# Patient Record
Sex: FEMALE | Race: WHITE | Employment: OTHER | URBAN - METROPOLITAN AREA
[De-identification: names, ages, dates, MRNs, and addresses within clinical notes are randomized per-mention and may not be internally consistent; named-entity substitution may affect disease eponyms.]

---

## 2017-01-11 ENCOUNTER — OFFICE VISIT (OUTPATIENT)
Dept: BEHAVIORAL/MENTAL HEALTH CLINIC | Age: 42
End: 2017-01-11

## 2017-01-11 VITALS
OXYGEN SATURATION: 95 % | HEIGHT: 67 IN | WEIGHT: 229 LBS | SYSTOLIC BLOOD PRESSURE: 123 MMHG | DIASTOLIC BLOOD PRESSURE: 86 MMHG | HEART RATE: 101 BPM | BODY MASS INDEX: 35.94 KG/M2

## 2017-01-11 DIAGNOSIS — F13.20 BENZODIAZEPINE DEPENDENCE (HCC): ICD-10-CM

## 2017-01-11 DIAGNOSIS — F41.9 ANXIETY: ICD-10-CM

## 2017-01-11 DIAGNOSIS — Z76.5 MALINGERING: Primary | ICD-10-CM

## 2017-01-11 NOTE — PATIENT INSTRUCTIONS
Sleep Tips    What to avoid    · Do not have drinks with caffeine, such as coffee or black tea, for 8 hours before bed. · Do not smoke or use other types of tobacco near bedtime. Nicotine is a stimulant and can keep you awake. · Avoid drinking alcohol late in the evening, because it can cause you to wake in the middle of the night. · Do not eat a big meal close to bedtime. If you are hungry, eat a light snack. · Do not drink a lot of water close to bedtime, because the need to urinate may wake you up during the night. · Do not read or watch TV in bed. Use the bed only for sleeping and sexual activity. What to try    · Go to bed at the same time every night, and wake up at the same time every morning. Do not take naps during the day. · Keep your bedroom quiet, dark, and cool. · Get regular exercise, but not within 3 to 4 hours of your bedtime. · Sleep on a comfortable pillow and mattress. · If watching the clock makes you anxious, turn it facing away from you so you cannot see the time. · If you worry when you lie down, start a worry book. Well before bedtime, write down your worries, and then set the book and your concerns aside. · Try meditation or other relaxation techniques before you go to bed. · If you cannot fall asleep, get up and go to another room until you feel sleepy. Do something relaxing. Repeat your bedtime routine before you go to bed again. Make your house quiet and calm about an hour before bedtime. Turn down the lights, turn off the TV, log off the computer, and turn down the volume on music. This can help you relax after a busy day. Substance Abuse Resources    Alcoholics Anonymous Hotline (XX)                        #623-1048    Narcotics Anonymous Hotline (NA)   #7-382-783-583-616-4410    [facility]   [address]   #[phone]   Http://[email or web address] LinguaLeo/index.htm    [employer] [facility])   [phone]      [address] [place], [place] [address] [facility]   [phone]       [facility] [address]    [facility] at [address] [place] [address]   Admissions: [phone]    [facility] for [facility]   [phone]    · [email or web address]

## 2017-01-11 NOTE — PROGRESS NOTES
Psychiatric Outpatient Progress Note    Account Number:  [id number]  Name: Steven Thakur    SUBJECTIVE:   CHIEF COMPLAINT:  Steven Thakur is a 39 y.o. female and was seen today for follow-up of psychiatric condition and psychotropic medication management. Client came early for the appointment. HPI:    Alon Beard reports the following psychiatric symptoms:  depression and anxiety. The symptoms have been present for few years and are of moderate to high severity.  Client reported sleep for 8-10 hrs and feels rested. Reported increased irritability at home over family members. Client was not sure about her medication regimen and ? Med compliance. She reported taking quetiapine. Does not remember tapering off of Duloxetine. Did not take paroxetine. Contributing factors include: social anxiety and financial stressors. Patient denies SI/HI/SIB. Side Effects:  none      Fam/Soc Hx (from [place] with updates):    Family History   Problem Relation Age of Onset    Hypertension Mother     Elevated Lipids Mother     Cancer Father      pancreatic    Thyroid Disease Paternal Aunt     Heart Disease Maternal Grandmother     Heart Disease Maternal Grandfather     Heart Disease Paternal Grandfather     Diabetes Paternal Aunt     Alcohol abuse Neg Hx     Arthritis-rheumatoid Neg Hx     Asthma Neg Hx     Bleeding Prob Neg Hx     Headache Neg Hx     Lung Disease Neg Hx     Migraines Neg Hx     Psychiatric Disorder Neg Hx     Stroke Neg Hx     Mental Retardation Neg Hx     Anesth Problems Neg Hx       Social History   Substance Use Topics    Smoking status: Current Every Day Smoker     Packs/day: 1.00    Smokeless tobacco: Never Used    Alcohol use No       REVIEW OF SYSTEMS:  Psychiatric:  depression, anxiety and social anxiety.   Appetite:no change from normal   Sleep: poor with difficulty initiating                  Mental Status exam: WNL except for      Sensorium  oriented to time, place and person   Relations cooperative    Eye Contact    appropriate   Appearance:  age appropriate, casually dressed, overweight and within normal Limits   Motor Behavior/Gait:  gait stable and within normal limits   Speech:  normal pitch and normal volume   Thought Process: goal directed, logical and within normal limits   Thought Content free of delusions and free of hallucinations   Suicidal ideations no plan , no intention and none   Homicidal ideations no plan , no intention and none   Mood:  anxious and irritability   Affect:  Anxious and mood-congruent   Memory recent  adequate   Memory remote:  adequate   Concentration:  adequate   Abstraction:  abstract   Insight:  fair   Reliability fair   Judgment:  fair       MEDICAL DECISION MAKING  Data: pertinent labs, imaging, medical records and diagnostic tests reviewed and incorporated in diagnosis and treatment plan    Allergies   Allergen Reactions    Ciprocinonide Hives     cipro    Demerol [Meperidine] Other (comments)     Goofy feeling, hallucinates        Current Outpatient Prescriptions   Medication Sig Dispense Refill    clonazePAM (KLONOPIN) 1 mg tablet Take 1 Tab by mouth daily. Max Daily Amount: 1 mg. 60 Tab 0    busPIRone (BUSPAR) 5 mg tablet Take 1 Tab by mouth three (3) times daily. 90 Tab 0    QUEtiapine (SEROQUEL) 100 mg tablet Take 1 Tab by mouth daily. 30 Tab 0    estradiol (ESTRACE) 0.5 mg tablet Take 0.5 mg by mouth daily.  pantoprazole (PROTONIX) 40 mg tablet Take 1 Tab by mouth daily. 30 Tab 3    GLUMETZA 1,000 mg TG24 24 hour tablet Take 1,000 mg by mouth nightly. 30 Tab 4    pravastatin (PRAVACHOL) 40 mg tablet TAKE ONE TABLET BY MOUTH NIGHTLY 30 Tab 4    fenofibrate micronized (LOFIBRA) 134 mg capsule Take 1 Cap by mouth daily. 30 Cap 4    sitaGLIPtin (JANUVIA) 100 mg tablet Take 1 Tab by mouth daily. 30 Tab 5    ranitidine (ZANTAC) 150 mg tablet Take 1 Tab by mouth nightly.  60 Tab 4    oxybutynin chloride XL (DITROPAN XL) 10 mg CR tablet Take 1 Tab by mouth nightly. TAKE ONE TABLET BY MOUTH EVERY DAY 30 Tab 4    terconazole (TERAZOL 7) 0.4 % vaginal cream Insert 1 Applicator into vagina nightly. 45 g 0    cholecalciferol, vitamin D3, (VITAMIN D3) 2,000 unit tab Take  by mouth.  Lancets (ONE TOUCH ULTRASOFT LANCETS) Misc Use as directed   1 Package 11    Blood-Glucose Meter (ONE TOUCH ULTRA SYSTEM KIT) monitoring kit Use as directed 1 Kit 0    glucose blood VI test strips (ONE TOUCH ULTRA TEST) strip Monitor BS BID  Dx: 250.02   1 Package 11    psyllium (METAMUCIL) 1.7 g Wafr Take  by mouth.  PARoxetine (PAXIL) 20 mg tablet Take 1 Tab by mouth daily. 30 Tab 0    dapagliflozin (FARXIGA) 5 mg tab tablet Take 1 Tab by mouth daily. 30 Tab 3    aspirin delayed-release 81 mg tablet Take 1 Tab by mouth daily. 30 Tab 3        Visit Vitals    /86 (BP 1 Location: Left arm, BP Patient Position: Sitting)    Pulse (!) 101    Ht 5' 7\" (1.702 m)    Wt 103.9 kg (229 lb)    LMP 09/10/2014 (Exact Date)    SpO2 95%    BMI 35.87 kg/m2         Problems addressed today:   anxiety, depression, social anxiety, R/o Mood disorder, malingering    Assessment:  reviewed- last filled clonazepam and zolpidem on 12/16/ 16 by Dr. Eris Ennis and taking since June 2016. Has been on oxycodone till July 2016 by another provider , ELA Du Jessica Freeman  is a 39 y.o.  female  is partially responding to treatment. Reported irritability and angry at home, difficulty focus and concentrationClient is unable to recall her medication. She stated ' I am taking duloxetine\". She was informed that she ha been tapered off of it. Client began receiving clonazepam from her PCP. Currently taking 1 mg daily. Client was on ambien and clonazepam and was untruthful about it and stated that she is not taking clonazepam and zolpidem. Reviewed  with her. Reported sleeping for 6-10 hrs and taking quetiapine.  Never began taking paxil due to insurance issues. Client seems to be malingering and plan to taper off quetiapine and was informed client about tapering off and begin SSRI for anxiety. Client appeared drowsy, disoriented and ? under the influence. When informed that she has to stick to one provider for her psychiatric issues. She got up and dismissed the interview and left the office. Patient denies SI/HI/SIB. No evidence of AH/VH or delusions. Risk Scoring- chronic illnesses and prescription drug management    Treatment Plan:  1. Medications:          Medication Changes/Adjustments: No refills given     Current Outpatient Prescriptions   Medication Sig Dispense Refill    clonazePAM (KLONOPIN) 1 mg tablet Take 1 Tab by mouth daily. Max Daily Amount: 1 mg. 60 Tab 0    busPIRone (BUSPAR) 5 mg tablet Take 1 Tab by mouth three (3) times daily. 90 Tab 0    QUEtiapine (SEROQUEL) 100 mg tablet Take 1 Tab by mouth daily. 30 Tab 0    estradiol (ESTRACE) 0.5 mg tablet Take 0.5 mg by mouth daily.  pantoprazole (PROTONIX) 40 mg tablet Take 1 Tab by mouth daily. 30 Tab 3    GLUMETZA 1,000 mg TG24 24 hour tablet Take 1,000 mg by mouth nightly. 30 Tab 4    pravastatin (PRAVACHOL) 40 mg tablet TAKE ONE TABLET BY MOUTH NIGHTLY 30 Tab 4    fenofibrate micronized (LOFIBRA) 134 mg capsule Take 1 Cap by mouth daily. 30 Cap 4    sitaGLIPtin (JANUVIA) 100 mg tablet Take 1 Tab by mouth daily. 30 Tab 5    ranitidine (ZANTAC) 150 mg tablet Take 1 Tab by mouth nightly. 60 Tab 4    oxybutynin chloride XL (DITROPAN XL) 10 mg CR tablet Take 1 Tab by mouth nightly. TAKE ONE TABLET BY MOUTH EVERY DAY 30 Tab 4    terconazole (TERAZOL 7) 0.4 % vaginal cream Insert 1 Applicator into vagina nightly. 45 g 0    cholecalciferol, vitamin D3, (VITAMIN D3) 2,000 unit tab Take  by mouth.       Lancets (ONE TOUCH ULTRASOFT LANCETS) Misc Use as directed   1 Package 11    Blood-Glucose Meter (ONE TOUCH ULTRA SYSTEM KIT) monitoring kit Use as directed 1 Kit 0    glucose blood VI test strips (ONE TOUCH ULTRA TEST) strip Monitor BS BID  Dx: 250.02   1 Package 11    psyllium (METAMUCIL) 1.7 g Wafr Take  by mouth.  PARoxetine (PAXIL) 20 mg tablet Take 1 Tab by mouth daily. 30 Tab 0    dapagliflozin (FARXIGA) 5 mg tab tablet Take 1 Tab by mouth daily. 30 Tab 3    aspirin delayed-release 81 mg tablet Take 1 Tab by mouth daily. 30 Tab 3                  The following regarding medications was addressed:    (The risks and benefits of the proposed medication; the potential medication side effects ie    dry mouth, weight gain, GI upset, headache; patient given opportunity to ask questions)       2. Counseling and coordination of care including instructions for treatment, risks/benefits, risk factor reduction and patient/family education. She agrees with the plan. Patient instructed to call with any side effects, questions or issues. Instructed patient to call the clinic, and if after hours call the provider on call ifclient experiences any suicidal thought or ideas to hurt self or other. Also instructed to call 911 or go to the ED. Patient verbalized understanding and agreed to call    3. Follow-up Disposition: Not on File      PSYCHOTHERAPY:  approx 20 minutes  Type:  Supportive/Cognitive Behavioral psychotherapy provided  Focus:     Current problems- social anxiety- discussed on medication use and benzodiazepione use and seeing one provider for psychiatric diagnoses. Psychoeducation provided  Treatment plan reviewed with patient-including diagnosis and medications    Leonardo Gaviria is progressing.     Dara Wan NP  1/11/2017

## 2017-01-16 ENCOUNTER — TELEPHONE (OUTPATIENT)
Dept: BEHAVIORAL/MENTAL HEALTH CLINIC | Age: 42
End: 2017-01-16

## 2017-01-16 DIAGNOSIS — G47.00 INSOMNIA, UNSPECIFIED TYPE: ICD-10-CM

## 2017-01-17 RX ORDER — ZOLPIDEM TARTRATE 10 MG/1
10 TABLET ORAL
Qty: 30 TAB | Refills: 0 | Status: SHIPPED | OUTPATIENT
Start: 2017-01-17 | End: 2017-01-18

## 2017-01-18 ENCOUNTER — ANESTHESIA EVENT (OUTPATIENT)
Dept: SURGERY | Age: 42
End: 2017-01-18
Payer: MEDICAID

## 2017-01-18 RX ORDER — QUETIAPINE FUMARATE 100 MG/1
100 TABLET, FILM COATED ORAL
Status: ON HOLD | COMMUNITY
End: 2017-01-20 | Stop reason: SDUPTHER

## 2017-01-19 ENCOUNTER — ANESTHESIA (OUTPATIENT)
Dept: SURGERY | Age: 42
End: 2017-01-19
Payer: MEDICAID

## 2017-01-19 ENCOUNTER — HOSPITAL ENCOUNTER (OUTPATIENT)
Age: 42
Setting detail: OBSERVATION
Discharge: HOME HEALTH | End: 2017-01-20
Attending: ORTHOPAEDIC SURGERY | Admitting: ORTHOPAEDIC SURGERY
Payer: MEDICAID

## 2017-01-19 LAB
GLUCOSE BLD STRIP.AUTO-MCNC: 169 MG/DL (ref 65–100)
GLUCOSE BLD STRIP.AUTO-MCNC: 198 MG/DL (ref 65–100)
GLUCOSE BLD STRIP.AUTO-MCNC: 235 MG/DL (ref 65–100)
GLUCOSE BLD STRIP.AUTO-MCNC: 275 MG/DL (ref 65–100)
GLUCOSE BLD STRIP.AUTO-MCNC: 482 MG/DL (ref 65–100)
SERVICE CMNT-IMP: ABNORMAL

## 2017-01-19 PROCEDURE — 74011000250 HC RX REV CODE- 250: Performed by: ORTHOPAEDIC SURGERY

## 2017-01-19 PROCEDURE — 74011250636 HC RX REV CODE- 250/636: Performed by: ORTHOPAEDIC SURGERY

## 2017-01-19 PROCEDURE — 74011636637 HC RX REV CODE- 636/637: Performed by: INTERNAL MEDICINE

## 2017-01-19 PROCEDURE — 77030006835 HC BLD SAW SAG STRY -B: Performed by: ORTHOPAEDIC SURGERY

## 2017-01-19 PROCEDURE — 77030003601 HC NDL NRV BLK BBMI -A

## 2017-01-19 PROCEDURE — 76210000016 HC OR PH I REC 1 TO 1.5 HR: Performed by: ORTHOPAEDIC SURGERY

## 2017-01-19 PROCEDURE — 74011000250 HC RX REV CODE- 250

## 2017-01-19 PROCEDURE — 77030011640 HC PAD GRND REM COVD -A: Performed by: ORTHOPAEDIC SURGERY

## 2017-01-19 PROCEDURE — 77030019908 HC STETH ESOPH SIMS -A: Performed by: ANESTHESIOLOGY

## 2017-01-19 PROCEDURE — 77030013079 HC BLNKT BAIR HGGR 3M -A: Performed by: ANESTHESIOLOGY

## 2017-01-19 PROCEDURE — 74011000258 HC RX REV CODE- 258

## 2017-01-19 PROCEDURE — 77030020788: Performed by: ORTHOPAEDIC SURGERY

## 2017-01-19 PROCEDURE — 77030006784 HC BLD SAW OSC MCRA -B: Performed by: ORTHOPAEDIC SURGERY

## 2017-01-19 PROCEDURE — 74011250637 HC RX REV CODE- 250/637: Performed by: PHYSICIAN ASSISTANT

## 2017-01-19 PROCEDURE — 77030020753 HC CUF TRNQT 1BLA STRY -B: Performed by: ORTHOPAEDIC SURGERY

## 2017-01-19 PROCEDURE — 77030003666 HC NDL SPINAL BD -A: Performed by: ANESTHESIOLOGY

## 2017-01-19 PROCEDURE — 99218 HC RM OBSERVATION: CPT

## 2017-01-19 PROCEDURE — 82962 GLUCOSE BLOOD TEST: CPT

## 2017-01-19 PROCEDURE — 77030018822 HC SLV COMPR FT COVD -B

## 2017-01-19 PROCEDURE — 76010000161 HC OR TIME 1 TO 1.5 HR INTENSV-TIER 1: Performed by: ORTHOPAEDIC SURGERY

## 2017-01-19 PROCEDURE — 77030007866 HC KT SPN ANES BBMI -B: Performed by: ANESTHESIOLOGY

## 2017-01-19 PROCEDURE — 77030034850: Performed by: ORTHOPAEDIC SURGERY

## 2017-01-19 PROCEDURE — 77030018836 HC SOL IRR NACL ICUM -A: Performed by: ORTHOPAEDIC SURGERY

## 2017-01-19 PROCEDURE — 76060000033 HC ANESTHESIA 1 TO 1.5 HR: Performed by: ORTHOPAEDIC SURGERY

## 2017-01-19 PROCEDURE — 74011636637 HC RX REV CODE- 636/637: Performed by: ANESTHESIOLOGY

## 2017-01-19 PROCEDURE — 77030026763 HC BN CEM J&J -E: Performed by: ORTHOPAEDIC SURGERY

## 2017-01-19 PROCEDURE — 77030028224 HC PDNG CST BSNM -A: Performed by: ORTHOPAEDIC SURGERY

## 2017-01-19 PROCEDURE — 74011250636 HC RX REV CODE- 250/636

## 2017-01-19 PROCEDURE — 77030006813 HC BLD SAW RECIP STRY -C: Performed by: ORTHOPAEDIC SURGERY

## 2017-01-19 PROCEDURE — 74011000258 HC RX REV CODE- 258: Performed by: ORTHOPAEDIC SURGERY

## 2017-01-19 PROCEDURE — 77030008467 HC STPLR SKN COVD -B: Performed by: ORTHOPAEDIC SURGERY

## 2017-01-19 PROCEDURE — 74011250636 HC RX REV CODE- 250/636: Performed by: PHYSICIAN ASSISTANT

## 2017-01-19 PROCEDURE — 74011000250 HC RX REV CODE- 250: Performed by: ANESTHESIOLOGY

## 2017-01-19 PROCEDURE — 74011250636 HC RX REV CODE- 250/636: Performed by: ANESTHESIOLOGY

## 2017-01-19 PROCEDURE — C9290 INJ, BUPIVACAINE LIPOSOME: HCPCS | Performed by: ORTHOPAEDIC SURGERY

## 2017-01-19 PROCEDURE — C1776 JOINT DEVICE (IMPLANTABLE): HCPCS | Performed by: ORTHOPAEDIC SURGERY

## 2017-01-19 PROCEDURE — 77030031139 HC SUT VCRL2 J&J -A: Performed by: ORTHOPAEDIC SURGERY

## 2017-01-19 DEVICE — CEMENT BNE 40GM FULL DOSE PMMA W/ GENT HI VISC RADPQ LNG: Type: IMPLANTABLE DEVICE | Site: KNEE | Status: FUNCTIONAL

## 2017-01-19 DEVICE — PAT INST NXGN 32MM POLYETH --: Type: IMPLANTABLE DEVICE | Site: KNEE | Status: FUNCTIONAL

## 2017-01-19 DEVICE — IMPLANTABLE DEVICE: Type: IMPLANTABLE DEVICE | Site: KNEE | Status: FUNCTIONAL

## 2017-01-19 RX ORDER — MIDAZOLAM HYDROCHLORIDE 1 MG/ML
INJECTION, SOLUTION INTRAMUSCULAR; INTRAVENOUS AS NEEDED
Status: DISCONTINUED | OUTPATIENT
Start: 2017-01-19 | End: 2017-01-19 | Stop reason: HOSPADM

## 2017-01-19 RX ORDER — ONDANSETRON 2 MG/ML
4 INJECTION INTRAMUSCULAR; INTRAVENOUS
Status: DISCONTINUED | OUTPATIENT
Start: 2017-01-19 | End: 2017-01-20 | Stop reason: HOSPADM

## 2017-01-19 RX ORDER — MIDAZOLAM HYDROCHLORIDE 1 MG/ML
1 INJECTION, SOLUTION INTRAMUSCULAR; INTRAVENOUS AS NEEDED
Status: DISCONTINUED | OUTPATIENT
Start: 2017-01-19 | End: 2017-01-19 | Stop reason: HOSPADM

## 2017-01-19 RX ORDER — BUPIVACAINE HYDROCHLORIDE 7.5 MG/ML
INJECTION, SOLUTION EPIDURAL; RETROBULBAR AS NEEDED
Status: DISCONTINUED | OUTPATIENT
Start: 2017-01-19 | End: 2017-01-19 | Stop reason: HOSPADM

## 2017-01-19 RX ORDER — FENOFIBRATE 145 MG/1
145 TABLET, COATED ORAL DAILY
Status: DISCONTINUED | OUTPATIENT
Start: 2017-01-20 | End: 2017-01-20 | Stop reason: HOSPADM

## 2017-01-19 RX ORDER — CEFAZOLIN SODIUM IN 0.9 % NACL 2 G/100 ML
PLASTIC BAG, INJECTION (ML) INTRAVENOUS AS NEEDED
Status: DISCONTINUED | OUTPATIENT
Start: 2017-01-19 | End: 2017-01-19 | Stop reason: HOSPADM

## 2017-01-19 RX ORDER — MORPHINE SULFATE 10 MG/ML
2 INJECTION, SOLUTION INTRAMUSCULAR; INTRAVENOUS
Status: DISCONTINUED | OUTPATIENT
Start: 2017-01-19 | End: 2017-01-19 | Stop reason: HOSPADM

## 2017-01-19 RX ORDER — SODIUM CHLORIDE 0.9 % (FLUSH) 0.9 %
5-10 SYRINGE (ML) INJECTION EVERY 8 HOURS
Status: DISCONTINUED | OUTPATIENT
Start: 2017-01-19 | End: 2017-01-19 | Stop reason: HOSPADM

## 2017-01-19 RX ORDER — SODIUM CHLORIDE 0.9 % (FLUSH) 0.9 %
5-10 SYRINGE (ML) INJECTION AS NEEDED
Status: DISCONTINUED | OUTPATIENT
Start: 2017-01-19 | End: 2017-01-20 | Stop reason: HOSPADM

## 2017-01-19 RX ORDER — BUSPIRONE HYDROCHLORIDE 5 MG/1
5 TABLET ORAL 3 TIMES DAILY
Status: DISCONTINUED | OUTPATIENT
Start: 2017-01-19 | End: 2017-01-20 | Stop reason: HOSPADM

## 2017-01-19 RX ORDER — DEXTROSE 50 % IN WATER (D50W) INTRAVENOUS SYRINGE
12.5-25 AS NEEDED
Status: DISCONTINUED | OUTPATIENT
Start: 2017-01-19 | End: 2017-01-20 | Stop reason: HOSPADM

## 2017-01-19 RX ORDER — NALOXONE HYDROCHLORIDE 0.4 MG/ML
0.4 INJECTION, SOLUTION INTRAMUSCULAR; INTRAVENOUS; SUBCUTANEOUS AS NEEDED
Status: DISCONTINUED | OUTPATIENT
Start: 2017-01-19 | End: 2017-01-20 | Stop reason: HOSPADM

## 2017-01-19 RX ORDER — METFORMIN HYDROCHLORIDE 500 MG/1
1000 TABLET, EXTENDED RELEASE ORAL
Status: DISCONTINUED | OUTPATIENT
Start: 2017-01-19 | End: 2017-01-20 | Stop reason: HOSPADM

## 2017-01-19 RX ORDER — DEXAMETHASONE SODIUM PHOSPHATE 4 MG/ML
10 INJECTION, SOLUTION INTRA-ARTICULAR; INTRALESIONAL; INTRAMUSCULAR; INTRAVENOUS; SOFT TISSUE ONCE
Status: COMPLETED | OUTPATIENT
Start: 2017-01-20 | End: 2017-01-20

## 2017-01-19 RX ORDER — ONDANSETRON 2 MG/ML
INJECTION INTRAMUSCULAR; INTRAVENOUS AS NEEDED
Status: DISCONTINUED | OUTPATIENT
Start: 2017-01-19 | End: 2017-01-19 | Stop reason: HOSPADM

## 2017-01-19 RX ORDER — SODIUM CHLORIDE 0.9 % (FLUSH) 0.9 %
5-10 SYRINGE (ML) INJECTION EVERY 8 HOURS
Status: DISCONTINUED | OUTPATIENT
Start: 2017-01-20 | End: 2017-01-20 | Stop reason: HOSPADM

## 2017-01-19 RX ORDER — CEFAZOLIN SODIUM IN 0.9 % NACL 2 G/50 ML
2 INTRAVENOUS SOLUTION, PIGGYBACK (ML) INTRAVENOUS EVERY 8 HOURS
Status: DISCONTINUED | OUTPATIENT
Start: 2017-01-19 | End: 2017-01-19 | Stop reason: CLARIF

## 2017-01-19 RX ORDER — INSULIN LISPRO 100 [IU]/ML
6 INJECTION, SOLUTION INTRAVENOUS; SUBCUTANEOUS ONCE
Status: COMPLETED | OUTPATIENT
Start: 2017-01-19 | End: 2017-01-19

## 2017-01-19 RX ORDER — FACIAL-BODY WIPES
10 EACH TOPICAL DAILY PRN
Status: DISCONTINUED | OUTPATIENT
Start: 2017-01-21 | End: 2017-01-20 | Stop reason: HOSPADM

## 2017-01-19 RX ORDER — VANCOMYCIN 2 GRAM/500 ML IN 0.9 % SODIUM CHLORIDE INTRAVENOUS
2000 EVERY 12 HOURS
Status: COMPLETED | OUTPATIENT
Start: 2017-01-19 | End: 2017-01-20

## 2017-01-19 RX ORDER — ASPIRIN 325 MG
325 TABLET, DELAYED RELEASE (ENTERIC COATED) ORAL 2 TIMES DAILY
Status: DISCONTINUED | OUTPATIENT
Start: 2017-01-19 | End: 2017-01-20 | Stop reason: HOSPADM

## 2017-01-19 RX ORDER — INSULIN LISPRO 100 [IU]/ML
INJECTION, SOLUTION INTRAVENOUS; SUBCUTANEOUS
Status: DISCONTINUED | OUTPATIENT
Start: 2017-01-20 | End: 2017-01-20 | Stop reason: HOSPADM

## 2017-01-19 RX ORDER — SODIUM CHLORIDE, SODIUM LACTATE, POTASSIUM CHLORIDE, CALCIUM CHLORIDE 600; 310; 30; 20 MG/100ML; MG/100ML; MG/100ML; MG/100ML
INJECTION, SOLUTION INTRAVENOUS
Status: DISCONTINUED | OUTPATIENT
Start: 2017-01-19 | End: 2017-01-19 | Stop reason: HOSPADM

## 2017-01-19 RX ORDER — OXYCODONE HYDROCHLORIDE 5 MG/1
10 TABLET ORAL
Status: DISCONTINUED | OUTPATIENT
Start: 2017-01-19 | End: 2017-01-20 | Stop reason: HOSPADM

## 2017-01-19 RX ORDER — FENTANYL CITRATE 50 UG/ML
INJECTION, SOLUTION INTRAMUSCULAR; INTRAVENOUS AS NEEDED
Status: DISCONTINUED | OUTPATIENT
Start: 2017-01-19 | End: 2017-01-19 | Stop reason: HOSPADM

## 2017-01-19 RX ORDER — ONDANSETRON 2 MG/ML
4 INJECTION INTRAMUSCULAR; INTRAVENOUS AS NEEDED
Status: DISCONTINUED | OUTPATIENT
Start: 2017-01-19 | End: 2017-01-19 | Stop reason: HOSPADM

## 2017-01-19 RX ORDER — PHENYLEPHRINE HCL IN 0.9% NACL 0.4MG/10ML
SYRINGE (ML) INTRAVENOUS AS NEEDED
Status: DISCONTINUED | OUTPATIENT
Start: 2017-01-19 | End: 2017-01-19 | Stop reason: HOSPADM

## 2017-01-19 RX ORDER — MAGNESIUM SULFATE 100 %
4 CRYSTALS MISCELLANEOUS AS NEEDED
Status: DISCONTINUED | OUTPATIENT
Start: 2017-01-19 | End: 2017-01-20 | Stop reason: HOSPADM

## 2017-01-19 RX ORDER — KETOROLAC TROMETHAMINE 30 MG/ML
30 INJECTION, SOLUTION INTRAMUSCULAR; INTRAVENOUS EVERY 6 HOURS
Status: COMPLETED | OUTPATIENT
Start: 2017-01-19 | End: 2017-01-20

## 2017-01-19 RX ORDER — OXYBUTYNIN CHLORIDE 5 MG/1
10 TABLET, EXTENDED RELEASE ORAL
Status: DISCONTINUED | OUTPATIENT
Start: 2017-01-19 | End: 2017-01-20 | Stop reason: HOSPADM

## 2017-01-19 RX ORDER — OXYCODONE HYDROCHLORIDE 5 MG/1
5 TABLET ORAL
Status: DISCONTINUED | OUTPATIENT
Start: 2017-01-19 | End: 2017-01-20 | Stop reason: HOSPADM

## 2017-01-19 RX ORDER — OXYCODONE AND ACETAMINOPHEN 5; 325 MG/1; MG/1
1 TABLET ORAL AS NEEDED
Status: DISCONTINUED | OUTPATIENT
Start: 2017-01-19 | End: 2017-01-19 | Stop reason: HOSPADM

## 2017-01-19 RX ORDER — FENTANYL CITRATE 50 UG/ML
50 INJECTION, SOLUTION INTRAMUSCULAR; INTRAVENOUS AS NEEDED
Status: DISCONTINUED | OUTPATIENT
Start: 2017-01-19 | End: 2017-01-19 | Stop reason: HOSPADM

## 2017-01-19 RX ORDER — LIDOCAINE HYDROCHLORIDE 10 MG/ML
0.1 INJECTION, SOLUTION EPIDURAL; INFILTRATION; INTRACAUDAL; PERINEURAL AS NEEDED
Status: DISCONTINUED | OUTPATIENT
Start: 2017-01-19 | End: 2017-01-19 | Stop reason: HOSPADM

## 2017-01-19 RX ORDER — PANTOPRAZOLE SODIUM 40 MG/1
40 TABLET, DELAYED RELEASE ORAL
Status: DISCONTINUED | OUTPATIENT
Start: 2017-01-19 | End: 2017-01-20 | Stop reason: HOSPADM

## 2017-01-19 RX ORDER — SODIUM CHLORIDE 9 MG/ML
125 INJECTION, SOLUTION INTRAVENOUS CONTINUOUS
Status: DISPENSED | OUTPATIENT
Start: 2017-01-19 | End: 2017-01-20

## 2017-01-19 RX ORDER — MIDAZOLAM HYDROCHLORIDE 1 MG/ML
0.5 INJECTION, SOLUTION INTRAMUSCULAR; INTRAVENOUS
Status: DISCONTINUED | OUTPATIENT
Start: 2017-01-19 | End: 2017-01-19 | Stop reason: HOSPADM

## 2017-01-19 RX ORDER — SODIUM CHLORIDE, SODIUM LACTATE, POTASSIUM CHLORIDE, CALCIUM CHLORIDE 600; 310; 30; 20 MG/100ML; MG/100ML; MG/100ML; MG/100ML
125 INJECTION, SOLUTION INTRAVENOUS CONTINUOUS
Status: DISCONTINUED | OUTPATIENT
Start: 2017-01-19 | End: 2017-01-19 | Stop reason: HOSPADM

## 2017-01-19 RX ORDER — POLYETHYLENE GLYCOL 3350 17 G/17G
17 POWDER, FOR SOLUTION ORAL DAILY
Status: DISCONTINUED | OUTPATIENT
Start: 2017-01-20 | End: 2017-01-20 | Stop reason: HOSPADM

## 2017-01-19 RX ORDER — ESTRADIOL 1 MG/1
0.5 TABLET ORAL DAILY
Status: DISCONTINUED | OUTPATIENT
Start: 2017-01-20 | End: 2017-01-20 | Stop reason: HOSPADM

## 2017-01-19 RX ORDER — SODIUM CHLORIDE 0.9 % (FLUSH) 0.9 %
5-10 SYRINGE (ML) INJECTION AS NEEDED
Status: DISCONTINUED | OUTPATIENT
Start: 2017-01-19 | End: 2017-01-19 | Stop reason: HOSPADM

## 2017-01-19 RX ORDER — DIPHENHYDRAMINE HYDROCHLORIDE 50 MG/ML
12.5 INJECTION, SOLUTION INTRAMUSCULAR; INTRAVENOUS AS NEEDED
Status: DISCONTINUED | OUTPATIENT
Start: 2017-01-19 | End: 2017-01-19 | Stop reason: HOSPADM

## 2017-01-19 RX ORDER — HYDROMORPHONE HYDROCHLORIDE 1 MG/ML
0.2 INJECTION, SOLUTION INTRAMUSCULAR; INTRAVENOUS; SUBCUTANEOUS
Status: DISCONTINUED | OUTPATIENT
Start: 2017-01-19 | End: 2017-01-19 | Stop reason: HOSPADM

## 2017-01-19 RX ORDER — SODIUM CHLORIDE 9 MG/ML
25 INJECTION, SOLUTION INTRAVENOUS CONTINUOUS
Status: DISCONTINUED | OUTPATIENT
Start: 2017-01-19 | End: 2017-01-19 | Stop reason: HOSPADM

## 2017-01-19 RX ORDER — HYDROXYZINE HYDROCHLORIDE 10 MG/1
10 TABLET, FILM COATED ORAL
Status: DISCONTINUED | OUTPATIENT
Start: 2017-01-19 | End: 2017-01-20 | Stop reason: HOSPADM

## 2017-01-19 RX ORDER — QUETIAPINE FUMARATE 100 MG/1
100 TABLET, FILM COATED ORAL
Status: DISCONTINUED | OUTPATIENT
Start: 2017-01-19 | End: 2017-01-20 | Stop reason: HOSPADM

## 2017-01-19 RX ORDER — FENTANYL CITRATE 50 UG/ML
25 INJECTION, SOLUTION INTRAMUSCULAR; INTRAVENOUS
Status: DISCONTINUED | OUTPATIENT
Start: 2017-01-19 | End: 2017-01-19 | Stop reason: HOSPADM

## 2017-01-19 RX ORDER — PROPOFOL 10 MG/ML
INJECTION, EMULSION INTRAVENOUS
Status: DISCONTINUED | OUTPATIENT
Start: 2017-01-19 | End: 2017-01-19 | Stop reason: HOSPADM

## 2017-01-19 RX ORDER — RANITIDINE 150 MG/1
150 TABLET, FILM COATED ORAL
Status: DISCONTINUED | OUTPATIENT
Start: 2017-01-19 | End: 2017-01-19 | Stop reason: CLARIF

## 2017-01-19 RX ORDER — FAMOTIDINE 20 MG/1
20 TABLET, FILM COATED ORAL
Status: DISCONTINUED | OUTPATIENT
Start: 2017-01-19 | End: 2017-01-20 | Stop reason: HOSPADM

## 2017-01-19 RX ORDER — PRAVASTATIN SODIUM 20 MG/1
40 TABLET ORAL
Status: DISCONTINUED | OUTPATIENT
Start: 2017-01-19 | End: 2017-01-20 | Stop reason: HOSPADM

## 2017-01-19 RX ORDER — AMOXICILLIN 250 MG
1 CAPSULE ORAL 2 TIMES DAILY
Status: DISCONTINUED | OUTPATIENT
Start: 2017-01-19 | End: 2017-01-20 | Stop reason: HOSPADM

## 2017-01-19 RX ORDER — ACETAMINOPHEN 325 MG/1
650 TABLET ORAL EVERY 6 HOURS
Status: DISCONTINUED | OUTPATIENT
Start: 2017-01-19 | End: 2017-01-20 | Stop reason: HOSPADM

## 2017-01-19 RX ORDER — ACETAMINOPHEN 325 MG/1
650 TABLET ORAL
Status: DISCONTINUED | OUTPATIENT
Start: 2017-01-19 | End: 2017-01-20 | Stop reason: HOSPADM

## 2017-01-19 RX ORDER — HYDROMORPHONE HYDROCHLORIDE 1 MG/ML
0.5 INJECTION, SOLUTION INTRAMUSCULAR; INTRAVENOUS; SUBCUTANEOUS
Status: DISCONTINUED | OUTPATIENT
Start: 2017-01-19 | End: 2017-01-20 | Stop reason: HOSPADM

## 2017-01-19 RX ORDER — DEXAMETHASONE SODIUM PHOSPHATE 4 MG/ML
INJECTION, SOLUTION INTRA-ARTICULAR; INTRALESIONAL; INTRAMUSCULAR; INTRAVENOUS; SOFT TISSUE AS NEEDED
Status: DISCONTINUED | OUTPATIENT
Start: 2017-01-19 | End: 2017-01-19 | Stop reason: HOSPADM

## 2017-01-19 RX ADMIN — QUETIAPINE FUMARATE 100 MG: 100 TABLET, FILM COATED ORAL at 21:50

## 2017-01-19 RX ADMIN — ASPIRIN 325 MG: 325 TABLET, DELAYED RELEASE ORAL at 18:34

## 2017-01-19 RX ADMIN — PRAVASTATIN SODIUM 40 MG: 20 TABLET ORAL at 21:48

## 2017-01-19 RX ADMIN — DOCUSATE SODIUM AND SENNOSIDES 1 TABLET: 8.6; 5 TABLET, FILM COATED ORAL at 18:34

## 2017-01-19 RX ADMIN — BUSPIRONE HYDROCHLORIDE 5 MG: 5 TABLET ORAL at 18:34

## 2017-01-19 RX ADMIN — SODIUM CHLORIDE, SODIUM LACTATE, POTASSIUM CHLORIDE, CALCIUM CHLORIDE: 600; 310; 30; 20 INJECTION, SOLUTION INTRAVENOUS at 13:26

## 2017-01-19 RX ADMIN — FENTANYL CITRATE 50 MCG: 50 INJECTION, SOLUTION INTRAMUSCULAR; INTRAVENOUS at 12:12

## 2017-01-19 RX ADMIN — Medication 2 G: at 12:23

## 2017-01-19 RX ADMIN — Medication 80 MCG: at 12:43

## 2017-01-19 RX ADMIN — Medication 80 MCG: at 12:38

## 2017-01-19 RX ADMIN — SODIUM CHLORIDE 125 ML/HR: 900 INJECTION, SOLUTION INTRAVENOUS at 23:46

## 2017-01-19 RX ADMIN — PANTOPRAZOLE SODIUM 40 MG: 40 TABLET, DELAYED RELEASE ORAL at 21:48

## 2017-01-19 RX ADMIN — Medication 40 MCG: at 12:20

## 2017-01-19 RX ADMIN — FENTANYL CITRATE 50 MCG: 50 INJECTION, SOLUTION INTRAMUSCULAR; INTRAVENOUS at 12:18

## 2017-01-19 RX ADMIN — DEXAMETHASONE SODIUM PHOSPHATE 10 MG: 4 INJECTION, SOLUTION INTRA-ARTICULAR; INTRALESIONAL; INTRAMUSCULAR; INTRAVENOUS; SOFT TISSUE at 12:56

## 2017-01-19 RX ADMIN — FAMOTIDINE 20 MG: 20 TABLET ORAL at 21:48

## 2017-01-19 RX ADMIN — OXYCODONE HYDROCHLORIDE 5 MG: 5 TABLET ORAL at 22:33

## 2017-01-19 RX ADMIN — SODIUM CHLORIDE, SODIUM LACTATE, POTASSIUM CHLORIDE, AND CALCIUM CHLORIDE 125 ML/HR: 600; 310; 30; 20 INJECTION, SOLUTION INTRAVENOUS at 11:52

## 2017-01-19 RX ADMIN — MIDAZOLAM HYDROCHLORIDE 2 MG: 1 INJECTION, SOLUTION INTRAMUSCULAR; INTRAVENOUS at 12:05

## 2017-01-19 RX ADMIN — ACETAMINOPHEN 650 MG: 325 TABLET, FILM COATED ORAL at 18:33

## 2017-01-19 RX ADMIN — KETOROLAC TROMETHAMINE 30 MG: 30 INJECTION, SOLUTION INTRAMUSCULAR at 23:44

## 2017-01-19 RX ADMIN — MIDAZOLAM HYDROCHLORIDE 1 MG: 1 INJECTION, SOLUTION INTRAMUSCULAR; INTRAVENOUS at 12:21

## 2017-01-19 RX ADMIN — BUPIVACAINE HYDROCHLORIDE 12.5 MG: 7.5 INJECTION, SOLUTION EPIDURAL; RETROBULBAR at 12:24

## 2017-01-19 RX ADMIN — LIDOCAINE HYDROCHLORIDE 0.1 ML: 10 INJECTION, SOLUTION EPIDURAL; INFILTRATION; INTRACAUDAL; PERINEURAL at 11:52

## 2017-01-19 RX ADMIN — ACETAMINOPHEN 650 MG: 325 TABLET, FILM COATED ORAL at 23:44

## 2017-01-19 RX ADMIN — SODIUM CHLORIDE, SODIUM LACTATE, POTASSIUM CHLORIDE, CALCIUM CHLORIDE: 600; 310; 30; 20 INJECTION, SOLUTION INTRAVENOUS at 12:00

## 2017-01-19 RX ADMIN — Medication 40 MCG: at 12:22

## 2017-01-19 RX ADMIN — Medication 80 MCG: at 12:24

## 2017-01-19 RX ADMIN — MIDAZOLAM HYDROCHLORIDE 1 MG: 1 INJECTION, SOLUTION INTRAMUSCULAR; INTRAVENOUS at 13:17

## 2017-01-19 RX ADMIN — FENTANYL CITRATE 50 MCG: 50 INJECTION, SOLUTION INTRAMUSCULAR; INTRAVENOUS at 12:05

## 2017-01-19 RX ADMIN — BUSPIRONE HYDROCHLORIDE 5 MG: 5 TABLET ORAL at 21:49

## 2017-01-19 RX ADMIN — SITAGLIPTIN 100 MG: 25 TABLET, FILM COATED ORAL at 21:46

## 2017-01-19 RX ADMIN — INSULIN LISPRO 6 UNITS: 100 INJECTION, SOLUTION INTRAVENOUS; SUBCUTANEOUS at 22:33

## 2017-01-19 RX ADMIN — Medication 40 MCG: at 12:41

## 2017-01-19 RX ADMIN — PROPOFOL 50 MCG/KG/MIN: 10 INJECTION, EMULSION INTRAVENOUS at 12:20

## 2017-01-19 RX ADMIN — KETOROLAC TROMETHAMINE 30 MG: 30 INJECTION, SOLUTION INTRAMUSCULAR at 18:33

## 2017-01-19 RX ADMIN — Medication 40 MCG: at 12:51

## 2017-01-19 RX ADMIN — HUMAN INSULIN 10 UNITS: 100 INJECTION, SOLUTION SUBCUTANEOUS at 12:05

## 2017-01-19 RX ADMIN — ONDANSETRON 4 MG: 2 INJECTION INTRAMUSCULAR; INTRAVENOUS at 13:38

## 2017-01-19 RX ADMIN — OXYBUTYNIN CHLORIDE 10 MG: 5 TABLET, EXTENDED RELEASE ORAL at 21:49

## 2017-01-19 RX ADMIN — SODIUM CHLORIDE 125 ML/HR: 900 INJECTION, SOLUTION INTRAVENOUS at 16:29

## 2017-01-19 RX ADMIN — MIDAZOLAM HYDROCHLORIDE 3 MG: 1 INJECTION, SOLUTION INTRAMUSCULAR; INTRAVENOUS at 12:18

## 2017-01-19 RX ADMIN — VANCOMYCIN HYDROCHLORIDE 2000 MG: 10 INJECTION, POWDER, LYOPHILIZED, FOR SOLUTION INTRAVENOUS at 18:50

## 2017-01-19 NOTE — PROGRESS NOTES
TRANSFER - IN REPORT:    Verbal report received from [facility] (name) on Ignacio Hammond  being received from PACU (unit) for routine post - op      Report consisted of patients Situation, Background, Assessment and   Recommendations(SBAR). Information from the following report(s) SBAR and Kardex was reviewed with the receiving nurse. Opportunity for questions and clarification was provided. Assessment completed upon patients arrival to unit and care assumed.

## 2017-01-19 NOTE — H&P
PRE-OP HISTORY AND PHYSICAL    Subjective:     Patient is a 39 y.o.  female presented with a history of right knee patellofemoral djd. Onset of symptoms was gradual with gradually worsening course since that time. She is being admitted for surgical management of this condition. The indications for the procedure include pain, swelling, functional decline, and failure of conservative management. 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    Diabetes mellitus without complication ([facility] 39/25/0181    Other specified disease of white blood cells 09/23/2011    Insomnia, unspecified 07/14/2010    Unspecified vitamin D deficiency 05/27/2010    Esophageal reflux 05/27/2010    Mixed hyperlipidemia 03/31/2010    Anxiety 06/03/2009     Past Medical History   Diagnosis Date    Abnormal Pap smear of cervix     Acid indigestion     Cancer ([facility]      choriocarcinoma  - UTERNE    Depression     Diabetes ([facility]     Dysthymic disorder     GERD (gastroesophageal reflux disease)     Hypertriglyceridemia     Mixed hyperlipidemia 3/31/2010    MRSA (methicillin resistant Staphylococcus aureus)     Psychiatric disorder      depression; ANXIETY    Urinary incontinence      URGENCY AND INCONTINENECE      Past Surgical History   Procedure Laterality Date    Hx appendectomy      Hx gyn  12/08     D & C     Hx partial hysterectomy Bilateral May 4 2015     Dr. Miguelina Garcia Hx orthopaedic       R ACL knee    Hx orthopaedic       BILATERAL A/S KNEES    Hx orthopaedic  4/2016     REMOVAL OF HARDWARE IN KNEE      Prior to Admission medications    Medication Sig Start Date End Date Taking? Authorizing Provider   QUEtiapine (SEROQUEL) 100 mg tablet Take 100 mg by mouth nightly.    Yes Historical Provider   busPIRone (BUSPAR) 5 mg tablet Take 1 Tab by mouth three (3) times daily. 12/15/16   Amarilis Dickey NP   estradiol (ESTRACE) 0.5 mg tablet Take 0.5 mg by mouth daily. 10/18/16   Historical Provider   pantoprazole (PROTONIX) 40 mg tablet Take 1 Tab by mouth daily. Patient taking differently: Take 40 mg by mouth nightly. 10/12/16   Ivette Amaral NP   GLUMETZA 1,000 mg TG24 24 hour tablet Take 1,000 mg by mouth nightly. 10/12/16   Ivette Amaral NP   pravastatin (PRAVACHOL) 40 mg tablet TAKE ONE TABLET BY MOUTH NIGHTLY 10/12/16   Ivette Amaral NP   fenofibrate micronized (LOFIBRA) 134 mg capsule Take 1 Cap by mouth daily. 10/12/16   Yudith Rivera NP   sitaGLIPtin (JANUVIA) 100 mg tablet Take 1 Tab by mouth daily. Patient taking differently: Take 100 mg by mouth nightly. 10/12/16   Ivette Amaral NP   ranitidine (ZANTAC) 150 mg tablet Take 1 Tab by mouth nightly. 10/12/16   Ivette Amaral NP   oxybutynin chloride XL (DITROPAN XL) 10 mg CR tablet Take 1 Tab by mouth nightly. TAKE ONE TABLET BY MOUTH EVERY DAY 10/12/16   Ivette Amaral NP   terconazole (TERAZOL 7) 0.4 % vaginal cream Insert 1 Applicator into vagina nightly.  6/15/16   YANELIS Fonseca   Lancets (ONE TOUCH ULTRASOFT LANCETS) Misc Use as directed   6/11/12   Jonnathan Ventura MD   Blood-Glucose Meter (ONE TOUCH ULTRA SYSTEM KIT) monitoring kit Use as directed 6/11/12   Jonnathan Ventura MD   glucose blood VI test strips (ONE TOUCH ULTRA TEST) strip Monitor BS BID  Dx: 250.02   6/11/12   Jonnathan Ventura MD     Allergies   Allergen Reactions    Ciprocinonide Hives     cipro    Demerol [Meperidine] Other (comments)     Goofy feeling, hallucinates      Social History   Substance Use Topics    Smoking status: Current Every Day Smoker     Packs/day: 1.00     Years: 25.00    Smokeless tobacco: Never Used    Alcohol use No      Family History   Problem Relation Age of Onset    Hypertension Mother     Elevated Lipids Mother     Cancer Father pancreatic    Thyroid Disease Paternal Aunt     Heart Disease Maternal Grandmother     Heart Disease Maternal Grandfather     Heart Disease Paternal Grandfather     Diabetes Paternal Aunt     Alcohol abuse Neg Hx     Arthritis-rheumatoid Neg Hx     Asthma Neg Hx     Bleeding Prob Neg Hx     Headache Neg Hx     Lung Disease Neg Hx     Migraines Neg Hx     Psychiatric Disorder Neg Hx     Stroke Neg Hx     Mental Retardation Neg Hx     Anesth Problems Neg Hx       Review of Systems  A comprehensive review of systems was negative except for that written in the HPI. Objective:     No data found. Lungs: clear to auscultation bilaterally  Heart: regular rate and rhythm, S1, S2 normal, no murmur, click, rub or gallop  Abdomen: soft, non-tender. Bowel sounds normal. No masses,  no organomegaly    Imaging Review  Radiographs show severe patellofemoral djd with good preservation of the medial and lateral joint compartments. Assessment:     Severe patellofemoral djd right knee    Plan:     The various methods of treatment have been discussed with the patient and family. After consideration of risks, benefits and other options for treatment, the patient has consented to surgical interventions (RIGHT PATELLOFEMORAL JOINT REPLACEMENT). Questions were answered and Pre-op teaching was done by Dr. Pearl Sierra.

## 2017-01-19 NOTE — OP NOTES
[address]   [address] [facility], [address] [place] [address]   OP NOTE       Name:  Summer Higuera   MR#:  [id number]   :  1975   Account #:  [de-identified]    Surgery Date:  2017   Date of Adm:  2017       PREOPERATIVE DIAGNOSIS: Chondral defect, left patella, grade 4. POSTOPERATIVE DIAGNOSIS: Chondral defect, left patella, grade 4. PROCEDURES PERFORMED: Patellofemoral joint replacement. SURGEON: German Mckee MD    ASSISTANT: Mo Ochoa MD    ANESTHESIA: Spinal.    ESTIMATED BLOOD LOSS: Minimal.    COMPLICATIONS: None. SPECIMENS REMOVED: None. IMPLANT: Alysia patellofemoral joint replacement. INDICATIONS: A 60-year-old woman multiple previous surgeries. She   has failed nonoperative modalities. Her last arthroscopy showed grade   4 changes in her patella. She presents today for elective patellofemoral   joint replacement after no relief of symptoms. The patient can no   longer ascend and descend stairs and get up from a chair without   difficulty. DESCRIPTION OF PROCEDURE: Anesthetic was initiated. IV   antibiotic dose was given. The left side was confirmed as the operative   side, prepped and draped in the usual sterile fashion. Skin was   covered completely with Ioban occlusive dressing. The left side was exsanguinated, tourniquet was inflated. Short medial   parapatellar arthrotomy was made. I released the fat pad of the   femoral patellar tendon to zacarias the  grade 4 changes of patella. The   distal femur was exposed with grade 4 changes in the trochlea. Medullary canal was entered. The anterior cutting guide was placed,   rotated based on the bony landmarks. The anterior cut was made,   sized for a 3 prepared for a 3 trochlea. Patella was cut freehand and a   32 patellar button was placed. A lateral chamfer cut was made. With   the trials in place, the patellofemoral joint tracked well. All osteophytes   were removed with a rongeur. Trials were removed. Bony surfaces   were lavaged and dried. I cemented the components. Cement was   allowed to fully cure with no motion and with the patella clamp in place. After the cement had fully cured, the knee was ranged, irrigated   copiously with pulsatile lavage. Soft tissues were infiltrated with local   anesthetic. The arthrotomy was closed with heavy Vicryl suture   watertight. Skin and subcutaneous were irrigated and closed in   standard fashion. Sterile dressing was applied. There were no complications. No specimen was sent. Procedure was   partial knee replacement, left knee. Dr. Adelia Pastrana performed the   procedure and Dr. Pritesh Stephens assisted. The patient was taken to the   recovery room stable.         Lionel Hu MD MD / S   D:  01/19/2017   13:29   T:  01/19/2017   13:48   Job #:  [id number]

## 2017-01-19 NOTE — DIABETES MGMT
DTC Progress Note    Recommendations/ Comments: Chart review for elevated BG's and elevated A1c in Oct 2016. If appropriate, please consider   Add lispro normal correction scale post op  Lantus 10 units daily to control BG's during post op period  Add A1c to next lab draw to evaluate home medications     Chart reviewed on [facility]. Patient is a 39 y.o. female with Type 2 DM on Glumetza 1000 mg daily at bedtime and Januvia 100 mg daily at home. Pt in OR today. DTC will see pt to assess home management tomorrow    A1c:   Lab Results   Component Value Date/Time    Hemoglobin A1c 9.8 10/12/2016 09:18 AM    Hemoglobin A1c 10.9 11/18/2015 08:39 AM    Hemoglobin A1c 7.1 05/18/2012 09:00 AM       Recent Glucose Results:   Lab Results   Component Value Date/Time    GLUCPOC 198 (H) 01/19/2017 12:49 PM    GLUCPOC 275 (H) 01/19/2017 11:50 AM        Lab Results   Component Value Date/Time    Creatinine 0.67 10/12/2016 09:18 AM       Active Orders   There are no active orders of the following type(s): Diet. PO intake: No data found. Current hospital DM medication:     Will continue to follow as needed.     Thank you  Bridger Patel RN, CDE

## 2017-01-19 NOTE — IP AVS SNAPSHOT
[address] [facility] [address] [place] [address] 
[phone] Patient: Ignacio aHmmond MRN: [id number] MMA:19/4/[birth year]26 You are allergic to the following Allergen Reactions Ciprocinonide Hives  
 cipro Demerol (Meperidine) Other (comments) Goofy feeling, hallucinates Recent Documentation Height Weight BMI OB Status Smoking Status 1.702 m 95.3 kg 32.89 kg/m2 Hysterectomy Current Every Day Smoker Unresulted Labs Order Current Status CULTURE, MRSA In process Emergency Contacts Name Discharge Info Relation Home Work Mobile Jonathan Vazquez DISCHARGE CAREGIVER [3] Life Partner [7] [phone] Jonathan Vazquez  Spouse [3] [phone] About your hospitalization You were admitted on:  January 19, 2017 [facility] on:  January 20, 2017 Unit phone number:  [phone] Why you were hospitalized Your primary diagnosis was:  Not on File Providers Seen During Your Hospitalizations Provider Role Specialty Primary office phone Ibrahima Dolan MD Attending Provider Orthopedic Surgery [phone] Rebel Alvarez MD Attending Provider Orthopedic Surgery [phone] Your Primary Care Physician (PCP) Primary Care Physician Office Phone Office Fax Hazel PENALOZA [phone] ** None ** Follow-up Information Follow up With Details Comments Contact Info Aydee Diaz NP   [address] [place] [address] 
[phone] [address] On 1/21/2017 for home health. Please contact agency if you have not heard from them by 12 PM the first full day after discharge. [address] [address] 
[phone] Current Discharge Medication List  
  
START taking these medications Dose & Instructions Dispensing Information Comments Morning Noon Evening Bedtime  
 aspirin delayed-release 325 mg tablet Your next dose is: Today, Tomorrow Other:  _________ Dose:  325 mg Take 1 Tab by mouth two (2) times a day. Quantity:  60 Tab Refills:  0  
     
   
   
   
  
 oxyCODONE IR 5 mg immediate release tablet Commonly known as:  Kateryna Roney Your next dose is: Today, Tomorrow Other:  _________ Dose:  5-10 mg Take 1-2 Tabs by mouth every four (4) hours as needed for Pain. Max Daily Amount: 60 mg.  
 Quantity:  90 Tab Refills:  0 CONTINUE these medications which have CHANGED Dose & Instructions Dispensing Information Comments Morning Noon Evening Bedtime  
 pantoprazole 40 mg tablet Commonly known as:  PROTONIX What changed:  when to take this Your next dose is: Today, Tomorrow Other:  _________ Dose:  40 mg Take 1 Tab by mouth daily. Quantity:  30 Tab Refills:  3 QUEtiapine 100 mg tablet Commonly known as:  SEROquel What changed:  See the new instructions. Your next dose is: Today, Tomorrow Other:  _________ TAKE ONE TABLET BY MOUTH EVERY DAY Quantity:  30 Tab Refills:  0 SITagliptin 100 mg tablet Commonly known as:  [employer] What changed:  when to take this Your next dose is: Today, Tomorrow Other:  _________ Dose:  100 mg Take 1 Tab by mouth daily. Quantity:  30 Tab Refills:  5 CONTINUE these medications which have NOT CHANGED Dose & Instructions Dispensing Information Comments Morning Noon Evening Bedtime Blood-Glucose Meter monitoring kit Commonly known as:  [address]   [address] KIT Your next dose is: Today, Tomorrow Other:  _________ Use as directed Quantity:  1 Kit Refills:  0 busPIRone 5 mg tablet Commonly known as:  BUSPAR Your next dose is: Today, Tomorrow Other:  _________ Dose:  5 mg Take 1 Tab by mouth three (3) times daily. Quantity:  90 Tab Refills:  0  
     
   
   
   
  
 estradiol 0.5 mg tablet Commonly known as:  ESTRACE Your next dose is: Today, Tomorrow Other:  _________ Dose:  0.5 mg Take 0.5 mg by mouth daily. Refills:  0  
     
   
   
   
  
 fenofibrate micronized 134 mg capsule Commonly known as:  LOFIBRA Your next dose is: Today, Tomorrow Other:  _________ Dose:  134 mg Take 1 Cap by mouth daily. Quantity:  30 Cap Refills:  4  
     
   
   
   
  
 glucose blood VI test strips strip Commonly known as:  ONETOUCH ULTRA TEST Your next dose is: Today, Tomorrow Other:  _________ Monitor BS BID Dx: 250.02 Quantity:  1 Package Refills:  11  
     
   
   
   
  
 GLUMETZA 1,000 mg Tg24 24 hour tablet Generic drug:  metFORMIN Your next dose is: Today, Tomorrow Other:  _________ Dose:  1000 mg Take 1,000 mg by mouth nightly. Quantity:  30 Tab Refills:  4 Lancets Misc Commonly known as:  ONETOUCH ULTRASOFT LANCETS Your next dose is: Today, Tomorrow Other:  _________ Use as directed Quantity:  1 Package Refills:  11  
     
   
   
   
  
 oxybutynin chloride XL 10 mg CR tablet Commonly known as:  DITROPAN XL Your next dose is: Today, Tomorrow Other:  _________ Dose:  10 mg Take 1 Tab by mouth nightly. TAKE ONE TABLET BY MOUTH EVERY DAY Quantity:  30 Tab Refills:  4  
     
   
   
   
  
 pravastatin 40 mg tablet Commonly known as:  PRAVACHOL Your next dose is: Today, Tomorrow Other:  _________ TAKE ONE TABLET BY MOUTH NIGHTLY Quantity:  30 Tab Refills:  4 raNITIdine 150 mg tablet Commonly known as:  ZANTAC Your next dose is: Today, Tomorrow Other:  _________ Dose:  150 mg Take 1 Tab by mouth nightly. Quantity:  60 Tab Refills:  4  
     
   
   
   
  
 terconazole 0.4 % vaginal cream  
Commonly known as:  TERAZOL 7 Your next dose is: Today, Tomorrow Other:  _________ Dose:  1 Applicator Insert 1 Applicator into vagina nightly. Quantity:  45 g Refills:  0 Where to Get Your Medications These medications were sent to [address] [place] [address] [place] [address] [place] [address] Phone:  [phone] QUEtiapine 100 mg tablet Information on where to get these meds will be given to you by the nurse or doctor. ! Ask your nurse or doctor about these medications  
  aspirin delayed-release 325 mg tablet  
 oxyCODONE IR 5 mg immediate release tablet Discharge Instructions After Hospital Care Plan:  Discharge Instructions Knee Replacement Dr. Pearl Sierra Patient Name: Jonnathan Summers Date of procedure: 1/19/2017 Procedure: Procedure(s): LEFT KNEE PATELLA FEMORAL ARTHROPLASTY Surgeon: Surgeon(s) and Role: Mj Nino MD - Primary Wolfgang Wesley MD - Assisting PCP: Ivette Amaral NP Date of discharge: No discharge date for patient encounter. Follow up appointments ? Follow up with Dr. Pearl Sierra in 3 weeks. Call [phone] to make an appointment. ? If home health has been arranged for you the agency will contact you to arrange dates/times for visits. Please call them if you do not hear from them within 24 hours after you are discharged When to call your Orthopaedic Surgeon: Call [phone]. If you call after 5pm or on a weekend, the on call physician will be contacted ? Unrelieved pain ? Signs of infection-if your incision is red; continues to have drainage; drainage has a foul odor or if you have a persistent fever over 101 degrees ? Signs of a blood clot in your leg-calf pain, tenderness, redness, swelling of lower leg When to call your Primary Care Physician: 
? Concerns about medical conditions such as diabetes, high blood pressure, asthma, congestive heart failure ? Call if blood sugars are elevated, persistent headache or dizziness, coughing or congestion, constipation or diarrhea, burning with urination, abnormal heart rate When to call 911and go to the nearest emergency room ? Acute onset of chest pain, shortness of breath, difficulty breathing Activity ? Weight bearing as tolerated with walker or crutches. Refer to pages 23-31 of your handbook for instructions and pictures ? Complete your Home Exercise Program daily as instructed by your therapist.  Refer to pages 33-41 of your handbook for instructions and pictures ? Get up every one hour and walk (except at night when sleeping) ? Do not drive or operate heavy machinery Incision Care ? The Aquacel (brown, waterproof) surgical dressing is to remain on your knee for 7 days. On the 7th day have someone gently peel the dressing off by carefully lifting the edge and stretching it slightly to break the adhesive seal and leave incision open to air. You may take a shower with the Aquacel dressing in place. ? You have staples in your knee incision; they will be removed by the Home Health staff in 10-14 days and steri-strips will be applied. Leave the steri-strips on until they fall off Preventing blood clots ? Take one coated Aspirin twice a day for one month following surgery ? Wear elastic stockings (TEDS) for 4 weeks. You may remove them for approximately 1 hour daily for showering/sponge bathing Pain management ? Take pain medication as prescribed; decrease the amount you use as your pain lessens ? Avoid alcoholic beverages while taking pain medication ? Do not take any over-the-counter medication for pain except Tylenol (acetaminophen) ? Please be aware that many medications contain Tylenol. We do not want you to over medicate so please read the information below as a guide. Do not take more than 4 Grams of Tylenol in a 24 hour period. (There are 1000 milligrams in one Gram) o Percocet contains 325 mg of Tylenol per tablet (do not take more than 12 tablets in 24 hours) 
o Lortab contains 500 mg of Tylenol per tablet (do not take more than 8 tablets in 24 hours) o Norco contains 325 mg of Tylenol per tablet (do not take more than 12 tablets in 24 hours). ? You may place an ice bag on your knee for 15-20 minutes after exercising and as needed throughout the day and night Diet ? Resume usual diet; drink plenty of fluids; eat foods high in fiber ? You may want to take a stool softener (such as Senokot-S or Colace) to prevent constipation while you are taking pain medication. If constipation occurs, take a laxative (such as Dulcolax tablets, Milk of Magnesia, or a suppository) Home Health Care Protocol (to be followed by [facility]) Nursing-per physicians order ? Remove staples 10-14 days post op and apply steri-strips ? Remove Aquacel dressing at 7 days post-op ? Complete head to toe assessment, vital signs ? Medication reconciliation ? Review pain management ? Manage chronic medical conditions Physical Therapy-per physicians order Weight bearing status: 
  
  
  
 
Mobility Status: 
  
  
  
  
 
Gait: 
  
  
  
  
 
ADL status overall composite: 
  
  
  
  
  
 
Physical Therapy ? Assessment and evaluation-bed mobility; functional transfers (bed, chair, bathroom, stairs); ambulation with equipment, car transfers, shower transfers, safety and ability to get out of house in the event of an emergency ? Review and maintain weight bearing as tolerated ? Discuss pain management ? Review how to do ADLs. Refer to page 42 of patient handbook Home Exercise Program-refer to pages 33-41 of the patient handbook for exercises Discharge Orders None Introducing [facility]! Dear Belkis Bales: Thank you for requesting a Ajaline account. Our records indicate that you already have an active Ajaline account. You can access your account anytime at [email or web address] Did you know that you can access your hospital and ER discharge instructions at any time in Ajaline? You can also review all of your test results from your hospital stay or ER visit. Additional Information If you have questions, please visit the Frequently Asked Questions section of the Ajaline website at [email or web address] Remember, Ajaline is NOT to be used for urgent needs. For medical emergencies, dial 911. Now available from your iPhone and Android! General Information Please provide this summary of care documentation to your next provider. Patient Signature:  ____________________________________________________________ Date:  ____________________________________________________________  
  
Parminder Tyler Provider Signature:  ____________________________________________________________ Date:  ____________________________________________________________

## 2017-01-19 NOTE — PROGRESS NOTES
Received order from Dr. Elliott Gr to give insulin regular 10 units iv, insulin given. Reported off to crna.

## 2017-01-19 NOTE — ANESTHESIA PROCEDURE NOTES
Spinal Block    Start time: 1/19/2017 12:11 PM  End time: 1/19/2017 12:24 PM  Performed by: José Miguel Morillo  Authorized by: José Miguel Morillo     Pre-procedure:   Indications: primary anesthetic  Preanesthetic Checklist: patient identified, risks and benefits discussed, anesthesia consent, site marked, patient being monitored and timeout performed    Timeout Time: 12:12          Spinal Block:   Patient Position:  Seated  Prep Region:  Lumbar  Prep: Betadine      Location:  L3-4  Technique:  Single shot  Local:  Lidocaine 1%  Local Dose (mL):  3    Needle:   Needle Type:  Pencil-tip  Needle Gauge:  25 G  Attempts:  1      Events: CSF confirmed, no blood with aspiration and no paresthesia        Assessment:  Insertion:  Uncomplicated  Patient tolerance:  Patient tolerated the procedure well with no immediate complications

## 2017-01-19 NOTE — IP AVS SNAPSHOT
Current Discharge Medication List  
  
Take these medications at their scheduled times Dose & Instructions Dispensing Information Comments Morning Noon Evening Bedtime  
 aspirin delayed-release 325 mg tablet Your next dose is: Today, Tomorrow Other:  ____________ Dose:  325 mg Take 1 Tab by mouth two (2) times a day. Quantity:  60 Tab Refills:  0  
     
   
   
   
  
 busPIRone 5 mg tablet Commonly known as:  BUSPAR Your next dose is: Today, Tomorrow Other:  ____________ Dose:  5 mg Take 1 Tab by mouth three (3) times daily. Quantity:  90 Tab Refills:  0  
     
   
   
   
  
 estradiol 0.5 mg tablet Commonly known as:  ESTRACE Your next dose is: Today, Tomorrow Other:  ____________ Dose:  0.5 mg Take 0.5 mg by mouth daily. Refills:  0  
     
   
   
   
  
 fenofibrate micronized 134 mg capsule Commonly known as:  LOFIBRA Your next dose is: Today, Tomorrow Other:  ____________ Dose:  134 mg Take 1 Cap by mouth daily. Quantity:  30 Cap Refills:  4 GLUMETZA 1,000 mg Tg24 24 hour tablet Generic drug:  metFORMIN Your next dose is: Today, Tomorrow Other:  ____________ Dose:  1000 mg Take 1,000 mg by mouth nightly. Quantity:  30 Tab Refills:  4  
     
   
   
   
  
 oxybutynin chloride XL 10 mg CR tablet Commonly known as:  DITROPAN XL Your next dose is: Today, Tomorrow Other:  ____________ Dose:  10 mg Take 1 Tab by mouth nightly. TAKE ONE TABLET BY MOUTH EVERY DAY Quantity:  30 Tab Refills:  4  
     
   
   
   
  
 pantoprazole 40 mg tablet Commonly known as:  PROTONIX Your next dose is: Today, Tomorrow Other:  ____________ Dose:  40 mg Take 1 Tab by mouth daily. Quantity:  30 Tab Refills:  3  
     
   
   
   
  
 raNITIdine 150 mg tablet Commonly known as:  ZANTAC Your next dose is: Today, Tomorrow Other:  ____________ Dose:  150 mg Take 1 Tab by mouth nightly. Quantity:  60 Tab Refills:  4 SITagliptin 100 mg tablet Commonly known as:  Emile Kim Your next dose is: Today, Tomorrow Other:  ____________ Dose:  100 mg Take 1 Tab by mouth daily. Quantity:  30 Tab Refills:  5  
     
   
   
   
  
 terconazole 0.4 % vaginal cream  
Commonly known as:  TERAZOL 7 Your next dose is: Today, Tomorrow Other:  ____________ Dose:  1 Applicator Insert 1 Applicator into vagina nightly. Quantity:  45 g Refills:  0 Take these medications as needed Dose & Instructions Dispensing Information Comments Morning Noon Evening Bedtime  
 oxyCODONE IR 5 mg immediate release tablet Commonly known as:  Alexander Fisher Your next dose is: Today, Tomorrow Other:  ____________ Dose:  5-10 mg Take 1-2 Tabs by mouth every four (4) hours as needed for Pain. Max Daily Amount: 60 mg.  
 Quantity:  90 Tab Refills:  0 Take these medications as directed Dose & Instructions Dispensing Information Comments Morning Noon Evening Bedtime Blood-Glucose Meter monitoring kit Commonly known as:  [address]   [address] KIT Your next dose is: Today, Tomorrow Other:  ____________ Use as directed Quantity:  1 Kit Refills:  0  
     
   
   
   
  
 glucose blood VI test strips strip Commonly known as:  ONETOUCH ULTRA TEST Your next dose is: Today, Tomorrow Other:  ____________ Monitor BS BID Dx: 250.02 Quantity:  1 Package Refills:  11 Lancets Misc Commonly known as:  ONETOUCH ULTRASOFT LANCETS Your next dose is: Today, Tomorrow Other:  ____________ Use as directed Quantity:  1 Package Refills:  11  
     
   
   
   
  
 pravastatin 40 mg tablet Commonly known as:  PRAVACHOL Your next dose is: Today, Tomorrow Other:  ____________ TAKE ONE TABLET BY MOUTH NIGHTLY Quantity:  30 Tab Refills:  4 QUEtiapine 100 mg tablet Commonly known as:  SEROquel Your next dose is: Today, Tomorrow Other:  ____________ TAKE ONE TABLET BY MOUTH EVERY DAY Quantity:  30 Tab Refills:  0 Where to Get Your Medications These medications were sent to [facility] [address] [place] [address] [facility] Phone:  [phone] QUEtiapine 100 mg tablet Information about where to get these medications is not yet available ! Ask your nurse or doctor about these medications  
  aspirin delayed-release 325 mg tablet  
 oxyCODONE IR 5 mg immediate release tablet

## 2017-01-19 NOTE — ANESTHESIA PREPROCEDURE EVALUATION
Anesthetic History   No history of anesthetic complications            Review of Systems / Medical History  Patient summary reviewed, nursing notes reviewed and pertinent labs reviewed    Pulmonary          Smoker         Neuro/Psych         Psychiatric history     Cardiovascular  Within defined limits                Exercise tolerance: >4 METS     GI/Hepatic/Renal     GERD: well controlled           Endo/Other    Diabetes: poorly controlled, type 2    Arthritis     Other Findings              Physical Exam    Airway  Mallampati: II  TM Distance: > 6 cm  Neck ROM: normal range of motion   Mouth opening: Normal     Cardiovascular  Regular rate and rhythm,  S1 and S2 normal,  no murmur, click, rub, or gallop             Dental  No notable dental hx       Pulmonary  Breath sounds clear to auscultation               Abdominal  GI exam deferred       Other Findings            Anesthetic Plan    ASA: 3  Anesthesia type: spinal      Post-op pain plan if not by surgeon: peripheral nerve block single    Induction: Intravenous  Anesthetic plan and risks discussed with: Patient

## 2017-01-19 NOTE — ANESTHESIA PROCEDURE NOTES
Peripheral Block    Start time: 1/19/2017 12:00 PM  End time: 1/19/2017 12:10 PM  Performed by: Nydia Bowser  Authorized by: Nydia Bowser       Pre-procedure: Indications: at surgeon's request and post-op pain management    Preanesthetic Checklist: patient identified, risks and benefits discussed, site marked, timeout performed, anesthesia consent given and patient being monitored    Timeout Time: 12:00          Block Type:   Block Type:   Adductor canal  Laterality:  Left  Monitoring:  Standard ASA monitoring, continuous pulse ox, frequent vital sign checks, heart rate, responsive to questions and oxygen  Injection Technique:  Single shot  Procedures: ultrasound guided    Patient Position: supine  Prep: DuraPrep    Location:  Mid thigh  Needle Type:  Stimuplex  Needle Gauge:  22 G  Needle Localization:  Ultrasound guidance  Medication Injected:  0.5%  ropivacaine  Volume (mL):  20    Assessment:  Number of attempts:  1  Injection Assessment:  Incremental injection every 5 mL, local visualized surrounding nerve on ultrasound, negative aspiration for blood, no paresthesia, no intravascular symptoms and ultrasound image on chart  Patient tolerance:  Patient tolerated the procedure well with no immediate complications

## 2017-01-19 NOTE — BRIEF OP NOTE
BRIEF OPERATIVE NOTE    Date of Procedure: 1/19/2017   Preoperative Diagnosis: LEFT KNEE PAIN, CHONDRAL DEFECT LEFT PATELLA  Postoperative Diagnosis: * No post-op diagnosis entered *    Procedure(s):  LEFT KNEE PATELLA FEMORAL ARTHROPLASTY  Surgeon(s) and Role:     * Mimi Krueger MD - Assisting     * Khalida Rivero MD - Primary            Surgical Staff:  Circ-1: Dino Ingram RN  Scrub RN-1: Edenilson Manzo RN  Retractor Estrada: Rocio Mehta  Event Time In   Incision Start 1243   Incision Close      Anesthesia: Spinal   Estimated Blood Loss: minimal  Specimens: * No specimens in log *   Findings: oa severe patella   Complications: none  Implants: * No implants in log *

## 2017-01-20 ENCOUNTER — HOME HEALTH ADMISSION (OUTPATIENT)
Dept: HOME HEALTH SERVICES | Facility: HOME HEALTH | Age: 42
End: 2017-01-20
Payer: MEDICAID

## 2017-01-20 VITALS
RESPIRATION RATE: 16 BRPM | WEIGHT: 210 LBS | HEART RATE: 60 BPM | SYSTOLIC BLOOD PRESSURE: 129 MMHG | HEIGHT: 67 IN | OXYGEN SATURATION: 94 % | DIASTOLIC BLOOD PRESSURE: 74 MMHG | TEMPERATURE: 98.4 F | BODY MASS INDEX: 32.96 KG/M2

## 2017-01-20 LAB
ANION GAP BLD CALC-SCNC: 14 MMOL/L (ref 5–15)
BUN SERPL-MCNC: 12 MG/DL (ref 6–20)
BUN/CREAT SERPL: 13 (ref 12–20)
CALCIUM SERPL-MCNC: 8.9 MG/DL (ref 8.5–10.1)
CHLORIDE SERPL-SCNC: 103 MMOL/L (ref 97–108)
CO2 SERPL-SCNC: 19 MMOL/L (ref 21–32)
CREAT SERPL-MCNC: 0.94 MG/DL (ref 0.55–1.02)
GLUCOSE BLD STRIP.AUTO-MCNC: 363 MG/DL (ref 65–100)
GLUCOSE BLD STRIP.AUTO-MCNC: 454 MG/DL (ref 65–100)
GLUCOSE SERPL-MCNC: 420 MG/DL (ref 65–100)
HGB BLD-MCNC: 12 G/DL (ref 11.5–16)
POTASSIUM SERPL-SCNC: 4.2 MMOL/L (ref 3.5–5.1)
SERVICE CMNT-IMP: ABNORMAL
SERVICE CMNT-IMP: ABNORMAL
SODIUM SERPL-SCNC: 136 MMOL/L (ref 136–145)

## 2017-01-20 PROCEDURE — 97161 PT EVAL LOW COMPLEX 20 MIN: CPT | Performed by: PHYSICAL THERAPIST

## 2017-01-20 PROCEDURE — 99218 HC RM OBSERVATION: CPT

## 2017-01-20 PROCEDURE — 85018 HEMOGLOBIN: CPT | Performed by: ORTHOPAEDIC SURGERY

## 2017-01-20 PROCEDURE — 77030027138 HC INCENT SPIROMETER -A

## 2017-01-20 PROCEDURE — 97116 GAIT TRAINING THERAPY: CPT | Performed by: PHYSICAL THERAPIST

## 2017-01-20 PROCEDURE — 80048 BASIC METABOLIC PNL TOTAL CA: CPT | Performed by: ORTHOPAEDIC SURGERY

## 2017-01-20 PROCEDURE — 74011250637 HC RX REV CODE- 250/637: Performed by: PHYSICIAN ASSISTANT

## 2017-01-20 PROCEDURE — 77030012893

## 2017-01-20 PROCEDURE — 74011636637 HC RX REV CODE- 636/637: Performed by: INTERNAL MEDICINE

## 2017-01-20 PROCEDURE — 36415 COLL VENOUS BLD VENIPUNCTURE: CPT | Performed by: ORTHOPAEDIC SURGERY

## 2017-01-20 PROCEDURE — 82962 GLUCOSE BLOOD TEST: CPT

## 2017-01-20 PROCEDURE — 74011636637 HC RX REV CODE- 636/637: Performed by: HOSPITALIST

## 2017-01-20 PROCEDURE — 74011250636 HC RX REV CODE- 250/636: Performed by: PHYSICIAN ASSISTANT

## 2017-01-20 RX ORDER — OXYCODONE HYDROCHLORIDE 5 MG/1
5-10 TABLET ORAL
Qty: 90 TAB | Refills: 0 | Status: SHIPPED | OUTPATIENT
Start: 2017-01-20 | End: 2017-05-22

## 2017-01-20 RX ORDER — ASPIRIN 325 MG
325 TABLET, DELAYED RELEASE (ENTERIC COATED) ORAL 2 TIMES DAILY
Qty: 60 TAB | Refills: 0 | Status: SHIPPED | OUTPATIENT
Start: 2017-01-20 | End: 2017-09-11 | Stop reason: ALTCHOICE

## 2017-01-20 RX ORDER — QUETIAPINE FUMARATE 100 MG/1
TABLET, FILM COATED ORAL
Qty: 30 TAB | Refills: 0 | Status: SHIPPED | OUTPATIENT
Start: 2017-01-20 | End: 2017-03-01 | Stop reason: SDUPTHER

## 2017-01-20 RX ORDER — INSULIN LISPRO 100 [IU]/ML
10 INJECTION, SOLUTION INTRAVENOUS; SUBCUTANEOUS ONCE
Status: COMPLETED | OUTPATIENT
Start: 2017-01-20 | End: 2017-01-20

## 2017-01-20 RX ADMIN — VANCOMYCIN HYDROCHLORIDE 2000 MG: 10 INJECTION, POWDER, LYOPHILIZED, FOR SOLUTION INTRAVENOUS at 07:44

## 2017-01-20 RX ADMIN — DEXAMETHASONE SODIUM PHOSPHATE 10 MG: 4 INJECTION, SOLUTION INTRAMUSCULAR; INTRAVENOUS at 13:06

## 2017-01-20 RX ADMIN — ASPIRIN 325 MG: 325 TABLET, DELAYED RELEASE ORAL at 09:35

## 2017-01-20 RX ADMIN — BUSPIRONE HYDROCHLORIDE 5 MG: 5 TABLET ORAL at 16:14

## 2017-01-20 RX ADMIN — OXYCODONE HYDROCHLORIDE 5 MG: 5 TABLET ORAL at 16:15

## 2017-01-20 RX ADMIN — INSULIN LISPRO 10 UNITS: 100 INJECTION, SOLUTION INTRAVENOUS; SUBCUTANEOUS at 08:02

## 2017-01-20 RX ADMIN — ACETAMINOPHEN 650 MG: 325 TABLET, FILM COATED ORAL at 13:06

## 2017-01-20 RX ADMIN — ACETAMINOPHEN 650 MG: 325 TABLET, FILM COATED ORAL at 07:42

## 2017-01-20 RX ADMIN — ESTRADIOL 0.5 MG: 1 TABLET ORAL at 09:33

## 2017-01-20 RX ADMIN — OXYCODONE HYDROCHLORIDE 5 MG: 5 TABLET ORAL at 03:00

## 2017-01-20 RX ADMIN — Medication 10 ML: at 07:43

## 2017-01-20 RX ADMIN — OXYCODONE HYDROCHLORIDE 5 MG: 5 TABLET ORAL at 13:39

## 2017-01-20 RX ADMIN — OXYCODONE HYDROCHLORIDE 5 MG: 5 TABLET ORAL at 10:52

## 2017-01-20 RX ADMIN — OXYCODONE HYDROCHLORIDE 5 MG: 5 TABLET ORAL at 07:42

## 2017-01-20 RX ADMIN — BUSPIRONE HYDROCHLORIDE 5 MG: 5 TABLET ORAL at 09:35

## 2017-01-20 RX ADMIN — KETOROLAC TROMETHAMINE 30 MG: 30 INJECTION, SOLUTION INTRAMUSCULAR at 11:50

## 2017-01-20 RX ADMIN — KETOROLAC TROMETHAMINE 30 MG: 30 INJECTION, SOLUTION INTRAMUSCULAR at 07:43

## 2017-01-20 RX ADMIN — DOCUSATE SODIUM AND SENNOSIDES 1 TABLET: 8.6; 5 TABLET, FILM COATED ORAL at 09:32

## 2017-01-20 RX ADMIN — FENOFIBRATE 145 MG: 145 TABLET, FILM COATED ORAL at 09:35

## 2017-01-20 RX ADMIN — INSULIN LISPRO 9 UNITS: 100 INJECTION, SOLUTION INTRAVENOUS; SUBCUTANEOUS at 11:49

## 2017-01-20 NOTE — DISCHARGE INSTRUCTIONS
After Hospital Care Plan:  Discharge Instructions Knee Replacement  Dr. aVlerie Chavez    Patient Name: Stefania Moraes  Date of procedure: 1/19/2017   Procedure: Procedure(s):  LEFT KNEE PATELLA FEMORAL ARTHROPLASTY  Surgeon: Otto Anderson) and Role:     * Leopold Real, MD - Primary     * Cristobal Cain MD - Assisting  PCP: Maxi Garcia NP  Date of discharge: No discharge date for patient encounter. Follow up appointments   Follow up with Dr. Valerie Chavez in 3 weeks. Call [phone] to make an appointment.  If home health has been arranged for you the agency will contact you to arrange dates/times for visits. Please call them if you do not hear from them within 24 hours after you are discharged    When to call your Orthopaedic Surgeon: Call [phone]. If you call after 5pm or on a weekend, the on call physician will be contacted   Unrelieved pain   Signs of infection-if your incision is red; continues to have drainage; drainage has a foul odor or if you have a persistent fever over 101 degrees   Signs of a blood clot in your leg-calf pain, tenderness, redness, swelling of lower leg    When to call your Primary Care Physician:   Concerns about medical conditions such as diabetes, high blood pressure, asthma, congestive heart failure   Call if blood sugars are elevated, persistent headache or dizziness, coughing or congestion, constipation or diarrhea, burning with urination, abnormal heart rate    When to call [id number] go to the nearest emergency room   Acute onset of chest pain, shortness of breath, difficulty breathing    Activity   Weight bearing as tolerated with walker or crutches.  Refer to pages 23-31 of your handbook for instructions and pictures   Complete your Home Exercise Program daily as instructed by your therapist.  Refer to pages 33-41 of your handbook for instructions and pictures   Get up every one hour and walk (except at night when sleeping)   Do not drive or operate heavy machinery    Incision Care   The Aquacel (brown, waterproof) surgical dressing is to remain on your knee for 7 days. On the 7th day have someone gently peel the dressing off by carefully lifting the edge and stretching it slightly to break the adhesive seal and leave incision open to air. You may take a shower with the Aquacel dressing in place.  You have staples in your knee incision; they will be removed by the [address] [place] [address] [facility] staff in 10-14 days and steri-strips will be applied. Leave the steri-strips on until they fall off      Preventing blood clots    Take one coated Aspirin twice a day for one month following surgery   Wear elastic stockings (TEDS) for 4 weeks. You may remove them for approximately 1 hour daily for showering/sponge bathing    Pain management   Take pain medication as prescribed; decrease the amount you use as your pain lessens   Avoid alcoholic beverages while taking pain medication   Do not take any over-the-counter medication for pain except Tylenol (acetaminophen)   Please be aware that many medications contain Tylenol. We do not want you to over medicate so please read the information below as a guide. Do not take more than 4 Grams of Tylenol in a 24 hour period. (There are 1000 milligrams in one Gram)  o Percocet contains 325 mg of Tylenol per tablet (do not take more than 12 tablets in 24 hours)  o Lortab contains 500 mg of Tylenol per tablet (do not take more than 8 tablets in 24 hours)  o Norco contains 325 mg of Tylenol per tablet (do not take more than 12 tablets in 24 hours).  You may place an ice bag on your knee for 15-20 minutes after exercising and as needed throughout the day and night    Diet   Resume usual diet; drink plenty of fluids; eat foods high in fiber   You may want to take a stool softener (such as Senokot-S or Colace) to prevent constipation while you are taking pain medication.   If constipation occurs, take a laxative (such as Dulcolax tablets, Milk of Magnesia, or a suppository)    [facility] Protocol (to be followed by [address])    Nursing-per physicians order   Remove staples 10-14 days post op and apply steri-strips   Remove Aquacel dressing at 7 days post-op    Complete head to toe assessment, vital signs   Medication reconciliation   Review pain management   Manage chronic medical conditions    Physical Therapy-per physicians order    Weight bearing status:             Mobility Status:                Gait:                ADL status overall composite:                   Physical Therapy   Assessment and evaluation-bed mobility; functional transfers (bed, chair, bathroom, stairs); ambulation with equipment, car transfers, shower transfers, safety and ability to get out of house in the event of an emergency   Review and maintain weight bearing as tolerated   Discuss pain management   Review how to do ADLs.  Refer to page 42 of patient handbook    Home Exercise Program-refer to pages 33-41 of the patient handbook for exercises

## 2017-01-20 NOTE — PROGRESS NOTES
Bedside and Verbal shift change report given to [address]  (oncoming nurse) by Stefanie Gr  (offgoing nurse). Report included the following information SBAR and Kardex.

## 2017-01-20 NOTE — ANESTHESIA POSTPROCEDURE EVALUATION
Post-Anesthesia Evaluation and Assessment    Patient: Soni Gill MRN: [id number]  SSN: [id number]    YOB: 1975  Age: 39 y.o. Sex: female       Cardiovascular Function/Vital Signs  Visit Vitals    /69    Pulse 92    Temp 36.7 °C (98 °F)    Resp 14    Ht 5' 7\" (1.702 m)    Wt 95.3 kg (210 lb)    SpO2 94%    BMI 32.89 kg/m2       Patient is status post spinal anesthesia for Procedure(s):  LEFT KNEE PATELLA FEMORAL ARTHROPLASTY. Nausea/Vomiting: None    Postoperative hydration reviewed and adequate. Pain:  Pain Scale 1: Numeric (0 - 10) (01/20/17 0300)  Pain Intensity 1: 4 (01/20/17 0300)   Managed    Neurological Status:   Neuro (WDL): Exceptions to WDL (01/19/17 1455)  Neuro  LLE Motor Response: Purposeful (01/19/17 1753)   At baseline    Mental Status and Level of Consciousness: Arousable    Pulmonary Status:   O2 Device: Room air (01/20/17 0259)   Adequate oxygenation and airway patent    Complications related to anesthesia: None    Post-anesthesia assessment completed.  No concerns    Signed By: Hadley Bingham MD     January 20, 2017

## 2017-01-20 NOTE — DIABETES MGMT
[facility]    Elevated A1C Visit Note    Recommendations/ Comments: If appropriate, please consider:    - Changing correction insulin to insulin resistant scale. - Add Lantus 20 units daily      Patient is a 39 y.o. female with hx Type 2 Diabetes on Januvia 100 mg daily and Metformin XR 1000 at night at home. Visited pt to talk about elevated A1c. When told pt that I was from the D[facility] she said: \"oh let me get my Pepsi ready for you\". Pt was drinking regular Pepsi and states to drink regular soda because she cant smoke here. Pt does not check BS at home, states to always take her medications and follows up with her PCP every 3 months. Has an upcoming appointment on 1/24. Encouraged pt to check her BS 3 times a day and share that information with her PCP. A1c:   Lab Results   Component Value Date/Time    Hemoglobin A1c 9.8 10/12/2016 09:18 AM    Hemoglobin A1c 10.9 11/18/2015 08:39 AM    Hemoglobin A1c 7.1 05/18/2012 09:00 AM       Recent Glucose Results:   Lab Results   Component Value Date/Time     (H) 01/20/2017 03:02 AM    GLUCPOC 454 (H) 01/20/2017 11:28 AM    GLUCPOC 363 (H) 01/20/2017 06:48 AM    GLUCPOC 482 (H) 01/19/2017 09:46 PM        Lab Results   Component Value Date/Time    Creatinine 0.94 01/20/2017 03:02 AM       Active Orders   Diet    DIET DIABETIC CONSISTENT CARB Regular          Assessed and instructed patient on the following:   ·  interpretation of lab results, blood sugar goals, complications of diabetes mellitus, SMBG skills and referred to Diabetes Educator    Provided patient with the following: [x]          Diabetes Self-Care Guide               [x]          Outpatient DTC contact number          Patient to follow up with PCP after discharge. Will continue to follow as needed. Thank you.     Vale Fritz RD

## 2017-01-20 NOTE — PROGRESS NOTES
The patient stated that Dr. John Hwang told her that she could go outside of the Hospital anytime she wanted. I talked with the patient and her Mother and explained [facility] no smoking policy. The patient stated that she understood and would try to get through it. I offered to call the doctor to get an order for a nicotine patch but she declined.

## 2017-01-20 NOTE — PROGRESS NOTES
Bedside and Verbal shift change report given to Letitia Danielson (oncoming nurse) by Velma Bacon RN (offgoing nurse). Report given with Savanna FAIRCHILD and MAR.

## 2017-01-20 NOTE — PROGRESS NOTES
Problem: Mobility Impaired (Adult and Pediatric)  Goal: *Acute Goals and Plan of Care (Insert Text)  Physical Therapy Goals  Initiated 1/20/2017    1. Patient will move from supine to sit and sit to supine in bed with modified independence within 4 days. 2. Patient will perform sit to stand with modified independence within 4 days. 3. Patient will ambulate with modified independence for 150 feet with the least restrictive device within 4 days. 4. Patient will ascend/descend 4 stairs with 4 handrail(s) with supervision/set-up within 4 days. 5. Patient will perform home exercise program per protocol with minimal assistance/contact guard assist within 4 days. 6. Patient will demonstrate AROM 0-90 degrees in operative joint within 4 days. PHYSICAL THERAPY TREATMENT  Patient: Tabitha Veliz (89 y.o. female)  Date: 1/20/2017  Diagnosis: LEFT KNEE PAIN, CHONDRAL DEFECT LEFT PATELLA  LEFT KNEE PAIN, CHONDRAL DEFECT LEFT PATELLA <principal problem not specified>  Procedure(s) (LRB):  LEFT KNEE PATELLA FEMORAL ARTHROPLASTY (Left) 1 Day Post-Op  Precautions:   WBAT L      ASSESSMENT:  Patient doing well. She ambulated 120 feet with rolling walker and supervision. She negotiated 3 steps with single handrail and verbal cues. She is safe ambulating on level surfaces and steps and ready for discharge from PT standpoint. She has rolling walker delivered to room. She will benefit from [address] [place] [address] [facility] PT post discharge from   [facility]. Progression toward goals:  [X]      Improving appropriately and progressing toward goals  [ ]      Improving slowly and progressing toward goals  [ ]      Not making progress toward goals and plan of care will be adjusted       Discharge Recommendations:  Home Health  Further Equipment Recommendations for Discharge:  rolling walker in room       SUBJECTIVE:   Patient stated I'm going home this afternoon.       OBJECTIVE DATA:   Critical Behavior:  Neurologic State: Alert  Orientation Level: Oriented X4  Cognition: Appropriate decision making, Appropriate for age attention/concentration, Appropriate safety awareness, Follows commands     Range of Motion:     PROM:  (L knee 10-80)  Functional Mobility Training:  Bed Mobility:  Rolling: Moderate assistance  Supine to Sit: Moderate assistance  Sit to Supine: Moderate assistance  Scooting: Minimum assistance  Transfers:  Sit to Stand: Minimum assistance  Stand to Sit: Minimum assistance  Stand Pivot Transfers: Minimum assistance (with rolling walker)     Balance:  Sitting: Intact  Standing: With support  Ambulation/Gait Training:  Distance:120 Feet   Assistive Device: Walker, rolling        Gait Description (WDL): Exceptions to WDL  Gait Abnormalities: Step to gait; Decreased step clearance     Left Side Weight Bearing: As tolerated  Base of Support: Widened     Speed/Melany: Pace decreased (<100 feet/min)  Step Length: Right shortened     Stairs:  Patient negotiated 3 steps with single handrail and verbal cues  She utilized step to sequencing with RLE ascending     Pain:  Pain Scale 1: Numeric (0 - 10)  Pain Intensity 1: 4  Pain Location 1: Knee  Pain Orientation 1: Left  Pain Description 1: Aching  Pain Intervention(s) 1: Medication (see MAR)  Activity Tolerance:   No c/o increased pain and minimal fatigue with Rx this PM     After treatment:   [ ] Patient left in no apparent distress sitting up in chair  [X] Patient left in no apparent distress in bed  [X] Call bell left within reach  [X] Nursing notified  [ ] Caregiver present  [ ] Bed alarm activated      COMMUNICATION/COLLABORATION:   The patients plan of care was discussed with: Registered Nurse     Cindy Ortiz, PT   Time Calculation: 16 mins

## 2017-01-20 NOTE — PROGRESS NOTES
Spoke with Luis Felipe Delacruz to get a hospitalist consult, as patient had a BG of 454. Telephone with readback, placed order.

## 2017-01-20 NOTE — PROGRESS NOTES
Noncompliance with diet. Four empty cans of regular pepsi found on the bedside table and she continue to ask for regular sodas. Patient advised about the risk of infection due to elevated blood sugar levels. Blood sugar 482. Dr Kenan Alcaraz on call for Dr Pearl Sierra notified and he gave an order for hospitalist consult. Dr Adilia Robles notified and she ordered 6 units of Humalog and stated she will come and see patient. 0240  Continue to drink regular sodas.  goes to the kitchen and gets 6 cans of regular sodas at a time.

## 2017-01-20 NOTE — CONSULTS
Consult Note    Primary Care Provider: Justin Alfonso NP  Source of Information: Patient     History of Presenting Illness:   Yumiko Latham is a 39 y.o. female admitted for left patellofemoral joint replacement--today post op day #1. Consult obtained for hyperglycemia. Upon examination, patient states she is diabetic and on metformin and januvia at home. She denies being on insulin. Believes her last A1c was done around 6 months ago and was in the 8% range. She does not check her blood sugars on a regular basis. She does take her meds as prescribed. Positive for polyuria and polydypsia. No n/v. No abdominal pain. No further complaints. Review of Systems:  Comprehensive review obtained including ENT, cardiovascular, respiratory, GI, , musculoskeletal, neuro, psych, endocrine, and skin and all negative other than as per above. Past Medical History   Diagnosis Date    Abnormal Pap smear of cervix     Acid indigestion     Cancer (HCC)      choriocarcinoma  - UTERNE    Depression     Diabetes ([facility] [address]     Dysthymic disorder     GERD (gastroesophageal reflux disease)     Hypertriglyceridemia     Mixed hyperlipidemia 3/31/2010    MRSA (methicillin resistant Staphylococcus aureus)     Psychiatric disorder      depression; ANXIETY    Urinary incontinence      URGENCY AND INCONTINENECE      Past Surgical History   Procedure Laterality Date    Hx appendectomy      Hx gyn  12/08     D & C     Hx partial hysterectomy Bilateral May 4 2015     Dr. Faina Rodriguez Hx orthopaedic       R ACL knee    Hx orthopaedic       BILATERAL A/S KNEES    Hx orthopaedic  4/2016     REMOVAL OF HARDWARE IN KNEE     Prior to Admission medications    Medication Sig Start Date End Date Taking? Authorizing Provider   QUEtiapine (SEROQUEL) 100 mg tablet Take 100 mg by mouth nightly.    Yes Historical Provider   busPIRone (BUSPAR) 5 mg tablet Take 1 Tab by mouth three (3) times daily. 12/15/16  Yes Amarilis Dickey NP   estradiol (ESTRACE) 0.5 mg tablet Take 0.5 mg by mouth daily. 10/18/16  Yes Historical Provider   pantoprazole (PROTONIX) 40 mg tablet Take 1 Tab by mouth daily. Patient taking differently: Take 40 mg by mouth nightly. 10/12/16  Yes Robert Rivera NP   pravastatin (PRAVACHOL) 40 mg tablet TAKE ONE TABLET BY MOUTH NIGHTLY 10/12/16  Yes Kerry Patterson NP   fenofibrate micronized (LOFIBRA) 134 mg capsule Take 1 Cap by mouth daily. 10/12/16  Yes Robert Rivera NP   sitaGLIPtin (JANUVIA) 100 mg tablet Take 1 Tab by mouth daily. Patient taking differently: Take 100 mg by mouth nightly. 10/12/16  Yes Kerry Patterson NP   ranitidine (ZANTAC) 150 mg tablet Take 1 Tab by mouth nightly. 10/12/16  Yes Kerry Patterson NP   oxybutynin chloride XL (DITROPAN XL) 10 mg CR tablet Take 1 Tab by mouth nightly. TAKE ONE TABLET BY MOUTH EVERY DAY 10/12/16  Yes Kerry Patterson NP   terconazole (TERAZOL 7) 0.4 % vaginal cream Insert 1 Applicator into vagina nightly. 6/15/16  Yes YANELIS Park   GLUMETZA 1,000 mg TG24 24 hour tablet Take 1,000 mg by mouth nightly.  10/12/16   Kerry Patterson NP   Lancets (ONE TOUCH ULTRASOFT LANCETS) Misc Use as directed   6/11/12   Alla Pritchett MD   Blood-Glucose Meter (ONE TOUCH ULTRA SYSTEM KIT) monitoring kit Use as directed 6/11/12   Alla Pritchett MD   glucose blood VI test strips (ONE TOUCH ULTRA TEST) strip Monitor BS BID  Dx: 250.02   6/11/12   Alla Pritchett MD     Allergies   Allergen Reactions    Ciprocinonide Hives     cipro    Demerol [Meperidine] Other (comments)     Goofy feeling, hallucinates      Family History   Problem Relation Age of Onset    Hypertension Mother     Elevated Lipids Mother     Cancer Father      pancreatic    Thyroid Disease Paternal Aunt     Heart Disease Maternal Grandmother     Heart Disease Maternal Grandfather     Heart Disease Paternal Grandfather     Diabetes Paternal Aunt     Alcohol abuse Neg Hx     Arthritis-rheumatoid Neg Hx     Asthma Neg Hx     Bleeding Prob Neg Hx     Headache Neg Hx     Lung Disease Neg Hx     Migraines Neg Hx     Psychiatric Disorder Neg Hx     Stroke Neg Hx     Mental Retardation Neg Hx     Anesth Problems Neg Hx         SOCIAL HISTORY:  Patient resides:  Independently x   Assisted Living    SNF    With family care       Smoking history:   None    Former    Chronic x     Alcohol history:   None x   Social    Chronic      Ambulates:   Independently x   w/cane    w/walker    w/wc      Objective:     Physical Exam:     Visit Vitals    /77    Pulse 67    Temp 98.4 °F (36.9 °C)    Resp 14    Ht 5' 7\" (1.702 m)    Wt 95.3 kg (210 lb)    LMP 09/10/2014 (Exact Date)    SpO2 93%    BMI 32.89 kg/m2    O2 Flow Rate (L/min): 3 l/min O2 Device: Room air    General:  Alert, cooperative, no distress, appears stated age. Head:  Normocephalic, without obvious abnormality, atraumatic. Eyes:  Conjunctivae/corneas clear. EOMs intact. Lungs:   Clear to auscultation bilaterally. No w/r/r. Heart:  Regular rate and rhythm, S1, S2 normal, no murmur appreciated, no edema. Abdomen:   Soft, non-tender. Bowel sounds normal. No masses,  No organomegaly. Obese. Extremities: Post op left knee    Neurologic: CNII-XII intact. Data Review:     Recent Days:  No results for input(s): WBC, HGB, HCT, PLT, HGBEXT, HCTEXT, PLTEXT in the last 72 hours. No results for input(s): NA, K, CL, CO2, GLU, BUN, CREA, CA, MG, PHOS, ALB, TBIL, SGOT, ALT, INR in the last 72 hours. No lab exists for component: INREXT  No results for input(s): PH, PCO2, PO2, HCO3, FIO2 in the last 72 hours.     24 Hour Results:  Recent Results (from the past 24 hour(s))   GLUCOSE, POC    Collection Time: 01/19/17 11:50 AM   Result Value Ref Range    Glucose (POC) 275 (H) 65 - 100 mg/dL    Performed by Socorro Gan, POC    Collection Time: 01/19/17 12:49 PM Result Value Ref Range    Glucose (POC) 198 (H) 65 - 100 mg/dL    Performed by [address], POC    Collection Time: 01/19/17  1:31 PM   Result Value Ref Range    Glucose (POC) 169 (H) 65 - 100 mg/dL    Performed by [address], POC    Collection Time: 01/19/17  2:36 PM   Result Value Ref Range    Glucose (POC) 235 (H) 65 - 100 mg/dL    Performed by Sterling Kelly, POC    Collection Time: 01/19/17  9:46 PM   Result Value Ref Range    Glucose (POC) 482 (H) 65 - 100 mg/dL    Performed by Sincere Arora              Assessment/Plan:     38 y/o female with:  1. DM type II, poorly controlled  --Patient with most recent documented HbA1c of 9.8% from 10/2016  --Outpatient regimen consists of januvia and metformin, held yesterday for surgery but resumed in the evening by orthopedic service. --Should be ok to continue while inpatient-->recommend checking daily BMP to assess renal function   --In addition, add insulin sliding scale. --Diabetic diet once ok to advance diet. --Upon review of HbA1c trend, should be on insulin; however, will let patient follow up with her PCP regarding chronic medical management. --Discussed diet/exercise/weight loss. Will sign off for now. Please call/re-consult if questions or additional issues arise.          Signed By: Tiffany Gresham DO     January 20, 2017

## 2017-01-20 NOTE — PROGRESS NOTES
Complaints: none  Events: none      GEN:  NAD. AOx3   ABD:  S/NT/ND   LLE:  Dressing C/D/I    5/5 motor    Calf nttp (Bilat)    Sensate all distribution to light touch    1+ dp/pt pulses, foot perfused      Lab Results   Component Value Date/Time    HGB 12.0 01/20/2017 03:02 AM       Lab Results   Component Value Date/Time    Sodium 136 01/20/2017 03:02 AM    Potassium 4.2 01/20/2017 03:02 AM    Chloride 103 01/20/2017 03:02 AM    CO2 19 01/20/2017 03:02 AM    BUN 12 01/20/2017 03:02 AM    Creatinine 0.94 01/20/2017 03:02 AM    Calcium 8.9 01/20/2017 03:02 AM            POD #1 LEFT PATELLOFEMORAL JOINT REPLACEMENT. Satisfactory progress.     ABX: Complete today  PATHWAY: D/C [facility] per protocol  DVT Prophylaxis: Aspirin, SCD, YANCI   Weight Bearing: WBAT LLE   Pain Control: PRN oral narcotics  Anticipated Discharge Date: 1/20/17 vs 1/21/17   Disposition: Home, Home Health

## 2017-01-20 NOTE — PROGRESS NOTES
Problem: Mobility Impaired (Adult and Pediatric)  Goal: *Acute Goals and Plan of Care (Insert Text)  Physical Therapy Goals  Initiated 1/20/2017    1. Patient will move from supine to sit and sit to supine in bed with modified independence within 4 days. 2. Patient will perform sit to stand with modified independence within 4 days. 3. Patient will ambulate with modified independence for 150 feet with the least restrictive device within 4 days. 4. Patient will ascend/descend 4 stairs with 4 handrail(s) with supervision/set-up within 4 days. 5. Patient will perform home exercise program per protocol with minimal assistance/contact guard assist within 4 days. 6. Patient will demonstrate AROM 0-90 degrees in operative joint within 4 days. PHYSICAL THERAPY EVALUATION  Patient: Stanton Martinez (85 y.o. female)  Date: 1/20/2017  Primary Diagnosis: LEFT KNEE PAIN, CHONDRAL DEFECT LEFT PATELLA  LEFT KNEE PAIN, CHONDRAL DEFECT LEFT PATELLA  Procedure(s) (LRB):  LEFT KNEE PATELLA FEMORAL ARTHROPLASTY (Left) 1 Day Post-Op   Precautions: WBAT L         ASSESSMENT :  Based on the objective data described below, the patient presents with good initial mobility. She is ambulating in room with CGA and rolling walker. She will need rolling walker and Home Health PT for discharge. Patient will benefit from skilled intervention to address the above impairments.   Patients rehabilitation potential is considered to be Excellent  Factors which may influence rehabilitation potential include:   [X]         None noted  [ ]         Mental ability/status  [ ]         Medical condition  [ ]         Home/family situation and support systems  [ ]         Safety awareness  [ ]         Pain tolerance/management  [ ]         Other:        PLAN :  Recommendations and Planned Interventions:  [X]           Bed Mobility Training             [ ]    Neuromuscular Re-Education  [X]           Transfer Training                   [ ] Orthotic/Prosthetic Training  [X]           Gait Training                         [ ]    Modalities  [X]           Therapeutic Exercises           [ ]    Edema Management/Control  [X]           Therapeutic Activities            [X]    Patient and Family Training/Education  [ ]           Other (comment):     Frequency/Duration: Patient will be followed by physical therapy  twice daily to address goals. Discharge Recommendations: Home Health  Further Equipment Recommendations for Discharge: rolling walker       SUBJECTIVE:   Patient stated I'm going home this afternoon.       OBJECTIVE DATA SUMMARY:   HISTORY:    Past Medical History   Diagnosis Date    Abnormal Pap smear of cervix      Acid indigestion      Cancer (HCC)         choriocarcinoma  - UTERNE    Depression      Diabetes ([facility]      Dysthymic disorder      GERD (gastroesophageal reflux disease)      Hypertriglyceridemia      Mixed hyperlipidemia 3/31/2010    MRSA (methicillin resistant Staphylococcus aureus)      Psychiatric disorder         depression; ANXIETY    Urinary incontinence         URGENCY AND INCONTINENECE     Past Surgical History   Procedure Laterality Date    Hx appendectomy        Hx gyn   12/08       D & C     Hx partial hysterectomy Bilateral May 4 2015       Dr. Faina Rodriguez Hx orthopaedic           R ACL knee    Hx orthopaedic           BILATERAL A/S KNEES    Hx orthopaedic   4/2016       REMOVAL OF HARDWARE IN KNEE     Prior Level of Function/Home Situation: Ambulatory in home without assistive device but with pain  Personal factors and/or comorbidities impacting plan of care:      Home Situation  Home Environment: Private residence  # Steps to Enter: 3  One/Two Story Residence: One story  Living Alone: No  Support Systems: Spouse/Significant Other/Partner  Patient Expects to be Discharged to[de-identified] Private residence  Current DME Used/Available at Home: None     EXAMINATION/PRESENTATION/DECISION MAKING:   Critical Behavior:  Neurologic State: Alert  Orientation Level: Oriented X4  Cognition: Appropriate decision making, Appropriate for age attention/concentration, Appropriate safety awareness, Follows commands     Skin:  Incision bandaged and not inspected  Strength:    Strength:  (L knee 2/5)  Tone & Sensation:   Sensation: Intact     Range Of Motion:  PROM:  (L knee 10-80)     Coordination:  Coordination: Within functional limits     Functional Mobility:  Bed Mobility:  Rolling: Moderate assistance  Supine to Sit: Moderate assistance  Sit to Supine: Moderate assistance  Scooting: Minimum assistance  Transfers:  Sit to Stand: Minimum assistance  Stand to Sit: Minimum assistance  Stand Pivot Transfers: Minimum assistance (with roling walker)        Balance:   Sitting: Intact  Standing: With support  Ambulation/Gait Training:  Distance (ft): 20 Feet (ft)  Assistive Device: Walker, rolling   Assistance: CGA  Gait Description (WDL): Exceptions to WDL  Gait Abnormalities: Step to gait; Decreased step clearance     Left Side Weight Bearing: As tolerated  Base of Support: Widened     Speed/Melany: Pace decreased (<100 feet/min)  Step Length: Right shortened                       Therapeutic Exercises:   LAQs, Heel slides, Hip extension and hip abduction  Functional Measure:  Tinetti test:      Sitting Balance: 1  Arises: 1  Attempts to Rise: 1  Immediate Standing Balance: 1  Standing Balance: 1  Nudged: 1  Eyes Closed: 1  Turn 360 Degrees - Continuous/Discontinuous: 1  Turn 360 Degrees - Steady/Unsteady: 1  Sitting Down: 1  Balance Score: 10  Indication of Gait: 1  R Step Length/Height: 1  L Step Length/Height: 0  R Foot Clearance: 1  L Foot Clearance: 0  Step Symmetry: 0  Step Continuity: 1  Path: 2  Trunk: 1  Walking Time: 0  Gait Score: 7  Total Score: 17         Tinetti Test and G-code impairment scale:            In compliance with CMSs Claims Based Outcome Reporting, the following G-code set was chosen for this patient based on their primary functional limitation being treated: The outcome measure chosen to determine the severity of the functional limitation was the Tinetti with a score of 17/28 which was correlated with the impairment scale. · Mobility - Walking and Moving Around:               - CURRENT STATUS:    CJ - 20%-39% impaired, limited or restricted               - GOAL STATUS:           CJ - 20%-39% impaired, limited or restricted               - D/C STATUS:                       ---------------To be determined---------------      Physical Therapy Evaluation Charge Determination   History Examination Presentation Decision-Making   LOW Complexity : Zero comorbidities / personal factors that will impact the outcome / POC LOW Complexity : 1-2 Standardized tests and measures addressing body structure, function, activity limitation and / or participation in recreation  LOW Complexity : Stable, uncomplicated  LOW Complexity : FOTO score of       Based on the above components, the patient evaluation is determined to be of the following complexity level: LOW      Pain:  Pain Scale 1: Numeric (0 - 10)  Pain Intensity 1: 2  Pain Location 1: Knee  Pain Orientation 1: Left  Pain Description 1: Aching  Pain Intervention(s) 1: Ice  Activity Tolerance:   Patient tolerated up in chair x 1.5 hours this PM     After treatment:   [X]         Patient left in no apparent distress sitting up in chair  [ ]         Patient left in no apparent distress in bed  [X]         Call bell left within reach  [X]         Nursing notified  [ ]         Caregiver present  [ ]         Bed alarm activated      COMMUNICATION/EDUCATION:   The patients plan of care was discussed with: Registered Nurse.  [X]         Fall prevention education was provided and the patient/caregiver indicated understanding. [X]         Patient/family have participated as able in goal setting and plan of care.   [X]         Patient/family agree to work toward stated goals and plan of care. [ ]         Patient understands intent and goals of therapy, but is neutral about his/her participation. [ ]         Patient is unable to participate in goal setting and plan of care.      Thank you for this referral.  Dex Albrecht, PT   Time Calculation: 24 mins

## 2017-01-20 NOTE — PROGRESS NOTES
Reviewed chart. CM met with pt to introduce role of CM and discuss discharge needs. Pt lives with spouse Stephanie Mccullough ([phone]) and 25year old son in a private residence in [place]. CM noted HH orders. Offered freedom of choice for HH, pt had no preference. Pt agreeable to [facility]. CM sent referral via [employer]. Family will transport pt home via car at discharge. [facility] is willing to accept pt. Care Management Interventions  PCP Verified by CM: Yes  Mode of Transport at Discharge:  Other (see comment) (family)  Transition of Care Consult (CM Consult): [address]: Yes  MyChart Signup: No  Discharge Durable Medical Equipment: No  Physical Therapy Consult: Yes  Occupational Therapy Consult: No  Speech Therapy Consult: No  Current Support Network: Lives with Spouse, Own Home  Confirm Follow Up Transport: Family  Plan discussed with Pt/Family/Caregiver: Yes  Freedom of Choice Offered: Yes  Discharge Location  Discharge Placement: Home with home health     YELITZA Kendall/TERESITA

## 2017-01-20 NOTE — PROGRESS NOTES
Problem: Discharge Planning  Goal: *Discharge to safe environment  Outcome: Progressing Towards Goal  Discharge disposition: home with HH.      YELITZA Wilson/CRM

## 2017-01-21 ENCOUNTER — HOME CARE VISIT (OUTPATIENT)
Dept: SCHEDULING | Facility: HOME HEALTH | Age: 42
End: 2017-01-21
Payer: MEDICAID

## 2017-01-21 LAB
BACTERIA SPEC CULT: NORMAL
BACTERIA SPEC CULT: NORMAL
SERVICE CMNT-IMP: NORMAL

## 2017-01-21 PROCEDURE — 400013 HH SOC

## 2017-01-21 PROCEDURE — G0151 HHCP-SERV OF PT,EA 15 MIN: HCPCS

## 2017-01-21 PROCEDURE — G0299 HHS/HOSPICE OF RN EA 15 MIN: HCPCS

## 2017-01-22 VITALS
TEMPERATURE: 98.5 F | HEART RATE: 86 BPM | HEIGHT: 67 IN | SYSTOLIC BLOOD PRESSURE: 132 MMHG | DIASTOLIC BLOOD PRESSURE: 78 MMHG | OXYGEN SATURATION: 97 %

## 2017-01-23 ENCOUNTER — HOME CARE VISIT (OUTPATIENT)
Dept: HOME HEALTH SERVICES | Facility: HOME HEALTH | Age: 42
End: 2017-01-23
Payer: MEDICAID

## 2017-01-23 ENCOUNTER — HOME CARE VISIT (OUTPATIENT)
Dept: SCHEDULING | Facility: HOME HEALTH | Age: 42
End: 2017-01-23
Payer: MEDICAID

## 2017-01-23 VITALS
WEIGHT: 210 LBS | OXYGEN SATURATION: 96 % | HEART RATE: 72 BPM | TEMPERATURE: 98.9 F | HEIGHT: 67 IN | BODY MASS INDEX: 32.96 KG/M2 | SYSTOLIC BLOOD PRESSURE: 120 MMHG | DIASTOLIC BLOOD PRESSURE: 60 MMHG | RESPIRATION RATE: 16 BRPM

## 2017-01-23 PROCEDURE — G0151 HHCP-SERV OF PT,EA 15 MIN: HCPCS

## 2017-01-24 VITALS — HEART RATE: 100 BPM | SYSTOLIC BLOOD PRESSURE: 132 MMHG | OXYGEN SATURATION: 98 % | DIASTOLIC BLOOD PRESSURE: 77 MMHG

## 2017-01-25 ENCOUNTER — HOME CARE VISIT (OUTPATIENT)
Dept: HOME HEALTH SERVICES | Facility: HOME HEALTH | Age: 42
End: 2017-01-25
Payer: MEDICAID

## 2017-01-25 ENCOUNTER — HOME CARE VISIT (OUTPATIENT)
Dept: SCHEDULING | Facility: HOME HEALTH | Age: 42
End: 2017-01-25
Payer: MEDICAID

## 2017-01-26 ENCOUNTER — HOME CARE VISIT (OUTPATIENT)
Dept: SCHEDULING | Facility: HOME HEALTH | Age: 42
End: 2017-01-26
Payer: MEDICAID

## 2017-01-26 VITALS — DIASTOLIC BLOOD PRESSURE: 78 MMHG | HEART RATE: 88 BPM | SYSTOLIC BLOOD PRESSURE: 132 MMHG | OXYGEN SATURATION: 98 %

## 2017-01-26 PROCEDURE — G0151 HHCP-SERV OF PT,EA 15 MIN: HCPCS

## 2017-01-27 ENCOUNTER — HOME CARE VISIT (OUTPATIENT)
Dept: HOME HEALTH SERVICES | Facility: HOME HEALTH | Age: 42
End: 2017-01-27
Payer: MEDICAID

## 2017-01-30 ENCOUNTER — HOME CARE VISIT (OUTPATIENT)
Dept: SCHEDULING | Facility: HOME HEALTH | Age: 42
End: 2017-01-30
Payer: MEDICAID

## 2017-01-30 VITALS
HEART RATE: 93 BPM | RESPIRATION RATE: 16 BRPM | SYSTOLIC BLOOD PRESSURE: 140 MMHG | DIASTOLIC BLOOD PRESSURE: 90 MMHG | OXYGEN SATURATION: 97 %

## 2017-01-30 PROCEDURE — G0151 HHCP-SERV OF PT,EA 15 MIN: HCPCS

## 2017-01-30 PROCEDURE — G0152 HHCP-SERV OF OT,EA 15 MIN: HCPCS

## 2017-01-31 VITALS — HEART RATE: 78 BPM | DIASTOLIC BLOOD PRESSURE: 76 MMHG | OXYGEN SATURATION: 97 % | SYSTOLIC BLOOD PRESSURE: 128 MMHG

## 2017-02-02 ENCOUNTER — HOME CARE VISIT (OUTPATIENT)
Dept: SCHEDULING | Facility: HOME HEALTH | Age: 42
End: 2017-02-02
Payer: MEDICAID

## 2017-02-02 PROCEDURE — G0151 HHCP-SERV OF PT,EA 15 MIN: HCPCS

## 2017-02-03 ENCOUNTER — HOME CARE VISIT (OUTPATIENT)
Dept: SCHEDULING | Facility: HOME HEALTH | Age: 42
End: 2017-02-03
Payer: MEDICAID

## 2017-02-03 VITALS
DIASTOLIC BLOOD PRESSURE: 90 MMHG | RESPIRATION RATE: 16 BRPM | SYSTOLIC BLOOD PRESSURE: 130 MMHG | HEART RATE: 90 BPM | OXYGEN SATURATION: 98 %

## 2017-02-03 PROCEDURE — G0152 HHCP-SERV OF OT,EA 15 MIN: HCPCS

## 2017-02-06 ENCOUNTER — HOME CARE VISIT (OUTPATIENT)
Dept: SCHEDULING | Facility: HOME HEALTH | Age: 42
End: 2017-02-06
Payer: MEDICAID

## 2017-02-06 PROCEDURE — G0160 HHC OCCUP THERAPY EA 15: HCPCS

## 2017-02-07 VITALS
HEART RATE: 87 BPM | DIASTOLIC BLOOD PRESSURE: 71 MMHG | OXYGEN SATURATION: 99 % | RESPIRATION RATE: 16 BRPM | SYSTOLIC BLOOD PRESSURE: 125 MMHG

## 2017-02-08 ENCOUNTER — HOME CARE VISIT (OUTPATIENT)
Dept: SCHEDULING | Facility: HOME HEALTH | Age: 42
End: 2017-02-08
Payer: MEDICAID

## 2017-02-08 ENCOUNTER — TELEPHONE (OUTPATIENT)
Dept: FAMILY MEDICINE CLINIC | Age: 42
End: 2017-02-08

## 2017-02-08 PROCEDURE — G0151 HHCP-SERV OF PT,EA 15 MIN: HCPCS

## 2017-02-09 VITALS — OXYGEN SATURATION: 97 % | DIASTOLIC BLOOD PRESSURE: 76 MMHG | SYSTOLIC BLOOD PRESSURE: 138 MMHG | HEART RATE: 82 BPM

## 2017-02-15 DIAGNOSIS — G47.00 INSOMNIA, UNSPECIFIED TYPE: ICD-10-CM

## 2017-02-15 RX ORDER — ZOLPIDEM TARTRATE 10 MG/1
10 TABLET ORAL
Qty: 30 TAB | Refills: 0 | Status: SHIPPED | OUTPATIENT
Start: 2017-02-15 | End: 2017-03-01

## 2017-02-15 RX ORDER — CLONAZEPAM 1 MG/1
1 TABLET ORAL DAILY
Qty: 60 TAB | Refills: 0 | Status: SHIPPED | OUTPATIENT
Start: 2017-02-15 | End: 2017-03-01

## 2017-02-15 NOTE — TELEPHONE ENCOUNTER
Received faxed refill request for this medication from the pharmacy that is on file. Pharmacy states    Clonazepam 1 MG Tab    Qty. 30 Tab    Take 1 tablet by mouth every day (max daily amount 1 Tab)    Zolpidem 10 MG Tab    Qty.  30 Tab    Take 1 tablet by mouth as needed at night for sleep

## 2017-03-01 ENCOUNTER — OFFICE VISIT (OUTPATIENT)
Dept: FAMILY MEDICINE CLINIC | Age: 42
End: 2017-03-01

## 2017-03-01 VITALS
HEIGHT: 67 IN | RESPIRATION RATE: 18 BRPM | BODY MASS INDEX: 34.56 KG/M2 | HEART RATE: 100 BPM | OXYGEN SATURATION: 95 % | WEIGHT: 220.2 LBS | SYSTOLIC BLOOD PRESSURE: 123 MMHG | DIASTOLIC BLOOD PRESSURE: 76 MMHG | TEMPERATURE: 99.2 F

## 2017-03-01 DIAGNOSIS — G47.00 INSOMNIA, UNSPECIFIED: ICD-10-CM

## 2017-03-01 DIAGNOSIS — E11.9 DIABETES MELLITUS WITHOUT COMPLICATION (HCC): ICD-10-CM

## 2017-03-01 DIAGNOSIS — F31.9 BIPOLAR 1 DISORDER, DEPRESSED (HCC): Primary | ICD-10-CM

## 2017-03-01 DIAGNOSIS — E78.2 MIXED HYPERLIPIDEMIA: ICD-10-CM

## 2017-03-01 DIAGNOSIS — F41.9 ANXIETY: ICD-10-CM

## 2017-03-01 RX ORDER — QUETIAPINE FUMARATE 100 MG/1
100 TABLET, FILM COATED ORAL
Qty: 30 TAB | Refills: 1 | Status: SHIPPED | OUTPATIENT
Start: 2017-03-01 | End: 2017-04-28 | Stop reason: SDUPTHER

## 2017-03-01 RX ORDER — PAROXETINE HYDROCHLORIDE 20 MG/1
TABLET, FILM COATED ORAL
COMMUNITY
Start: 2016-12-15 | End: 2017-03-01

## 2017-03-01 RX ORDER — FLUCONAZOLE 150 MG/1
TABLET ORAL
COMMUNITY
Start: 2017-02-15 | End: 2017-03-01

## 2017-03-01 NOTE — PROGRESS NOTES
HISTORY OF PRESENT ILLNESS  Pau Do is a 39 y.o. female. HPI: Patient is crying and reports she feels  depressed due to not taking her medication--Seroquel--. She stopped going to her psychiatrist at [address] She reports that she didn't get along didn't like her psychiatrist. Last time she saw he psychiatrist on December, 2016, as a result she ran out of her Seroquel a month ago. She has history of bipolar, depression and anxiety. She is also taking Buspar 5 mg tid. She denies suicidal ideation or hurting herself. She is requesting refill on Seroquel requesting referral to another psychiatrist.  Cardiovascular Review  The patient has diabetes and hypertension. She reports taking medications as instructed, no medication side effects noted. Diet and Lifestyle: generally follows a low fat low cholesterol diet, generally follows a low sodium diet, no formal exercise but active during the day. Lab review: labs reviewed and discussed with patient.       Past Medical History:   Diagnosis Date    Abnormal Pap smear of cervix     Acid indigestion     Cancer (HCC)     choriocarcinoma  - UTERNE    Depression     Diabetes ([address]     Dysthymic disorder     GERD (gastroesophageal reflux disease)     Hypertriglyceridemia     Mixed hyperlipidemia 3/31/2010    MRSA (methicillin resistant Staphylococcus aureus)     Psychiatric disorder     depression; ANXIETY    Urinary incontinence     URGENCY AND INCONTINENECE     Past Surgical History:   Procedure Laterality Date    HX APPENDECTOMY      HX GYN  12/08    D & C     HX ORTHOPAEDIC      R ACL knee    HX ORTHOPAEDIC      BILATERAL A/S KNEES    HX ORTHOPAEDIC  4/2016    REMOVAL OF HARDWARE IN KNEE    HX PARTIAL HYSTERECTOMY Bilateral May 4 2015    Dr. Jackie Malik     Allergies   Allergen Reactions    Ciprocinonide Hives     cipro    Demerol [Meperidine] Other (comments)     Goofy feeling, hallucinates     Current Outpatient Prescriptions:    QUEtiapine (SEROQUEL) 100 mg tablet, Take 1 Tab by mouth nightly., Disp: 30 Tab, Rfl: 1    aspirin delayed-release 325 mg tablet, Take 1 Tab by mouth two (2) times a day., Disp: 60 Tab, Rfl: 0    oxyCODONE IR (ROXICODONE) 5 mg immediate release tablet, Take 1-2 Tabs by mouth every four (4) hours as needed for Pain. Max Daily Amount: 60 mg., Disp: 90 Tab, Rfl: 0    busPIRone (BUSPAR) 5 mg tablet, Take 1 Tab by mouth three (3) times daily. , Disp: 90 Tab, Rfl: 0    estradiol (ESTRACE) 0.5 mg tablet, Take 0.5 mg by mouth daily. , Disp: , Rfl:     pantoprazole (PROTONIX) 40 mg tablet, Take 1 Tab by mouth daily. (Patient taking differently: Take 40 mg by mouth nightly.), Disp: 30 Tab, Rfl: 3    pravastatin (PRAVACHOL) 40 mg tablet, TAKE ONE TABLET BY MOUTH NIGHTLY, Disp: 30 Tab, Rfl: 4    fenofibrate micronized (LOFIBRA) 134 mg capsule, Take 1 Cap by mouth daily. , Disp: 30 Cap, Rfl: 4    sitaGLIPtin (JANUVIA) 100 mg tablet, Take 1 Tab by mouth daily. (Patient taking differently: Take 100 mg by mouth nightly.), Disp: 30 Tab, Rfl: 5    ranitidine (ZANTAC) 150 mg tablet, Take 1 Tab by mouth nightly., Disp: 60 Tab, Rfl: 4    terconazole (TERAZOL 7) 0.4 % vaginal cream, Insert 1 Applicator into vagina nightly., Disp: 45 g, Rfl: 0    Lancets (ONE TOUCH ULTRASOFT LANCETS) Misc, Use as directed , Disp: 1 Package, Rfl: 11    Blood-Glucose Meter (ONE TOUCH ULTRA SYSTEM KIT) monitoring kit, Use as directed, Disp: 1 Kit, Rfl: 0    glucose blood VI test strips (ONE TOUCH ULTRA TEST) strip, Monitor BS BID Dx: 250.02 , Disp: 1 Package, Rfl: 11    GLUMETZA 1,000 mg TG24 24 hour tablet, Take 1,000 mg by mouth nightly., Disp: 30 Tab, Rfl: 4    oxybutynin chloride XL (DITROPAN XL) 10 mg CR tablet, Take 1 Tab by mouth nightly. TAKE ONE TABLET BY MOUTH EVERY DAY, Disp: 30 Tab, Rfl: 4  Review of Systems   Constitutional: Negative. Respiratory: Negative. Cardiovascular: Negative. Gastrointestinal: Negative. Psychiatric/Behavioral: Positive for depression. Negative for hallucinations, memory loss, substance abuse and suicidal ideas. The patient is nervous/anxious and has insomnia. Blood pressure 123/76, pulse 100, temperature 99.2 °F (37.3 °C), temperature source Oral, resp. rate 18, height 5' 7\" (1.702 m), weight 220 lb 3.2 oz (99.9 kg), last menstrual period 09/10/2014, SpO2 95 %. Physical Exam   Constitutional: She is oriented to person, place, and time. Crying, in mild distress    HENT:   Mouth/Throat: Oropharynx is clear and moist.   Neck: Neck supple. Cardiovascular: Normal rate and regular rhythm. No murmur heard. Pulmonary/Chest: Effort normal and breath sounds normal.   Abdominal: Soft. Bowel sounds are normal.   Neurological: She is alert and oriented to person, place, and time. Psychiatric: She has a normal mood and affect. Her behavior is normal.   Nursing note and vitals reviewed. ASSESSMENT and PLAN    ICD-10-CM ICD-9-CM    1. Anxiety F41.9 300.00    2. Insomnia, unspecified G47.00 780.52    3. Bipolar 1 disorder, depressed ([facility] F31.9 296.50 REFERRAL TO PSYCHIATRY   4. Mixed hyperlipidemia E78.2 272.2 LIPID PANEL      METABOLIC PANEL, COMPREHENSIVE   5. Diabetes mellitus without complication (HCC) H14.7 250.00 MICROALBUMIN, UR, RAND W/ MICROALBUMIN/CREA RATIO      HEMOGLOBIN A1C WITH EAG     -     QUEtiapine (SEROQUEL) 100 mg tablet; Take 1 Tab by mouth nightly. Refill Seroquel, name and number of several psychiatrist given to PT to call and make appointment   Await labs.  Will follow up in lab to recheck fasting labs  Pt was given an after visit summary which includes diagnosis, current medicines and vital and voiced understanding of treatment plan

## 2017-03-01 NOTE — PROGRESS NOTES
\"Reviewed record in preparation for visit and have obtained the necessary documentation\"  Chief Complaint   Patient presents with    Altered mental status     discuss     Anxiety    Medication Evaluation       Patient presents in the office today to discuss mental health issues/concerns and anxiety     Patient also requested medication evaluation in regard to mental health medications,sleep aid,and anxiety medications     1. Have you been to the ER, urgent care clinic since your last visit? Hospitalized since your last visit? No    2. Have you seen or consulted any other health care providers outside of the [address] [place] [address] since your last visit? Include any pap smears or colon screening.  No

## 2017-03-01 NOTE — MR AVS SNAPSHOT
Visit Information Date & Time Provider Department Dept. Phone Encounter #  
 3/1/2017 10:30 AM Maxi Garcia  [facility] [address] [facility] [address] [phone] [id number] Your Appointments 4/20/2017 11:30 AM  
ESTABLISHED PATIENT with Mariana Schaumann, NP [facility] ([facility]) Appt Note: 3m fu  
 [address] [facility] Florina Garcia [address]  
  
   
 [address] [facility] [phone] Upcoming Health Maintenance Date Due Pneumococcal 19-64 Highest Risk (1 of 3 - PCV13) 10/5/1994 DTaP/Tdap/Td series (1 - Tdap) 10/5/1996 EYE EXAM RETINAL OR DILATED Q1 2/12/2014 PAP AKA CERVICAL CYTOLOGY 2/12/2016 FOOT EXAM Q1 2/13/2016 HEMOGLOBIN A1C Q6M 4/12/2017 MICROALBUMIN Q1 10/12/2017 LIPID PANEL Q1 10/12/2017 Allergies as of 3/1/2017  Review Complete On: 3/1/2017 By: Jeanie Salinas LPN Severity Noted Reaction Type Reactions Ciprocinonide  06/03/2009    Hives  
 cipro Demerol [Meperidine]  06/03/2009    Other (comments) Goofy feeling, hallucinates Current Immunizations  Never Reviewed Name Date Influenza Vaccine (Quad) PF 10/12/2016 Influenza Vaccine Split 11/19/2010, 10/28/2009 Not reviewed this visit You Were Diagnosed With   
  
 Codes Comments Anxiety    -  Primary ICD-10-CM: F41.9 ICD-9-CM: 300.00 Insomnia, unspecified     ICD-10-CM: G47.00 ICD-9-CM: 780.52 Bipolar 1 disorder, depressed ([facility]     ICD-10-CM: F31.9 ICD-9-CM: 296.50 Mixed hyperlipidemia     ICD-10-CM: E78.2 ICD-9-CM: 272.2 Diabetes mellitus without complication ([facility]     RBF-29-II: E11.9 ICD-9-CM: 250.00 Vitals BP  
  
  
  
  
  
 123/76 (BP 1 Location: Left arm, BP Patient Position: Sitting) Vitals History BMI and BSA Data Body Mass Index Body Surface Area 34.49 kg/m 2 2.17 m 2 Preferred Pharmacy Pharmacy Name Phone [address] [place] [address] [phone] Your Updated Medication List  
  
   
This list is accurate as of: 3/1/17 10:53 AM.  Always use your most recent med list.  
  
  
  
  
 aspirin delayed-release 325 mg tablet Take 1 Tab by mouth two (2) times a day. Blood-Glucose Meter monitoring kit Commonly known as:  [address]   [address] KIT Use as directed  
  
 busPIRone 5 mg tablet Commonly known as:  BUSPAR Take 1 Tab by mouth three (3) times daily. estradiol 0.5 mg tablet Commonly known as:  ESTRACE Take 0.5 mg by mouth daily. fenofibrate micronized 134 mg capsule Commonly known as:  LOFIBRA Take 1 Cap by mouth daily. glucose blood VI test strips strip Commonly known as:  ONETOUCH ULTRA TEST Monitor BS BID Dx: 250.02  
  
 GLUMETZA 1,000 mg Tg24 24 hour tablet Generic drug:  metFORMIN Take 1,000 mg by mouth nightly. Lancets Misc Commonly known as:  ONETOUCH ULTRASOFT LANCETS Use as directed  
  
 oxybutynin chloride XL 10 mg CR tablet Commonly known as:  DITROPAN XL Take 1 Tab by mouth nightly. TAKE ONE TABLET BY MOUTH EVERY DAY  
  
 oxyCODONE IR 5 mg immediate release tablet Commonly known as:  [place] [employer] Take 1-2 Tabs by mouth every four (4) hours as needed for Pain. Max Daily Amount: 60 mg.  
  
 pantoprazole 40 mg tablet Commonly known as:  PROTONIX Take 1 Tab by mouth daily. pravastatin 40 mg tablet Commonly known as:  PRAVACHOL  
TAKE ONE TABLET BY MOUTH NIGHTLY QUEtiapine 100 mg tablet Commonly known as:  SEROquel Take 1 Tab by mouth nightly. raNITIdine 150 mg tablet Commonly known as:  ZANTAC Take 1 Tab by mouth nightly. SITagliptin 100 mg tablet Commonly known as:  Merline Pesa Take 1 Tab by mouth daily. terconazole 0.4 % vaginal cream  
Commonly known as:  TERAZOL 7 Insert 1 Applicator into vagina nightly. Prescriptions Sent to Pharmacy Refills QUEtiapine (SEROQUEL) 100 mg tablet 1 Sig: Take 1 Tab by mouth nightly. Class: Normal  
 Pharmacy: [address] [place] [address] #: [phone] Route: Oral  
  
We Performed the Following HEMOGLOBIN A1C WITH EAG [17423 CPT(R)] LIPID PANEL [19219 CPT(R)] METABOLIC PANEL, COMPREHENSIVE [97781 CPT(R)] MICROALBUMIN, UR, RAND W/ MICROALBUMIN/CREA RATIO [id number] CPT(R)] REFERRAL TO PSYCHIATRY [REF91 Custom] Comments:  
 Please evaluate patient for anxiety/depression Patient will make her appointment Referral Information Referral ID Referred By Referred To  
  
 [id number] Yuan PENALOZA Not Available Visits Status Start Date End Date 1 New Request 3/1/17 3/1/18 If your referral has a status of pending review or denied, additional information will be sent to support the outcome of this decision. Introducing [address]! Dear Shalini Chu: Thank you for requesting a [employer] account. Our records indicate that you already have an active Touch[employer] account. You can access your account anytime at [email or web address] Did you know that you can access your hospital and ER discharge instructions at any time in Touch[employer]? You can also review all of your test results from your hospital stay or ER visit. Additional Information If you have questions, please visit the Frequently Asked Questions section of the Touch[employer] website at [email or web address] Remember, TouchFrame is NOT to be used for urgent needs. For medical emergencies, dial 911. Now available from your iPhone and Android! Please provide this summary of care documentation to your next provider. Your primary care clinician is listed as Bella ROBLERO. If you have any questions after today's visit, please call [phone].

## 2017-03-25 DIAGNOSIS — E78.5 HYPERLIPIDEMIA, UNSPECIFIED HYPERLIPIDEMIA TYPE: ICD-10-CM

## 2017-03-27 RX ORDER — PRAVASTATIN SODIUM 40 MG/1
TABLET ORAL
Qty: 30 TAB | Refills: 0 | Status: SHIPPED | OUTPATIENT
Start: 2017-03-27 | End: 2017-05-15 | Stop reason: SDUPTHER

## 2017-04-12 DIAGNOSIS — F31.9 BIPOLAR 1 DISORDER, DEPRESSED (HCC): Primary | ICD-10-CM

## 2017-04-12 DIAGNOSIS — F34.1 DYSTHYMIC DISORDER: ICD-10-CM

## 2017-04-12 DIAGNOSIS — G47.00 INSOMNIA, UNSPECIFIED: ICD-10-CM

## 2017-04-12 RX ORDER — ZOLPIDEM TARTRATE 5 MG/1
5 TABLET ORAL
Qty: 30 TAB | Refills: 0 | Status: SHIPPED | OUTPATIENT
Start: 2017-04-12 | End: 2017-05-16 | Stop reason: SDUPTHER

## 2017-04-28 DIAGNOSIS — F40.10 SOCIAL ANXIETY DISORDER: ICD-10-CM

## 2017-04-28 DIAGNOSIS — F40.01 AGORAPHOBIA WITH PANIC DISORDER: ICD-10-CM

## 2017-04-28 DIAGNOSIS — F41.9 ANXIETY: ICD-10-CM

## 2017-04-28 DIAGNOSIS — F32.2 SEVERE SINGLE CURRENT EPISODE OF MAJOR DEPRESSIVE DISORDER, WITHOUT PSYCHOTIC FEATURES (HCC): ICD-10-CM

## 2017-04-28 RX ORDER — QUETIAPINE FUMARATE 100 MG/1
100 TABLET, FILM COATED ORAL
Qty: 30 TAB | Refills: 1 | Status: SHIPPED | OUTPATIENT
Start: 2017-04-28 | End: 2017-06-29 | Stop reason: SDUPTHER

## 2017-04-28 RX ORDER — BUSPIRONE HYDROCHLORIDE 5 MG/1
5 TABLET ORAL 3 TIMES DAILY
Qty: 90 TAB | Refills: 0 | Status: SHIPPED | OUTPATIENT
Start: 2017-04-28 | End: 2018-10-02

## 2017-05-15 DIAGNOSIS — K21.00 GASTROESOPHAGEAL REFLUX DISEASE WITH ESOPHAGITIS: ICD-10-CM

## 2017-05-15 DIAGNOSIS — E78.5 HYPERLIPIDEMIA, UNSPECIFIED HYPERLIPIDEMIA TYPE: ICD-10-CM

## 2017-05-16 RX ORDER — CLONAZEPAM 1 MG/1
1 TABLET ORAL DAILY
Qty: 60 TAB | Refills: 0 | Status: SHIPPED | OUTPATIENT
Start: 2017-05-16 | End: 2017-05-22

## 2017-05-16 RX ORDER — PANTOPRAZOLE SODIUM 40 MG/1
TABLET, DELAYED RELEASE ORAL
Qty: 30 TAB | Refills: 0 | Status: SHIPPED | OUTPATIENT
Start: 2017-05-16 | End: 2017-07-20 | Stop reason: SDUPTHER

## 2017-05-16 RX ORDER — PRAVASTATIN SODIUM 40 MG/1
TABLET ORAL
Qty: 30 TAB | Refills: 0 | Status: SHIPPED | OUTPATIENT
Start: 2017-05-16 | End: 2017-07-20 | Stop reason: SDUPTHER

## 2017-05-16 RX ORDER — ZOLPIDEM TARTRATE 5 MG/1
5 TABLET ORAL
Qty: 30 TAB | Refills: 3 | Status: SHIPPED | OUTPATIENT
Start: 2017-05-16 | End: 2017-10-25 | Stop reason: SDUPTHER

## 2017-05-22 ENCOUNTER — OFFICE VISIT (OUTPATIENT)
Dept: FAMILY MEDICINE CLINIC | Age: 42
End: 2017-05-22

## 2017-05-22 VITALS
TEMPERATURE: 98.2 F | RESPIRATION RATE: 18 BRPM | BODY MASS INDEX: 35.47 KG/M2 | DIASTOLIC BLOOD PRESSURE: 86 MMHG | WEIGHT: 226 LBS | SYSTOLIC BLOOD PRESSURE: 127 MMHG | HEIGHT: 67 IN | HEART RATE: 84 BPM | OXYGEN SATURATION: 98 %

## 2017-05-22 DIAGNOSIS — E11.9 DIABETES MELLITUS WITHOUT COMPLICATION (HCC): Primary | ICD-10-CM

## 2017-05-22 DIAGNOSIS — F41.9 ANXIETY: ICD-10-CM

## 2017-05-22 DIAGNOSIS — F31.9 BIPOLAR 1 DISORDER, DEPRESSED (HCC): ICD-10-CM

## 2017-05-22 DIAGNOSIS — E66.9 OBESITY (BMI 35.0-39.9 WITHOUT COMORBIDITY): ICD-10-CM

## 2017-05-22 DIAGNOSIS — E78.2 MIXED HYPERLIPIDEMIA: ICD-10-CM

## 2017-05-22 RX ORDER — METFORMIN HYDROCHLORIDE 1000 MG/1
1000 TABLET, FILM COATED, EXTENDED RELEASE ORAL
Qty: 30 TAB | Refills: 4 | Status: SHIPPED | OUTPATIENT
Start: 2017-05-22 | End: 2017-09-11 | Stop reason: CLARIF

## 2017-05-22 NOTE — PROGRESS NOTES
1. Have you been to the ER, urgent care clinic since your last visit? Hospitalized since your last visit? No    2. Have you seen or consulted any other health care providers outside of the [address] [place] [address] since your last visit? Include any pap smears or colon screening.  No   Chief Complaint   Patient presents with    Medication Evaluation     patient is here for medication evaluation     Learning Assessment 7/22/2015   PRIMARY LEARNER Patient   HIGHEST LEVEL OF EDUCATION - PRIMARY LEARNER  GRADUATED HIGH SCHOOL OR GED   BARRIERS PRIMARY LEARNER NONE   CO-LEARNER CAREGIVER No   PRIMARY LANGUAGE ENGLISH   LEARNER PREFERENCE PRIMARY DEMONSTRATION   ANSWERED BY Bambi oD   RELATIONSHIP SELF

## 2017-05-22 NOTE — PROGRESS NOTES
HISTORY OF PRESENT ILLNESS  Eleanor Do is a 39 y.o. female. HPI:Cardiovascular Review  The patient has diabetes and hyperlipidemia. She reports taking medications as instructed, no medication side effects noted. Diet and Lifestyle: generally follows a low fat low cholesterol diet, generally follows a low sodium diet, no formal exercise but active during the day. Lab review: labs reviewed and discussed with patient. Patient requesting another referral to psychiatrist.  Past Medical History:   Diagnosis Date    Abnormal Pap smear of cervix     Acid indigestion     Cancer ([facility]     choriocarcinoma  - UTERNE    Depression     Diabetes ([facility]     Dysthymic disorder     GERD (gastroesophageal reflux disease)     Hypertriglyceridemia     Mixed hyperlipidemia 3/31/2010    MRSA (methicillin resistant Staphylococcus aureus)     Psychiatric disorder     depression; ANXIETY    Urinary incontinence     URGENCY AND INCONTINENECE     Past Surgical History:   Procedure Laterality Date    HX APPENDECTOMY      HX GYN  12/08    D & C     HX ORTHOPAEDIC      R ACL knee    HX ORTHOPAEDIC      BILATERAL A/S KNEES    HX ORTHOPAEDIC  4/2016    REMOVAL OF HARDWARE IN KNEE    HX PARTIAL HYSTERECTOMY Bilateral May 4 2015    Dr. Alfonso Ball     Allergies   Allergen Reactions    Ciprocinonide Hives     cipro    Demerol [Meperidine] Other (comments)     Goofy feeling, hallucinates     Current Outpatient Prescriptions:     GLUMETZA 1,000 mg TG24 24 hour tablet, Take 1,000 mg by mouth nightly., Disp: 30 Tab, Rfl: 4    pravastatin (PRAVACHOL) 40 mg tablet, TAKE ONE TABLET BY MOUTH AT NIGHT, Disp: 30 Tab, Rfl: 0    pantoprazole (PROTONIX) 40 mg tablet, TAKE ONE TABLET BY MOUTH EVERY DAY, Disp: 30 Tab, Rfl: 0    zolpidem (AMBIEN) 5 mg tablet, Take 1 Tab by mouth nightly as needed for Sleep.  Max Daily Amount: 5 mg., Disp: 30 Tab, Rfl: 3    busPIRone (BUSPAR) 5 mg tablet, Take 1 Tab by mouth three (3) times daily., Disp: 90 Tab, Rfl: 0    QUEtiapine (SEROQUEL) 100 mg tablet, Take 1 Tab by mouth nightly., Disp: 30 Tab, Rfl: 1    aspirin delayed-release 325 mg tablet, Take 1 Tab by mouth two (2) times a day., Disp: 60 Tab, Rfl: 0    estradiol (ESTRACE) 0.5 mg tablet, Take 0.5 mg by mouth daily. , Disp: , Rfl:     fenofibrate micronized (LOFIBRA) 134 mg capsule, Take 1 Cap by mouth daily. , Disp: 30 Cap, Rfl: 4    sitaGLIPtin (JANUVIA) 100 mg tablet, Take 1 Tab by mouth daily. (Patient taking differently: Take 100 mg by mouth nightly.), Disp: 30 Tab, Rfl: 5    ranitidine (ZANTAC) 150 mg tablet, Take 1 Tab by mouth nightly., Disp: 60 Tab, Rfl: 4    oxybutynin chloride XL (DITROPAN XL) 10 mg CR tablet, Take 1 Tab by mouth nightly. TAKE ONE TABLET BY MOUTH EVERY DAY, Disp: 30 Tab, Rfl: 4    terconazole (TERAZOL 7) 0.4 % vaginal cream, Insert 1 Applicator into vagina nightly., Disp: 45 g, Rfl: 0    Lancets (ONE TOUCH ULTRASOFT LANCETS) Misc, Use as directed , Disp: 1 Package, Rfl: 11    Blood-Glucose Meter (ONE TOUCH ULTRA SYSTEM KIT) monitoring kit, Use as directed, Disp: 1 Kit, Rfl: 0    glucose blood VI test strips (ONE TOUCH ULTRA TEST) strip, Monitor BS BID Dx: 250.02 , Disp: 1 Package, Rfl: 11  Review of Systems   Constitutional: Negative. Respiratory: Negative. Cardiovascular: Negative. Gastrointestinal: Negative. Blood pressure 127/86, pulse 84, temperature 98.2 °F (36.8 °C), temperature source Oral, resp. rate 18, height 5' 7\" (1.702 m), weight 226 lb (102.5 kg), last menstrual period 09/10/2014, SpO2 98 %. Body mass index is 35.4 kg/(m^2). Physical Exam   Constitutional: No distress. HENT:   Mouth/Throat: Oropharynx is clear and moist.   Neck: Neck supple. Cardiovascular: Normal rate and regular rhythm. No murmur heard. Pulmonary/Chest: Effort normal and breath sounds normal.   Abdominal: Soft.  Bowel sounds are normal.   Skin:   Diabetic foot exam:     Left: Reflexes 2+     Vibratory sensation normal    Proprioception normal   Sharp/dull discrimination normal    Filament test normal sensation with micro filament   Pulse DP: 2+ (normal)   Pulse PT: 2+ (normal)   Deformities: None  Right: Reflexes 2+   Vibratory sensation normal   Proprioception normal   Sharp/dull discrimination normal   Filament test normal sensation with micro filament   Pulse DP: 2+ (normal)   Pulse PT: 2+ (normal)   Deformities: None     Nursing note and vitals reviewed. ASSESSMENT and PLAN    ICD-10-CM ICD-9-CM    1. Diabetes mellitus without complication ([facility]) M78.9 250.00 HEMOGLOBIN A1C WITH EAG      MICROALBUMIN, UR, RAND W/ MICROALBUMIN/CREA RATIO       DIABETES FOOT EXAM      GLUMETZA 1,000 mg TG24 24 hour tablet   2. Mixed hyperlipidemia E78.2 272.2 LIPID PANEL      METABOLIC PANEL, COMPREHENSIVE   3. Obesity (BMI 35.0-39.9 without comorbidity) ([facility]) E66.9 278.00    4. Bipolar 1 disorder, depressed ([facility] F31.9 296.50 REFERRAL TO PSYCHIATRY   5.  Anxiety F41.9 300.00    await labs  Refill medication,   Refer to psychiatrist   Pt was given an after visit summary which includes diagnosis, current medicines and vital and voiced understanding of treatment plan

## 2017-05-23 NOTE — TELEPHONE ENCOUNTER
Rx has been called into [facility] last week.  Checked with Pharmacist (Mr. Cristobal Merrill)

## 2017-05-25 ENCOUNTER — TELEPHONE (OUTPATIENT)
Dept: FAMILY MEDICINE CLINIC | Age: 42
End: 2017-05-25

## 2017-06-12 NOTE — PROGRESS NOTES
I have reviewed discharge instructions with the patient and spouse. The patient and spouse verbalized understanding. Patient's belongings were packed by the spouse. Patient was taken to patient discharge in a wheelchair by volunteers. - - -

## 2017-06-29 DIAGNOSIS — F41.9 ANXIETY: ICD-10-CM

## 2017-06-30 RX ORDER — QUETIAPINE FUMARATE 100 MG/1
TABLET, FILM COATED ORAL
Qty: 30 TAB | Refills: 0 | Status: SHIPPED | OUTPATIENT
Start: 2017-06-30 | End: 2017-07-20 | Stop reason: SDUPTHER

## 2017-07-07 RX ORDER — ZOLPIDEM TARTRATE 5 MG/1
5 TABLET ORAL
Qty: 30 TAB | Refills: 3 | Status: CANCELLED | OUTPATIENT
Start: 2017-07-07

## 2017-07-07 NOTE — TELEPHONE ENCOUNTER
Writer contacted patient's pharmacy, pharmacist verified patient has 3 refills of requested medication requested.

## 2017-07-07 NOTE — TELEPHONE ENCOUNTER
----- Message from Fausto Black sent at 7/7/2017  2:36 PM EDT -----  Regarding: Margarita Cain / Telephone  Pt is requesting a refill of her Ambien pt has been out for a week. Please send to the [facility]  And pt best contact number is  [phone].

## 2017-07-10 ENCOUNTER — TELEPHONE (OUTPATIENT)
Dept: FAMILY MEDICINE CLINIC | Age: 42
End: 2017-07-10

## 2017-07-10 NOTE — TELEPHONE ENCOUNTER
----- Message from Eduardo Peacock sent at 7/10/2017  2:58 PM EDT -----  Regarding: Nicole/kash   Pt is requesting an appointment today or tomorrow for left shoulder pain. Pts number is [phone].

## 2017-07-20 DIAGNOSIS — K21.00 GASTROESOPHAGEAL REFLUX DISEASE WITH ESOPHAGITIS: ICD-10-CM

## 2017-07-20 DIAGNOSIS — F41.9 ANXIETY: ICD-10-CM

## 2017-07-20 DIAGNOSIS — E78.5 HYPERLIPIDEMIA, UNSPECIFIED HYPERLIPIDEMIA TYPE: ICD-10-CM

## 2017-07-21 RX ORDER — QUETIAPINE FUMARATE 100 MG/1
TABLET, FILM COATED ORAL
Qty: 30 TAB | Refills: 0 | Status: SHIPPED | OUTPATIENT
Start: 2017-07-21 | End: 2017-08-29 | Stop reason: SDUPTHER

## 2017-07-21 RX ORDER — FENOFIBRATE 134 MG/1
CAPSULE ORAL
Qty: 30 CAP | Refills: 0 | Status: SHIPPED | OUTPATIENT
Start: 2017-07-21 | End: 2017-08-29 | Stop reason: SDUPTHER

## 2017-07-21 RX ORDER — PANTOPRAZOLE SODIUM 40 MG/1
TABLET, DELAYED RELEASE ORAL
Qty: 30 TAB | Refills: 0 | Status: SHIPPED | OUTPATIENT
Start: 2017-07-21 | End: 2019-04-15 | Stop reason: SDUPTHER

## 2017-07-21 RX ORDER — PRAVASTATIN SODIUM 40 MG/1
TABLET ORAL
Qty: 30 TAB | Refills: 0 | Status: SHIPPED | OUTPATIENT
Start: 2017-07-21 | End: 2017-08-29 | Stop reason: SDUPTHER

## 2017-07-21 RX ORDER — OXYBUTYNIN CHLORIDE 10 MG/1
TABLET, EXTENDED RELEASE ORAL
Qty: 30 TAB | Refills: 0 | Status: SHIPPED | OUTPATIENT
Start: 2017-07-21 | End: 2017-08-29 | Stop reason: SDUPTHER

## 2017-08-01 ENCOUNTER — TELEPHONE (OUTPATIENT)
Dept: FAMILY MEDICINE CLINIC | Age: 42
End: 2017-08-01

## 2017-08-09 RX ORDER — PHENOL/SODIUM PHENOLATE
20 AEROSOL, SPRAY (ML) MUCOUS MEMBRANE DAILY
Qty: 30 TAB | Refills: 5 | Status: SHIPPED | OUTPATIENT
Start: 2017-08-09 | End: 2019-07-03 | Stop reason: SDUPTHER

## 2017-08-15 ENCOUNTER — TELEPHONE (OUTPATIENT)
Dept: FAMILY MEDICINE CLINIC | Age: 42
End: 2017-08-15

## 2017-08-29 DIAGNOSIS — F41.9 ANXIETY: ICD-10-CM

## 2017-08-29 DIAGNOSIS — E78.5 HYPERLIPIDEMIA, UNSPECIFIED HYPERLIPIDEMIA TYPE: ICD-10-CM

## 2017-08-29 RX ORDER — QUETIAPINE FUMARATE 100 MG/1
TABLET, FILM COATED ORAL
Qty: 30 TAB | Refills: 0 | Status: SHIPPED | OUTPATIENT
Start: 2017-08-29 | End: 2017-09-28 | Stop reason: SDUPTHER

## 2017-08-29 RX ORDER — FENOFIBRATE 134 MG/1
CAPSULE ORAL
Qty: 30 CAP | Refills: 0 | Status: SHIPPED | OUTPATIENT
Start: 2017-08-29 | End: 2017-09-28 | Stop reason: SDUPTHER

## 2017-08-29 RX ORDER — PRAVASTATIN SODIUM 40 MG/1
TABLET ORAL
Qty: 30 TAB | Refills: 0 | Status: SHIPPED | OUTPATIENT
Start: 2017-08-29 | End: 2017-09-28 | Stop reason: SDUPTHER

## 2017-08-29 RX ORDER — OXYBUTYNIN CHLORIDE 10 MG/1
TABLET, EXTENDED RELEASE ORAL
Qty: 30 TAB | Refills: 0 | Status: SHIPPED | OUTPATIENT
Start: 2017-08-29 | End: 2017-09-28 | Stop reason: SDUPTHER

## 2017-09-11 ENCOUNTER — OFFICE VISIT (OUTPATIENT)
Dept: FAMILY MEDICINE CLINIC | Age: 42
End: 2017-09-11

## 2017-09-11 VITALS
BODY MASS INDEX: 34.88 KG/M2 | RESPIRATION RATE: 18 BRPM | OXYGEN SATURATION: 99 % | WEIGHT: 222.2 LBS | DIASTOLIC BLOOD PRESSURE: 72 MMHG | SYSTOLIC BLOOD PRESSURE: 118 MMHG | HEIGHT: 67 IN | HEART RATE: 96 BPM | TEMPERATURE: 99.1 F

## 2017-09-11 DIAGNOSIS — E11.9 DIABETES MELLITUS WITHOUT COMPLICATION (HCC): Primary | ICD-10-CM

## 2017-09-11 DIAGNOSIS — L98.8 SKIN LESION OF BREAST: ICD-10-CM

## 2017-09-11 LAB
GLUCOSE POC: 347 MG/DL
HBA1C MFR BLD HPLC: 9.8 %

## 2017-09-11 RX ORDER — PIOGLITAZONEHYDROCHLORIDE 15 MG/1
15 TABLET ORAL DAILY
Qty: 90 TAB | Refills: 0 | Status: SHIPPED | OUTPATIENT
Start: 2017-09-11 | End: 2019-07-03 | Stop reason: SDUPTHER

## 2017-09-11 NOTE — PROGRESS NOTES
Chief Complaint   Patient presents with    Breast Cyst     Patient has a small red knot area underneath right breast. Sometimes the area gets hard and irritated. Sometimes the area is tender to touch. Patient has had knot on breast x 4 weeks. 1. Have you been to the ER, urgent care clinic since your last visit? Hospitalized since your last visit? No    2. Have you seen or consulted any other health care providers outside of the [address] [place] [address] since your last visit? Include any pap smears or colon screening.  No

## 2017-09-11 NOTE — PROGRESS NOTES
[facility] Note      Subjective:     Chief Complaint   Patient presents with    Breast Cyst     Patient has a small red knot area underneath right breast. Sometimes the area gets hard and irritated. Sometimes the area is tender to touch. Oly Brito is a 39y.o. year old female who presents for evaluation of the following:    Breast Issue: Red bump on breast noticed 4 weeks ago. Right breast. No pain usually, gets  hard and warm occasionally. Uses tylenol with some relief. - Has never had anything like this on her skin  - Last mammogram 2010, history of early mammograms due to unknown reason  - Patient s/p hysterectomy for choriocarcinoma. Ovaries in place per patient. Patient is on estradiol.  - MGM breast cancer  - First baby at age 21. - [id number]  Endorses: sore/tender spot under right underarm but has resolved. Denies breast pain, nipple discharge, asymmetry,       DMII:   - Does not check sugars at home   - Last A1C 9.8% in 10/2016   - Taking januvia. Cannot tolerate metformin dur to GI upset  Diet: Feels her problem is she drinks peps, bread, potatoes  Activity: None  Denies CP, edema. SOB, polyuria, fatigue, hand or feet pain. Nauise,a vomting, headache    Nicotine Use: Everyday smoker. Patient is a smoker for stress releif. Not interested in quitting at [facility] time. Review of Systems   Pertinent positives and negative per HPI. All other systems  reviewed are negative for a Comprehensive ROS (10+).        Past Medical History:   Diagnosis Date    Abnormal Pap smear of cervix     Acid indigestion     Cancer (HCC)     choriocarcinoma  - UTERNE    Depression     Diabetes (HCC)     Dysthymic disorder     GERD (gastroesophageal reflux disease)     Hypertriglyceridemia     Mixed hyperlipidemia 3/31/2010    MRSA (methicillin resistant Staphylococcus aureus)     Psychiatric disorder     depression; ANXIETY    Urinary incontinence     URGENCY AND INCONTINENECE        Social History     Social History    Marital status: SINGLE     Spouse name: N/A    Number of children: N/A    Years of education: N/A     Occupational History    Not on file. Social History Main Topics    Smoking status: Current Every Day Smoker     Packs/day: 1.00     Years: 25.00    Smokeless tobacco: Never Used    Alcohol use No    Drug use: No    Sexual activity: Yes     Partners: Male     Birth control/ protection: None     Other Topics Concern    Not on file     Social History Narrative         Objective:     Vitals:    09/11/17 1405   BP: 118/72   Pulse: 96   Resp: 18   Temp: 99.1 °F (37.3 °C)   TempSrc: Oral   SpO2: 99%   Weight: 222 lb 3.2 oz (100.8 kg)   Height: 5' 7\" (1.702 m)       Physical Examination:  General: Alert, cooperative, no distress, appears stated age. Obese, odor of smoke. Eyes: Conjunctivae/corneas clear. PERRL, EOMs intact. Ears: Normal external ear canals both ears. Nose: Nares normal. Septum midline. Mucosa normal. No drainage or sinus tenderness. Mouth/Throat: Lips, mucosa, and tongue normal. Teeth and gums normal.  Neck: Supple, symmetrical, trachea midline, no adenopathy. No thyroid enlargement/tenderness/nodules  Lungs: Clear to auscultation bilaterally. Normal inspiratory and expiratory ratio. Heart: Regular rate and rhythm, S1, S2 normal, no murmur, click, rub or gallop. Abdomen: Soft, non-tender. Bowel sounds normal. No masses or organomegaly. Breast: Symmetric appearing. Superificial ~5mm papule of skin with faint surrounding erythma without induration or fluctuance at 7 o'clock position. No discharge, non tender lesion. No mass of breast tissure appreciated. Large breast with unsupportive bra. Extremities: Extremities normal, atraumatic, no cyanosis or edema. Pulses: 2+ and symmetric all extremities.   Skin: as above  Lymph nodes: Cervical, supraclavicular, and axillary nodes normal.    Office Visit on 09/11/2017   Component Date Value Ref Range Status    Glucose POC 09/11/2017 347  mg/dL Final    Hemoglobin A1c (POC) 09/11/2017 9.8  % Final       Assessment/ Plan:   Diagnoses and all orders for this visit:    1. Diabetes mellitus without complication [facility]): Uncontrolled  accucheck and A1C persistently elevated at 9.8%. Asymptomatic. Will add additional po med since previous not covered by insurance. Patient declines insulin initiation at this time. -     AMB POC GLUCOSE BLOOD, BY GLUCOSE MONITORING DEVICE  -     AMB POC HEMOGLOBIN N0V  -     METABOLIC PANEL, COMPREHENSIVE  -     CBC W/O DIFF  -     pioglitazone (ACTOS) 15 mg tablet; Take 1 Tab by mouth daily.  -     LIPID PANEL    2. Skin lesion of breast: Epidermoid cyst of skin. No palpable breast involvement but we should get routine mammogram to evaluate. Provided reassurance for cysts red flag symptoms for signs of superinfection. Pateint is to hold po estradiol until mammogram results confirmed normal.    -     [facility] MAMMO BI SCREENING INCL CAD; Future    3. Nicotine Use: Patient pre-contemplative. Need PHQ at next visit. I have discussed the diagnosis with the patient and the intended plan as seen in the above orders. The patient has received an after-visit summary and questions were answered concerning future plans. I have discussed medication side effects and warnings with the patient as well. Follow-up Disposition:  Return in about 3 months (around 12/11/2017) for  follow up DMII.       Signed,    Cedrick Isbell MD  9/11/2017

## 2017-09-11 NOTE — MR AVS SNAPSHOT
Visit Information Date & Time Provider Department Dept. Phone Encounter #  
 9/11/2017  2:00 PM Delma Councilman,  [facility] [phone] [id number] Follow-up Instructions Return in about 3 months (around 12/11/2017) for  follow up DMII. Upcoming Health Maintenance Date Due  
 EYE EXAM RETINAL OR DILATED Q1 2/12/2014 INFLUENZA AGE 9 TO ADULT 8/1/2017 MICROALBUMIN Q1 10/12/2017 LIPID PANEL Q1 10/12/2017 HEMOGLOBIN A1C Q6M 11/22/2017 Pneumococcal 19-64 Highest Risk (2 of 3 - PCV13) 5/22/2018 FOOT EXAM Q1 5/22/2018 PAP AKA CERVICAL CYTOLOGY 5/22/2020 DTaP/Tdap/Td series (2 - Td) 5/22/2027 Allergies as of 9/11/2017  Review Complete On: 9/11/2017 By: Delma Councilman, MD  
  
 Severity Noted Reaction Type Reactions Ciprocinonide  06/03/2009    Hives  
 cipro Demerol [Meperidine]  06/03/2009    Other (comments) Goofy feeling, hallucinates Current Immunizations  Never Reviewed Name Date Influenza Vaccine (Quad) PF 10/12/2016 Influenza Vaccine Split 11/19/2010, 10/28/2009 Not reviewed this visit You Were Diagnosed With   
  
 Codes Comments Diabetes mellitus without complication ([facility].)    -  Primary ICD-10-CM: E11.9 ICD-9-CM: 250.00 Skin lesion of breast     ICD-10-CM: N64.9 ICD-9-CM: 611.9 Vitals BP Pulse Temp Resp Height(growth percentile) Weight(growth percentile) 118/72 (BP 1 Location: Left arm, BP Patient Position: Sitting) 96 99.1 °F (37.3 °C) (Oral) 18 5' 7\" (1.702 m) 222 lb 3.2 oz (100.8 kg) LMP SpO2 BMI OB Status Smoking Status 09/10/2014 (Exact Date) 99% 34.8 kg/m2 Hysterectomy Current Every Day Smoker BMI and BSA Data Body Mass Index Body Surface Area 34.8 kg/m 2 2.18 m 2 Preferred Pharmacy Pharmacy Name Phone 5 [address] [phone] Your Updated Medication List  
  
   
 This list is accurate as of: 9/11/17  2:57 PM.  Always use your most recent med list.  
  
  
  
  
 Blood-Glucose Meter monitoring kit Commonly known as:  [address]   [address] KIT Use as directed  
  
 busPIRone 5 mg tablet Commonly known as:  BUSPAR Take 1 Tab by mouth three (3) times daily. estradiol 0.5 mg tablet Commonly known as:  ESTRACE Take 0.5 mg by mouth daily. fenofibrate micronized 134 mg capsule Commonly known as:  LOFIBRA TAKE ONE CAPSULE BY MOUTH EVERY DAY  
  
 glucose blood VI test strips strip Commonly known as:  ONETOUCH ULTRA TEST Monitor BS BID Dx: 250.02  
  
 GLUMETZA 1,000 mg Tg24 24 hour tablet Generic drug:  metFORMIN Take 1,000 mg by mouth nightly. Lancets Misc Commonly known as:  ONETOUCH ULTRASOFT LANCETS Use as directed Omeprazole delayed release 20 mg tablet Commonly known as:  PRILOSEC D/R Take 1 Tab by mouth daily. oxybutynin chloride XL 10 mg CR tablet Commonly known as:  DITROPAN XL  
TAKE ONE TABLET BY MOUTH AT NIGHT  
  
 pantoprazole 40 mg tablet Commonly known as:  PROTONIX  
TAKE ONE TABLET BY MOUTH EVERY DAY pioglitazone 15 mg tablet Commonly known as:  ACTOS Take 1 Tab by mouth daily. pravastatin 40 mg tablet Commonly known as:  PRAVACHOL  
TAKE ONE TABLET BY MOUTH AT NIGHT QUEtiapine 100 mg tablet Commonly known as:  SEROquel TAKE ONE TABLET BY MOUTH AT NIGHT  
  
 raNITIdine 150 mg tablet Commonly known as:  ZANTAC Take 1 Tab by mouth nightly. SITagliptin 100 mg tablet Commonly known as:  Cathaleen Lint Take 1 Tab by mouth daily. terconazole 0.4 % vaginal cream  
Commonly known as:  TERAZOL 7 Insert 1 Applicator into vagina nightly. zolpidem 5 mg tablet Commonly known as:  AMBIEN Take 1 Tab by mouth nightly as needed for Sleep. Max Daily Amount: 5 mg. Prescriptions Sent to Pharmacy  Refills  
 pioglitazone (ACTOS) 15 mg tablet 0  
 Sig: Take 1 Tab by mouth daily. Class: Normal  
 Pharmacy: [address] [place] [address] [address] #: [phone] Route: Oral  
  
We Performed the Following AMB POC GLUCOSE BLOOD, BY GLUCOSE MONITORING DEVICE [68475 CPT(R)] AMB POC HEMOGLOBIN A1C [90526 CPT(R)] CBC W/O DIFF [89258 CPT(R)] METABOLIC PANEL, COMPREHENSIVE [41673 CPT(R)] Follow-up Instructions Return in about 3 months (around 12/11/2017) for  follow up DMII. To-Do List   
 09/11/2017 Imaging:  SHELIA MAMMO BI SCREENING INCL CAD Patient Instructions Noninsulin Medicines for Type 2 Diabetes: Care Instructions Your Care Instructions There are different types of noninsulin medicines for diabetes. Each works in a different way. But they all help you control your blood sugar. Some types help your body make insulin to lower your blood sugar. Others lower how much insulin your body needs. Some can slow how fast your body digests sugars. And some can remove extra glucose through your urine. · Alpha-glucosidase inhibitors. These keep starches from breaking down. This means that they lower the amount of glucose absorbed when you eat. They don't help your body make more insulin. So they will not cause low blood sugar unless you use them with other medicines for diabetes. They include acarbose and miglitol. · DPP-4 inhibitors. These help your body raise the level of insulin after you eat. They also help your body make less of a hormone that raises blood sugar. They include linagliptin, saxagliptin, and sitagliptin. · Incretin hormones (GLP-1 receptor agonists). Your body makes a protein that can raise your insulin level. It also can lower your blood sugar and make you less hungry. You can get shots of hormones that work the same way. They include exenatide and liraglutide. · Meglitinides. These help your body release insulin.  They also help slow how your body digests sugars. So they can keep your blood sugar from rising too fast after you eat. They include nateglinide and repaglinide. · Metformin. This lowers how much glucose your liver makes. And it helps you respond better to insulin. It also lowers the amount of stored sugar that your liver releases when you are not eating. · SGLT2 inhibitors. These help to remove extra glucose through your urine. They may also help some people lose weight. They include canagliflozin, dapagliflozin, and empagliflozin. · Sulfonylureas. These help your body release more insulin. Some work for many hours. They can cause low blood sugar if you don't eat as you planned. They include glipizide and glyburide. · Thiazolidinediones. These reduce the amount of blood glucose. They also help you respond better to insulin. They include pioglitazone and rosiglitazone. You may need to take more than one medicine for diabetes. Two or more medicines may work better to lower your blood sugar level than just one does. Follow-up care is a key part of your treatment and safety. Be sure to make and go to all appointments, and call your doctor if you are having problems. It's also a good idea to know your test results and keep a list of the medicines you take. How can you care for yourself at home? · Eat a healthy diet. Get some exercise each day. This may help you to reduce how much medicine you need. · Do not take other prescription or over-the-counter medicines, vitamins, herbal products, or supplements without talking to your doctor first. Some medicines for type 2 diabetes can cause problems with other medicines or supplements. · Tell your doctor if you plan to get pregnant. Some of these drugs are not safe for pregnant women. · Be safe with medicines. Take your medicines exactly as prescribed.  Meglitinides and sulfonylureas can cause your blood sugar to drop very low. Call your doctor if you think you are having a problem with your medicine. · Check your blood sugar often. You can use a glucose monitor. Keeping track can help you know how certain foods, activities, and medicines affect your blood sugar. And it can help you keep your blood sugar from getting so low that it's not safe. When should you call for help? Call 911 anytime you think you may need emergency care. For example, call if: 
· You passed out (lost consciousness). · You are confused or cannot think clearly. · Your blood sugar is very high or very low. Watch closely for changes in your health, and be sure to contact your doctor if: 
· Your blood sugar stays outside the level your doctor set for you. · You have any problems. Where can you learn more? Go to http://angelique-sheila.info/. Enter H153 in the search box to learn more about \"Noninsulin Medicines for Type 2 Diabetes: Care Instructions. \" Current as of: March 13, 2017 Content Version: 11.3 © 3737-2253 opendorse. Care instructions adapted under license by [employer] (which disclaims liability or warranty for this information). If you have questions about a medical condition or this instruction, always ask your healthcare professional. [address] any warranty or liability for your use of this information. Learning About Diabetes Food Guidelines Your Care Instructions Meal planning is important to manage diabetes. It helps keep your blood sugar at a target level (which you set with your doctor). You don't have to eat special foods. You can eat what your family eats, including sweets once in a while. But you do have to pay attention to how often you eat and how much you eat of certain foods. You may want to work with a dietitian or a certified diabetes educator (CDE) to help you plan meals and snacks.  A dietitian or CDE can also help you lose weight if that is one of your goals. What should you know about eating carbs? Managing the amount of carbohydrate (carbs) you eat is an important part of healthy meals when you have diabetes. Carbohydrate is found in many foods. · Learn which foods have carbs. And learn the amounts of carbs in different foods. ¨ Bread, cereal, pasta, and rice have about 15 grams of carbs in a serving. A serving is 1 slice of bread (1 ounce), ½ cup of cooked cereal, or 1/3 cup of cooked pasta or rice. ¨ Fruits have 15 grams of carbs in a serving. A serving is 1 small fresh fruit, such as an apple or orange; ½ of a banana; ½ cup of cooked or canned fruit; ½ cup of fruit juice; 1 cup of melon or raspberries; or 2 tablespoons of dried fruit. ¨ Milk and no-sugar-added yogurt have 15 grams of carbs in a serving. A serving is 1 cup of milk or 2/3 cup of no-sugar-added yogurt. ¨ Starchy vegetables have 15 grams of carbs in a serving. A serving is ½ cup of mashed potatoes or sweet potato; 1 cup winter squash; ½ of a small baked potato; ½ cup of cooked beans; or ½ cup cooked corn or green peas. · Learn how much carbs to eat each day and at each meal. A dietitian or CDE can teach you how to keep track of the amount of carbs you eat. This is called carbohydrate counting. · If you are not sure how to count carbohydrate grams, use the Plate Method to plan meals. It is a good, quick way to make sure that you have a balanced meal. It also helps you spread carbs throughout the day. ¨ Divide your plate by types of foods. Put non-starchy vegetables on half the plate, meat or other protein food on one-quarter of the plate, and a grain or starchy vegetable in the final quarter of the plate.  To this you can add a small piece of fruit and 1 cup of milk or yogurt, depending on how many carbs you are supposed to eat at a meal. 
· Try to eat about the same amount of carbs at each meal. Do not \"save up\" your daily allowance of carbs to eat at one meal. 
· Proteins have very little or no carbs per serving. Examples of proteins are beef, chicken, turkey, fish, eggs, tofu, cheese, cottage cheese, and peanut butter. A serving size of meat is 3 ounces, which is about the size of a deck of cards. Examples of meat substitute serving sizes (equal to 1 ounce of meat) are 1/4 cup of cottage cheese, 1 egg, 1 tablespoon of peanut butter, and ½ cup of tofu. How can you eat out and still eat healthy? · Learn to estimate the serving sizes of foods that have carbohydrate. If you measure food at home, it will be easier to estimate the amount in a serving of restaurant food. · If the meal you order has too much carbohydrate (such as potatoes, corn, or baked beans), ask to have a low-carbohydrate food instead. Ask for a salad or green vegetables. · If you use insulin, check your blood sugar before and after eating out to help you plan how much to eat in the future. · If you eat more carbohydrate at a meal than you had planned, take a walk or do other exercise. This will help lower your blood sugar. What else should you know? · Limit saturated fat, such as the fat from meat and dairy products. This is a healthy choice because people who have diabetes are at higher risk of heart disease. So choose lean cuts of meat and nonfat or low-fat dairy products. Use olive or canola oil instead of butter or shortening when cooking. · Don't skip meals. Your blood sugar may drop too low if you skip meals and take insulin or certain medicines for diabetes. · Check with your doctor before you drink alcohol. Alcohol can cause your blood sugar to drop too low. Alcohol can also cause a bad reaction if you take certain diabetes medicines. Follow-up care is a key part of your treatment and safety.  Be sure to make and go to all appointments, and call your doctor if you are having problems. It's also a good idea to know your test results and keep a list of the medicines you take. Where can you learn more? Go to http://angelique-sheila.info/. Enter G402 in the search box to learn more about \"Learning About Diabetes Food Guidelines. \" Current as of: March 13, 2017 Content Version: 11.3 © 9973-2085 [employer]. Care instructions adapted under license by [employer] (which disclaims liability or warranty for this information). If you have questions about a medical condition or this instruction, always ask your healthcare professional. [address] any warranty or liability for your use of this information. Learning About Type 2 Diabetes What is type 2 diabetes? Insulin is a hormone that helps your body use sugar from your food as energy. Type 2 diabetes happens when your body can't use insulin the right way. Over time, the pancreas can't make enough insulin. If you don't have enough insulin, too much sugar stays in your blood. If you are overweight, get little or no exercise, or have type 2 diabetes in your family, you are more likely to have problems with the way insulin works in your body.  Americans, Hispanics, Native Americans,  Americans, and Pacific Islanders have a higher risk for type 2 diabetes. Type 2 diabetes can be prevented or delayed with a healthy lifestyle, which includes staying at a healthy weight, making smart food choices, and getting regular exercise. What can you expect with type 2 diabetes? Ajit Castillo keep hearing about how important it is to keep your blood sugar within a target range. That's because over time, high blood sugar can lead to serious problems. It can: 
· Harm your eyes, nerves, and kidneys. · Damage your blood vessels, leading to heart disease and stroke.  
· Reduce blood flow and cause nerve damage to parts of your body, especially your feet. This can cause slow healing and pain when you walk. · Make your immune system weak and less able to fight infections. When people hear the word \"diabetes,\" they often think of problems like these. But daily care and treatment can help prevent or delay these problems. The goal is to keep your blood sugar in a target range. That's the best way to reduce your chance of having more problems from diabetes. What are the symptoms? Some people who have type 2 diabetes may not have any symptoms early on. Many people with the disease don't even know they have it at first. But with time, diabetes starts to cause symptoms. You experience most symptoms of type 2 diabetes when your blood sugar is either too high or too low. The most common symptoms of high blood sugar include: · Thirst. 
· Frequent urination. · Weight loss. · Blurry vision. The symptoms of low blood sugar include: · Sweating. · Shakiness. · Weakness. · Hunger. · Confusion. How can you prevent type 2 diabetes? The best way to prevent or delay type 2 diabetes is to adopt healthy habits, which include: 
· Staying at a healthy weight. · Exercising regularly. · Eating healthy foods. How is type 2 diabetes treated? If you have type 2 diabetes, here are the most important things you can do. · Take your diabetes medicines. · Check your blood sugar as often as your doctor recommends. Also, get a hemoglobin A1c test at least every 6 months. · Try to eat a variety of foods and to spread carbohydrate throughout the day. Carbohydrate raises blood sugar higher and more quickly than any other nutrient does. Carbohydrate is found in sugar, breads and cereals, fruit, starchy vegetables such as potatoes and corn, and milk and yogurt. · Get at least 30 minutes of exercise on most days of the week. Walking is a good choice.  You also may want to do other activities, such as running, swimming, cycling, or playing tennis or team sports. If your doctor says it's okay, do muscle-strengthening exercises at least 2 times a week. · See your doctor for checkups and tests on a regular schedule. · If you have high blood pressure or high cholesterol, take the medicines as prescribed by your doctor. · Do not smoke. Smoking can make health problems worse. This includes problems you might have with type 2 diabetes. If you need help quitting, talk to your doctor about stop-smoking programs and medicines. These can increase your chances of quitting for good. Follow-up care is a key part of your treatment and safety. Be sure to make and go to all appointments, and call your doctor if you are having problems. It's also a good idea to know your test results and keep a list of the medicines you take. Where can you learn more? Go to http://angeliqueOutboxsheila.info/. Enter C737 in the search box to learn more about \"Learning About Type 2 Diabetes. \" Current as of: March 13, 2017 Content Version: 11.3 © 9320-1362 [employer]. Care instructions adapted under license by [employer] (which disclaims liability or warranty for this information). If you have questions about a medical condition or this instruction, always ask your healthcare professional. [address] any warranty or liability for your use of this information. Epidermoid Cyst: Care Instructions Your Care Instructions An epidermoid (say \"mj-slb-HLE-oumou\") cyst is a lump just under the skin. These cysts can form when a hair follicle becomes blocked. They are common in acne and may occur on the face, neck, back, and genitals. However, they can form anywhere on the body. These cysts are not cancer and do not lead to cancer. They tend not to hurt, but they can sometimes become swollen and painful. They also may break open (rupture) and cause scarring. These cysts sometimes do not cause problems and may not need treatment. If you have a cyst that is swollen and hurts, your doctor may inject it with a medicine to help it heal. But it is more likely that a painful cyst will need to be removed. Your doctor will give you a shot of numbing medicine and cut into the cyst to drain it or remove it. This makes the symptoms go away. Follow-up care is a key part of your treatment and safety. Be sure to make and go to all appointments, and call your doctor if you are having problems. Its also a good idea to know your test results and keep a list of the medicines you take. How can you care for yourself at home? · Do not squeeze the cyst or poke it with a needle to open it. This can cause swelling, redness, and infection. · Always have a doctor look at any new lumps you get to make sure that they are not serious. When should you call for help? Watch closely for changes in your health, and be sure to contact your doctor if: 
· You have a fever, redness, or swelling after you get a shot of medicine in the cyst. 
· You see or feel a new lump on your skin. Where can you learn more? Go to http://angelique-sheila.info/. Enter C730 in the search box to learn more about \"Epidermoid Cyst: Care Instructions. \" Current as of: October 13, 2016 Content Version: 11.3 © 6126-7629 [employer]. Care instructions adapted under license by [employer] (which disclaims liability or warranty for this information). If you have questions about a medical condition or this instruction, always ask your healthcare professional. [address] any warranty or liability for your use of this information. Introducing \[facility] [place] [facility]\"" & HEALTH SERVICES! Dear Baljit Samayoa: Thank you for requesting a [employer] account. Our records indicate that you already have an active [employer] account.   You can access your account anytime at [email or web address] Did you know that you can access your hospital and ER discharge instructions at any time in DrinkS[employer]? You can also review all of your test results from your hospital stay or ER visit. Additional Information If you have questions, please visit the Frequently Asked Questions section of the [employer] website at [email or web address] Remember, DrinkSendo is NOT to be used for urgent needs. For medical emergencies, dial 911. Now available from your iPhone and Android! Please provide this summary of care documentation to your next provider. Your primary care clinician is listed as Fina ROBLERO. If you have any questions after today's visit, please call [phone].

## 2017-09-11 NOTE — PATIENT INSTRUCTIONS
Noninsulin Medicines for Type 2 Diabetes: Care Instructions  Your Care Instructions  There are different types of noninsulin medicines for diabetes. Each works in a different way. But they all help you control your blood sugar. Some types help your body make insulin to lower your blood sugar. Others lower how much insulin your body needs. Some can slow how fast your body digests sugars. And some can remove extra glucose through your urine. · Alpha-glucosidase inhibitors. These keep starches from breaking down. This means that they lower the amount of glucose absorbed when you eat. They don't help your body make more insulin. So they will not cause low blood sugar unless you use them with other medicines for diabetes. They include acarbose and miglitol. · DPP-4 inhibitors. These help your body raise the level of insulin after you eat. They also help your body make less of a hormone that raises blood sugar. They include linagliptin, saxagliptin, and sitagliptin. · Incretin hormones (GLP-1 receptor agonists). Your body makes a protein that can raise your insulin level. It also can lower your blood sugar and make you less hungry. You can get shots of hormones that work the same way. They include exenatide and liraglutide. · Meglitinides. These help your body release insulin. They also help slow how your body digests sugars. So they can keep your blood sugar from rising too fast after you eat. They include nateglinide and repaglinide. · Metformin. This lowers how much glucose your liver makes. And it helps you respond better to insulin. It also lowers the amount of stored sugar that your liver releases when you are not eating. · SGLT2 inhibitors. These help to remove extra glucose through your urine. They may also help some people lose weight. They include canagliflozin, dapagliflozin, and empagliflozin. · Sulfonylureas. These help your body release more insulin. Some work for many hours.  They can cause low blood sugar if you don't eat as you planned. They include glipizide and glyburide. · Thiazolidinediones. These reduce the amount of blood glucose. They also help you respond better to insulin. They include pioglitazone and rosiglitazone. You may need to take more than one medicine for diabetes. Two or more medicines may work better to lower your blood sugar level than just one does. Follow-up care is a key part of your treatment and safety. Be sure to make and go to all appointments, and call your doctor if you are having problems. It's also a good idea to know your test results and keep a list of the medicines you take. How can you care for yourself at home? · Eat a healthy diet. Get some exercise each day. This may help you to reduce how much medicine you need. · Do not take other prescription or over-the-counter medicines, vitamins, herbal products, or supplements without talking to your doctor first. Some medicines for type 2 diabetes can cause problems with other medicines or supplements. · Tell your doctor if you plan to get pregnant. Some of these drugs are not safe for pregnant women. · Be safe with medicines. Take your medicines exactly as prescribed. Meglitinides and sulfonylureas can cause your blood sugar to drop very low. Call your doctor if you think you are having a problem with your medicine. · Check your blood sugar often. You can use a glucose monitor. Keeping track can help you know how certain foods, activities, and medicines affect your blood sugar. And it can help you keep your blood sugar from getting so low that it's not safe. When should you call for help? Call 911 anytime you think you may need emergency care. For example, call if:  · You passed out (lost consciousness). · You are confused or cannot think clearly. · Your blood sugar is very high or very low.   Watch closely for changes in your health, and be sure to contact your doctor if:  · Your blood sugar stays outside the level your doctor set for you. · You have any problems. Where can you learn more? Go to http://angelique-sheila.info/. Enter H153 in the search box to learn more about \"Noninsulin Medicines for Type 2 Diabetes: Care Instructions. \"  Current as of: March 13, 2017  Content Version: 11.3  © 0695-0709 "[employer]. Care instructions adapted under license by [employer] (which disclaims liability or warranty for this information). If you have questions about a medical condition or this instruction, always ask your healthcare professional. [address] any warranty or liability for your use of this information. Learning About Diabetes Food Guidelines  Your Care Instructions  Meal planning is important to manage diabetes. It helps keep your blood sugar at a target level (which you set with your doctor). You don't have to eat special foods. You can eat what your family eats, including sweets once in a while. But you do have to pay attention to how often you eat and how much you eat of certain foods. You may want to work with a dietitian or a certified diabetes educator (CDE) to help you plan meals and snacks. A dietitian or CDE can also help you lose weight if that is one of your goals. What should you know about eating carbs? Managing the amount of carbohydrate (carbs) you eat is an important part of healthy meals when you have diabetes. Carbohydrate is found in many foods. · Learn which foods have carbs. And learn the amounts of carbs in different foods. ¨ Bread, cereal, pasta, and rice have about 15 grams of carbs in a serving. A serving is 1 slice of bread (1 ounce), ½ cup of cooked cereal, or 1/3 cup of cooked pasta or rice. ¨ Fruits have 15 grams of carbs in a serving.  A serving is 1 small fresh fruit, such as an apple or orange; ½ of a banana; ½ cup of cooked or canned fruit; ½ cup of fruit juice; 1 cup of melon or raspberries; or 2 tablespoons of dried fruit. ¨ Milk and no-sugar-added yogurt have 15 grams of carbs in a serving. A serving is 1 cup of milk or 2/3 cup of no-sugar-added yogurt. ¨ Starchy vegetables have 15 grams of carbs in a serving. A serving is ½ cup of mashed potatoes or sweet potato; 1 cup winter squash; ½ of a small baked potato; ½ cup of cooked beans; or ½ cup cooked corn or green peas. · Learn how much carbs to eat each day and at each meal. A dietitian or CDE can teach you how to keep track of the amount of carbs you eat. This is called carbohydrate counting. · If you are not sure how to count carbohydrate grams, use the Plate Method to plan meals. It is a good, quick way to make sure that you have a balanced meal. It also helps you spread carbs throughout the day. ¨ Divide your plate by types of foods. Put non-starchy vegetables on half the plate, meat or other protein food on one-quarter of the plate, and a grain or starchy vegetable in the final quarter of the plate. To this you can add a small piece of fruit and 1 cup of milk or yogurt, depending on how many carbs you are supposed to eat at a meal.  · Try to eat about the same amount of carbs at each meal. Do not \"save up\" your daily allowance of carbs to eat at one meal.  · Proteins have very little or no carbs per serving. Examples of proteins are beef, chicken, turkey, fish, eggs, tofu, cheese, cottage cheese, and peanut butter. A serving size of meat is 3 ounces, which is about the size of a deck of cards. Examples of meat substitute serving sizes (equal to 1 ounce of meat) are 1/4 cup of cottage cheese, 1 egg, 1 tablespoon of peanut butter, and ½ cup of tofu. How can you eat out and still eat healthy? · Learn to estimate the serving sizes of foods that have carbohydrate. If you measure food at home, it will be easier to estimate the amount in a serving of restaurant food.   · If the meal you order has too much carbohydrate (such as potatoes, corn, or baked beans), ask to have a low-carbohydrate food instead. Ask for a salad or green vegetables. · If you use insulin, check your blood sugar before and after eating out to help you plan how much to eat in the future. · If you eat more carbohydrate at a meal than you had planned, take a walk or do other exercise. This will help lower your blood sugar. What else should you know? · Limit saturated fat, such as the fat from meat and dairy products. This is a healthy choice because people who have diabetes are at higher risk of heart disease. So choose lean cuts of meat and nonfat or low-fat dairy products. Use olive or canola oil instead of butter or shortening when cooking. · Don't skip meals. Your blood sugar may drop too low if you skip meals and take insulin or certain medicines for diabetes. · Check with your doctor before you drink alcohol. Alcohol can cause your blood sugar to drop too low. Alcohol can also cause a bad reaction if you take certain diabetes medicines. Follow-up care is a key part of your treatment and safety. Be sure to make and go to all appointments, and call your doctor if you are having problems. It's also a good idea to know your test results and keep a list of the medicines you take. Where can you learn more? Go to http://angelique-sheila.info/. Enter K003 in the search box to learn more about \"Learning About Diabetes Food Guidelines. \"  Current as of: March 13, 2017  Content Version: 11.3  © 3355-1794 [employer]. Care instructions adapted under license by [employer] (which disclaims liability or warranty for this information). If you have questions about a medical condition or this instruction, always ask your healthcare professional. [place] [address] any warranty or liability for your use of this information. Learning About Type 2 Diabetes  What is type 2 diabetes?   Insulin is a hormone that helps your body use sugar from your food as energy. Type 2 diabetes happens when your body can't use insulin the right way. Over time, the pancreas can't make enough insulin. If you don't have enough insulin, too much sugar stays in your blood. If you are overweight, get little or no exercise, or have type 2 diabetes in your family, you are more likely to have problems with the way insulin works in your body.  Americans, Hispanics, Native Americans,  Americans, and Pacific Islanders have a higher risk for type 2 diabetes. Type 2 diabetes can be prevented or delayed with a healthy lifestyle, which includes staying at a healthy weight, making smart food choices, and getting regular exercise. What can you expect with type 2 diabetes? Tyron Wise keep hearing about how important it is to keep your blood sugar within a target range. That's because over time, high blood sugar can lead to serious problems. It can:  · Harm your eyes, nerves, and kidneys. · Damage your blood vessels, leading to heart disease and stroke. · Reduce blood flow and cause nerve damage to parts of your body, especially your feet. This can cause slow healing and pain when you walk. · Make your immune system weak and less able to fight infections. When people hear the word \"diabetes,\" they often think of problems like these. But daily care and treatment can help prevent or delay these problems. The goal is to keep your blood sugar in a target range. That's the best way to reduce your chance of having more problems from diabetes. What are the symptoms? Some people who have type 2 diabetes may not have any symptoms early on. Many people with the disease don't even know they have it at first. But with time, diabetes starts to cause symptoms. You experience most symptoms of type 2 diabetes when your blood sugar is either too high or too low. The most common symptoms of high blood sugar include:  · Thirst.  · Frequent urination. · Weight loss. · Blurry vision.   The symptoms of low blood sugar include:  · Sweating. · Shakiness. · Weakness. · Hunger. · Confusion. How can you prevent type 2 diabetes? The best way to prevent or delay type 2 diabetes is to adopt healthy habits, which include:  · Staying at a healthy weight. · Exercising regularly. · Eating healthy foods. How is type 2 diabetes treated? If you have type 2 diabetes, here are the most important things you can do. · Take your diabetes medicines. · Check your blood sugar as often as your doctor recommends. Also, get a hemoglobin A1c test at least every 6 months. · Try to eat a variety of foods and to spread carbohydrate throughout the day. Carbohydrate raises blood sugar higher and more quickly than any other nutrient does. Carbohydrate is found in sugar, breads and cereals, fruit, starchy vegetables such as potatoes and corn, and milk and yogurt. · Get at least 30 minutes of exercise on most days of the week. Walking is a good choice. You also may want to do other activities, such as running, swimming, cycling, or playing tennis or team sports. If your doctor says it's okay, do muscle-strengthening exercises at least 2 times a week. · See your doctor for checkups and tests on a regular schedule. · If you have high blood pressure or high cholesterol, take the medicines as prescribed by your doctor. · Do not smoke. Smoking can make health problems worse. This includes problems you might have with type 2 diabetes. If you need help quitting, talk to your doctor about stop-smoking programs and medicines. These can increase your chances of quitting for good. Follow-up care is a key part of your treatment and safety. Be sure to make and go to all appointments, and call your doctor if you are having problems. It's also a good idea to know your test results and keep a list of the medicines you take. Where can you learn more? Go to http://angelique-sheila.info/.   Enter M446 in the search box to learn more about \"Learning About Type 2 Diabetes. \"  Current as of: March 13, 2017  Content Version: 11.3  © 6492-5975 [employer]. Care instructions adapted under license by [employer] (which disclaims liability or warranty for this information). If you have questions about a medical condition or this instruction, always ask your healthcare professional. [address] any warranty or liability for your use of this information. Epidermoid Cyst: Care Instructions  Your Care Instructions  An epidermoid (say \"kx-irb-ZTT-moyd\") cyst is a lump just under the skin. These cysts can form when a hair follicle becomes blocked. They are common in acne and may occur on the face, neck, back, and genitals. However, they can form anywhere on the body. These cysts are not cancer and do not lead to cancer. They tend not to hurt, but they can sometimes become swollen and painful. They also may break open (rupture) and cause scarring. These cysts sometimes do not cause problems and may not need treatment. If you have a cyst that is swollen and hurts, your doctor may inject it with a medicine to help it heal. But it is more likely that a painful cyst will need to be removed. Your doctor will give you a shot of numbing medicine and cut into the cyst to drain it or remove it. This makes the symptoms go away. Follow-up care is a key part of your treatment and safety. Be sure to make and go to all appointments, and call your doctor if you are having problems. Its also a good idea to know your test results and keep a list of the medicines you take. How can you care for yourself at home? · Do not squeeze the cyst or poke it with a needle to open it. This can cause swelling, redness, and infection. · Always have a doctor look at any new lumps you get to make sure that they are not serious. When should you call for help?   Watch closely for changes in your health, and be sure to contact your doctor if:  · You have a fever, redness, or swelling after you get a shot of medicine in the cyst.  · You see or feel a new lump on your skin. Where can you learn more? Go to http://angelique-sheila.info/. Enter Q186 in the search box to learn more about \"Epidermoid Cyst: Care Instructions. \"  Current as of: October 13, 2016  Content Version: 11.3  © 6236-0765 [employer]. Care instructions adapted under license by [employer] (which disclaims liability or warranty for this information). If you have questions about a medical condition or this instruction, always ask your healthcare professional. [place] [address] any warranty or liability for your use of this information.

## 2017-09-21 ENCOUNTER — HOSPITAL ENCOUNTER (OUTPATIENT)
Dept: MAMMOGRAPHY | Age: 42
Discharge: HOME | End: 2017-09-21
Payer: MEDICAID

## 2017-09-21 DIAGNOSIS — L98.8 SKIN LESION OF BREAST: ICD-10-CM

## 2017-09-21 DIAGNOSIS — N64.9 BREAST LESION: Primary | ICD-10-CM

## 2017-09-21 DIAGNOSIS — R92.8 ABNORMAL MAMMOGRAM: ICD-10-CM

## 2017-09-21 PROCEDURE — 77066 DX MAMMO INCL CAD BI: CPT

## 2017-09-21 PROCEDURE — 76642 ULTRASOUND BREAST LIMITED: CPT

## 2017-09-22 LAB
ALBUMIN SERPL-MCNC: 4.2 G/DL (ref 3.5–5.5)
ALBUMIN/CREAT UR: 13 MG/G CREAT (ref 0–30)
ALBUMIN/GLOB SERPL: 1.8 {RATIO} (ref 1.2–2.2)
ALP SERPL-CCNC: 81 IU/L (ref 39–117)
ALT SERPL-CCNC: 25 IU/L (ref 0–32)
AST SERPL-CCNC: 23 IU/L (ref 0–40)
BILIRUB SERPL-MCNC: 0.4 MG/DL (ref 0–1.2)
BUN SERPL-MCNC: 10 MG/DL (ref 6–24)
BUN/CREAT SERPL: 15 (ref 9–23)
CALCIUM SERPL-MCNC: 9.1 MG/DL (ref 8.7–10.2)
CHLORIDE SERPL-SCNC: 98 MMOL/L (ref 96–106)
CHOLEST SERPL-MCNC: 160 MG/DL (ref 100–199)
CO2 SERPL-SCNC: 18 MMOL/L (ref 18–29)
CREAT SERPL-MCNC: 0.65 MG/DL (ref 0.57–1)
CREAT UR-MCNC: 113.5 MG/DL
ERYTHROCYTE [DISTWIDTH] IN BLOOD BY AUTOMATED COUNT: 14 % (ref 12.3–15.4)
EST. AVERAGE GLUCOSE BLD GHB EST-MCNC: 258 MG/DL
GLOBULIN SER CALC-MCNC: 2.3 G/DL (ref 1.5–4.5)
GLUCOSE SERPL-MCNC: 333 MG/DL (ref 65–99)
HBA1C MFR BLD: 10.6 % (ref 4.8–5.6)
HCT VFR BLD AUTO: 41.4 % (ref 34–46.6)
HDLC SERPL-MCNC: 25 MG/DL
HGB BLD-MCNC: 14 G/DL (ref 11.1–15.9)
INTERPRETATION, 910389: NORMAL
LDLC SERPL CALC-MCNC: ABNORMAL MG/DL (ref 0–99)
MCH RBC QN AUTO: 29.2 PG (ref 26.6–33)
MCHC RBC AUTO-ENTMCNC: 33.8 G/DL (ref 31.5–35.7)
MCV RBC AUTO: 86 FL (ref 79–97)
MICROALBUMIN UR-MCNC: 14.8 UG/ML
PLATELET # BLD AUTO: 222 X10E3/UL (ref 150–379)
POTASSIUM SERPL-SCNC: 4.2 MMOL/L (ref 3.5–5.2)
PROT SERPL-MCNC: 6.5 G/DL (ref 6–8.5)
RBC # BLD AUTO: 4.79 X10E6/UL (ref 3.77–5.28)
SODIUM SERPL-SCNC: 137 MMOL/L (ref 134–144)
TRIGL SERPL-MCNC: 505 MG/DL (ref 0–149)
VLDLC SERPL CALC-MCNC: ABNORMAL MG/DL (ref 5–40)
WBC # BLD AUTO: 10.3 X10E3/UL (ref 3.4–10.8)

## 2017-09-25 ENCOUNTER — TELEPHONE (OUTPATIENT)
Dept: FAMILY MEDICINE CLINIC | Age: 42
End: 2017-09-25

## 2017-09-28 DIAGNOSIS — F41.9 ANXIETY: ICD-10-CM

## 2017-09-28 DIAGNOSIS — E78.5 HYPERLIPIDEMIA, UNSPECIFIED HYPERLIPIDEMIA TYPE: ICD-10-CM

## 2017-09-29 RX ORDER — PRAVASTATIN SODIUM 40 MG/1
TABLET ORAL
Qty: 30 TAB | Refills: 0 | Status: SHIPPED | OUTPATIENT
Start: 2017-09-29 | End: 2017-10-24 | Stop reason: SDUPTHER

## 2017-09-29 RX ORDER — FENOFIBRATE 134 MG/1
CAPSULE ORAL
Qty: 30 CAP | Refills: 0 | Status: SHIPPED | OUTPATIENT
Start: 2017-09-29 | End: 2017-10-24 | Stop reason: SDUPTHER

## 2017-09-29 RX ORDER — QUETIAPINE FUMARATE 100 MG/1
TABLET, FILM COATED ORAL
Qty: 30 TAB | Refills: 0 | Status: SHIPPED | OUTPATIENT
Start: 2017-09-29 | End: 2018-01-02 | Stop reason: SDUPTHER

## 2017-09-29 RX ORDER — OXYBUTYNIN CHLORIDE 10 MG/1
TABLET, EXTENDED RELEASE ORAL
Qty: 30 TAB | Refills: 0 | Status: SHIPPED | OUTPATIENT
Start: 2017-09-29 | End: 2017-10-24 | Stop reason: SDUPTHER

## 2017-09-29 NOTE — PROGRESS NOTES
Pt was made aware yesterday of her Cholesterol and A1C labs and her risk of Cardiovascular disease due to these numbers, she has been advised to make a 1 month follow up appointment, but Pt states she will have to check with her  schedule first. Pt promised she will call office back for a 1 month follow up appt.

## 2017-10-24 DIAGNOSIS — K21.00 GASTROESOPHAGEAL REFLUX DISEASE WITH ESOPHAGITIS: ICD-10-CM

## 2017-10-24 DIAGNOSIS — E78.5 HYPERLIPIDEMIA, UNSPECIFIED HYPERLIPIDEMIA TYPE: ICD-10-CM

## 2017-10-24 RX ORDER — PRAVASTATIN SODIUM 40 MG/1
TABLET ORAL
Qty: 30 TAB | Refills: 0 | Status: SHIPPED | OUTPATIENT
Start: 2017-10-24 | End: 2019-07-03 | Stop reason: SDUPTHER

## 2017-10-24 RX ORDER — FENOFIBRATE 134 MG/1
CAPSULE ORAL
Qty: 30 CAP | Refills: 0 | Status: SHIPPED | OUTPATIENT
Start: 2017-10-24 | End: 2019-07-03 | Stop reason: SDUPTHER

## 2017-10-24 RX ORDER — OXYBUTYNIN CHLORIDE 10 MG/1
TABLET, EXTENDED RELEASE ORAL
Qty: 30 TAB | Refills: 0 | Status: SHIPPED | OUTPATIENT
Start: 2017-10-24 | End: 2019-07-03 | Stop reason: SDUPTHER

## 2017-10-24 RX ORDER — RANITIDINE 150 MG/1
TABLET, FILM COATED ORAL
Qty: 30 TAB | Refills: 0 | Status: SHIPPED | OUTPATIENT
Start: 2017-10-24 | End: 2019-07-03 | Stop reason: SDUPTHER

## 2017-10-25 NOTE — TELEPHONE ENCOUNTER
Pharmacy on file verified  Requested Prescriptions     Pending Prescriptions Disp Refills    zolpidem (AMBIEN) 5 mg tablet 30 Tab 3     Sig: Take 1 Tab by mouth nightly as needed for Sleep. Max Daily Amount: 5 mg.

## 2017-10-27 RX ORDER — ZOLPIDEM TARTRATE 5 MG/1
5 TABLET ORAL
Qty: 30 TAB | Refills: 3 | Status: SHIPPED | OUTPATIENT
Start: 2017-10-27 | End: 2019-01-21

## 2018-01-02 DIAGNOSIS — F41.9 ANXIETY: ICD-10-CM

## 2018-01-02 RX ORDER — QUETIAPINE FUMARATE 100 MG/1
TABLET, FILM COATED ORAL
Qty: 30 TAB | Refills: 0 | Status: SHIPPED | OUTPATIENT
Start: 2018-01-02 | End: 2018-10-02 | Stop reason: SDUPTHER

## 2018-02-07 ENCOUNTER — HOSPITAL ENCOUNTER (OUTPATIENT)
Dept: LAB | Age: 43
Discharge: HOME | End: 2018-02-07

## 2018-02-07 LAB
25(OH)D3 SERPL-MCNC: 17.1 NG/ML (ref 30–100)
ALBUMIN SERPL-MCNC: 3.7 G/DL (ref 3.5–5)
ALBUMIN/GLOB SERPL: 1.1 {RATIO} (ref 1.1–2.2)
ALP SERPL-CCNC: 86 U/L (ref 45–117)
ALT SERPL-CCNC: 39 U/L (ref 12–78)
ANION GAP SERPL CALC-SCNC: 10 MMOL/L (ref 5–15)
AST SERPL-CCNC: 19 U/L (ref 15–37)
BASOPHILS # BLD: 0 K/UL (ref 0–0.1)
BASOPHILS NFR BLD: 0 % (ref 0–1)
BILIRUB SERPL-MCNC: 0.3 MG/DL (ref 0.2–1)
BUN SERPL-MCNC: 12 MG/DL (ref 6–20)
BUN/CREAT SERPL: 14 (ref 12–20)
CALCIUM SERPL-MCNC: 8.9 MG/DL (ref 8.5–10.1)
CHLORIDE SERPL-SCNC: 103 MMOL/L (ref 97–108)
CHOLEST SERPL-MCNC: 163 MG/DL
CO2 SERPL-SCNC: 27 MMOL/L (ref 21–32)
CREAT SERPL-MCNC: 0.83 MG/DL (ref 0.55–1.02)
CREAT UR-MCNC: 155 MG/DL
DIFFERENTIAL METHOD BLD: ABNORMAL
EOSINOPHIL # BLD: 0.1 K/UL (ref 0–0.4)
EOSINOPHIL NFR BLD: 1 % (ref 0–7)
ERYTHROCYTE [DISTWIDTH] IN BLOOD BY AUTOMATED COUNT: 13.2 % (ref 11.5–14.5)
EST. AVERAGE GLUCOSE BLD GHB EST-MCNC: 220 MG/DL
GLOBULIN SER CALC-MCNC: 3.4 G/DL (ref 2–4)
GLUCOSE SERPL-MCNC: 254 MG/DL (ref 65–100)
HBA1C MFR BLD: 9.3 % (ref 4.2–6.3)
HCT VFR BLD AUTO: 43.3 % (ref 35–47)
HDLC SERPL-MCNC: 33 MG/DL
HDLC SERPL: 4.9 {RATIO} (ref 0–5)
HGB BLD-MCNC: 14.5 G/DL (ref 11.5–16)
IMM GRANULOCYTES # BLD: 0 K/UL (ref 0–0.04)
IMM GRANULOCYTES NFR BLD AUTO: 0 % (ref 0–0.5)
LDLC SERPL CALC-MCNC: 59 MG/DL (ref 0–100)
LIPID PROFILE,FLP: ABNORMAL
LYMPHOCYTES # BLD: 2.8 K/UL (ref 0.8–3.5)
LYMPHOCYTES NFR BLD: 32 % (ref 12–49)
MAGNESIUM SERPL-MCNC: 1.9 MG/DL (ref 1.6–2.4)
MCH RBC QN AUTO: 29.6 PG (ref 26–34)
MCHC RBC AUTO-ENTMCNC: 33.5 G/DL (ref 30–36.5)
MCV RBC AUTO: 88.4 FL (ref 80–99)
MICROALBUMIN UR-MCNC: 1.35 MG/DL
MICROALBUMIN/CREAT UR-RTO: 9 MG/G (ref 0–30)
MONOCYTES # BLD: 0.4 K/UL (ref 0–1)
MONOCYTES NFR BLD: 5 % (ref 5–13)
NEUTS SEG # BLD: 5.4 K/UL (ref 1.8–8)
NEUTS SEG NFR BLD: 62 % (ref 32–75)
NRBC # BLD: 0 K/UL (ref 0–0.01)
NRBC BLD-RTO: 0 PER 100 WBC
PLATELET # BLD AUTO: 204 K/UL (ref 150–400)
PMV BLD AUTO: 13 FL (ref 8.9–12.9)
POTASSIUM SERPL-SCNC: 3.9 MMOL/L (ref 3.5–5.1)
PROT SERPL-MCNC: 7.1 G/DL (ref 6.4–8.2)
RBC # BLD AUTO: 4.9 M/UL (ref 3.8–5.2)
SODIUM SERPL-SCNC: 140 MMOL/L (ref 136–145)
TRIGL SERPL-MCNC: 355 MG/DL (ref ?–150)
TSH SERPL DL<=0.05 MIU/L-ACNC: 0.72 UIU/ML (ref 0.36–3.74)
URATE SERPL-MCNC: 4.4 MG/DL (ref 2.6–6)
VLDLC SERPL CALC-MCNC: 71 MG/DL
WBC # BLD AUTO: 8.7 K/UL (ref 3.6–11)

## 2018-02-07 PROCEDURE — 82570 ASSAY OF URINE CREATININE: CPT | Performed by: FAMILY MEDICINE

## 2018-02-07 PROCEDURE — 83735 ASSAY OF MAGNESIUM: CPT | Performed by: FAMILY MEDICINE

## 2018-02-07 PROCEDURE — 80061 LIPID PANEL: CPT | Performed by: FAMILY MEDICINE

## 2018-02-07 PROCEDURE — 83036 HEMOGLOBIN GLYCOSYLATED A1C: CPT | Performed by: FAMILY MEDICINE

## 2018-02-07 PROCEDURE — 82306 VITAMIN D 25 HYDROXY: CPT | Performed by: FAMILY MEDICINE

## 2018-02-07 PROCEDURE — 85025 COMPLETE CBC W/AUTO DIFF WBC: CPT | Performed by: FAMILY MEDICINE

## 2018-02-07 PROCEDURE — 86592 SYPHILIS TEST NON-TREP QUAL: CPT | Performed by: FAMILY MEDICINE

## 2018-02-07 PROCEDURE — 87389 HIV-1 AG W/HIV-1&-2 AB AG IA: CPT | Performed by: FAMILY MEDICINE

## 2018-02-07 PROCEDURE — 84550 ASSAY OF BLOOD/URIC ACID: CPT | Performed by: FAMILY MEDICINE

## 2018-02-07 PROCEDURE — 84443 ASSAY THYROID STIM HORMONE: CPT | Performed by: FAMILY MEDICINE

## 2018-02-07 PROCEDURE — 80053 COMPREHEN METABOLIC PANEL: CPT | Performed by: FAMILY MEDICINE

## 2018-02-07 PROCEDURE — 80074 ACUTE HEPATITIS PANEL: CPT | Performed by: FAMILY MEDICINE

## 2018-02-08 LAB — RPR SER QL: NONREACTIVE

## 2018-02-09 LAB
HAV IGM SER QL: NONREACTIVE
HBV CORE IGM SER QL: NONREACTIVE
HBV SURFACE AG SER QL: <0.1 INDEX
HBV SURFACE AG SER QL: NEGATIVE
HCV AB SERPL QL IA: NONREACTIVE
HCV COMMENT,HCGAC: NORMAL
HIV 1+2 AB+HIV1 P24 AG SERPL QL IA: NONREACTIVE
HIV12 RESULT COMMENT, HHIVC: NORMAL
SP1: NORMAL
SP2: NORMAL
SP3: NORMAL

## 2018-07-16 ENCOUNTER — HOSPITAL ENCOUNTER (OUTPATIENT)
Dept: LAB | Age: 43
Discharge: HOME | End: 2018-07-16

## 2018-07-16 LAB
ALBUMIN SERPL-MCNC: 3.9 G/DL (ref 3.5–5)
ALBUMIN/GLOB SERPL: 1.1 {RATIO} (ref 1.1–2.2)
ALP SERPL-CCNC: 96 U/L (ref 45–117)
ALT SERPL-CCNC: 32 U/L (ref 12–78)
ANION GAP SERPL CALC-SCNC: 11 MMOL/L (ref 5–15)
AST SERPL-CCNC: 17 U/L (ref 15–37)
BILIRUB SERPL-MCNC: 0.3 MG/DL (ref 0.2–1)
BUN SERPL-MCNC: 10 MG/DL (ref 6–20)
BUN/CREAT SERPL: 14 (ref 12–20)
CALCIUM SERPL-MCNC: 8.6 MG/DL (ref 8.5–10.1)
CHLORIDE SERPL-SCNC: 105 MMOL/L (ref 97–108)
CHOLEST SERPL-MCNC: 232 MG/DL
CO2 SERPL-SCNC: 24 MMOL/L (ref 21–32)
CREAT SERPL-MCNC: 0.69 MG/DL (ref 0.55–1.02)
EST. AVERAGE GLUCOSE BLD GHB EST-MCNC: 214 MG/DL
GLOBULIN SER CALC-MCNC: 3.4 G/DL (ref 2–4)
GLUCOSE SERPL-MCNC: 171 MG/DL (ref 65–100)
HBA1C MFR BLD: 9.1 % (ref 4.2–6.3)
HDLC SERPL-MCNC: 28 MG/DL
HDLC SERPL: 8.3 {RATIO} (ref 0–5)
LDLC SERPL CALC-MCNC: ABNORMAL MG/DL (ref 0–100)
LDLC SERPL DIRECT ASSAY-MCNC: 85 MG/DL (ref 0–100)
LIPID PROFILE,FLP: ABNORMAL
POTASSIUM SERPL-SCNC: 4.2 MMOL/L (ref 3.5–5.1)
PROT SERPL-MCNC: 7.3 G/DL (ref 6.4–8.2)
SODIUM SERPL-SCNC: 140 MMOL/L (ref 136–145)
TRIGL SERPL-MCNC: 859 MG/DL (ref ?–150)
VLDLC SERPL CALC-MCNC: ABNORMAL MG/DL

## 2018-07-16 PROCEDURE — 80061 LIPID PANEL: CPT | Performed by: FAMILY MEDICINE

## 2018-07-16 PROCEDURE — 83036 HEMOGLOBIN GLYCOSYLATED A1C: CPT | Performed by: FAMILY MEDICINE

## 2018-07-16 PROCEDURE — 83721 ASSAY OF BLOOD LIPOPROTEIN: CPT | Performed by: FAMILY MEDICINE

## 2018-07-16 PROCEDURE — 80053 COMPREHEN METABOLIC PANEL: CPT | Performed by: FAMILY MEDICINE

## 2018-10-02 ENCOUNTER — OFFICE VISIT (OUTPATIENT)
Dept: FAMILY MEDICINE CLINIC | Age: 43
End: 2018-10-02

## 2018-10-02 VITALS
RESPIRATION RATE: 16 BRPM | DIASTOLIC BLOOD PRESSURE: 82 MMHG | SYSTOLIC BLOOD PRESSURE: 130 MMHG | BODY MASS INDEX: 34.62 KG/M2 | OXYGEN SATURATION: 97 % | WEIGHT: 220.6 LBS | HEIGHT: 67 IN | HEART RATE: 84 BPM | TEMPERATURE: 98.8 F

## 2018-10-02 DIAGNOSIS — F40.01 AGORAPHOBIA WITH PANIC DISORDER: ICD-10-CM

## 2018-10-02 DIAGNOSIS — F31.9 BIPOLAR 1 DISORDER, DEPRESSED (HCC): Primary | ICD-10-CM

## 2018-10-02 DIAGNOSIS — F32.2 SEVERE SINGLE CURRENT EPISODE OF MAJOR DEPRESSIVE DISORDER, WITHOUT PSYCHOTIC FEATURES (HCC): ICD-10-CM

## 2018-10-02 DIAGNOSIS — G47.00 INSOMNIA, UNSPECIFIED TYPE: ICD-10-CM

## 2018-10-02 RX ORDER — BUSPIRONE HYDROCHLORIDE 10 MG/1
10 TABLET ORAL 2 TIMES DAILY
Qty: 60 TAB | Refills: 3 | Status: SHIPPED | OUTPATIENT
Start: 2018-10-02 | End: 2019-07-03 | Stop reason: SDUPTHER

## 2018-10-02 RX ORDER — QUETIAPINE FUMARATE 100 MG/1
100 TABLET, FILM COATED ORAL DAILY
Qty: 30 TAB | Refills: 3 | Status: SHIPPED | OUTPATIENT
Start: 2018-10-02 | End: 2019-01-24 | Stop reason: SDUPTHER

## 2018-10-02 RX ORDER — FLUCONAZOLE 150 MG/1
150 TABLET ORAL DAILY
COMMUNITY
End: 2019-03-16

## 2018-10-02 NOTE — PROGRESS NOTES
Chief Complaint   Patient presents with    Anxiety     Follow up    Medication Refill     Klonopin, ambien and Buspar. 1. Have you been to the ER, urgent care clinic since your last visit? Hospitalized since your last visit? No    2. Have you seen or consulted any other health care providers outside of the [address] [place] [address] since your last visit? Include any pap smears or colon screening.  No

## 2018-10-02 NOTE — PROGRESS NOTES
HISTORY OF PRESENT ILLNESS  Efe Rivera Do is a 43 y.o. female. HPI: Patient is following up on her anxiety/depression. She has history of bipolar 1 disorder, anxiety, depression, insomnia. High cholesterol, diabetes, GERD. For her psychiatric issues was followed by [facility]. She reports that she had to be seen every two weeks and that since her son turned to 21years old, she lost her [employer] and that she couldn't pay to be seen at that clinic. She is here to refill her medication. She states that her symptom are under control since taking her current medication.  She will get a new insurance at the end of this year and will follow up with a psychiatrist .  For her high cholesterol and diabetes she is followed by [facility] clinic, it is a free clinic of [facility] and she will follow up with them for her lab work  BMI is 34.55, she is not watching her diet, she is not exercising   Past Medical History:   Diagnosis Date    Abnormal Pap smear of cervix     Acid indigestion     Cancer ([facility] [address]     choriocarcinoma  - UTERNE    Depression     Diabetes ([facility] [address]     Dysthymic disorder     GERD (gastroesophageal reflux disease)     Hypertriglyceridemia     Mixed hyperlipidemia 3/31/2010    MRSA (methicillin resistant Staphylococcus aureus)     Psychiatric disorder     depression; ANXIETY    Urinary incontinence     URGENCY AND INCONTINENECE     Past Surgical History:   Procedure Laterality Date    HX APPENDECTOMY      HX GYN  12/08    D & C     HX ORTHOPAEDIC      R ACL knee    HX ORTHOPAEDIC      BILATERAL A/S KNEES    HX ORTHOPAEDIC  4/2016    REMOVAL OF HARDWARE IN KNEE    HX PARTIAL HYSTERECTOMY Bilateral May 4 2015    Dr. Joshua Bosch     Allergies   Allergen Reactions    Ciprocinonide Hives     cipro    Demerol [Meperidine] Other (comments)     Goofy feeling, hallucinates     Current Outpatient Prescriptions:     fluconazole (DIFLUCAN) 150 mg tablet, Take 150 mg by mouth daily. FDA advises cautious prescribing of oral fluconazole in pregnancy. , Disp: , Rfl:     QUEtiapine (SEROQUEL) 100 mg tablet, Take 1 Tab by mouth daily. , Disp: 30 Tab, Rfl: 3    busPIRone (BUSPAR) 10 mg tablet, Take 1 Tab by mouth two (2) times a day., Disp: 60 Tab, Rfl: 3    zolpidem (AMBIEN) 5 mg tablet, Take 1 Tab by mouth nightly as needed for Sleep. Max Daily Amount: 5 mg., Disp: 30 Tab, Rfl: 3    pravastatin (PRAVACHOL) 40 mg tablet, TAKE ONE TABLET BY MOUTH AT NIGHT, Disp: 30 Tab, Rfl: 0    fenofibrate micronized (LOFIBRA) 134 mg capsule, TAKE ONE CAPSULE BY MOUTH EVERY DAY, Disp: 30 Cap, Rfl: 0    raNITIdine (ZANTAC) 150 mg tablet, TAKE ONE TABLET BY MOUTH AT NIGHT, Disp: 30 Tab, Rfl: 0    pioglitazone (ACTOS) 15 mg tablet, Take 1 Tab by mouth daily. , Disp: 90 Tab, Rfl: 0    Omeprazole delayed release (PRILOSEC D/R) 20 mg tablet, Take 1 Tab by mouth daily. (Patient taking differently: Take 40 mg by mouth daily.), Disp: 30 Tab, Rfl: 5    SITagliptin (JANUVIA) 100 mg tablet, Take 1 Tab by mouth daily. , Disp: 30 Tab, Rfl: 5    pantoprazole (PROTONIX) 40 mg tablet, TAKE ONE TABLET BY MOUTH EVERY DAY, Disp: 30 Tab, Rfl: 0    terconazole (TERAZOL 7) 0.4 % vaginal cream, Insert 1 Applicator into vagina nightly., Disp: 45 g, Rfl: 0    Lancets (ONE TOUCH ULTRASOFT LANCETS) Misc, Use as directed , Disp: 1 Package, Rfl: 11    Blood-Glucose Meter (ONE TOUCH ULTRA SYSTEM KIT) monitoring kit, Use as directed, Disp: 1 Kit, Rfl: 0    glucose blood VI test strips (ONE TOUCH ULTRA TEST) strip, Monitor BS BID Dx: 250.02 , Disp: 1 Package, Rfl: 11    oxybutynin chloride XL (DITROPAN XL) 10 mg CR tablet, TAKE ONE TABLET BY MOUTH AT NIGHT, Disp: 30 Tab, Rfl: 0    estradiol (ESTRACE) 0.5 mg tablet, Take 0.5 mg by mouth daily. , Disp: , Rfl:   Review of Systems   Constitutional: Negative. Respiratory: Negative. Cardiovascular: Negative. Gastrointestinal: Negative.     Psychiatric/Behavioral: Positive for depression. The patient is nervous/anxious and has insomnia. Blood pressure 130/82, pulse 84, temperature 98.8 °F (37.1 °C), temperature source Oral, resp. rate 16, height 5' 7\" (1.702 m), weight 220 lb 9.6 oz (100.1 kg), last menstrual period 09/10/2014, SpO2 97 %. Body mass index is 34.55 kg/(m^2). Physical Exam   Constitutional: No distress. HENT:   Mouth/Throat: Oropharynx is clear and moist.   Neck: Normal range of motion. Neck supple. Cardiovascular: Normal rate and regular rhythm. No murmur heard. Pulmonary/Chest: Effort normal.   Abdominal: Soft. Bowel sounds are normal.   Psychiatric: She has a normal mood and affect. Her behavior is normal.   Nursing note and vitals reviewed. ASSESSMENT and PLAN  Diagnoses and all orders for this visit:    1. Bipolar 1 disorder, depressed (HCC)  -     QUEtiapine (SEROQUEL) 100 mg tablet; Take 1 Tab by mouth daily. 2. Insomnia, unspecified type      Will fill on 17 on this month        checked and this was filled on 9/17  3. Severe single current episode of major depressive disorder, without psychotic features ([address]    4. Agoraphobia with panic disorder  -     busPIRone (BUSPAR) 10 mg tablet; Take 1 Tab by mouth two (2) times a day.     5. BMI 34.0-34.9,adult      Normal BMI discussed, advised to watch diet and exericse  Pt was given an after visit summary which includes diagnosis, current medicines and vital and voiced understanding of treatment plan

## 2018-10-16 DIAGNOSIS — G47.09 OTHER INSOMNIA: Primary | ICD-10-CM

## 2018-10-16 NOTE — TELEPHONE ENCOUNTER
----- Message from Pura Coates sent at 10/16/2018  9:06 AM EDT -----  Regarding: Dr. Christophe Penny  Pt requesting refill of \"Ambian\" to [facility].)  Pt stated it is cheaper for her to get 10mg instead of the 5mg and they're able to be split. Pt would like 10mg( 2 refills to equal a 4 month supply). Pt only has 1 left and stated please rush it as her  can  in town today if possible. Pharmacy contact:  [phone]  . Requested Prescriptions     Pending Prescriptions Disp Refills    zolpidem (AMBIEN) 5 mg tablet 30 Tab 3     Sig: Take 1 Tab by mouth nightly as needed for Sleep. Max Daily Amount: 5 mg.

## 2018-10-16 NOTE — TELEPHONE ENCOUNTER
Patient is requesting call back in regards to her medication refill, she is out of medication.   Best call back : [phone]

## 2018-10-17 DIAGNOSIS — G47.09 OTHER INSOMNIA: Primary | ICD-10-CM

## 2018-10-17 RX ORDER — ZOLPIDEM TARTRATE 5 MG/1
10 TABLET ORAL
Qty: 30 TAB | Refills: 3 | Status: CANCELLED | OUTPATIENT
Start: 2018-10-17

## 2018-10-17 RX ORDER — ZOLPIDEM TARTRATE 5 MG/1
5 TABLET ORAL
Qty: 30 TAB | Refills: 3 | Status: CANCELLED | OUTPATIENT
Start: 2018-10-17

## 2018-10-17 RX ORDER — ZOLPIDEM TARTRATE 10 MG/1
TABLET ORAL
Qty: 30 TAB | Refills: 1 | Status: CANCELLED | OUTPATIENT
Start: 2018-10-17

## 2018-10-17 NOTE — TELEPHONE ENCOUNTER
Patient is calling requesting the status for the  refill on the medication from yesterday. She is out of the medication. .  Requested Prescriptions     Pending Prescriptions Disp Refills    zolpidem (AMBIEN) 5 mg tablet 30 Tab 3     Sig: Take 1 Tab by mouth nightly as needed for Sleep. Max Daily Amount: 5 mg.      Best call back : [phone]  Pharmacy verified

## 2018-10-18 NOTE — TELEPHONE ENCOUNTER
----- Message from Lila Christiansen sent at 10/17/2018  7:19 PM EDT -----  Regarding: Dr. Josué Mxi  Pt is upset because she said she spoke with the PCP about her refill and later in the day, the Rx was denied by the PCP through the portal.  Pt would like to know why the Rx was denied. Best contact number is [phone].

## 2018-10-18 NOTE — TELEPHONE ENCOUNTER
Pt calling in regards to medication being submitted over to pharmacy. She states that medication request has been denied and she is wanting to know why, after she was told from provider that medication will be sent to pharmacy .  Pt informed that request has been sent to provider and is waiting for her approval. Pt is requesting a call back in regards to this from provider or supervisor     [address] [place] [address]  Tuesday, October 02, 2018 10:15 AM    Best call back [phone]    Pharmacy on file verified

## 2018-10-19 RX ORDER — ZOLPIDEM TARTRATE 10 MG/1
10 TABLET ORAL
Qty: 10 TAB | Refills: 0 | Status: SHIPPED | OUTPATIENT
Start: 2018-10-19 | End: 2019-01-21 | Stop reason: SDUPTHER

## 2018-11-05 ENCOUNTER — HOSPITAL ENCOUNTER (OUTPATIENT)
Dept: LAB | Age: 43
Discharge: HOME | End: 2018-11-05

## 2018-11-05 LAB
ALBUMIN SERPL-MCNC: 3.7 G/DL (ref 3.5–5)
ALBUMIN/GLOB SERPL: 1.2 {RATIO} (ref 1.1–2.2)
ALP SERPL-CCNC: 78 U/L (ref 45–117)
ALT SERPL-CCNC: 36 U/L (ref 12–78)
ANION GAP SERPL CALC-SCNC: 8 MMOL/L (ref 5–15)
AST SERPL-CCNC: 13 U/L (ref 15–37)
BILIRUB SERPL-MCNC: 0.4 MG/DL (ref 0.2–1)
BUN SERPL-MCNC: 11 MG/DL (ref 6–20)
BUN/CREAT SERPL: 17 (ref 12–20)
CALCIUM SERPL-MCNC: 8.7 MG/DL (ref 8.5–10.1)
CHLORIDE SERPL-SCNC: 106 MMOL/L (ref 97–108)
CHOLEST SERPL-MCNC: 157 MG/DL
CO2 SERPL-SCNC: 25 MMOL/L (ref 21–32)
CREAT SERPL-MCNC: 0.66 MG/DL (ref 0.55–1.02)
EST. AVERAGE GLUCOSE BLD GHB EST-MCNC: 226 MG/DL
GLOBULIN SER CALC-MCNC: 3.1 G/DL (ref 2–4)
GLUCOSE SERPL-MCNC: 196 MG/DL (ref 65–100)
HBA1C MFR BLD: 9.5 % (ref 4.2–6.3)
HDLC SERPL-MCNC: 35 MG/DL
HDLC SERPL: 4.5 {RATIO} (ref 0–5)
LDLC SERPL CALC-MCNC: 73 MG/DL (ref 0–100)
LIPID PROFILE,FLP: ABNORMAL
POTASSIUM SERPL-SCNC: 4.1 MMOL/L (ref 3.5–5.1)
PROT SERPL-MCNC: 6.8 G/DL (ref 6.4–8.2)
SODIUM SERPL-SCNC: 139 MMOL/L (ref 136–145)
TRIGL SERPL-MCNC: 245 MG/DL (ref ?–150)
VLDLC SERPL CALC-MCNC: 49 MG/DL

## 2018-11-05 PROCEDURE — 80061 LIPID PANEL: CPT | Performed by: FAMILY MEDICINE

## 2018-11-05 PROCEDURE — 80053 COMPREHEN METABOLIC PANEL: CPT | Performed by: FAMILY MEDICINE

## 2018-11-05 PROCEDURE — 83036 HEMOGLOBIN GLYCOSYLATED A1C: CPT | Performed by: FAMILY MEDICINE

## 2018-12-22 ENCOUNTER — APPOINTMENT (OUTPATIENT)
Dept: GENERAL RADIOLOGY | Age: 43
End: 2018-12-22
Attending: EMERGENCY MEDICINE
Payer: SELF-PAY

## 2018-12-22 ENCOUNTER — HOSPITAL ENCOUNTER (EMERGENCY)
Age: 43
Discharge: HOME | End: 2018-12-22
Attending: EMERGENCY MEDICINE
Payer: SELF-PAY

## 2018-12-22 VITALS
SYSTOLIC BLOOD PRESSURE: 149 MMHG | DIASTOLIC BLOOD PRESSURE: 96 MMHG | HEIGHT: 67 IN | RESPIRATION RATE: 18 BRPM | OXYGEN SATURATION: 100 % | BODY MASS INDEX: 35.92 KG/M2 | HEART RATE: 91 BPM | WEIGHT: 228.84 LBS | TEMPERATURE: 98.6 F

## 2018-12-22 DIAGNOSIS — S46.912A MUSCLE STRAIN OF LEFT SHOULDER REGION, INITIAL ENCOUNTER: Primary | ICD-10-CM

## 2018-12-22 PROCEDURE — 73030 X-RAY EXAM OF SHOULDER: CPT

## 2018-12-22 PROCEDURE — 74011250636 HC RX REV CODE- 250/636: Performed by: EMERGENCY MEDICINE

## 2018-12-22 PROCEDURE — 96372 THER/PROPH/DIAG INJ SC/IM: CPT

## 2018-12-22 PROCEDURE — 99283 EMERGENCY DEPT VISIT LOW MDM: CPT

## 2018-12-22 PROCEDURE — 74011250637 HC RX REV CODE- 250/637: Performed by: EMERGENCY MEDICINE

## 2018-12-22 RX ORDER — METHOCARBAMOL 500 MG/1
500 TABLET, FILM COATED ORAL
Qty: 30 TAB | Refills: 0 | Status: SHIPPED | OUTPATIENT
Start: 2018-12-22 | End: 2019-10-01

## 2018-12-22 RX ORDER — NAPROXEN 500 MG/1
500 TABLET ORAL 2 TIMES DAILY WITH MEALS
Qty: 30 TAB | Refills: 0 | Status: SHIPPED | OUTPATIENT
Start: 2018-12-22 | End: 2019-01-01

## 2018-12-22 RX ORDER — CELECOXIB 200 MG/1
600 CAPSULE ORAL 2 TIMES DAILY
COMMUNITY
End: 2018-12-22

## 2018-12-22 RX ORDER — KETOROLAC TROMETHAMINE 30 MG/ML
30 INJECTION, SOLUTION INTRAMUSCULAR; INTRAVENOUS
Status: COMPLETED | OUTPATIENT
Start: 2018-12-22 | End: 2018-12-22

## 2018-12-22 RX ORDER — METHOCARBAMOL 500 MG/1
500 TABLET, FILM COATED ORAL
Status: COMPLETED | OUTPATIENT
Start: 2018-12-22 | End: 2018-12-22

## 2018-12-22 RX ADMIN — METHOCARBAMOL 500 MG: 500 TABLET ORAL at 16:33

## 2018-12-22 RX ADMIN — KETOROLAC TROMETHAMINE 30 MG: 30 INJECTION, SOLUTION INTRAMUSCULAR at 16:33

## 2018-12-22 NOTE — ED PROVIDER NOTES
HPI   37 y.o. female presents to the ED noting a 3-4 week history of left shoulder pain. She states that she has been evaluated by two previous providers over the course of her injury and has been rx'd motrin and celebrex to only limited effect on her sx. She states that today she was lifting a heavy bag if feed when she felt sharp aching pain in her left shoulder predominantly in the deltoid and triceps. She tried taking tylenol to no avail of her symptoms. She denies any trauma to the area. No recent illness, fever, chills, redness, swelling. No trauma to the area. She states that she has not had any xrays done, does not know if she has arthritis.      Past Medical History:   Diagnosis Date    Abnormal Pap smear of cervix     Acid indigestion     Cancer (HCC)     choriocarcinoma  - [facility]TERNE    Depression     Diabetes ([facility] [address]     Dysthymic disorder     GERD (gastroesophageal reflux disease)     Hypertriglyceridemia     Mixed hyperlipidemia 3/31/2010    MRSA (methicillin resistant Staphylococcus aureus)     Psychiatric disorder     depression; ANXIETY    Urinary incontinence     URGENCY AND INCONTINENECE       Past Surgical History:   Procedure Laterality Date    HX APPENDECTOMY      HX GYN  12/08    D & C     HX ORTHOPAEDIC      R ACL knee    HX ORTHOPAEDIC      BILATERAL A/S KNEES    HX ORTHOPAEDIC  4/2016    REMOVAL OF HARDWARE IN KNEE    HX PARTIAL HYSTERECTOMY Bilateral May 4 2015    Dr. Mayda Chatterjee         Family History:   Problem Relation Age of Onset    Hypertension Mother     Elevated Lipids Mother     Cancer Father         pancreatic    Thyroid Disease Paternal Aunt     Heart Disease Maternal Grandmother     Heart Disease Maternal Grandfather     Heart Disease Paternal Grandfather     Diabetes Paternal Aunt     Alcohol abuse Neg Hx     Arthritis-rheumatoid Neg Hx     Asthma Neg Hx     Bleeding Prob Neg Hx     Headache Neg Hx     Lung Disease Neg Hx     Migraines Neg Hx  Psychiatric Disorder Neg Hx     Stroke Neg Hx     Mental Retardation Neg Hx     Anesth Problems Neg Hx        Social History     Socioeconomic History    Marital status: SINGLE     Spouse name: Not on file    Number of children: Not on file    Years of education: Not on file    Highest education level: Not on file   Social Needs    Financial resource strain: Not on file    Food insecurity - worry: Not on file    Food insecurity - inability: Not on file    Transportation needs - medical: Not on file   [employer] needs - non-medical: Not on file   Occupational History    Not on file   Tobacco Use    Smoking status: Current Every Day Smoker     Packs/day: 1.00     Years: 25.00     Pack years: 25.00    Smokeless tobacco: Never Used   Substance and Sexual Activity    Alcohol use: No     Alcohol/week: 0.0 oz    Drug use: No    Sexual activity: Yes     Partners: Male     Birth control/protection: None   Other Topics Concern    Not on file   Social History Narrative    Not on file         ALLERGIES: Ciprocinonide and Demerol [meperidine]    Review of Systems   Constitutional: Negative for activity change, appetite change, chills and fever. HENT: Negative for congestion, rhinorrhea, sinus pressure, sneezing and sore throat. Eyes: Negative for photophobia and visual disturbance. Respiratory: Negative for cough and shortness of breath. Cardiovascular: Negative for chest pain. Gastrointestinal: Negative for abdominal pain, blood in stool, constipation, diarrhea, nausea and vomiting. Genitourinary: Negative for difficulty urinating, dysuria, flank pain, frequency, hematuria, menstrual problem, urgency, vaginal bleeding and vaginal discharge. Musculoskeletal: Positive for arthralgias (left shoulder). Negative for back pain, joint swelling, myalgias, neck pain and neck stiffness. Skin: Negative for rash and wound. Neurological: Negative for syncope, weakness, numbness and headaches. Psychiatric/Behavioral: Negative for self-injury and suicidal ideas. All other systems reviewed and are negative. Vitals:    12/22/18 1556   BP: (!) 149/96   Pulse: 91   Resp: 18   Temp: 98.6 °F (37 °C)   SpO2: 100%   Weight: 103.8 kg (228 lb 13.4 oz)   Height: 5' 7\" (1.702 m)            Physical Exam   Constitutional: She is oriented to person, place, and time. She appears well-developed and well-nourished. No distress. HENT:   Head: Normocephalic and atraumatic. Nose: Nose normal.   Mouth/Throat: Oropharynx is clear and moist.   Eyes: Conjunctivae and EOM are normal. Pupils are equal, round, and reactive to light. Neck: Neck supple. Cardiovascular: Normal rate, regular rhythm, normal heart sounds and intact distal pulses. Pulmonary/Chest: Effort normal and breath sounds normal.   Abdominal: Soft. She exhibits no distension. There is no tenderness. Musculoskeletal: She exhibits tenderness. She exhibits no edema or deformity. Left shoulder: She exhibits decreased range of motion, tenderness (over the deltoid and triceps muscles, neg drop test, low suspicion for compete tear, but likey muscle injury.), pain and spasm. She exhibits no bony tenderness, no swelling, no crepitus, no deformity, no laceration, normal pulse and normal strength. Neg spurling's   Neurological: She is alert and oriented to person, place, and time. She has normal strength. No cranial nerve deficit or sensory deficit. Coordination normal. GCS eye subscore is 4. GCS verbal subscore is 5. GCS motor subscore is 6. Skin: Skin is warm and dry. She is not diaphoretic. Nursing note and vitals reviewed. MDM    37 y.o. female presents with sx suggestive of muscle strain in deltoid/triceps after lifting heavy bag. No clear evidence of rotator cuff tear, likely strain with associated spasm    XR was done and showed no acute abnormality.     Given toradol in the Ed and rx'd naprosyn and robaxin and rec'd stretches and rest. Rec'd PCP f/u, return precautions were given for worsening or concerns.      Procedures

## 2019-01-21 ENCOUNTER — OFFICE VISIT (OUTPATIENT)
Dept: FAMILY MEDICINE CLINIC | Age: 44
End: 2019-01-21

## 2019-01-21 VITALS
TEMPERATURE: 99.1 F | BODY MASS INDEX: 35.41 KG/M2 | OXYGEN SATURATION: 98 % | WEIGHT: 225.6 LBS | RESPIRATION RATE: 18 BRPM | SYSTOLIC BLOOD PRESSURE: 108 MMHG | DIASTOLIC BLOOD PRESSURE: 72 MMHG | HEIGHT: 67 IN | HEART RATE: 107 BPM

## 2019-01-21 DIAGNOSIS — G47.09 OTHER INSOMNIA: ICD-10-CM

## 2019-01-21 DIAGNOSIS — R68.89 FLU-LIKE SYMPTOMS: Primary | ICD-10-CM

## 2019-01-21 LAB
QUICKVUE INFLUENZA TEST: NEGATIVE
S PYO AG THROAT QL: NEGATIVE
VALID INTERNAL CONTROL?: YES
VALID INTERNAL CONTROL?: YES

## 2019-01-21 RX ORDER — ZOLPIDEM TARTRATE 10 MG/1
10 TABLET ORAL
Qty: 10 TAB | Refills: 0 | Status: SHIPPED | OUTPATIENT
Start: 2019-01-21 | End: 2019-02-01 | Stop reason: SDUPTHER

## 2019-01-21 RX ORDER — CLONAZEPAM 1 MG/1
TABLET ORAL
COMMUNITY
Start: 2016-02-15 | End: 2019-02-01

## 2019-01-21 RX ORDER — OSELTAMIVIR PHOSPHATE 75 MG/1
75 CAPSULE ORAL 2 TIMES DAILY
Qty: 10 CAP | Refills: 0 | Status: SHIPPED | OUTPATIENT
Start: 2019-01-21 | End: 2019-01-26

## 2019-01-21 RX ORDER — CITALOPRAM 40 MG/1
40 TABLET, FILM COATED ORAL DAILY
COMMUNITY
Start: 2016-02-15 | End: 2019-02-01

## 2019-01-21 NOTE — PATIENT INSTRUCTIONS
Sore Throat: Care Instructions  Your Care Instructions    Infection by bacteria or a virus causes most sore throats. Cigarette smoke, dry air, air pollution, allergies, and yelling can also cause a sore throat. Sore throats can be painful and annoying. Fortunately, most sore throats go away on their own. If you have a bacterial infection, your doctor may prescribe antibiotics. Follow-up care is a key part of your treatment and safety. Be sure to make and go to all appointments, and call your doctor if you are having problems. It's also a good idea to know your test results and keep a list of the medicines you take. How can you care for yourself at home? · If your doctor prescribed antibiotics, take them as directed. Do not stop taking them just because you feel better. You need to take the full course of antibiotics. · Gargle with warm salt water once an hour to help reduce swelling and relieve discomfort. Use 1 teaspoon of salt mixed in 1 cup of warm water. · Take an over-the-counter pain medicine, such as acetaminophen (Tylenol), ibuprofen (Advil, Motrin), or naproxen (Aleve). Read and follow all instructions on the label. · Be careful when taking over-the-counter cold or flu medicines and Tylenol at the same time. Many of these medicines have acetaminophen, which is Tylenol. Read the labels to make sure that you are not taking more than the recommended dose. Too much acetaminophen (Tylenol) can be harmful. · Drink plenty of fluids. Fluids may help soothe an irritated throat. Hot fluids, such as tea or soup, may help decrease throat pain. · Use over-the-counter throat lozenges to soothe pain. Regular cough drops or hard candy may also help. These should not be given to young children because of the risk of choking. · Do not smoke or allow others to smoke around you. If you need help quitting, talk to your doctor about stop-smoking programs and medicines.  These can increase your chances of quitting for good. · Use a vaporizer or humidifier to add moisture to your bedroom. Follow the directions for cleaning the machine. When should you call for help? Call your doctor now or seek immediate medical care if:    · You have new or worse trouble swallowing.     · Your sore throat gets much worse on one side.    Watch closely for changes in your health, and be sure to contact your doctor if you do not get better as expected. Where can you learn more? Go to http://angelique-sheila.info/. Enter [phone] [id number] [phone] in the search box to learn more about \"Sore Throat: Care Instructions. \"  Current as of: March 27, 2018  Content Version: 11.9  © 5754-0335 [employer]. Care instructions adapted under license by [employer] (which disclaims liability or warranty for this information). If you have questions about a medical condition or this instruction, always ask your healthcare professional. [address] any warranty or liability for your use of this information.

## 2019-01-21 NOTE — PROGRESS NOTES
Chief Complaint   Patient presents with    Cold Symptoms     cough, sore throat, nausea, had a temperature of 102 on Saturday, had diarrhea on Sarturday x 3 days       1. Have you been to the ER, urgent care clinic since your last visit? Hospitalized since your last visit? No    2. Have you seen or consulted any other health care providers outside of the [address] [place] [address] since your last visit? Include any pap smears or colon screening.  No

## 2019-01-21 NOTE — PROGRESS NOTES
Patient Name: Sarah Joyner   MRN: [id number]    Daniela Smallwood is a 37 y.o. female who presents with the following:     Patient reports congestion, sore throat, productive cough, myalgias, nausea, fever and URI symptoms for 2 days, gradually worsening since that time. Denies a history of rhinorrhea, sinus congestion, chest congestion, wheezing, SOB/RUIZ and chest pain. Evaluation to date: none. Treatment to date: OTC products. Relevant PMH: DM and smoker. 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 Tika Dugan Has a fever of 102 F Saturday evening. Last dose of Tylenol was this AM.  Received flu shot this year. Review of Systems   Constitutional: Positive for fever and malaise/fatigue. Negative for weight loss. HENT: Positive for sore throat. Respiratory: Positive for cough. Negative for hemoptysis, shortness of breath and wheezing. Cardiovascular: Negative for chest pain, palpitations, leg swelling and PND. Gastrointestinal: Positive for nausea. Negative for abdominal pain, constipation, diarrhea and vomiting. Musculoskeletal: Positive for myalgias. The patient's medications, allergies, past medical history, surgical history, family history and social history were reviewed and updated where appropriate. Prior to Admission medications    Medication Sig Start Date End Date Taking? Authorizing Provider   clonazePAM [place]) 1 mg tablet  2/15/16  Yes Provider, Historical   citalopram (CELEXA) 40 mg tablet Take 40 mg by mouth daily. 2/15/16  Yes Provider, Historical   methocarbamol (ROBAXIN) 500 mg tablet Take 1 Tab by mouth three (3) times daily as needed. 12/22/18  Yes Chandana Lines, DO   zolpidem (AMBIEN) 10 mg tablet Take 1 Tab by mouth nightly as needed for Sleep. Max Daily Amount: 10 mg. 10/19/18  Yes Robert Rivera, NP   fluconazole (DIFLUCAN) 150 mg tablet Take 150 mg by mouth daily.  FDA advises cautious prescribing of oral fluconazole in pregnancy. Yes Provider, Historical   QUEtiapine (SEROQUEL) 100 mg tablet Take 1 Tab by mouth daily. 10/2/18  Yes Ajay Rivera NP   busPIRone (BUSPAR) 10 mg tablet Take 1 Tab by mouth two (2) times a day. 10/2/18  Yes Robert Rivera NP   pravastatin (PRAVACHOL) 40 mg tablet TAKE ONE TABLET BY MOUTH AT NIGHT 10/24/17  Yes Robert Rivera NP   fenofibrate micronized (LOFIBRA) 134 mg capsule TAKE ONE CAPSULE BY MOUTH EVERY DAY 10/24/17  Yes Robert Rivera NP   oxybutynin chloride XL (DITROPAN XL) 10 mg CR tablet TAKE ONE TABLET BY MOUTH AT NIGHT 10/24/17  Yes Robert Rivera NP   raNITIdine (ZANTAC) 150 mg tablet TAKE ONE TABLET BY MOUTH AT NIGHT 10/24/17  Yes Robert Rivera NP   pioglitazone (ACTOS) 15 mg tablet Take 1 Tab by mouth daily. 9/11/17  Yes Silver Vargas MD   Omeprazole delayed release (PRILOSEC D/R) 20 mg tablet Take 1 Tab by mouth daily. Patient taking differently: Take 40 mg by mouth daily. 8/9/17  Yes Robert Rivera NP   SITagliptin (JANUVIA) 100 mg tablet Take 1 Tab by mouth daily. 8/9/17  Yes Robert Rivera NP   pantoprazole (PROTONIX) 40 mg tablet TAKE ONE TABLET BY MOUTH EVERY DAY 7/21/17  Yes Robert Rivera NP   estradiol (ESTRACE) 0.5 mg tablet Take 0.5 mg by mouth daily. 10/18/16  Yes Provider, Historical   terconazole (TERAZOL 7) 0.4 % vaginal cream Insert 1 Applicator into vagina nightly.  6/15/16  Yes YANELIS Alexander   Lancets (ONE TOUCH ULTRASOFT LANCETS) Misc Use as directed   6/11/12  Yes Katie Bales MD   Blood-Glucose Meter (ONE TOUCH ULTRA SYSTEM KIT) monitoring kit Use as directed 6/11/12  Yes Katie Bales MD   glucose blood VI test strips (ONE TOUCH ULTRA TEST) strip Monitor BS BID  Dx: 250.02   6/11/12  Yes Katie Bales MD       Allergies   Allergen Reactions    Ciprocinonide Hives     cipro    Ciprofloxacin Hives    Demerol [Meperidine] Other (comments)     Goofy feeling, hallucinates           OBJECTIVE    Visit Vitals  /72 (BP 1 Location: Left arm, BP Patient Position: Sitting)   Pulse (!) 107   Temp 99.1 °F (37.3 °C) (Oral)   Resp 18   Ht 5' 7\" (1.702 m)   Wt 225 lb 9.6 oz (102.3 kg)   LMP 09/10/2014 (Exact Date)   SpO2 98%   BMI 35.33 kg/m²       Physical Exam   Constitutional: She is oriented to person, place, and time and well-developed, well-nourished, and in no distress. No distress. HENT:   Head: Normocephalic and atraumatic. Right Ear: Tympanic membrane is not perforated and not erythematous. No middle ear effusion. No decreased hearing is noted. Left Ear: Tympanic membrane is not perforated and not erythematous. No middle ear effusion. No decreased hearing is noted. Nose: Rhinorrhea present. Right sinus exhibits no maxillary sinus tenderness and no frontal sinus tenderness. Left sinus exhibits no maxillary sinus tenderness and no frontal sinus tenderness. Mouth/Throat: Uvula is midline, oropharynx is clear and moist and mucous membranes are normal.   Neck: Normal range of motion. Neck supple. Cardiovascular: Normal rate, regular rhythm and normal heart sounds. Exam reveals no gallop and no friction rub. No murmur heard. Pulmonary/Chest: Effort normal and breath sounds normal. No respiratory distress. She has no wheezes. Productive cough   Lymphadenopathy:     She has no cervical adenopathy. Neurological: She is alert and oriented to person, place, and time. Skin: She is not diaphoretic. Psychiatric: Mood, memory, affect and judgment normal.   Nursing note and vitals reviewed. ASSESSMENT AND PLAN  Sandhya Silverman is a 37 y.o. female who presents today for:    1. Flu-like symptoms  POC strep/flu negative but symptoms appear to be suggestive of flu. Discussed with pt regarding risks/benefits of Tamiflu vs watchful waiting. Side effects of GI upset and transient mood changes reviewed. Patient would like to start Tamiflu.  Will send for strep culture. Reviewed signs and symptoms that would indicate a worsening medical condition which would require immediate evaluation and treatment; patient expressed understanding of plan. - AMB POC RAPID STREP A  - AMB POC RAPID INFLUENZA TEST  - oseltamivir (TAMIFLU) 75 mg capsule; Take 1 Cap by mouth two (2) times a day for 5 days. Dispense: 10 Cap; Refill: 0  - CULTURE, STREP THROAT       Medications Discontinued During This Encounter   Medication Reason    zolpidem (AMBIEN) 5 mg tablet        Follow-up Disposition:  Return if symptoms worsen or fail to improve. Medication risks/benefits/costs/interactions/alternatives discussed with patient. Advised patient to call back or return to office if symptoms worsen/change/persist. If patient cannot reach us or should anything more severe/urgent arise he/she should proceed directly to the nearest emergency department. Discussed expected course/resolution/complications of diagnosis in detail with patient. Patient given a written after visit summary which includes his/her diagnoses, current medications and vitals. Patient expressed understanding with the diagnosis and plan. Stormy Cleary M.D.

## 2019-01-23 LAB — S PYO THROAT QL CULT: NEGATIVE

## 2019-01-24 DIAGNOSIS — F31.9 BIPOLAR 1 DISORDER, DEPRESSED (HCC): ICD-10-CM

## 2019-01-24 NOTE — PROGRESS NOTES
Dear Ms. Do,    I hope you are doing well. I wanted to follow up on your recent test results: Your strep test was negative. Please let me know if you have any questions.

## 2019-01-25 RX ORDER — QUETIAPINE FUMARATE 100 MG/1
100 TABLET, FILM COATED ORAL DAILY
Qty: 30 TAB | Refills: 3 | Status: SHIPPED | OUTPATIENT
Start: 2019-01-25 | End: 2019-05-17 | Stop reason: SDUPTHER

## 2019-01-31 ENCOUNTER — TELEPHONE (OUTPATIENT)
Dept: FAMILY MEDICINE CLINIC | Age: 44
End: 2019-01-31

## 2019-01-31 NOTE — TELEPHONE ENCOUNTER
Pt. is calling requesting a call back in regards to the refill on the [facility]. Pt. Wants to know why she had only 10 pills on the refill  for this medication.      Best call #[phone]

## 2019-02-01 DIAGNOSIS — G47.09 OTHER INSOMNIA: ICD-10-CM

## 2019-02-01 RX ORDER — ZOLPIDEM TARTRATE 10 MG/1
10 TABLET ORAL
Qty: 15 TAB | Refills: 0 | Status: SHIPPED | OUTPATIENT
Start: 2019-02-01 | End: 2019-03-25 | Stop reason: SDUPTHER

## 2019-02-01 NOTE — TELEPHONE ENCOUNTER
RUDY Unger LPN   Caller: Unspecified (Yesterday, 11:55 AM)             I have already talked to her and she has 20 tabs at home that she takes 1/2 of tabs   And fill this on 2/20/2019

## 2019-02-01 NOTE — TELEPHONE ENCOUNTER
Nicole Han, RUDY Luna, LPN   Caller: Unspecified (Yesterday, 11:55 AM)             I didn't see her and thus didn't refill her Ambien Dr Gwen Robbins refilled and gave her only 20 tabs

## 2019-03-16 ENCOUNTER — HOSPITAL ENCOUNTER (EMERGENCY)
Age: 44
Discharge: HOME | End: 2019-03-16
Attending: EMERGENCY MEDICINE
Payer: MEDICAID

## 2019-03-16 ENCOUNTER — APPOINTMENT (OUTPATIENT)
Dept: GENERAL RADIOLOGY | Age: 44
End: 2019-03-16
Attending: EMERGENCY MEDICINE
Payer: MEDICAID

## 2019-03-16 VITALS
OXYGEN SATURATION: 93 % | TEMPERATURE: 101.1 F | DIASTOLIC BLOOD PRESSURE: 55 MMHG | RESPIRATION RATE: 16 BRPM | BODY MASS INDEX: 34.6 KG/M2 | WEIGHT: 220.9 LBS | SYSTOLIC BLOOD PRESSURE: 106 MMHG | HEART RATE: 98 BPM

## 2019-03-16 DIAGNOSIS — J18.9 COMMUNITY ACQUIRED PNEUMONIA OF RIGHT UPPER LOBE OF LUNG: Primary | ICD-10-CM

## 2019-03-16 DIAGNOSIS — B37.41 YEAST CYSTITIS: ICD-10-CM

## 2019-03-16 LAB
ALBUMIN SERPL-MCNC: 3.6 G/DL (ref 3.5–5)
ALBUMIN/GLOB SERPL: 1 {RATIO} (ref 1.1–2.2)
ALP SERPL-CCNC: 77 U/L (ref 45–117)
ALT SERPL-CCNC: 32 U/L (ref 12–78)
ANION GAP SERPL CALC-SCNC: 14 MMOL/L (ref 5–15)
APPEARANCE UR: CLEAR
AST SERPL-CCNC: 23 U/L (ref 15–37)
BACTERIA URNS QL MICRO: ABNORMAL /HPF
BASOPHILS # BLD: 0 K/UL (ref 0–0.1)
BASOPHILS NFR BLD: 0 % (ref 0–1)
BILIRUB SERPL-MCNC: 0.9 MG/DL (ref 0.2–1)
BILIRUB UR QL CFM: NEGATIVE
BUN SERPL-MCNC: 8 MG/DL (ref 6–20)
BUN/CREAT SERPL: 8 (ref 12–20)
CALCIUM SERPL-MCNC: 9.4 MG/DL (ref 8.5–10.1)
CHLORIDE SERPL-SCNC: 93 MMOL/L (ref 97–108)
CO2 SERPL-SCNC: 25 MMOL/L (ref 21–32)
COLOR UR: ABNORMAL
CREAT SERPL-MCNC: 1.01 MG/DL (ref 0.55–1.02)
DEPRECATED S PYO AG THROAT QL EIA: NEGATIVE
DIFFERENTIAL METHOD BLD: NORMAL
EOSINOPHIL # BLD: 0 K/UL (ref 0–0.4)
EOSINOPHIL NFR BLD: 0 % (ref 0–7)
EPITH CASTS URNS QL MICRO: ABNORMAL /LPF
ERYTHROCYTE [DISTWIDTH] IN BLOOD BY AUTOMATED COUNT: 13.4 % (ref 11.5–14.5)
FLUAV AG NPH QL IA: NEGATIVE
FLUBV AG NOSE QL IA: NEGATIVE
GLOBULIN SER CALC-MCNC: 3.7 G/DL (ref 2–4)
GLUCOSE SERPL-MCNC: 280 MG/DL (ref 65–100)
GLUCOSE UR STRIP.AUTO-MCNC: >1000 MG/DL
HCT VFR BLD AUTO: 37.1 % (ref 35–47)
HGB BLD-MCNC: 12.9 G/DL (ref 11.5–16)
HGB UR QL STRIP: ABNORMAL
HYALINE CASTS URNS QL MICRO: ABNORMAL /LPF (ref 0–5)
IMM GRANULOCYTES # BLD AUTO: 0 K/UL (ref 0–0.04)
IMM GRANULOCYTES NFR BLD AUTO: 0 % (ref 0–0.5)
KETONES UR QL STRIP.AUTO: 15 MG/DL
LEUKOCYTE ESTERASE UR QL STRIP.AUTO: NEGATIVE
LYMPHOCYTES # BLD: 2.1 K/UL (ref 0.8–3.5)
LYMPHOCYTES NFR BLD: 24 % (ref 12–49)
MCH RBC QN AUTO: 29.5 PG (ref 26–34)
MCHC RBC AUTO-ENTMCNC: 34.8 G/DL (ref 30–36.5)
MCV RBC AUTO: 84.9 FL (ref 80–99)
MONOCYTES # BLD: 0.7 K/UL (ref 0–1)
MONOCYTES NFR BLD: 8 % (ref 5–13)
NEUTS SEG # BLD: 6 K/UL (ref 1.8–8)
NEUTS SEG NFR BLD: 68 % (ref 32–75)
NITRITE UR QL STRIP.AUTO: NEGATIVE
NRBC # BLD: 0 K/UL (ref 0–0.01)
NRBC BLD-RTO: 0 PER 100 WBC
PH UR STRIP: 6 [PH] (ref 5–8)
PLATELET # BLD AUTO: 165 K/UL (ref 150–400)
PMV BLD AUTO: 11.2 FL (ref 8.9–12.9)
POTASSIUM SERPL-SCNC: 3.2 MMOL/L (ref 3.5–5.1)
PROT SERPL-MCNC: 7.3 G/DL (ref 6.4–8.2)
PROT UR STRIP-MCNC: ABNORMAL MG/DL
RBC # BLD AUTO: 4.37 M/UL (ref 3.8–5.2)
RBC #/AREA URNS HPF: ABNORMAL /HPF (ref 0–5)
SODIUM SERPL-SCNC: 132 MMOL/L (ref 136–145)
SP GR UR REFRACTOMETRY: 1.01 (ref 1–1.03)
TROPONIN I SERPL-MCNC: <0.05 NG/ML
UR CULT HOLD, URHOLD: NORMAL
UROBILINOGEN UR QL STRIP.AUTO: 2 EU/DL (ref 0.2–1)
WBC # BLD AUTO: 8.7 K/UL (ref 3.6–11)
WBC URNS QL MICRO: ABNORMAL /HPF (ref 0–4)
YEAST BUDDING URNS QL: PRESENT

## 2019-03-16 PROCEDURE — 74011250637 HC RX REV CODE- 250/637: Performed by: EMERGENCY MEDICINE

## 2019-03-16 PROCEDURE — 99284 EMERGENCY DEPT VISIT MOD MDM: CPT

## 2019-03-16 PROCEDURE — 96361 HYDRATE IV INFUSION ADD-ON: CPT

## 2019-03-16 PROCEDURE — 87880 STREP A ASSAY W/OPTIC: CPT

## 2019-03-16 PROCEDURE — 74011250636 HC RX REV CODE- 250/636: Performed by: EMERGENCY MEDICINE

## 2019-03-16 PROCEDURE — 81001 URINALYSIS AUTO W/SCOPE: CPT

## 2019-03-16 PROCEDURE — 87070 CULTURE OTHR SPECIMN AEROBIC: CPT

## 2019-03-16 PROCEDURE — 96365 THER/PROPH/DIAG IV INF INIT: CPT

## 2019-03-16 PROCEDURE — 93005 ELECTROCARDIOGRAM TRACING: CPT

## 2019-03-16 PROCEDURE — 85025 COMPLETE CBC W/AUTO DIFF WBC: CPT

## 2019-03-16 PROCEDURE — 87804 INFLUENZA ASSAY W/OPTIC: CPT

## 2019-03-16 PROCEDURE — 96366 THER/PROPH/DIAG IV INF ADDON: CPT

## 2019-03-16 PROCEDURE — 80053 COMPREHEN METABOLIC PANEL: CPT

## 2019-03-16 PROCEDURE — 36415 COLL VENOUS BLD VENIPUNCTURE: CPT

## 2019-03-16 PROCEDURE — 74011000258 HC RX REV CODE- 258: Performed by: EMERGENCY MEDICINE

## 2019-03-16 PROCEDURE — 71046 X-RAY EXAM CHEST 2 VIEWS: CPT

## 2019-03-16 PROCEDURE — 84484 ASSAY OF TROPONIN QUANT: CPT

## 2019-03-16 RX ORDER — AZITHROMYCIN 250 MG/1
500 TABLET, FILM COATED ORAL
Status: COMPLETED | OUTPATIENT
Start: 2019-03-16 | End: 2019-03-16

## 2019-03-16 RX ORDER — AZITHROMYCIN 250 MG/1
250 TABLET, FILM COATED ORAL DAILY
Qty: 4 TAB | Refills: 0 | Status: SHIPPED | OUTPATIENT
Start: 2019-03-16 | End: 2019-03-16

## 2019-03-16 RX ORDER — ACETAMINOPHEN 500 MG
1000 TABLET ORAL ONCE
Status: COMPLETED | OUTPATIENT
Start: 2019-03-16 | End: 2019-03-16

## 2019-03-16 RX ORDER — AZITHROMYCIN 250 MG/1
250 TABLET, FILM COATED ORAL DAILY
Qty: 4 TAB | Refills: 0 | Status: SHIPPED | OUTPATIENT
Start: 2019-03-16 | End: 2019-03-20

## 2019-03-16 RX ORDER — FLUCONAZOLE 100 MG/1
150 TABLET ORAL ONCE
Qty: 1 TAB | Refills: 1 | Status: SHIPPED | OUTPATIENT
Start: 2019-03-16 | End: 2019-07-08 | Stop reason: SDUPTHER

## 2019-03-16 RX ADMIN — SODIUM CHLORIDE 1000 ML: 900 INJECTION, SOLUTION INTRAVENOUS at 21:28

## 2019-03-16 RX ADMIN — CEFTRIAXONE 1 G: 1 INJECTION, POWDER, FOR SOLUTION INTRAMUSCULAR; INTRAVENOUS at 21:28

## 2019-03-16 RX ADMIN — AZITHROMYCIN 500 MG: 250 TABLET, FILM COATED ORAL at 21:30

## 2019-03-16 RX ADMIN — SODIUM CHLORIDE 1000 ML: 900 INJECTION, SOLUTION INTRAVENOUS at 19:31

## 2019-03-16 RX ADMIN — ACETAMINOPHEN 1000 MG: 500 TABLET ORAL at 19:31

## 2019-03-16 NOTE — ED NOTES
Bedside and Verbal shift change report given to Effie Kuhn RN (oncoming nurse) by Kimberly Mendoza RN (offgoing nurse). Report included the following information SBAR, Kardex, ED Summary, MAR, Accordion and Recent Results.

## 2019-03-16 NOTE — ED TRIAGE NOTES
Triage note: Pt states she started feeling bad on wed with a cough, sore throat, and hoarseness. Pt states she left to go to [facility] on Tues and returned today. Unsure of when the fever started because she was badly sunburned on Tues.

## 2019-03-16 NOTE — ED PROVIDER NOTES
'was in [facility] past week/ got sunburned/ returned 2d ago/ coughing/ F/Ch since then/ 'throat really hurts'; no meds today;     pt denies HA, vison changes, diff swallowing,  Abd pain, F/Ch, N/V, D/Cons or other current systemic complaints      The history is provided by the patient and the spouse. Cough   This is a new problem. The current episode started more than 2 days ago. The problem occurs constantly. The problem has been gradually worsening. The cough is non-productive. There has been a fever of 102 - 102.9 F. The fever has been present for 1 - 2 days. Associated symptoms include chest pain, chills, sweats, rhinorrhea, sore throat and myalgias. Pertinent negatives include no weight loss, no eye redness, no ear congestion, no ear pain, no headaches, no shortness of breath, no wheezing, no nausea and no vomiting. She has tried decongestants for the symptoms. The treatment provided mild relief. Risk factors: just back from [facility]. She is not a smoker. Her past medical history is significant for cancer. Past medical history comments: DM, GERD, Depression, HLP, anxiety.         Past Medical History:   Diagnosis Date    Abnormal Pap smear of cervix     Acid indigestion     Cancer (HCC)     choriocarcinoma  - UTERNE    Depression     Diabetes ([facility] [address]     Dysthymic disorder     GERD (gastroesophageal reflux disease)     Hypertriglyceridemia     Mixed hyperlipidemia 3/31/2010    MRSA (methicillin resistant Staphylococcus aureus)     Psychiatric disorder     depression; ANXIETY    Urinary incontinence     URGENCY AND INCONTINENECE       Past Surgical History:   Procedure Laterality Date    HX APPENDECTOMY      HX GYN  12/08    D & C     HX ORTHOPAEDIC      R ACL knee    HX ORTHOPAEDIC      BILATERAL A/S KNEES    HX ORTHOPAEDIC  4/2016    REMOVAL OF HARDWARE IN KNEE    HX PARTIAL HYSTERECTOMY Bilateral May 4 2015    Dr. Cody Mccarthy         Family History:   Problem Relation Age of Onset    Hypertension Mother     Elevated Lipids Mother     Cancer Father         pancreatic    Thyroid Disease Paternal Aunt     Heart Disease Maternal Grandmother     Heart Disease Maternal Grandfather     Heart Disease Paternal Grandfather     Diabetes Paternal Aunt     Alcohol abuse Neg Hx     Arthritis-rheumatoid Neg Hx     Asthma Neg Hx     Bleeding Prob Neg Hx     Headache Neg Hx     Lung Disease Neg Hx     Migraines Neg Hx     Psychiatric Disorder Neg Hx     Stroke Neg Hx     Mental Retardation Neg Hx     Anesth Problems Neg Hx        Social History     Socioeconomic History    Marital status: SINGLE     Spouse name: Not on file    Number of children: Not on file    Years of education: Not on file    Highest education level: Not on file   Social Needs    Financial resource strain: Not on file    Food insecurity - worry: Not on file    Food insecurity - inability: Not on file   Turpitude needs - medical: Not on file   Turpitude needs - non-medical: Not on file   Occupational History    Not on file   Tobacco Use    Smoking status: Current Every Day Smoker     Packs/day: 1.00     Years: 25.00     Pack years: 25.00    Smokeless tobacco: Never Used   Substance and Sexual Activity    Alcohol use: No     Alcohol/week: 0.0 oz    Drug use: No    Sexual activity: Yes     Partners: Male     Birth control/protection: None   Other Topics Concern    Not on file   Social History Narrative    Not on file         ALLERGIES: Ciprocinonide; Ciprofloxacin; and Demerol [meperidine]    Review of Systems   Constitutional: Positive for chills. Negative for weight loss. HENT: Positive for rhinorrhea and sore throat. Negative for ear pain. Eyes: Negative for redness. Respiratory: Positive for cough. Negative for shortness of breath and wheezing. Cardiovascular: Positive for chest pain. Gastrointestinal: Negative for abdominal pain, nausea and vomiting.    Genitourinary: Negative for dysuria, vaginal bleeding and vaginal discharge. Musculoskeletal: Positive for myalgias. Neurological: Negative for headaches. All other systems reviewed and are negative. Vitals:    03/16/19 1912   Temp: (!) 102.9 °F (39.4 °C)            Physical Exam   Constitutional: She is oriented to person, place, and time. She appears well-developed and well-nourished. NAD, AxOx4, speaking in complete sentences with a hoarse voice     HENT:   Head: Normocephalic and atraumatic. Right Ear: External ear normal.   Left Ear: External ear normal.   Nose: Nose normal.   Mouth/Throat: Oropharyngeal exudate present. Cn intact       Eyes: Conjunctivae and EOM are normal. Pupils are equal, round, and reactive to light. Right eye exhibits no discharge. Left eye exhibits no discharge. No scleral icterus. Neck: Normal range of motion. Neck supple. No JVD present. No tracheal deviation present. Cardiovascular: Regular rhythm, normal heart sounds and intact distal pulses. Exam reveals no gallop and no friction rub. No murmur heard. Tachycardic but febrile   Pulmonary/Chest: Effort normal and breath sounds normal. No stridor. No respiratory distress. She has no wheezes. She has no rales. She exhibits no tenderness. Abdominal: Soft. Bowel sounds are normal. There is no tenderness. There is no rebound and no guarding. nttp     Genitourinary: No vaginal discharge found. Genitourinary Comments: Pt denies urinary/ vaginal complaints   Musculoskeletal: Normal range of motion. She exhibits no edema, tenderness or deformity. Neurological: She is alert and oriented to person, place, and time. She displays normal reflexes. No cranial nerve deficit or sensory deficit. She exhibits normal muscle tone. Coordination normal.   pt has motor/ CV/ Sensation grossly intact to all extremities, R = L in strength;   Skin: Skin is warm and dry. Capillary refill takes less than 2 seconds. No rash noted. No erythema. No pallor. Psychiatric: She has a normal mood and affect. Her behavior is normal. Thought content normal.   Nursing note and vitals reviewed. MDM       Procedures      Chief Complaint   Patient presents with    Cough    Sore Throat       7:21 PM  The patients presenting problems have been discussed, and they are in agreement with the care plan formulated and outlined with them. I have encouraged them to ask questions as they arise throughout their visit. MEDICATIONS GIVEN:  Medications   acetaminophen (TYLENOL) tablet 1,000 mg (not administered)   sodium chloride 0.9 % bolus infusion 1,000 mL (not administered)       LABS REVIEWED:  Labs Reviewed   INFLUENZA A & B AG (RAPID TEST)   STREP AG SCREEN, GROUP A   URINE CULTURE HOLD SAMPLE   TROPONIN I   METABOLIC PANEL, COMPREHENSIVE   CBC WITH AUTOMATED DIFF   URINALYSIS W/MICROSCOPIC       RADIOLOGY RESULTS:  The following have been ordered and reviewed:  _____________________________________________________________________  _____________________________________________________________________    EKG interpretation:   Rhythm: sinus tachycardia rhythm; and regular . Rate (approx.): 104; Axis: normal; P wave: normal; QRS interval: normal ; ST/T wave: normal; Negative acute significant segmental elevations/ no old;   PROCEDURES:        CONSULTATIONS:       PROGRESS NOTES:      DIAGNOSIS:    1. Community acquired pneumonia of right upper lobe of lung ([facility] [address]        PLAN:  1- CAP - roce/ azith;   2 Yeast cystitis - diflucan rx after completes azith;       ED COURSE: The patients hospital course has been uncomplicated. 9:58 PM told of yeast/ diflucan for 'after azith';   Bambi Do's  results have been reviewed with her. She has been counseled regarding her diagnosis.   She verbally conveys understanding and agreement of the signs, symptoms, diagnosis, treatment and prognosis and additionally agrees to Call/ Arrange follow up as recommended with  Desiree Guo NP in 24 - 48 hours. She also agrees with the care-plan and conveys that all of her questions have been answered. I have also put together some discharge instructions for her that include: 1) educational information regarding their diagnosis, 2) how to care for their diagnosis at home, as well a 3) list of reasons why they would want to return to the ED prior to their follow-up appointment, should their condition change or for concerns.

## 2019-03-17 LAB
ATRIAL RATE: 103 BPM
CALCULATED R AXIS, ECG10: -23 DEGREES
CALCULATED T AXIS, ECG11: 56 DEGREES
DIAGNOSIS, 93000: NORMAL
P-R INTERVAL, ECG05: 178 MS
Q-T INTERVAL, ECG07: 348 MS
QRS DURATION, ECG06: 94 MS
QTC CALCULATION (BEZET), ECG08: 455 MS
VENTRICULAR RATE, ECG03: 103 BPM

## 2019-03-17 NOTE — ED NOTES
Pt d/c'd home. Pt IV d/c'd cath intact. Pt given d/c instructions and rx x1. Pt verbalized understanding, declined w/c and left ambulatory in care of family in stable condition.

## 2019-03-17 NOTE — DISCHARGE INSTRUCTIONS
Patient Education        Pneumonia: Care Instructions  Your Care Instructions    Pneumonia is an infection of the lungs. Most cases are caused by infections from bacteria or viruses. Pneumonia may be mild or very severe. If it is caused by bacteria, you will be treated with antibiotics. It may take a few weeks to a few months to recover fully from pneumonia, depending on how sick you were and whether your overall health is good. Follow-up care is a key part of your treatment and safety. Be sure to make and go to all appointments, and call your doctor if you are having problems. It's also a good idea to know your test results and keep a list of the medicines you take. How can you care for yourself at home? · Take your antibiotics exactly as directed. Do not stop taking the medicine just because you are feeling better. You need to take the full course of antibiotics. · Take your medicines exactly as prescribed. Call your doctor if you think you are having a problem with your medicine. · Get plenty of rest and sleep. You may feel weak and tired for a while, but your energy level will improve with time. · To prevent dehydration, drink plenty of fluids, enough so that your urine is light yellow or clear like water. Choose water and other caffeine-free clear liquids until you feel better. If you have kidney, heart, or liver disease and have to limit fluids, talk with your doctor before you increase the amount of fluids you drink. · Take care of your cough so you can rest. A cough that brings up mucus from your lungs is common with pneumonia. It is one way your body gets rid of the infection. But if coughing keeps you from resting or causes severe fatigue and chest-wall pain, talk to your doctor. He or she may suggest that you take a medicine to reduce the cough. · Use a vaporizer or humidifier to add moisture to your bedroom. Follow the directions for cleaning the machine.   · Do not smoke or allow others to smoke around you. Smoke will make your cough last longer. If you need help quitting, talk to your doctor about stop-smoking programs and medicines. These can increase your chances of quitting for good. · Take an over-the-counter pain medicine, such as acetaminophen (Tylenol), ibuprofen (Advil, Motrin), or naproxen (Aleve). Read and follow all instructions on the label. · Do not take two or more pain medicines at the same time unless the doctor told you to. Many pain medicines have acetaminophen, which is Tylenol. Too much acetaminophen (Tylenol) can be harmful. · If you were given a spirometer to measure how well your lungs are working, use it as instructed. This can help your doctor tell how your recovery is going. · To prevent pneumonia in the future, talk to your doctor about getting a flu vaccine (once a year) and a pneumococcal vaccine (one time only for most people). When should you call for help? Call 911 anytime you think you may need emergency care. For example, call if:    · You have severe trouble breathing.    Call your doctor now or seek immediate medical care if:    · You cough up dark brown or bloody mucus (sputum).     · You have new or worse trouble breathing.     · You are dizzy or lightheaded, or you feel like you may faint.    Watch closely for changes in your health, and be sure to contact your doctor if:    · You have a new or higher fever.     · You are coughing more deeply or more often.     · You are not getting better after 2 days (48 hours).     · You do not get better as expected. Where can you learn more? Go to http://angelique-sheila.info/. Enter [id number] in the search box to learn more about \"Pneumonia: Care Instructions. \"  Current as of: September 5, 2018  Content Version: 11.9  © 3171-0183 [employer]. Care instructions adapted under license by [employer] (which disclaims liability or warranty for this information).  If you have questions about a medical condition or this instruction, always ask your healthcare professional. [address] any warranty or liability for your use of this information. Patient Education        Candidiasis: Care Instructions  Your Care Instructions  Candidiasis (say \"vrz-vqx-FX-uh-melinda\") is a yeast infection. Yeast normally lives in your body. But it can cause problems if your body's defenses don't work as they should. Some medicines can increase your chance of getting a yeast infection. These include antibiotics, steroids, and cancer drugs. And some diseases like AIDS and diabetes can make you more likely to get yeast infections. There are different types of yeast infections. Daryel Kelch is a yeast infection in the mouth. It usually occurs in people with weak immune systems. It causes white patches inside the mouth and throat. Yeast infections of the skin usually occur in skin folds where the skin stays moist. They cause red, oozing patches on your skin. Babies can get these infections under the diaper. People who often wear gloves can get them on their hands. Many women get vaginal yeast infections. They are most common when women take antibiotics. These infections can cause the vagina to itch and burn. They also cause white discharge that looks like cottage cheese. In rare cases, yeast infects the blood. This can cause serious disease. This kind of infection is treated with medicine given through a needle into a vein (IV). After you start treatment, a yeast infection usually goes away quickly. But if your immune system is weak, the infection may come back. Tell your doctor if you get yeast infections often. Follow-up care is a key part of your treatment and safety. Be sure to make and go to all appointments, and call your doctor if you are having problems. It's also a good idea to know your test results and keep a list of the medicines you take. How can you care for yourself at home?   · Take your medicines exactly as prescribed. Call your doctor if you think you are having a problem with your medicine. · Use antibiotics only as directed by your doctor. · Eat yogurt with live cultures. It has bacteria called lactobacillus. It may help prevent some types of yeast infections. · Keep your skin clean and dry. Put powder on moist places. · If you are using a cream or suppository to treat a vaginal yeast infection, don't use condoms or a diaphragm. Use a different type of birth control. · Eat a healthy diet and get regular exercise. This will help keep your immune system strong. When should you call for help? Watch closely for changes in your health, and be sure to contact your doctor if:    · You do not get better as expected. Where can you learn more? Go to http://angelique-sheila.info/. Enter J908 in the search box to learn more about \"Candidiasis: Care Instructions. \"  Current as of: May 14, 2018  Content Version: 11.9  © 4825-8287 [employer]. Care instructions adapted under license by [employer] (which disclaims liability or warranty for this information). If you have questions about a medical condition or this instruction, always ask your healthcare professional. [address] any warranty or liability for your use of this information.

## 2019-03-18 LAB
BACTERIA SPEC CULT: NORMAL
SERVICE CMNT-IMP: NORMAL

## 2019-03-25 DIAGNOSIS — G47.09 OTHER INSOMNIA: ICD-10-CM

## 2019-03-25 RX ORDER — ZOLPIDEM TARTRATE 10 MG/1
10 TABLET ORAL
Qty: 15 TAB | Refills: 0 | Status: SHIPPED | OUTPATIENT
Start: 2019-03-25 | End: 2019-04-29 | Stop reason: SDUPTHER

## 2019-04-02 ENCOUNTER — OFFICE VISIT (OUTPATIENT)
Dept: FAMILY MEDICINE CLINIC | Age: 44
End: 2019-04-02

## 2019-04-02 VITALS
RESPIRATION RATE: 16 BRPM | SYSTOLIC BLOOD PRESSURE: 124 MMHG | DIASTOLIC BLOOD PRESSURE: 80 MMHG | WEIGHT: 218 LBS | HEART RATE: 62 BPM | OXYGEN SATURATION: 97 % | TEMPERATURE: 98.4 F | HEIGHT: 67 IN | BODY MASS INDEX: 34.21 KG/M2

## 2019-04-02 DIAGNOSIS — R05.9 COUGH: ICD-10-CM

## 2019-04-02 DIAGNOSIS — E78.2 MIXED HYPERLIPIDEMIA: Primary | ICD-10-CM

## 2019-04-02 DIAGNOSIS — E11.65 TYPE 2 DIABETES MELLITUS WITH HYPERGLYCEMIA, WITHOUT LONG-TERM CURRENT USE OF INSULIN (HCC): ICD-10-CM

## 2019-04-02 DIAGNOSIS — J18.9 PNEUMONIA OF RIGHT UPPER LOBE DUE TO INFECTIOUS ORGANISM: ICD-10-CM

## 2019-04-02 RX ORDER — BENZONATATE 100 MG/1
100 CAPSULE ORAL
Qty: 30 CAP | Refills: 0 | Status: SHIPPED | OUTPATIENT
Start: 2019-04-02 | End: 2019-05-17 | Stop reason: SDUPTHER

## 2019-04-02 NOTE — PROGRESS NOTES
HISTORY OF PRESENT ILLNESS  Pham Do is a 37 y.o. female. HPI:Cardiovascular Review  The patient has hypertension, hyperlipidemia and obesity. She reports taking medications as instructed, no medication side effects noted. Diet and Lifestyle: generally follows a low fat low cholesterol diet, generally follows a low sodium diet, no formal exercise but active during the day. Lab review: labs reviewed and discussed with patient. Pneumonia: Patient reports she went ER for cough and fever on 6/16/19. She was diagnosed with RUL pneumonia, prescribed Z PK and Rocephin IM,pateint reports that she is still coughing and get fever at time, takes tylenol for fever  Past Medical History:   Diagnosis Date    Abnormal Pap smear of cervix     Acid indigestion     Cancer ([facility] [address]     choriocarcinoma  - UTERNE    Depression     Diabetes ([facility] [address]     Dysthymic disorder     GERD (gastroesophageal reflux disease)     Hypertriglyceridemia     Mixed hyperlipidemia 3/31/2010    MRSA (methicillin resistant Staphylococcus aureus)     Psychiatric disorder     depression; ANXIETY    Urinary incontinence     URGENCY AND INCONTINENECE     Past Surgical History:   Procedure Laterality Date    HX APPENDECTOMY      HX GYN  12/08    D & C     HX ORTHOPAEDIC      R ACL knee    HX ORTHOPAEDIC      BILATERAL A/S KNEES    HX ORTHOPAEDIC  4/2016    REMOVAL OF HARDWARE IN KNEE    HX PARTIAL HYSTERECTOMY Bilateral May 4 2015    Dr. Asher Bell Buckle     Allergies   Allergen Reactions    Ciprocinonide Hives     cipro    Ciprofloxacin Hives    Demerol [Meperidine] Other (comments)     Goofy feeling, hallucinates     Current Outpatient Medications:     benzonatate (TESSALON) 100 mg capsule, Take 1 Cap by mouth three (3) times daily as needed for Cough for up to 7 days. , Disp: 30 Cap, Rfl: 0    zolpidem (AMBIEN) 10 mg tablet, Take 1 Tab by mouth nightly as needed for Sleep. Max Daily Amount: 10 mg.  Fill it by 2/202018, Disp: 15 Tab, Rfl: 0    QUEtiapine (SEROQUEL) 100 mg tablet, Take 1 Tab by mouth daily. , Disp: 30 Tab, Rfl: 3    busPIRone (BUSPAR) 10 mg tablet, Take 1 Tab by mouth two (2) times a day., Disp: 60 Tab, Rfl: 3    pravastatin (PRAVACHOL) 40 mg tablet, TAKE ONE TABLET BY MOUTH AT NIGHT, Disp: 30 Tab, Rfl: 0    fenofibrate micronized (LOFIBRA) 134 mg capsule, TAKE ONE CAPSULE BY MOUTH EVERY DAY, Disp: 30 Cap, Rfl: 0    oxybutynin chloride XL (DITROPAN XL) 10 mg CR tablet, TAKE ONE TABLET BY MOUTH AT NIGHT, Disp: 30 Tab, Rfl: 0    raNITIdine (ZANTAC) 150 mg tablet, TAKE ONE TABLET BY MOUTH AT NIGHT, Disp: 30 Tab, Rfl: 0    pioglitazone (ACTOS) 15 mg tablet, Take 1 Tab by mouth daily. , Disp: 90 Tab, Rfl: 0    Omeprazole delayed release (PRILOSEC D/R) 20 mg tablet, Take 1 Tab by mouth daily. (Patient taking differently: Take 40 mg by mouth daily.), Disp: 30 Tab, Rfl: 5    SITagliptin (JANUVIA) 100 mg tablet, Take 1 Tab by mouth daily. , Disp: 30 Tab, Rfl: 5    pantoprazole (PROTONIX) 40 mg tablet, TAKE ONE TABLET BY MOUTH EVERY DAY, Disp: 30 Tab, Rfl: 0    estradiol (ESTRACE) 0.5 mg tablet, Take 0.5 mg by mouth daily. , Disp: , Rfl:     Lancets (ONE TOUCH ULTRASOFT LANCETS) Misc, Use as directed , Disp: 1 Package, Rfl: 11    glucose blood VI test strips (ONE TOUCH ULTRA TEST) strip, Monitor BS BID Dx: 250.02 , Disp: 1 Package, Rfl: 11    methocarbamol (ROBAXIN) 500 mg tablet, Take 1 Tab by mouth three (3) times daily as needed. , Disp: 30 Tab, Rfl: 0    terconazole (TERAZOL 7) 0.4 % vaginal cream, Insert 1 Applicator into vagina nightly., Disp: 45 g, Rfl: 0    Blood-Glucose Meter (ONE TOUCH ULTRA SYSTEM KIT) monitoring kit, Use as directed, Disp: 1 Kit, Rfl: 0  Review of Systems   Constitutional: Negative. Respiratory: Positive for cough and sputum production. Cardiovascular: Negative. Gastrointestinal: Negative.       Blood pressure 124/80, pulse 62, temperature 98.4 °F (36.9 °C), temperature source Oral, resp. rate 16, height 5' 7\" (1.702 m), weight 218 lb (98.9 kg), last menstrual period 09/10/2014, SpO2 97 %. Body mass index is 34.14 kg/m². Physical Exam   Constitutional: No distress. HENT:   Mouth/Throat: Oropharynx is clear and moist.   Neck: Normal range of motion. Neck supple. Cardiovascular: Normal rate and regular rhythm. No murmur heard. Pulmonary/Chest: Effort normal and breath sounds normal.   Abdominal: Soft. Bowel sounds are normal.   Skin:   Diabetic foot exam:     Left Foot:   Visual Exam: normal    Pulse DP: 2+ (normal)   Filament test: normal sensation    Vibratory sensation: normal      Right Foot:   Visual Exam: normal    Pulse DP: 2+ (normal)   Filament test: normal sensation    Vibratory sensation: normal     Nursing note and vitals reviewed. ASSESSMENT and PLAN  Diagnoses and all orders for this visit:    1. Mixed hyperlipidemia  -     LIPID PANEL  -     HEMOGLOBIN A1C WITH EAG    2. Type 2 diabetes mellitus with hyperglycemia, without long-term current use of insulin ([facility])  -     METABOLIC PANEL, BASIC  -     MICROALBUMIN, UR, RAND W/ MICROALB/CREAT RATIO    3. Pneumonia of right upper lobe due to infectious organism ([address]  -     XR CHEST PA LAT; Future    4. Cough  -     benzonatate (TESSALON) 100 mg capsule; Take 1 Cap by mouth three (3) times daily as needed for Cough for up to 7 days.   Follow in July   Pt was given an after visit summary which includes diagnosis, current medicines and vital and voiced understanding of treatment plan

## 2019-04-02 NOTE — PROGRESS NOTES
Chief Complaint   Patient presents with    Medication Evaluation     Ambien and Seroquel    Pneumonia     Recently diagnosed after a trip from [facility] 3/16/2019    Diabetes     Follow up.  Cholesterol Problem     Follow up. 1. Have you been to the ER, urgent care clinic since your last visit? Hospitalized since your last visit? Yes to [facility] 3/16/2019.    2. Have you seen or consulted any other health care providers outside of the [address] [place] [address] since your last visit? Include any pap smears or colon screening.  No

## 2019-04-15 DIAGNOSIS — K21.00 GASTROESOPHAGEAL REFLUX DISEASE WITH ESOPHAGITIS: ICD-10-CM

## 2019-04-15 RX ORDER — PANTOPRAZOLE SODIUM 40 MG/1
TABLET, DELAYED RELEASE ORAL
Qty: 30 TAB | Refills: 11 | Status: SHIPPED | OUTPATIENT
Start: 2019-04-15 | End: 2019-07-03 | Stop reason: SDUPTHER

## 2019-04-15 NOTE — TELEPHONE ENCOUNTER
----- Message from Julee De La Garza sent at 4/15/2019  3:14 PM EDT -----  Regarding: RUDY Rivera/ Refill   Pt calling stating that she needs rx refills on the following medications \"Vit D\" and \"Protonix 40 mg\". [facility] [employer] [address]. Best contact [phone]. Sharon Dior   Requested Prescriptions     Pending Prescriptions Disp Refills    pantoprazole (PROTONIX) 40 mg tablet 30 Tab 0

## 2019-04-20 LAB
ALBUMIN/CREAT UR: 20 MG/G CREAT (ref 0–30)
BUN SERPL-MCNC: 7 MG/DL (ref 6–24)
BUN/CREAT SERPL: 10 (ref 9–23)
CALCIUM SERPL-MCNC: 9.3 MG/DL (ref 8.7–10.2)
CHLORIDE SERPL-SCNC: 102 MMOL/L (ref 96–106)
CHOLEST SERPL-MCNC: 181 MG/DL (ref 100–199)
CO2 SERPL-SCNC: 20 MMOL/L (ref 20–29)
CREAT SERPL-MCNC: 0.69 MG/DL (ref 0.57–1)
CREAT UR-MCNC: 203.2 MG/DL
EST. AVERAGE GLUCOSE BLD GHB EST-MCNC: 260 MG/DL
GLUCOSE SERPL-MCNC: 255 MG/DL (ref 65–99)
HBA1C MFR BLD: 10.7 % (ref 4.8–5.6)
HDLC SERPL-MCNC: 37 MG/DL
INTERPRETATION, 910389: NORMAL
LDLC SERPL CALC-MCNC: 101 MG/DL (ref 0–99)
MICROALBUMIN UR-MCNC: 40.6 UG/ML
POTASSIUM SERPL-SCNC: 4.1 MMOL/L (ref 3.5–5.2)
SODIUM SERPL-SCNC: 138 MMOL/L (ref 134–144)
TRIGL SERPL-MCNC: 217 MG/DL (ref 0–149)
VLDLC SERPL CALC-MCNC: 43 MG/DL (ref 5–40)

## 2019-04-22 DIAGNOSIS — E11.65 TYPE 2 DIABETES MELLITUS WITH HYPERGLYCEMIA, WITHOUT LONG-TERM CURRENT USE OF INSULIN (HCC): Primary | ICD-10-CM

## 2019-04-22 RX ORDER — ERGOCALCIFEROL 1.25 MG/1
50000 CAPSULE ORAL
Qty: 12 CAP | Refills: 0 | Status: SHIPPED | OUTPATIENT
Start: 2019-04-22 | End: 2019-07-23 | Stop reason: SDUPTHER

## 2019-04-22 RX ORDER — GLIMEPIRIDE 4 MG/1
4 TABLET ORAL
Qty: 30 TAB | Refills: 3 | Status: SHIPPED | OUTPATIENT
Start: 2019-04-22 | End: 2019-05-14

## 2019-04-26 ENCOUNTER — OFFICE VISIT (OUTPATIENT)
Dept: FAMILY MEDICINE CLINIC | Age: 44
End: 2019-04-26

## 2019-04-26 VITALS
WEIGHT: 209 LBS | OXYGEN SATURATION: 98 % | HEART RATE: 100 BPM | DIASTOLIC BLOOD PRESSURE: 83 MMHG | HEIGHT: 67 IN | RESPIRATION RATE: 18 BRPM | BODY MASS INDEX: 32.8 KG/M2 | SYSTOLIC BLOOD PRESSURE: 124 MMHG | TEMPERATURE: 98.9 F

## 2019-04-26 DIAGNOSIS — J40 BRONCHITIS: Primary | ICD-10-CM

## 2019-04-26 RX ORDER — ALBUTEROL SULFATE 0.83 MG/ML
2.5 SOLUTION RESPIRATORY (INHALATION)
Qty: 24 EACH | Refills: 1 | Status: SHIPPED | OUTPATIENT
Start: 2019-04-26 | End: 2019-12-13

## 2019-04-26 RX ORDER — NEBULIZER AND COMPRESSOR
1 EACH MISCELLANEOUS
Qty: 1 EACH | Refills: 0 | Status: ON HOLD | OUTPATIENT
Start: 2019-04-26 | End: 2019-10-15 | Stop reason: CLARIF

## 2019-04-26 RX ORDER — FLUTICASONE FUROATE AND VILANTEROL 100; 25 UG/1; UG/1
1 POWDER RESPIRATORY (INHALATION) DAILY
Qty: 1 INHALER | Refills: 0 | Status: SHIPPED | OUTPATIENT
Start: 2019-04-26 | End: 2021-07-14 | Stop reason: ALTCHOICE

## 2019-04-26 NOTE — PROGRESS NOTES
Assessment/Plan:     Diagnoses and all orders for this visit:    1. Bronchitis  -     Nebulizer & Compressor machine; 1 Each by Does Not Apply route every four (4) hours as needed (wheezing). -     fluticasone furoate-vilanterol (BREO ELLIPTA) 100-25 mcg/dose inhaler; Take 1 Puff by inhalation daily. -     albuterol (PROVENTIL VENTOLIN) 2.5 mg /3 mL (0.083 %) nebulizer solution; 3 mL by Nebulization route every four (4) hours as needed for Wheezing.  -     XR CHEST PA LAT; Future    Treatment as above. Xray due to recent nature of her pneumonia. Once well, encouraged pneumonia vaccination for prevention. Strongly encouraged smoking cessation. Follow up in one month or sooner as needed. She is unable to her HFA inhalers. Follow-up and Dispositions    · Return in about 1 month (around 5/24/2019) for Follow Up. Discussed expected course/resolution/complications of diagnosis in detail with patient.    Medication risks/benefits/costs/interactions/alternatives discussed with patient.    Pt was given after visit summary which includes diagnoses, current medications & vitals. Pt expressed understanding with the diagnosis and plan          Subjective:      Becky Dickson is a 37 y.o. female who presents for had concerns including Cough (chest congestion). Upper Respiratory Infection  Patient complains of symptoms of a URI. Symptoms include cough. Onset of symptoms was 10 days ago, unchanged since that time. She also c/o no  fever for the past 10 days . She is drinking plenty of fluids. . Evaluation to date: none. Treatment to date: Allegra which has not improved symptoms. She is a current tobacco user. She is smoking 1 pack per day. Current Outpatient Medications   Medication Sig Dispense Refill    Nebulizer & Compressor machine 1 Each by Does Not Apply route every four (4) hours as needed (wheezing).  1 Each 0    fluticasone furoate-vilanterol (BREO ELLIPTA) 100-25 mcg/dose inhaler Take 1 Puff by inhalation daily. 1 Inhaler 0    albuterol (PROVENTIL VENTOLIN) 2.5 mg /3 mL (0.083 %) nebulizer solution 3 mL by Nebulization route every four (4) hours as needed for Wheezing. 24 Each 1    glimepiride (AMARYL) 4 mg tablet Take 1 Tab by mouth every morning. 30 Tab 3    ergocalciferol (ERGOCALCIFEROL) 50,000 unit capsule Take 1 Cap by mouth every seven (7) days. 12 Cap 0    pantoprazole (PROTONIX) 40 mg tablet TAKE ONE TABLET BY MOUTH EVERY DAY 30 Tab 11    zolpidem (AMBIEN) 10 mg tablet Take 1 Tab by mouth nightly as needed for Sleep. Max Daily Amount: 10 mg. Fill it by 2/202018 15 Tab 0    QUEtiapine (SEROQUEL) 100 mg tablet Take 1 Tab by mouth daily. 30 Tab 3    methocarbamol (ROBAXIN) 500 mg tablet Take 1 Tab by mouth three (3) times daily as needed. 30 Tab 0    busPIRone (BUSPAR) 10 mg tablet Take 1 Tab by mouth two (2) times a day. 60 Tab 3    pravastatin (PRAVACHOL) 40 mg tablet TAKE ONE TABLET BY MOUTH AT NIGHT 30 Tab 0    fenofibrate micronized (LOFIBRA) 134 mg capsule TAKE ONE CAPSULE BY MOUTH EVERY DAY 30 Cap 0    oxybutynin chloride XL (DITROPAN XL) 10 mg CR tablet TAKE ONE TABLET BY MOUTH AT NIGHT 30 Tab 0    raNITIdine (ZANTAC) 150 mg tablet TAKE ONE TABLET BY MOUTH AT NIGHT 30 Tab 0    pioglitazone (ACTOS) 15 mg tablet Take 1 Tab by mouth daily. 90 Tab 0    Omeprazole delayed release (PRILOSEC D/R) 20 mg tablet Take 1 Tab by mouth daily. (Patient taking differently: Take 40 mg by mouth daily.) 30 Tab 5    SITagliptin (JANUVIA) 100 mg tablet Take 1 Tab by mouth daily. 30 Tab 5    estradiol (ESTRACE) 0.5 mg tablet Take 0.5 mg by mouth daily.  terconazole (TERAZOL 7) 0.4 % vaginal cream Insert 1 Applicator into vagina nightly.  45 g 0    Lancets (ONE TOUCH ULTRASOFT LANCETS) Misc Use as directed   1 Package 11    Blood-Glucose Meter (ONE TOUCH ULTRA SYSTEM KIT) monitoring kit Use as directed 1 Kit 0    glucose blood VI test strips (ONE TOUCH ULTRA TEST) strip Monitor BS BID  Dx: 250.02   1 Package 11       Allergies   Allergen Reactions    Ciprofloxacin Hives    Demerol [Meperidine] Other (comments)     Goofy feeling, hallucinates       ROS:   Review of Systems   Constitutional: Negative for chills, fever and malaise/fatigue. HENT: Negative for congestion, ear pain, sinus pain and sore throat. Respiratory: Positive for cough. Negative for sputum production, shortness of breath and wheezing. Cardiovascular: Negative for chest pain. Neurological: Negative for seizures. Endo/Heme/Allergies: Negative for environmental allergies. Objective:     Visit Vitals  /83   Pulse 100   Temp 98.9 °F (37.2 °C) (Oral)   Resp 18   Ht 5' 7\" (1.702 m)   Wt 209 lb (94.8 kg)   LMP 09/10/2014 (Exact Date)   SpO2 98%   BMI 32.73 kg/m²       Vitals and Nurse Documentation reviewed. Physical Exam   Constitutional: No distress. HENT:   Right Ear: Tympanic membrane is not erythematous and not bulging. No middle ear effusion. Left Ear: Tympanic membrane is not erythematous and not bulging. No middle ear effusion. Nose: No rhinorrhea. Right sinus exhibits no maxillary sinus tenderness and no frontal sinus tenderness. Left sinus exhibits no maxillary sinus tenderness and no frontal sinus tenderness. Mouth/Throat: No oropharyngeal exudate or posterior oropharyngeal erythema. Eyes: EOM and lids are normal.   Cardiovascular: S1 normal and S2 normal. Exam reveals no gallop and no friction rub. No murmur heard. Pulmonary/Chest: Breath sounds normal. She has no wheezes. Cough is bronchitic in nature. Lymphadenopathy:     She has no cervical adenopathy. Skin: Skin is warm and dry.    Psychiatric: Mood and affect normal.

## 2019-04-26 NOTE — PROGRESS NOTES
Chief Complaint   Patient presents with    Cough     chest congestion     1. Have you been to the ER, urgent care clinic since your last visit? Hospitalized since your last visit? No    2. Have you seen or consulted any other health care providers outside of the [address] [place] [address] since your last visit? Include any pap smears or colon screening.  No

## 2019-04-26 NOTE — PATIENT INSTRUCTIONS
Bronchitis: Care Instructions  Your Care Instructions    Bronchitis is inflammation of the bronchial tubes, which carry air to the lungs. The tubes swell and produce mucus, or phlegm. The mucus and inflamed bronchial tubes make you cough. You may have trouble breathing. Most cases of bronchitis are caused by viruses like those that cause colds. Antibiotics usually do not help and they may be harmful. Bronchitis usually develops rapidly and lasts about 2 to 3 weeks in otherwise healthy people. Follow-up care is a key part of your treatment and safety. Be sure to make and go to all appointments, and call your doctor if you are having problems. It's also a good idea to know your test results and keep a list of the medicines you take. How can you care for yourself at home? · Take all medicines exactly as prescribed. Call your doctor if you think you are having a problem with your medicine. · Get some extra rest.  · Take an over-the-counter pain medicine, such as acetaminophen (Tylenol), ibuprofen (Advil, Motrin), or naproxen (Aleve) to reduce fever and relieve body aches. Read and follow all instructions on the label. · Do not take two or more pain medicines at the same time unless the doctor told you to. Many pain medicines have acetaminophen, which is Tylenol. Too much acetaminophen (Tylenol) can be harmful. · Take an over-the-counter cough medicine that contains dextromethorphan to help quiet a dry, hacking cough so that you can sleep. Avoid cough medicines that have more than one active ingredient. Read and follow all instructions on the label. · Breathe moist air from a humidifier, hot shower, or sink filled with hot water. The heat and moisture will thin mucus so you can cough it out. · Do not smoke. Smoking can make bronchitis worse. If you need help quitting, talk to your doctor about stop-smoking programs and medicines. These can increase your chances of quitting for good.   When should you call for help? Call 911 anytime you think you may need emergency care. For example, call if:    · You have severe trouble breathing.    Call your doctor now or seek immediate medical care if:    · You have new or worse trouble breathing.     · You cough up dark brown or bloody mucus (sputum).     · You have a new or higher fever.     · You have a new rash.    Watch closely for changes in your health, and be sure to contact your doctor if:    · You cough more deeply or more often, especially if you notice more mucus or a change in the color of your mucus.     · You are not getting better as expected. Where can you learn more? Go to http://angelique-sheila.info/. Enter H333 in the search box to learn more about \"Bronchitis: Care Instructions. \"  Current as of: September 5, 2018  Content Version: 11.9  © 3794-3006 [employer] [facility] [employer]. Care instructions adapted under license by [employer] (which disclaims liability or warranty for this information). If you have questions about a medical condition or this instruction, always ask your healthcare professional. [address] any warranty or liability for your use of this information.

## 2019-04-29 ENCOUNTER — HOSPITAL ENCOUNTER (OUTPATIENT)
Dept: GENERAL RADIOLOGY | Age: 44
Discharge: HOME | End: 2019-04-29
Attending: NURSE PRACTITIONER
Payer: COMMERCIAL

## 2019-04-29 DIAGNOSIS — G47.09 OTHER INSOMNIA: ICD-10-CM

## 2019-04-29 DIAGNOSIS — J40 BRONCHITIS: ICD-10-CM

## 2019-04-29 PROCEDURE — 71046 X-RAY EXAM CHEST 2 VIEWS: CPT

## 2019-04-29 RX ORDER — ZOLPIDEM TARTRATE 10 MG/1
TABLET ORAL
Qty: 15 TAB | Refills: 0 | Status: SHIPPED | OUTPATIENT
Start: 2019-04-29 | End: 2019-05-17 | Stop reason: SDUPTHER

## 2019-05-07 ENCOUNTER — TELEPHONE (OUTPATIENT)
Dept: FAMILY MEDICINE CLINIC | Age: 44
End: 2019-05-07

## 2019-05-07 DIAGNOSIS — J40 BRONCHITIS: Primary | ICD-10-CM

## 2019-05-07 NOTE — TELEPHONE ENCOUNTER
----- Message from Rajani Alonzo sent at 5/7/2019  3:39 PM EDT -----  Regarding: RUDY titus/Telephone  Pt requesting a call from the nurse in regards to her nebulizer machine. Best contact [phone].

## 2019-05-07 NOTE — TELEPHONE ENCOUNTER
Patient request order for nebulizer machine be sent to [email or web address]. Patients pharmacy does not carry the nebulizer machine.

## 2019-05-08 NOTE — TELEPHONE ENCOUNTER
Outbound call to patient. LVM to return call back to the office.  (Needs name of DME company used to fax order)

## 2019-05-08 NOTE — TELEPHONE ENCOUNTER
Incoming call from patient. Patient does not have a DME company in mind. Writer faxed order to [facility] on 5/8/2019 at 2:28pm. Confirmation received.

## 2019-05-09 ENCOUNTER — APPOINTMENT (OUTPATIENT)
Dept: ULTRASOUND IMAGING | Age: 44
End: 2019-05-09
Attending: EMERGENCY MEDICINE
Payer: COMMERCIAL

## 2019-05-09 ENCOUNTER — HOSPITAL ENCOUNTER (EMERGENCY)
Age: 44
Discharge: HOME | End: 2019-05-09
Attending: EMERGENCY MEDICINE
Payer: COMMERCIAL

## 2019-05-09 VITALS
HEART RATE: 82 BPM | RESPIRATION RATE: 20 BRPM | WEIGHT: 221.34 LBS | SYSTOLIC BLOOD PRESSURE: 136 MMHG | OXYGEN SATURATION: 97 % | TEMPERATURE: 98.7 F | DIASTOLIC BLOOD PRESSURE: 79 MMHG | BODY MASS INDEX: 34.74 KG/M2 | HEIGHT: 67 IN

## 2019-05-09 DIAGNOSIS — N83.8 OVARIAN MASS, RIGHT: ICD-10-CM

## 2019-05-09 DIAGNOSIS — N83.202 CYST OF LEFT OVARY: Primary | ICD-10-CM

## 2019-05-09 DIAGNOSIS — R10.2 PELVIC PAIN: ICD-10-CM

## 2019-05-09 LAB
APPEARANCE UR: CLEAR
BACTERIA URNS QL MICRO: ABNORMAL /HPF
BILIRUB UR QL: NEGATIVE
COLOR UR: ABNORMAL
EPITH CASTS URNS QL MICRO: ABNORMAL /LPF
GLUCOSE UR STRIP.AUTO-MCNC: >1000 MG/DL
HGB UR QL STRIP: NEGATIVE
KETONES UR QL STRIP.AUTO: NEGATIVE MG/DL
LEUKOCYTE ESTERASE UR QL STRIP.AUTO: NEGATIVE
NITRITE UR QL STRIP.AUTO: NEGATIVE
PH UR STRIP: 6 [PH] (ref 5–8)
PROT UR STRIP-MCNC: NEGATIVE MG/DL
RBC #/AREA URNS HPF: ABNORMAL /HPF (ref 0–5)
SP GR UR REFRACTOMETRY: 1.01 (ref 1–1.03)
UR CULT HOLD, URHOLD: NORMAL
UROBILINOGEN UR QL STRIP.AUTO: 0.2 EU/DL (ref 0.2–1)
WBC URNS QL MICRO: ABNORMAL /HPF (ref 0–4)
YEAST BUDDING URNS QL: PRESENT
YEAST URNS QL MICRO: PRESENT

## 2019-05-09 PROCEDURE — 99283 EMERGENCY DEPT VISIT LOW MDM: CPT

## 2019-05-09 PROCEDURE — 76830 TRANSVAGINAL US NON-OB: CPT

## 2019-05-09 PROCEDURE — 81001 URINALYSIS AUTO W/SCOPE: CPT

## 2019-05-09 PROCEDURE — 76856 US EXAM PELVIC COMPLETE: CPT

## 2019-05-09 PROCEDURE — 74011250637 HC RX REV CODE- 250/637: Performed by: EMERGENCY MEDICINE

## 2019-05-09 RX ORDER — ALBUTEROL SULFATE 90 UG/1
2 AEROSOL, METERED RESPIRATORY (INHALATION)
Qty: 1 INHALER | Refills: 0 | Status: SHIPPED | OUTPATIENT
Start: 2019-05-09 | End: 2019-12-13

## 2019-05-09 RX ORDER — IBUPROFEN 600 MG/1
600 TABLET ORAL
Status: COMPLETED | OUTPATIENT
Start: 2019-05-09 | End: 2019-05-09

## 2019-05-09 RX ADMIN — IBUPROFEN 600 MG: 600 TABLET, FILM COATED ORAL at 11:55

## 2019-05-09 NOTE — ED PROVIDER NOTES
This patient presents with right-sided abdominal/pelvic pain. This started gradually over the past 3 days. It was very mild during that time. In that time, she had no fever, chills, flank pain, dysuria, or hematuria. No painful urination. 4 wks ago, she had left lower quadrant/pelvic pain. She went to her gynecologist, had an ultrasound, and was diagnosed with an ovarian cyst. She does not remember the size. She is scheduled to have a repeat ultrasound in the near future. She has no uterus. She also has no appendix. She recently had pneumonia and had an episode of bronchitis after that. She is prescribed an albuterol nebulizer because she can't coordinate an inhaler with her inhalation. The machine has not arrived yet in the mail. Today within the past hour or 2 she was standing up washing dishes. She bent down, and when she stood up, her pain became very severe. She vomited immediately. The pain was severe for about 3 minutes. She called the gynecology office. She was referred to the emergency department. She currently has some mild soreness only. She is a lot better. She tried no medication. Shifting in the bed, twisting and turning, and walking does make the pain worse. No vaginal bleeding or spotting. Old chart reviewed - seen in March in the ED for CAP. DC home. Neg CT pelvis in 2009.            Past Medical History:   Diagnosis Date    Abnormal Pap smear of cervix     Acid indigestion     Cancer (HCC)     choriocarcinoma  - [facility]TERNE    Depression     Diabetes ([facility] [address]     Dysthymic disorder     GERD (gastroesophageal reflux disease)     Hypertriglyceridemia     Mixed hyperlipidemia 3/31/2010    MRSA (methicillin resistant Staphylococcus aureus)     Psychiatric disorder     depression; ANXIETY    Urinary incontinence     URGENCY AND INCONTINENECE       Past Surgical History:   Procedure Laterality Date    HX APPENDECTOMY      HX GYN  12/08    D & C     HX ORTHOPAEDIC      R ACL knee  HX ORTHOPAEDIC      BILATERAL A/S KNEES    HX ORTHOPAEDIC  4/2016    REMOVAL OF HARDWARE IN KNEE    HX PARTIAL HYSTERECTOMY Bilateral May 4 2015    Dr. Khushboo Claros         Family History:   Problem Relation Age of Onset    Hypertension Mother     Elevated Lipids Mother     Cancer Father         pancreatic    Thyroid Disease Paternal Aunt     Heart Disease Maternal Grandmother     Heart Disease Maternal Grandfather     Heart Disease Paternal Grandfather     Diabetes Paternal Aunt     Alcohol abuse Neg Hx     Arthritis-rheumatoid Neg Hx     Asthma Neg Hx     Bleeding Prob Neg Hx     Headache Neg Hx     Lung Disease Neg Hx     Migraines Neg Hx     Psychiatric Disorder Neg Hx     Stroke Neg Hx     Mental Retardation Neg Hx     Anesth Problems Neg Hx        Social History     Socioeconomic History    Marital status: SINGLE     Spouse name: Not on file    Number of children: Not on file    Years of education: Not on file    Highest education level: Not on file   Occupational History    Not on file   Social Needs    Financial resource strain: Not on file    Food insecurity:     Worry: Not on file     Inability: Not on file    Transportation needs:     Medical: Not on file     Non-medical: Not on file   Tobacco Use    Smoking status: Current Every Day Smoker     Packs/day: 1.00     Years: 25.00     Pack years: 25.00    Smokeless tobacco: Never Used   Substance and Sexual Activity    Alcohol use: No     Alcohol/week: 0.0 oz    Drug use: No    Sexual activity: Yes     Partners: Male     Birth control/protection: None   Lifestyle    Physical activity:     Days per week: Not on file     Minutes per session: Not on file    Stress: Not on file   Relationships    Social connections:     Talks on phone: Not on file     Gets together: Not on file     Attends Oriental orthodox service: Not on file     Active member of club or organization: Not on file     Attends meetings of clubs or organizations: Not on file     Relationship status: Not on file    Intimate partner violence:     Fear of current or ex partner: Not on file     Emotionally abused: Not on file     Physically abused: Not on file     Forced sexual activity: Not on file   Other Topics Concern    Not on file   Social History Narrative    Not on file         ALLERGIES: Ciprofloxacin and Demerol [meperidine]    Review of Systems   All other systems reviewed and are negative. Vitals:    05/09/19 1106   BP: 157/84   Pulse: 87   Resp: 18   Temp: 98.2 °F (36.8 °C)   SpO2: 96%   Weight: 100.4 kg (221 lb 5.5 oz)   Height: 5' 7\" (1.702 m)            Physical Exam   Abdominal: Soft. She exhibits no distension. There is tenderness (minimal RLQ, suprapubic). There is no guarding. Constitutional: Pt is awake and alert. Pt appears well-developed and well-nourished. NAD. HENT:   Head: Normocephalic and atraumatic. Nose: Nose normal.   Mouth/Throat: Oropharynx is clear and moist. No oropharyngeal exudate. Eyes: Conjunctivae and extraocular motions are normal. Pupils are equal, round, and reactive to light. Right eye exhibits no discharge. Left eye exhibits no discharge. No scleral icterus. Neck: No tracheal deviation present. Supple neck. Cardiovascular: Normal rate, regular rhythm, normal heart sounds and intact distal pulses. Exam reveals no gallop and no friction rub. No murmur heard. Pulmonary/Chest: Effort normal and breath sounds normal.  Pt  has no wheezes. Pt  has no rales. Musculoskeletal:  Pt  exhibits no edema and no tenderness. Ext: Normal ROM in all four extremities; not tender to palpation; distal pulses are normal, no edema. Neurological:  Pt is alert. nonfocal neuro exam.  Skin: Skin is warm and dry. Pt  is not diaphoretic. Psychiatric:  Pt  has a normal mood and affect.  Behavior is normal.             MDM       Procedures           pain stayed mild  Went over US results  CD given  She is to call Gyn for another visit  otc pain meds  Pain is so much better and severe pain was only 3 minutes long. We received US report over fax from [place] office. L ovarian cyst is larger. Told her this as well. She says she gets frequent yeast infections. Takes diflucan once per week scheduled. Recent Results (from the past 12 hour(s))   URINALYSIS W/MICROSCOPIC    Collection Time: 05/09/19 11:56 AM   Result Value Ref Range    Color YELLOW/STRAW      Appearance CLEAR CLEAR      Specific gravity 1.010 1.003 - 1.030      pH (UA) 6.0 5.0 - 8.0      Protein NEGATIVE  NEG mg/dL    Glucose >1,000 (A) NEG mg/dL    Ketone NEGATIVE  NEG mg/dL    Bilirubin NEGATIVE  NEG      Blood NEGATIVE  NEG      Urobilinogen 0.2 0.2 - 1.0 EU/dL    Nitrites NEGATIVE  NEG      Leukocyte Esterase NEGATIVE  NEG      WBC 0-4 0 - 4 /hpf    RBC 0-5 0 - 5 /hpf    Epithelial cells MODERATE (A) FEW /lpf    Bacteria 2+ (A) NEG /hpf    Yeast PRESENT (A) NEG      Budding yeast PRESENT (A) NEG     URINE CULTURE HOLD SAMPLE    Collection Time: 05/09/19 11:56 AM   Result Value Ref Range    Urine culture hold        URINE ON HOLD IN MICROBIOLOGY DEPT FOR 3 DAYS. IF UNPRESERVED URINE IS SUBMITTED, IT CANNOT BE USED FOR ADDITIONAL TESTING AFTER 24 HRS, RECOLLECTION WILL BE REQUIRED.

## 2019-05-09 NOTE — ED TRIAGE NOTES
TRIAGE NOTE: Patient arrived from home with c/o RIGHT lower abd pain that started 3 days ago. +nausea.  \"this morning it was so severe when I bent over\" Denies any urinary symptoms at this time

## 2019-05-09 NOTE — DISCHARGE INSTRUCTIONS
Patient Education      you must follow up with Dr Jason Ch for today's visit. She needs to repeat the ultrasound. Call today for an appointment in the near future. Functional Ovarian Cyst: Care Instructions  Your Care Instructions    A functional ovarian cyst is a sac that forms on the surface of a woman's ovary during ovulation. The sac holds a maturing egg. Usually the sac goes away after the egg is released. But if the egg is not released, or if the sac closes up after the egg is released, the sac can swell up with fluid and form a cyst.  Functional ovarian cysts are different than ovarian growths caused by other problems, such as cancer. Most functional ovarian cysts cause no symptoms and go away on their own. Some cause mild pain. Others can cause severe pain when they rupture or bleed. Follow-up care is a key part of your treatment and safety. Be sure to make and go to all appointments, and call your doctor if you are having problems. It's also a good idea to know your test results and keep a list of the medicines you take. How can you care for yourself at home? · Use heat, such as a hot water bottle, a heating pad set on low, or a warm bath, to relax tense muscles and relieve cramping. · Be safe with medicines. Take pain medicines exactly as directed. ? If the doctor gave you a prescription medicine for pain, take it as prescribed. ? If you are not taking a prescription pain medicine, ask your doctor if you can take an over-the-counter medicine. · Avoid constipation. Make sure you drink enough fluids and include fruits, vegetables, and fiber in your diet each day. Constipation does not cause ovarian cysts, but it may make your pelvic pain worse. When should you call for help?   Call your doctor now or seek immediate medical care if:    · You have severe vaginal bleeding.     · You have new or worse belly or pelvic pain.    Watch closely for changes in your health, and be sure to contact your doctor if:    · You have unusual vaginal bleeding.     · You do not get better as expected. Where can you learn more? Go to http://angelique-sheila.info/. Enter J252 in the search box to learn more about \"Functional Ovarian Cyst: Care Instructions. \"  Current as of: May 14, 2018  Content Version: 11.9  © 0411-4375 [employer]. Care instructions adapted under license by [employer] (which disclaims liability or warranty for this information). If you have questions about a medical condition or this instruction, always ask your healthcare professional. [address] any warranty or liability for your use of this information.

## 2019-05-14 ENCOUNTER — OFFICE VISIT (OUTPATIENT)
Dept: FAMILY MEDICINE CLINIC | Age: 44
End: 2019-05-14

## 2019-05-14 VITALS
TEMPERATURE: 97.8 F | DIASTOLIC BLOOD PRESSURE: 82 MMHG | BODY MASS INDEX: 35.79 KG/M2 | HEIGHT: 67 IN | HEART RATE: 102 BPM | OXYGEN SATURATION: 95 % | SYSTOLIC BLOOD PRESSURE: 118 MMHG | RESPIRATION RATE: 18 BRPM | WEIGHT: 228 LBS

## 2019-05-14 DIAGNOSIS — R10.31 RIGHT GROIN PAIN: ICD-10-CM

## 2019-05-14 DIAGNOSIS — E66.01 SEVERE OBESITY (HCC): ICD-10-CM

## 2019-05-14 DIAGNOSIS — R10.31 RIGHT LOWER QUADRANT ABDOMINAL PAIN: Primary | ICD-10-CM

## 2019-05-14 DIAGNOSIS — R16.0 ENLARGED LIVER: ICD-10-CM

## 2019-05-14 DIAGNOSIS — E11.65 TYPE 2 DIABETES MELLITUS WITH HYPERGLYCEMIA, WITHOUT LONG-TERM CURRENT USE OF INSULIN (HCC): ICD-10-CM

## 2019-05-14 LAB
BILIRUB UR QL STRIP: NEGATIVE
GLUCOSE UR-MCNC: NORMAL MG/DL
HBA1C MFR BLD HPLC: 10.9 %
KETONES P FAST UR STRIP-MCNC: NEGATIVE MG/DL
PH UR STRIP: 6 [PH] (ref 4.6–8)
PROT UR QL STRIP: NEGATIVE
SP GR UR STRIP: 1.01 (ref 1–1.03)
UA UROBILINOGEN AMB POC: NORMAL (ref 0.2–1)
URINALYSIS CLARITY POC: CLEAR
URINALYSIS COLOR POC: YELLOW
URINE BLOOD POC: NEGATIVE
URINE LEUKOCYTES POC: NEGATIVE
URINE NITRITES POC: NEGATIVE

## 2019-05-14 RX ORDER — GLIMEPIRIDE 4 MG/1
4 TABLET ORAL 2 TIMES DAILY
Qty: 60 TAB | Refills: 1 | Status: SHIPPED | OUTPATIENT
Start: 2019-05-14 | End: 2019-07-03 | Stop reason: SDUPTHER

## 2019-05-14 RX ORDER — CYCLOBENZAPRINE HCL 5 MG
5 TABLET ORAL 2 TIMES DAILY
Qty: 20 TAB | Refills: 0 | Status: SHIPPED | OUTPATIENT
Start: 2019-05-14 | End: 2019-06-10 | Stop reason: SDUPTHER

## 2019-05-14 NOTE — PROGRESS NOTES
Chief Complaint   Patient presents with    Abdominal Pain     RLQ pain x 1.5 weeks        1. Have you been to the ER, urgent care clinic since your last visit? Hospitalized since your last visit? No    2. Have you seen or consulted any other health care providers outside of the [address] [place] [address] since your last visit? Include any pap smears or colon screening?  No         Pt had ultra sound on Thursday----a lesion with blood flow in kidney    pt had cat scan (normal but with enlarged liver) - pt's urine abnormal from ER

## 2019-05-14 NOTE — PROGRESS NOTES
HISTORY OF PRESENT ILLNESS  Yovani Do is a 37 y.o. female. HPI: Patient is complaining of RLQ abdominal pain radiating to right groin x 5 days. Last Thursday 5/9/2019, she went to urgent care clinic at [facility]. Her urine was clear, abdominal US showed small cyst on left ovary. She was referred her GYN/Oncologist who ordered abdominal CT enlarged liver, and small cyst on left ovary. Patient was told they don't know what is wrong with her and advised to follow up with PCP. Noe Marmolejo She has histroy of diabetes, hyperlipidemia, and depression. She stopped taking her metformin due to causing her to go to bathroom too much.  She is not complaint with her diet and that recent HA1C 10.9 , except for large amount of sugar in urine her urine is clear for infection and blood  Past Medical History:   Diagnosis Date    Abnormal Pap smear of cervix     Acid indigestion     Cancer (HCC)     choriocarcinoma  - UTERNE    Depression     Diabetes ([address]     Dysthymic disorder     GERD (gastroesophageal reflux disease)     Hypertriglyceridemia     Mixed hyperlipidemia 3/31/2010    MRSA (methicillin resistant Staphylococcus aureus)     Psychiatric disorder     depression; ANXIETY    Urinary incontinence     URGENCY AND INCONTINENECE     Past Surgical History:   Procedure Laterality Date    HX APPENDECTOMY      HX GYN  12/08    D & C     HX ORTHOPAEDIC      R ACL knee    HX ORTHOPAEDIC      BILATERAL A/S KNEES    HX ORTHOPAEDIC  4/2016    REMOVAL OF HARDWARE IN KNEE    HX PARTIAL HYSTERECTOMY Bilateral May 4 2015    Dr. Simon Uriostegui     Allergies   Allergen Reactions    Ciprofloxacin Hives    Demerol [Meperidine] Other (comments)     Goofy feeling, hallucinates     Current Outpatient Medications:     glimepiride (AMARYL) 4 mg tablet, Take 1 Tab by mouth two (2) times a day., Disp: 60 Tab, Rfl: 1    cyclobenzaprine (FLEXERIL) 5 mg tablet, Take 1 Tab by mouth two (2) times a day., Disp: 20 Tab, Rfl: 0   albuterol (PROVENTIL HFA, VENTOLIN HFA, PROAIR HFA) 90 mcg/actuation inhaler, Take 2 Puffs by inhalation every four (4) hours as needed for Wheezing., Disp: 1 Inhaler, Rfl: 0    inhalational spacing device, 1 Each by Does Not Apply route as needed (when using MDI). , Disp: 1 Device, Rfl: 0    zolpidem (AMBIEN) 10 mg tablet, TAKE 1 TABLET BY MOUTH ONCE DAILY AT BEDTIME AS NEEDED FOR SLEEP, Disp: 15 Tab, Rfl: 0    Nebulizer & Compressor machine, 1 Each by Does Not Apply route every four (4) hours as needed (wheezing). , Disp: 1 Each, Rfl: 0    fluticasone furoate-vilanterol (BREO ELLIPTA) 100-25 mcg/dose inhaler, Take 1 Puff by inhalation daily. , Disp: 1 Inhaler, Rfl: 0    albuterol (PROVENTIL VENTOLIN) 2.5 mg /3 mL (0.083 %) nebulizer solution, 3 mL by Nebulization route every four (4) hours as needed for Wheezing., Disp: 24 Each, Rfl: 1    ergocalciferol (ERGOCALCIFEROL) 50,000 unit capsule, Take 1 Cap by mouth every seven (7) days. , Disp: 12 Cap, Rfl: 0    pantoprazole (PROTONIX) 40 mg tablet, TAKE ONE TABLET BY MOUTH EVERY DAY, Disp: 30 Tab, Rfl: 11    QUEtiapine (SEROQUEL) 100 mg tablet, Take 1 Tab by mouth daily. , Disp: 30 Tab, Rfl: 3    methocarbamol (ROBAXIN) 500 mg tablet, Take 1 Tab by mouth three (3) times daily as needed. , Disp: 30 Tab, Rfl: 0    busPIRone (BUSPAR) 10 mg tablet, Take 1 Tab by mouth two (2) times a day., Disp: 60 Tab, Rfl: 3    pravastatin (PRAVACHOL) 40 mg tablet, TAKE ONE TABLET BY MOUTH AT NIGHT, Disp: 30 Tab, Rfl: 0    fenofibrate micronized (LOFIBRA) 134 mg capsule, TAKE ONE CAPSULE BY MOUTH EVERY DAY, Disp: 30 Cap, Rfl: 0    oxybutynin chloride XL (DITROPAN XL) 10 mg CR tablet, TAKE ONE TABLET BY MOUTH AT NIGHT, Disp: 30 Tab, Rfl: 0    raNITIdine (ZANTAC) 150 mg tablet, TAKE ONE TABLET BY MOUTH AT NIGHT, Disp: 30 Tab, Rfl: 0    pioglitazone (ACTOS) 15 mg tablet, Take 1 Tab by mouth daily. , Disp: 90 Tab, Rfl: 0    Omeprazole delayed release (PRILOSEC D/R) 20 mg tablet, Take 1 Tab by mouth daily. (Patient taking differently: Take 40 mg by mouth daily.), Disp: 30 Tab, Rfl: 5    SITagliptin (JANUVIA) 100 mg tablet, Take 1 Tab by mouth daily. , Disp: 30 Tab, Rfl: 5    estradiol (ESTRACE) 0.5 mg tablet, Take 0.5 mg by mouth daily. , Disp: , Rfl:     terconazole (TERAZOL 7) 0.4 % vaginal cream, Insert 1 Applicator into vagina nightly., Disp: 45 g, Rfl: 0    Lancets (ONE TOUCH ULTRASOFT LANCETS) Misc, Use as directed , Disp: 1 Package, Rfl: 11    Blood-Glucose Meter (ONE TOUCH ULTRA SYSTEM KIT) monitoring kit, Use as directed, Disp: 1 Kit, Rfl: 0    glucose blood VI test strips (ONE TOUCH ULTRA TEST) strip, Monitor BS BID Dx: 250.02 , Disp: 1 Package, Rfl: 11  Review of Systems   Constitutional: Negative. Respiratory: Negative. Cardiovascular: Negative. Gastrointestinal: Positive for abdominal pain. Genitourinary: Negative. Blood pressure 118/82, pulse (!) 102, temperature 97.8 °F (36.6 °C), temperature source Oral, resp. rate 18, height 5' 7\" (1.702 m), weight 228 lb (103.4 kg), last menstrual period 09/10/2014, SpO2 95 %. Physical Exam   Constitutional: No distress. HENT:   Mouth/Throat: Oropharynx is clear and moist.   Neck: Normal range of motion. Neck supple. Cardiovascular: Normal rate and regular rhythm. No murmur heard. Pulmonary/Chest: Effort normal and breath sounds normal.   Abdominal: Bowel sounds are normal. She exhibits no distension. There is tenderness. Pain in RLQ with palpation   Genitourinary:   Genitourinary Comments: UA is clear for infection and blood   Nursing note and vitals reviewed. ASSESSMENT and PLAN  Diagnoses and all orders for this visit:    1. Right lower quadrant abdominal pain  -     AMB POC URINALYSIS DIP STICK AUTO W/O MICRO    2. Severe obesity (HCC)    3. Enlarged liver  -     REFERRAL TO LIVER HEPATOLOGY    4.  Type 2 diabetes mellitus with hyperglycemia, without long-term current use of insulin (HCC)  - Increased glimepiride (AMARYL) 4 mg tablet; Take 1 Tab by mouth two (2) times a day. -     REFERRAL TO ENDOCRINOLOGY  -     AMB POC HEMOGLOBIN A1C    5. Right groin pain  -     cyclobenzaprine (FLEXERIL) 5 mg tablet; Take 1 Tab by mouth two (2) times a day.   Go to Er if pain gets worse   Pt was given an after visit summary which includes diagnosis, current medicines and vital and voiced understanding of treatment plan

## 2019-05-16 ENCOUNTER — OFFICE VISIT (OUTPATIENT)
Dept: HEMATOLOGY | Age: 44
End: 2019-05-16

## 2019-05-16 VITALS
OXYGEN SATURATION: 97 % | TEMPERATURE: 98.4 F | HEART RATE: 95 BPM | SYSTOLIC BLOOD PRESSURE: 123 MMHG | DIASTOLIC BLOOD PRESSURE: 67 MMHG | BODY MASS INDEX: 34.65 KG/M2 | WEIGHT: 220.8 LBS | HEIGHT: 67 IN

## 2019-05-16 DIAGNOSIS — R16.0 HEPATOMEGALY: Primary | ICD-10-CM

## 2019-05-16 PROBLEM — F31.9 BIPOLAR 1 DISORDER, DEPRESSED (HCC): Status: RESOLVED | Noted: 2017-03-01 | Resolved: 2019-05-16

## 2019-05-16 NOTE — PROGRESS NOTES
Chief Complaint   Patient presents with   [facility] Patient     Visit Vitals  /67 (BP 1 Location: Left arm, BP Patient Position: Sitting)   Pulse 95   Temp 98.4 °F (36.9 °C) (Tympanic)   Ht 5' 7\" (1.702 m)   Wt 220 lb 12.8 oz (100.2 kg)   SpO2 97%   BMI 34.58 kg/m²     Learning Assessment 5/16/2019   PRIMARY LEARNER Patient   HIGHEST LEVEL OF EDUCATION - PRIMARY LEARNER  -   BARRIERS PRIMARY LEARNER NONE   CO-LEARNER CAREGIVER No   PRIMARY LANGUAGE ENGLISH   LEARNER PREFERENCE PRIMARY LISTENING   ANSWERED BY patient   RELATIONSHIP SELF     Abuse Screening Questionnaire 5/16/2019   Do you ever feel afraid of your partner? N   Are you in a relationship with someone who physically or mentally threatens you? N   Is it safe for you to go home?  [facility] Sp[facility]

## 2019-05-16 NOTE — PROGRESS NOTES
[address] [facility], MD, [place], [place]       RUDY Villagomez Head, PAKhushiC    Vu Officer, [facility]   RUDY Aguirre NP [address] [facility] 136    at [address]    [address] [facility], [address] [place] [address], [facility] 22.    [phone]    FAX: [address]    [address], [address] [place] [address], [address]    [phone]    FAX: [phone]       Patient Care Team:  Jed Fischer NP as PCP - General ([facility])  Georgette Pickering MD (Gynecology)      Problem List  Date Reviewed: 5/16/2019          Codes Class Noted    Severe obesity ([facility] [address] ICD-10-CM: E66.01  ICD-9-CM: 278.01  5/14/2019        Hyperlipemia ICD-10-CM: E78.5  ICD-9-CM: 272.4  2/13/2015        Choriocarcinoma ICD-10-CM: SCX1965  ICD-9-CM: YJJ6592  2/13/2015    Overview Signed 5/16/2019  9:28 AM by Kelly Camp MD     2015.   Treated chemotherapy and JIE             Microalbuminuria ICD-10-CM: R80.9  ICD-9-CM: 791.0  2/23/2014        Dysthymic disorder ICD-10-CM: F34.1  ICD-9-CM: 300.4  9/12/2012        Patellar malalignment syndrome ICD-10-CM: M23.90  ICD-9-CM: 717.9  8/3/2012        Diabetes mellitus without complication ([facility] [address] GOY-89-VK: E11.9  ICD-9-CM: 250.00  5/23/2012        Insomnia, unspecified ICD-10-CM: G47.00  ICD-9-CM: 780.52  7/14/2010        Unspecified vitamin D deficiency ICD-10-CM: E55.9  ICD-9-CM: 268.9  5/27/2010        Esophageal reflux ICD-10-CM: K21.9  ICD-9-CM: 530.81  5/27/2010        Anxiety ICD-10-CM: F41.9  ICD-9-CM: 300.00  6/3/2009    Overview Signed 6/3/2009  9:54 AM by Radha Garcia, April H     History of                      The clinicians listed above have asked me to see Stanton Martinez in consultation regarding suspected fatty liver disease hepatomegaly and its management. All medical records sent by the referring physicians were reviewed     The patient is a 37 y.o.  female who is suspected to have fatty liver disease based upon CT scan. Serologic evaluation for markers of chronic liver disease has either not been performed or the results are not available to me. The most recent imaging of the liver was CT performed in 5/2019. Results are not available at this time. The patient believes this demonstrated an enlarged liver. An assessment of liver fibrosis with biopsy or elastography has not been performed. The patient has no symptoms which can be attributed to the liver disorder. The patient has not experienced fatigue, pain in the right side over the liver,     The patient completes all daily activities without any functional limitations. ASSESSMENT AND PLAN:  Fatty liver  Suspect the patient has fatty liver based upon imaging, features of metabolic syndrome, serologic studies that are negative for other causes of chronic liver disease,   The histologic severity has not been defined. Liver transaminases are elevated. ALP is normal.  Liver function is normal.  The platelet count is normal.      Based upon laboratory studies and imaging  the patient does not appear to have significant liver injury. Have performed laboratory testing to monitor liver function and degree of liver injury. This included BMP, hepatic panel, CBC with platelet count, INR. Serologic testing for causes of chronic liver disease were ordered. All were negative     Will perform imaging of the liver with ultrasound. The need to perform an assessment of liver fibrosis was discussed with the patient. The Fibroscan can assess liver fibrosis and determine if a patient has advanced fibrosis or cirrhosis without the need for liver biopsy. This will be performed at the next office visit.       If the Fibroscan suggests advanced fibrosis then a liver biopsy should be considered. Counseling for diet for weight loss  There is currently no FDA approved medical treatment for fatty liver, NALFD or MEZA. The patient was counseled regarding diet and exercise to achieve weight loss. The best diet for patients with fatty liver is one very low in carbohydrates and enriched with protein such as an Elsa's program.      The patient was told not to consume any food products and drinks containing fructose as this enhances hepatic fat synthesis. There is no medication or vitamin supplements that we advocate for MEZA. Using glitazones in patients without diabetes mellitus has been shown to reduce fat content in the liver but has no effect on fibrosis and is associated with weight gain. Vitamin E has also been used but the data is not very good and most experts no longer advocate this. The only medical treatments for MEZA are though clinical trials. The patient would be a good candidate for enrollment into a clinical trial if found to have MEZA. Screening for Hepatocellular Carcinoma  HCC screening is not necessary if the patient has no evidence of cirrhosis. Treatment of other medical problems in patients with chronic liver disease  There are no contraindications for the patient to take most medications that are necessary for treatment of other medical issues. Counseling for alcohol in patients with chronic liver disease  The patient was counseled regarding alcohol consumption and the effect of alcohol on chronic liver disease. Vaccinations   Vaccination for viral hepatitis A and B is recommended since the patient has no serologic evidence of previous Routine vaccinations against other bacterial and viral agents can be performed as indicated. Annual flu vaccination should be administered if indicated.       ALLERGIES  Allergies   Allergen Reactions    Ciprofloxacin Hives    Demerol [Meperidine] Other (comments) Goofy feeling, hallucinates       MEDICATIONS  Current Outpatient Medications   Medication Sig    glimepiride (AMARYL) 4 mg tablet Take 1 Tab by mouth two (2) times a day.  cyclobenzaprine (FLEXERIL) 5 mg tablet Take 1 Tab by mouth two (2) times a day.  albuterol (PROVENTIL HFA, VENTOLIN HFA, PROAIR HFA) 90 mcg/actuation inhaler Take 2 Puffs by inhalation every four (4) hours as needed for Wheezing.  zolpidem (AMBIEN) 10 mg tablet TAKE 1 TABLET BY MOUTH ONCE DAILY AT BEDTIME AS NEEDED FOR SLEEP    fluticasone furoate-vilanterol (BREO ELLIPTA) 100-25 mcg/dose inhaler Take 1 Puff by inhalation daily.  albuterol (PROVENTIL VENTOLIN) 2.5 mg /3 mL (0.083 %) nebulizer solution 3 mL by Nebulization route every four (4) hours as needed for Wheezing.  ergocalciferol (ERGOCALCIFEROL) 50,000 unit capsule Take 1 Cap by mouth every seven (7) days.  pantoprazole (PROTONIX) 40 mg tablet TAKE ONE TABLET BY MOUTH EVERY DAY    QUEtiapine (SEROQUEL) 100 mg tablet Take 1 Tab by mouth daily.  busPIRone (BUSPAR) 10 mg tablet Take 1 Tab by mouth two (2) times a day.  pravastatin (PRAVACHOL) 40 mg tablet TAKE ONE TABLET BY MOUTH AT NIGHT    fenofibrate micronized (LOFIBRA) 134 mg capsule TAKE ONE CAPSULE BY MOUTH EVERY DAY    oxybutynin chloride XL (DITROPAN XL) 10 mg CR tablet TAKE ONE TABLET BY MOUTH AT NIGHT    raNITIdine (ZANTAC) 150 mg tablet TAKE ONE TABLET BY MOUTH AT NIGHT    pioglitazone (ACTOS) 15 mg tablet Take 1 Tab by mouth daily.  SITagliptin (JANUVIA) 100 mg tablet Take 1 Tab by mouth daily.  inhalational spacing device 1 Each by Does Not Apply route as needed (when using MDI).  Nebulizer & Compressor machine 1 Each by Does Not Apply route every four (4) hours as needed (wheezing).  methocarbamol (ROBAXIN) 500 mg tablet Take 1 Tab by mouth three (3) times daily as needed.  Omeprazole delayed release (PRILOSEC D/R) 20 mg tablet Take 1 Tab by mouth daily.  (Patient taking differently: Take 40 mg by mouth daily.)    estradiol (ESTRACE) 0.5 mg tablet Take 0.5 mg by mouth daily.  terconazole (TERAZOL 7) 0.4 % vaginal cream Insert 1 Applicator into vagina nightly.  Lancets (ONE TOUCH ULTRASOFT LANCETS) Misc Use as directed      Blood-Glucose Meter (ONE TOUCH ULTRA SYSTEM KIT) monitoring kit Use as directed    glucose blood VI test strips (ONE TOUCH ULTRA TEST) strip Monitor BS BID  Dx: 250.02       No current facility-administered medications for this visit. SYSTEM REVIEW NOT RELATED TO LIVER DISEASE OR REVIEWED ABOVE:  Constitution systems: Negative for fever, chills, weight gain, weight loss. Eyes: Negative for visual changes. ENT: Negative for sore throat, painful swallowing. Respiratory: Negative for cough, hemoptysis, SOB. Cardiology: Negative for chest pain, palpitations. GI:  GERD well controlled. : Negative for urinary frequency, dysuria, hematuria, nocturia. Skin: Negative for rash. Hematology: Negative for easy bruising, blood clots. Musculo-skelatal: Negative for back pain, muscle pain, weakness. Neurologic: Negative for headaches, dizziness, vertigo, memory problems not related to HE. Psychology: Negative for anxiety, depression. FAMILY HISTORY:  The father   of pancreas cancer. The mother has the following chronic disease(s): GERD, bladder cancer. There is no family history of liver disease. SOCIAL HISTORY:  The patient has a long term male partner for over 10 years. The patient has 1 child. The patient currently smokes 1 pack of tobacco daily. The patient has never consumed significant amounts of alcohol. The patient does not work. PHYSICAL EXAMINATION:  VS: per nursing note  General: No acute distress. Eyes: Sclera anicteric. ENT: No oral lesions. Thyroid normal.  Nodes: No adenopathy. Skin: No spider angiomata. No jaundice. No palmar erythema. Respiratory: Lungs clear to auscultation. Cardiovascular: Regular heart rate. No murmurs. No JVD. Abdomen: Soft non-tender, liver size normal to percussion/palpation. Spleen not palpable. No obvious ascites. Extremities: No edema. No muscle wasting. No gross arthritic changes. Neurologic: Alert and oriented. Cranial nerves grossly intact. No asterixis. LABORATORY STUDIES:  Liver [place] of [address] Units 5/16/2019   WBC 3.4 - 10.8 x10E3/uL 10.1   ANC 1.4 - 7.0 x10E3/uL 6.3   HGB 11.1 - 15.9 g/dL 14.6    - 379 x10E3/uL 225   AST 0 - 40 IU/L 50 (H)   ALT 0 - 32 IU/L 35 (H)   Alk Phos 39 - 117 IU/L 72   Bili, Total 0.0 - 1.2 mg/dL 0.4   Bili, Direct 0.00 - 0.40 mg/dL 0.16   Albumin 3.5 - 5.5 g/dL 4.4   BUN 6 - 24 mg/dL 8   Creat 0.57 - 1.00 mg/dL 0.74   Na 134 - 144 mmol/L 140   K 3.5 - 5.2 mmol/L 4.2   Cl 96 - 106 mmol/L 104   CO2 20 - 29 mmol/L 24   Glucose 65 - 99 mg/dL 293 (H)     SEROLOGIES:  Serologies Latest Ref Rng & Units 5/16/2019 2/7/2018   Hep A Ab, Total Negative Negative    Hep B Surface Ag Negative Negative    Hep B Surface Ag Index  <0.10   Hep B Surface Ag Interp NEG    NEGATIVE   Hep B Core Ab, Total Negative Negative    Hep B Surface AB QL  Non Reactive    Hep C Ab 0.0 - 0.9 s/co ratio <0.1    Hep C Ab NR    NONREACTIVE     LIVER HISTOLOGY:  Not available or performed    ENDOSCOPIC PROCEDURES:  Not available or performed    RADIOLOGY:  Not available or performed    OTHER TESTING:  Not available or performed    FOLLOW-UP:  All of the issues listed above in the Assessment and Plan were discussed with the patient. All questions were answered. The patient expressed a clear understanding of the above. [address] [place] [address] in 4 weeks for Fibroscan to review all data and determine the treatment plan.       MD Zeyad Ackermanvä[facility] [address] [facility] [place], [address] [place] [address].  [phone]  [address]

## 2019-05-17 ENCOUNTER — TELEPHONE (OUTPATIENT)
Dept: FAMILY MEDICINE CLINIC | Age: 44
End: 2019-05-17

## 2019-05-17 DIAGNOSIS — R05.9 COUGH: ICD-10-CM

## 2019-05-17 DIAGNOSIS — F31.9 BIPOLAR 1 DISORDER, DEPRESSED (HCC): ICD-10-CM

## 2019-05-17 DIAGNOSIS — G47.09 OTHER INSOMNIA: ICD-10-CM

## 2019-05-17 LAB
ALBUMIN SERPL-MCNC: 4.4 G/DL (ref 3.5–5.5)
ALP SERPL-CCNC: 72 IU/L (ref 39–117)
ALT SERPL-CCNC: 35 IU/L (ref 0–32)
AST SERPL-CCNC: 50 IU/L (ref 0–40)
BASOPHILS # BLD AUTO: 0 X10E3/UL (ref 0–0.2)
BASOPHILS NFR BLD AUTO: 0 %
BILIRUB DIRECT SERPL-MCNC: 0.16 MG/DL (ref 0–0.4)
BILIRUB SERPL-MCNC: 0.4 MG/DL (ref 0–1.2)
BUN SERPL-MCNC: 8 MG/DL (ref 6–24)
BUN/CREAT SERPL: 11 (ref 9–23)
CALCIUM SERPL-MCNC: 9.7 MG/DL (ref 8.7–10.2)
CHLORIDE SERPL-SCNC: 104 MMOL/L (ref 96–106)
CO2 SERPL-SCNC: 24 MMOL/L (ref 20–29)
COMMENT, 144067: NORMAL
CREAT SERPL-MCNC: 0.74 MG/DL (ref 0.57–1)
EOSINOPHIL # BLD AUTO: 0.1 X10E3/UL (ref 0–0.4)
EOSINOPHIL NFR BLD AUTO: 1 %
ERYTHROCYTE [DISTWIDTH] IN BLOOD BY AUTOMATED COUNT: 14.1 % (ref 12.3–15.4)
GLUCOSE SERPL-MCNC: 293 MG/DL (ref 65–99)
HAV AB SER QL IA: NEGATIVE
HBV CORE AB SERPL QL IA: NEGATIVE
HBV SURFACE AB SER QL: NON REACTIVE
HBV SURFACE AG SERPL QL IA: NEGATIVE
HCT VFR BLD AUTO: 44.3 % (ref 34–46.6)
HCV AB S/CO SERPL IA: <0.1 S/CO RATIO (ref 0–0.9)
HGB BLD-MCNC: 14.6 G/DL (ref 11.1–15.9)
IMM GRANULOCYTES # BLD AUTO: 0 X10E3/UL (ref 0–0.1)
IMM GRANULOCYTES NFR BLD AUTO: 0 %
LYMPHOCYTES # BLD AUTO: 3.3 X10E3/UL (ref 0.7–3.1)
LYMPHOCYTES NFR BLD AUTO: 33 %
MCH RBC QN AUTO: 29.2 PG (ref 26.6–33)
MCHC RBC AUTO-ENTMCNC: 33 G/DL (ref 31.5–35.7)
MCV RBC AUTO: 89 FL (ref 79–97)
MONOCYTES # BLD AUTO: 0.4 X10E3/UL (ref 0.1–0.9)
MONOCYTES NFR BLD AUTO: 4 %
NEUTROPHILS # BLD AUTO: 6.3 X10E3/UL (ref 1.4–7)
NEUTROPHILS NFR BLD AUTO: 62 %
PLATELET # BLD AUTO: 225 X10E3/UL (ref 150–379)
POTASSIUM SERPL-SCNC: 4.2 MMOL/L (ref 3.5–5.2)
PROT SERPL-MCNC: 7.1 G/DL (ref 6–8.5)
RBC # BLD AUTO: 5 X10E6/UL (ref 3.77–5.28)
SODIUM SERPL-SCNC: 140 MMOL/L (ref 134–144)
WBC # BLD AUTO: 10.1 X10E3/UL (ref 3.4–10.8)

## 2019-05-17 RX ORDER — QUETIAPINE FUMARATE 100 MG/1
TABLET, FILM COATED ORAL
Qty: 30 TAB | Refills: 3 | Status: SHIPPED | OUTPATIENT
Start: 2019-05-17 | End: 2020-01-20

## 2019-05-17 RX ORDER — BENZONATATE 100 MG/1
CAPSULE ORAL
Qty: 30 CAP | Refills: 0 | Status: SHIPPED | OUTPATIENT
Start: 2019-05-17 | End: 2020-06-30

## 2019-05-17 RX ORDER — INSULIN PUMP SYRINGE, 3 ML
EACH MISCELLANEOUS
Qty: 1 KIT | Refills: 0 | Status: SHIPPED | OUTPATIENT
Start: 2019-05-17 | End: 2019-05-17 | Stop reason: SDUPTHER

## 2019-05-17 RX ORDER — ZOLPIDEM TARTRATE 10 MG/1
TABLET ORAL
Qty: 15 TAB | Refills: 0 | Status: SHIPPED | OUTPATIENT
Start: 2019-05-17 | End: 2019-06-25 | Stop reason: SDUPTHER

## 2019-05-17 RX ORDER — LANCETS
EACH MISCELLANEOUS
Qty: 180 EACH | Refills: 3 | Status: SHIPPED | OUTPATIENT
Start: 2019-05-17 | End: 2019-05-17 | Stop reason: SDUPTHER

## 2019-05-17 RX ORDER — INSULIN PUMP SYRINGE, 3 ML
EACH MISCELLANEOUS
Qty: 1 KIT | Refills: 0 | Status: ON HOLD | OUTPATIENT
Start: 2019-05-17 | End: 2019-10-15 | Stop reason: CLARIF

## 2019-05-17 RX ORDER — LANCETS
EACH MISCELLANEOUS
Qty: 180 EACH | Refills: 3 | Status: ON HOLD | OUTPATIENT
Start: 2019-05-17 | End: 2019-10-15 | Stop reason: CLARIF

## 2019-05-17 NOTE — TELEPHONE ENCOUNTER
Regarding: FW: Non-Urgent Medical Question  Contact: [phone]  Can you please send this for her  thanks  ----- Message -----  From: Peyton Pickett LPN  Sent: 2/99/0559   1:12 PM  To: Travis Bartlett NP  Subject: FW: Non-Urgent Medical Question                      ----- Message -----  From: Hammad Sheriff  Sent: 5/17/2019   1:11 PM  To: [facility] Nurse [facility]  Subject: Non-Urgent Medical Question                      ----- Message from [address] [place] [address], [facility] sent at 5/17/2019  1:11 PM EDT -----    Can you call in a RX for a new  Glucometer and all of the things to go with it? [address] ..  please and thanks you!!    Keysha Church

## 2019-06-04 NOTE — PROGRESS NOTES
Results faxed to PCP office due to glucose levels. Patient aware of lab results including glucose levels. Patient given follow up appointment and had no further questions.

## 2019-06-10 DIAGNOSIS — R10.31 RIGHT GROIN PAIN: ICD-10-CM

## 2019-06-10 RX ORDER — CYCLOBENZAPRINE HCL 5 MG
5 TABLET ORAL 2 TIMES DAILY
Qty: 20 TAB | Refills: 0 | Status: SHIPPED | OUTPATIENT
Start: 2019-06-10 | End: 2019-07-03 | Stop reason: SDUPTHER

## 2019-06-25 DIAGNOSIS — G47.09 OTHER INSOMNIA: ICD-10-CM

## 2019-06-25 DIAGNOSIS — F31.9 BIPOLAR 1 DISORDER, DEPRESSED (HCC): ICD-10-CM

## 2019-06-25 RX ORDER — ZOLPIDEM TARTRATE 10 MG/1
TABLET ORAL
Qty: 15 TAB | Refills: 0 | Status: SHIPPED | OUTPATIENT
Start: 2019-06-25 | End: 2019-07-23 | Stop reason: SDUPTHER

## 2019-06-25 RX ORDER — QUETIAPINE FUMARATE 100 MG/1
TABLET, FILM COATED ORAL
Qty: 30 TAB | Refills: 3 | Status: SHIPPED | OUTPATIENT
Start: 2019-06-25 | End: 2019-10-01

## 2019-07-03 DIAGNOSIS — E11.9 DIABETES MELLITUS WITHOUT COMPLICATION (HCC): ICD-10-CM

## 2019-07-03 DIAGNOSIS — F40.01 AGORAPHOBIA WITH PANIC DISORDER: ICD-10-CM

## 2019-07-03 DIAGNOSIS — E11.65 TYPE 2 DIABETES MELLITUS WITH HYPERGLYCEMIA, WITHOUT LONG-TERM CURRENT USE OF INSULIN (HCC): ICD-10-CM

## 2019-07-03 DIAGNOSIS — R10.31 RIGHT GROIN PAIN: ICD-10-CM

## 2019-07-03 DIAGNOSIS — K21.00 GASTROESOPHAGEAL REFLUX DISEASE WITH ESOPHAGITIS: ICD-10-CM

## 2019-07-03 DIAGNOSIS — E78.5 HYPERLIPIDEMIA, UNSPECIFIED HYPERLIPIDEMIA TYPE: ICD-10-CM

## 2019-07-05 ENCOUNTER — TELEPHONE (OUTPATIENT)
Dept: FAMILY MEDICINE CLINIC | Age: 44
End: 2019-07-05

## 2019-07-05 RX ORDER — PHENOL/SODIUM PHENOLATE
20 AEROSOL, SPRAY (ML) MUCOUS MEMBRANE DAILY
Qty: 30 TAB | Refills: 5 | Status: SHIPPED | OUTPATIENT
Start: 2019-07-05 | End: 2020-01-20

## 2019-07-05 RX ORDER — PANTOPRAZOLE SODIUM 40 MG/1
TABLET, DELAYED RELEASE ORAL
Qty: 30 TAB | Refills: 11 | Status: SHIPPED | OUTPATIENT
Start: 2019-07-05 | End: 2020-08-19

## 2019-07-05 RX ORDER — GLIMEPIRIDE 4 MG/1
4 TABLET ORAL 2 TIMES DAILY
Qty: 60 TAB | Refills: 5 | Status: SHIPPED | OUTPATIENT
Start: 2019-07-05 | End: 2020-01-20

## 2019-07-05 RX ORDER — PRAVASTATIN SODIUM 40 MG/1
40 TABLET ORAL DAILY
Qty: 30 TAB | Refills: 5 | Status: SHIPPED | OUTPATIENT
Start: 2019-07-05 | End: 2020-01-20

## 2019-07-05 RX ORDER — BUSPIRONE HYDROCHLORIDE 10 MG/1
10 TABLET ORAL 2 TIMES DAILY
Qty: 60 TAB | Refills: 3 | Status: SHIPPED | OUTPATIENT
Start: 2019-07-05 | End: 2019-11-19 | Stop reason: SDUPTHER

## 2019-07-05 RX ORDER — RANITIDINE 150 MG/1
150 TABLET, FILM COATED ORAL
Qty: 30 TAB | Refills: 5 | Status: SHIPPED | OUTPATIENT
Start: 2019-07-05 | End: 2020-01-20

## 2019-07-05 RX ORDER — CYCLOBENZAPRINE HCL 5 MG
5 TABLET ORAL 2 TIMES DAILY
Qty: 20 TAB | Refills: 0 | Status: SHIPPED | OUTPATIENT
Start: 2019-07-05 | End: 2019-10-01

## 2019-07-05 RX ORDER — FENOFIBRATE 134 MG/1
134 CAPSULE ORAL DAILY
Qty: 30 CAP | Refills: 5 | Status: SHIPPED | OUTPATIENT
Start: 2019-07-05 | End: 2020-01-20

## 2019-07-05 RX ORDER — PIOGLITAZONEHYDROCHLORIDE 15 MG/1
15 TABLET ORAL DAILY
Qty: 90 TAB | Refills: 0 | Status: SHIPPED | OUTPATIENT
Start: 2019-07-05 | End: 2019-11-19 | Stop reason: SDUPTHER

## 2019-07-05 RX ORDER — OXYBUTYNIN CHLORIDE 10 MG/1
10 TABLET, EXTENDED RELEASE ORAL DAILY
Qty: 30 TAB | Refills: 5 | Status: SHIPPED | OUTPATIENT
Start: 2019-07-05 | End: 2020-01-20

## 2019-07-08 RX ORDER — FLUCONAZOLE 100 MG/1
150 TABLET ORAL ONCE
Qty: 1 TAB | Refills: 1 | Status: SHIPPED | OUTPATIENT
Start: 2019-07-08 | End: 2019-07-08

## 2019-07-08 NOTE — TELEPHONE ENCOUNTER
Pharmacy requesting medication refill     Requested Prescriptions     Pending Prescriptions Disp Refills    fluconazole (DIFLUCAN) 100 mg tablet 1 Tab 1     Sig: Take 1.5 Tabs by mouth once for 1 dose.      Pharmacy on file verified  ([facility] [phone])    Pharmacy Comments: Take 1 tables by mouth one a week

## 2019-07-09 NOTE — TELEPHONE ENCOUNTER
----- Message from Hortor Card sent at 7/9/2019  9:28 AM EDT -----  Regarding: Robert Castillo/Alex Randolph from [employer] is requesting a returned call to clarify a prescription.  [facility] Contact Number: [phone]

## 2019-07-23 DIAGNOSIS — G47.09 OTHER INSOMNIA: ICD-10-CM

## 2019-07-23 RX ORDER — ZOLPIDEM TARTRATE 10 MG/1
TABLET ORAL
Qty: 15 TAB | Refills: 0 | Status: SHIPPED | OUTPATIENT
Start: 2019-07-23 | End: 2019-08-26 | Stop reason: SDUPTHER

## 2019-07-23 RX ORDER — ERGOCALCIFEROL 1.25 MG/1
CAPSULE ORAL
Qty: 12 CAP | Refills: 0 | Status: SHIPPED | OUTPATIENT
Start: 2019-07-23 | End: 2019-11-19 | Stop reason: SDUPTHER

## 2019-08-25 DIAGNOSIS — G47.09 OTHER INSOMNIA: ICD-10-CM

## 2019-08-25 RX ORDER — ZOLPIDEM TARTRATE 10 MG/1
TABLET ORAL
Qty: 15 TAB | Refills: 0 | Status: CANCELLED | OUTPATIENT
Start: 2019-08-25

## 2019-08-26 DIAGNOSIS — G47.09 OTHER INSOMNIA: ICD-10-CM

## 2019-08-26 RX ORDER — ZOLPIDEM TARTRATE 10 MG/1
10 TABLET ORAL
Qty: 15 TAB | Refills: 0 | Status: SHIPPED | OUTPATIENT
Start: 2019-08-26 | End: 2019-09-22 | Stop reason: SDUPTHER

## 2019-08-26 NOTE — TELEPHONE ENCOUNTER
RUDY Villegas LPN   Caller: Unspecified Tamika Haider,  8:09 AM)   Phone Number: [phone]             We don't have 7.5 mg , stay with 5 mg and to use sleep hygiene

## 2019-08-26 NOTE — TELEPHONE ENCOUNTER
PCP: Jaylin Gaxiola NP    Last appt: 7/18/2019  Future Appointments   Date Time Provider Arti Martinez   11/15/2019  8:50 AM Luis Dowell MD [facility] [address]       Requested Prescriptions     Pending Prescriptions Disp Refills    zolpidem (AMBIEN) 10 mg tablet 15 Tab 0   last refill 07/23/19

## 2019-08-26 NOTE — TELEPHONE ENCOUNTER
zolpidem (AMBIEN) 10 mg tablet [Robert Rivera NP]       Patient Comment: I am currently taking 5 mg at night and having trouble getting to sleep. Can we bump it up to 7.5 mg per night?    PCP: France Ratliff NP    Last appt: 7/18/2019  Future Appointments   Date Time Provider Arti Martinez   11/15/2019  8:50 AM Giovanni Cota MD R[facility] [address]       Requested Prescriptions      No prescriptions requested or ordered in this encounter

## 2019-09-22 DIAGNOSIS — G47.09 OTHER INSOMNIA: ICD-10-CM

## 2019-09-23 RX ORDER — ZOLPIDEM TARTRATE 10 MG/1
10 TABLET ORAL
Qty: 15 TAB | Refills: 0 | Status: SHIPPED | OUTPATIENT
Start: 2019-09-23 | End: 2019-10-23 | Stop reason: SDUPTHER

## 2019-09-23 NOTE — TELEPHONE ENCOUNTER
PCP: Teresa Prather NP    Last appt: 7/18/2019  Last refill 08/26/19  Future Appointments   Date Time Provider Arti Martinez   11/15/2019  8:50 AM Juni Mata MD [facility] [address]       Requested Prescriptions     Pending Prescriptions Disp Refills    zolpidem (AMBIEN) 10 mg tablet 15 Tab 0     Sig: Take 1 Tab by mouth nightly as needed for Sleep. Max Daily Amount: 10 mg.

## 2019-10-01 ENCOUNTER — OFFICE VISIT (OUTPATIENT)
Dept: FAMILY MEDICINE CLINIC | Age: 44
End: 2019-10-01

## 2019-10-01 VITALS
WEIGHT: 237.4 LBS | BODY MASS INDEX: 37.26 KG/M2 | OXYGEN SATURATION: 98 % | RESPIRATION RATE: 18 BRPM | HEART RATE: 88 BPM | TEMPERATURE: 98.6 F | HEIGHT: 67 IN | SYSTOLIC BLOOD PRESSURE: 112 MMHG | DIASTOLIC BLOOD PRESSURE: 72 MMHG

## 2019-10-01 DIAGNOSIS — Z23 ENCOUNTER FOR IMMUNIZATION: ICD-10-CM

## 2019-10-01 DIAGNOSIS — E78.5 HYPERLIPIDEMIA, UNSPECIFIED HYPERLIPIDEMIA TYPE: ICD-10-CM

## 2019-10-01 DIAGNOSIS — R53.83 OTHER FATIGUE: ICD-10-CM

## 2019-10-01 DIAGNOSIS — E11.9 DIABETES MELLITUS WITHOUT COMPLICATION (HCC): Primary | ICD-10-CM

## 2019-10-01 DIAGNOSIS — F31.9 BIPOLAR 1 DISORDER, DEPRESSED (HCC): ICD-10-CM

## 2019-10-01 DIAGNOSIS — F41.9 ANXIETY: ICD-10-CM

## 2019-10-01 NOTE — PROGRESS NOTES
HISTORY OF PRESENT ILLNESS  Thu Do is a 37 y.o. female. HPI: Following up on chronic problems, patient has history of diabetes, hyperlipidemia, GERD, depression , anxiety , bipolar and insomnia. Taking medication as prescribed, no medication side effects reported. Today she is complaining of fatigue and is requesting to check her TSH, free T4  Due for flu vaccine   Past Medical History:   Diagnosis Date    Abnormal Pap smear of cervix     Acid indigestion     Cancer ([facility] [address]     choriocarcinoma  - UTERNE    Depression     Diabetes ([facility] [address]     Dysthymic disorder     GERD (gastroesophageal reflux disease)     Hypertriglyceridemia     Mixed hyperlipidemia 3/31/2010    MRSA (methicillin resistant Staphylococcus aureus)     Psychiatric disorder     depression; ANXIETY    Urinary incontinence     URGENCY AND INCONTINENECE     Past Surgical History:   Procedure Laterality Date    HX APPENDECTOMY      HX GYN  12/08    D & C     HX GYN      Ovary 8/2/2019 [place]    HX ORTHOPAEDIC      R ACL knee    HX ORTHOPAEDIC      BILATERAL A/S KNEES    HX ORTHOPAEDIC  4/2016    REMOVAL OF HARDWARE IN KNEE    HX PARTIAL HYSTERECTOMY Bilateral May 4 2015    Dr. Vanessa Client     Allergies   Allergen Reactions    Ciprofloxacin Hives    Demerol [Meperidine] Other (comments)     Goofy feeling, hallucinates     Current Outpatient Medications:     zolpidem (AMBIEN) 10 mg tablet, Take 1 Tab by mouth nightly as needed for Sleep. Max Daily Amount: 10 mg., Disp: 15 Tab, Rfl: 0    ergocalciferol (ERGOCALCIFEROL) 50,000 unit capsule, TAKE 1 CAPSULE BY MOUTH ONCE A WEEK, Disp: 12 Cap, Rfl: 0    Omeprazole delayed release (PRILOSEC D/R) 20 mg tablet, Take 1 Tab by mouth daily. , Disp: 30 Tab, Rfl: 5    SITagliptin (JANUVIA) 100 mg tablet, Take 1 Tab by mouth daily. , Disp: 30 Tab, Rfl: 5    pravastatin (PRAVACHOL) 40 mg tablet, Take 1 Tab by mouth daily. , Disp: 30 Tab, Rfl: 5    fenofibrate micronized (LOFIBRA) 134 mg capsule, Take 1 Cap by mouth daily. , Disp: 30 Cap, Rfl: 5    oxybutynin chloride XL (DITROPAN XL) 10 mg CR tablet, Take 1 Tab by mouth daily. , Disp: 30 Tab, Rfl: 5    raNITIdine (ZANTAC) 150 mg tablet, Take 1 Tab by mouth nightly., Disp: 30 Tab, Rfl: 5    busPIRone (BUSPAR) 10 mg tablet, Take 1 Tab by mouth two (2) times a day., Disp: 60 Tab, Rfl: 3    pantoprazole (PROTONIX) 40 mg tablet, TAKE ONE TABLET BY MOUTH EVERY DAY, Disp: 30 Tab, Rfl: 11    glimepiride (AMARYL) 4 mg tablet, Take 1 Tab by mouth two (2) times a day., Disp: 60 Tab, Rfl: 5    pioglitazone (ACTOS) 15 mg tablet, Take 1 Tab by mouth daily. , Disp: 90 Tab, Rfl: 0    Blood-Glucose Meter monitoring kit, Per insurance guideline test bid E11.9, Disp: 1 Kit, Rfl: 0    glucose blood VI test strips (BLOOD GLUCOSE TEST) strip, Test BID E11.9, Disp: 180 Strip, Rfl: 3    lancets misc, Test bid E11.9, Disp: 180 Each, Rfl: 3    QUEtiapine (SEROQUEL) 100 mg tablet, TAKE 1 TABLET BY MOUTH ONCE DAILY, Disp: 30 Tab, Rfl: 3    benzonatate (TESSALON) 100 mg capsule, TAKE 1 CAPSULE BY MOUTH THREE TIMES DAILY AS NEEDED FOR COUGH FOR UP TO 7 DAYS, Disp: 30 Cap, Rfl: 0    albuterol (PROVENTIL HFA, VENTOLIN HFA, PROAIR HFA) 90 mcg/actuation inhaler, Take 2 Puffs by inhalation every four (4) hours as needed for Wheezing., Disp: 1 Inhaler, Rfl: 0    inhalational spacing device, 1 Each by Does Not Apply route as needed (when using MDI). , Disp: 1 Device, Rfl: 0    fluticasone furoate-vilanterol (BREO ELLIPTA) 100-25 mcg/dose inhaler, Take 1 Puff by inhalation daily. , Disp: 1 Inhaler, Rfl: 0    albuterol (PROVENTIL VENTOLIN) 2.5 mg /3 mL (0.083 %) nebulizer solution, 3 mL by Nebulization route every four (4) hours as needed for Wheezing., Disp: 24 Each, Rfl: 1    Lancets (ONE TOUCH ULTRASOFT LANCETS) Misc, Use as directed , Disp: 1 Package, Rfl: 11    Blood-Glucose Meter (ONE TOUCH ULTRA SYSTEM KIT) monitoring kit, Use as directed, Disp: 1 Kit, Rfl: 0    glucose blood VI test strips (ONE TOUCH ULTRA TEST) strip, Monitor BS BID Dx: 250.02 , Disp: 1 Package, Rfl: 11    Nebulizer & Compressor machine, 1 Each by Does Not Apply route every four (4) hours as needed (wheezing). , Disp: 1 Each, Rfl: 0    estradiol (ESTRACE) 0.5 mg tablet, Take 0.5 mg by mouth daily. , Disp: , Rfl:     terconazole (TERAZOL 7) 0.4 % vaginal cream, Insert 1 Applicator into vagina nightly., Disp: 45 g, Rfl: 0  Review of Systems   Constitutional: Negative. Respiratory: Negative. Cardiovascular: Negative. Gastrointestinal: Negative. Psychiatric/Behavioral: Positive for depression. Negative for hallucinations, memory loss, substance abuse and suicidal ideas. The patient is nervous/anxious and has insomnia. Blood pressure 112/72, pulse 88, temperature 98.6 °F (37 °C), temperature source Oral, resp. rate 18, height 5' 7\" (1.702 m), weight 237 lb 6.4 oz (107.7 kg), last menstrual period 09/10/2014, SpO2 98 %. Body mass index is 37.18 kg/m². Physical Exam   Constitutional: No distress. HENT:   Mouth/Throat: Oropharynx is clear and moist.   Neck: Normal range of motion. Neck supple. Cardiovascular: Normal rate and regular rhythm. No murmur heard. Pulmonary/Chest: Effort normal and breath sounds normal.   Abdominal: Soft. Bowel sounds are normal.   Psychiatric: She has a normal mood and affect. Her behavior is normal.   Nursing note and vitals reviewed. ASSESSMENT and PLAN  Diagnoses and all orders for this visit:    1. Diabetes mellitus without complication (HCC)  -     MICROALBUMIN, UR, RAND W/ MICROALB/CREAT RATIO  -     HEMOGLOBIN A1C WITH EAG    2. Hyperlipidemia, unspecified hyperlipidemia type  -     LIPID PANEL  -     METABOLIC PANEL, COMPREHENSIVE    3. Bipolar 1 disorder, depressed ([address]    4. Anxiety    5. BMI 37.0-37.9, adult     Diet and exericse    6. Other fatigue  -     TSH 3RD GENERATION  -     T4, FREE    7.  Encounter for immunization  -     IL IMMUNIZ ADMIN,1 SINGLE/COMB VAC/TOXOID  -     INFLUENZA VIRUS VAC QUAD,SPLIT,PRESV FREE SYRINGE IM  Follow up in three months  Pt was given an after visit summary which includes diagnosis, current medicines and vital and voiced understanding of treatment plan

## 2019-10-02 LAB
ALBUMIN SERPL-MCNC: 4.6 G/DL (ref 3.5–5.5)
ALBUMIN/CREAT UR: 6.1 MG/G CREAT (ref 0–30)
ALBUMIN/GLOB SERPL: 2.3 {RATIO} (ref 1.2–2.2)
ALP SERPL-CCNC: 56 IU/L (ref 39–117)
ALT SERPL-CCNC: 18 IU/L (ref 0–32)
AST SERPL-CCNC: 15 IU/L (ref 0–40)
BILIRUB SERPL-MCNC: 0.3 MG/DL (ref 0–1.2)
BUN SERPL-MCNC: 12 MG/DL (ref 6–24)
BUN/CREAT SERPL: 15 (ref 9–23)
CALCIUM SERPL-MCNC: 9.1 MG/DL (ref 8.7–10.2)
CHLORIDE SERPL-SCNC: 103 MMOL/L (ref 96–106)
CHOLEST SERPL-MCNC: 164 MG/DL (ref 100–199)
CO2 SERPL-SCNC: 24 MMOL/L (ref 20–29)
CREAT SERPL-MCNC: 0.81 MG/DL (ref 0.57–1)
CREAT UR-MCNC: 122.6 MG/DL
EST. AVERAGE GLUCOSE BLD GHB EST-MCNC: 151 MG/DL
GLOBULIN SER CALC-MCNC: 2 G/DL (ref 1.5–4.5)
GLUCOSE SERPL-MCNC: 172 MG/DL (ref 65–99)
HBA1C MFR BLD: 6.9 % (ref 4.8–5.6)
HDLC SERPL-MCNC: 40 MG/DL
INTERPRETATION, 910389: NORMAL
LDLC SERPL CALC-MCNC: 84 MG/DL (ref 0–99)
MICROALBUMIN UR-MCNC: 7.5 UG/ML
POTASSIUM SERPL-SCNC: 4.1 MMOL/L (ref 3.5–5.2)
PROT SERPL-MCNC: 6.6 G/DL (ref 6–8.5)
SODIUM SERPL-SCNC: 141 MMOL/L (ref 134–144)
T4 FREE SERPL-MCNC: 1.27 NG/DL (ref 0.82–1.77)
TRIGL SERPL-MCNC: 198 MG/DL (ref 0–149)
TSH SERPL DL<=0.005 MIU/L-ACNC: 0.93 UIU/ML (ref 0.45–4.5)
VLDLC SERPL CALC-MCNC: 40 MG/DL (ref 5–40)

## 2019-10-15 ENCOUNTER — ANESTHESIA EVENT (OUTPATIENT)
Dept: ENDOSCOPY | Age: 44
End: 2019-10-15
Payer: COMMERCIAL

## 2019-10-15 ENCOUNTER — HOSPITAL ENCOUNTER (OUTPATIENT)
Age: 44
Setting detail: OUTPATIENT SURGERY
Discharge: HOME | End: 2019-10-15
Attending: SPECIALIST | Admitting: SPECIALIST
Payer: COMMERCIAL

## 2019-10-15 ENCOUNTER — ANESTHESIA (OUTPATIENT)
Dept: ENDOSCOPY | Age: 44
End: 2019-10-15
Payer: COMMERCIAL

## 2019-10-15 VITALS
SYSTOLIC BLOOD PRESSURE: 99 MMHG | OXYGEN SATURATION: 97 % | RESPIRATION RATE: 13 BRPM | DIASTOLIC BLOOD PRESSURE: 68 MMHG | WEIGHT: 236.38 LBS | HEART RATE: 89 BPM | BODY MASS INDEX: 37.02 KG/M2 | TEMPERATURE: 97.8 F

## 2019-10-15 PROCEDURE — 76040000019: Performed by: SPECIALIST

## 2019-10-15 PROCEDURE — 74011250636 HC RX REV CODE- 250/636: Performed by: SPECIALIST

## 2019-10-15 PROCEDURE — 74011250636 HC RX REV CODE- 250/636: Performed by: NURSE ANESTHETIST, CERTIFIED REGISTERED

## 2019-10-15 PROCEDURE — 76060000031 HC ANESTHESIA FIRST 0.5 HR: Performed by: SPECIALIST

## 2019-10-15 PROCEDURE — 88305 TISSUE EXAM BY PATHOLOGIST: CPT

## 2019-10-15 PROCEDURE — 77030021593 HC FCPS BIOP ENDOSC BSC -A: Performed by: SPECIALIST

## 2019-10-15 PROCEDURE — 74011000250 HC RX REV CODE- 250: Performed by: NURSE ANESTHETIST, CERTIFIED REGISTERED

## 2019-10-15 RX ORDER — SODIUM CHLORIDE 9 MG/ML
INJECTION, SOLUTION INTRAVENOUS
Status: DISCONTINUED | OUTPATIENT
Start: 2019-10-15 | End: 2019-10-15 | Stop reason: HOSPADM

## 2019-10-15 RX ORDER — EPINEPHRINE 0.1 MG/ML
1 INJECTION INTRACARDIAC; INTRAVENOUS
Status: DISCONTINUED | OUTPATIENT
Start: 2019-10-15 | End: 2019-10-15 | Stop reason: HOSPADM

## 2019-10-15 RX ORDER — FLUMAZENIL 0.1 MG/ML
0.2 INJECTION INTRAVENOUS
Status: DISCONTINUED | OUTPATIENT
Start: 2019-10-15 | End: 2019-10-15 | Stop reason: HOSPADM

## 2019-10-15 RX ORDER — LIDOCAINE HYDROCHLORIDE 20 MG/ML
INJECTION, SOLUTION EPIDURAL; INFILTRATION; INTRACAUDAL; PERINEURAL AS NEEDED
Status: DISCONTINUED | OUTPATIENT
Start: 2019-10-15 | End: 2019-10-15 | Stop reason: HOSPADM

## 2019-10-15 RX ORDER — NALOXONE HYDROCHLORIDE 0.4 MG/ML
0.4 INJECTION, SOLUTION INTRAMUSCULAR; INTRAVENOUS; SUBCUTANEOUS
Status: DISCONTINUED | OUTPATIENT
Start: 2019-10-15 | End: 2019-10-15 | Stop reason: HOSPADM

## 2019-10-15 RX ORDER — SODIUM CHLORIDE 0.9 % (FLUSH) 0.9 %
5-40 SYRINGE (ML) INJECTION EVERY 8 HOURS
Status: DISCONTINUED | OUTPATIENT
Start: 2019-10-15 | End: 2019-10-15 | Stop reason: HOSPADM

## 2019-10-15 RX ORDER — DEXTROMETHORPHAN/PSEUDOEPHED 2.5-7.5/.8
1.2 DROPS ORAL
Status: DISCONTINUED | OUTPATIENT
Start: 2019-10-15 | End: 2019-10-15 | Stop reason: HOSPADM

## 2019-10-15 RX ORDER — SODIUM CHLORIDE 0.9 % (FLUSH) 0.9 %
5-40 SYRINGE (ML) INJECTION AS NEEDED
Status: DISCONTINUED | OUTPATIENT
Start: 2019-10-15 | End: 2019-10-15 | Stop reason: HOSPADM

## 2019-10-15 RX ORDER — ATROPINE SULFATE 0.1 MG/ML
0.5 INJECTION INTRAVENOUS
Status: DISCONTINUED | OUTPATIENT
Start: 2019-10-15 | End: 2019-10-15 | Stop reason: HOSPADM

## 2019-10-15 RX ORDER — SODIUM CHLORIDE 9 MG/ML
50 INJECTION, SOLUTION INTRAVENOUS CONTINUOUS
Status: DISCONTINUED | OUTPATIENT
Start: 2019-10-15 | End: 2019-10-15 | Stop reason: HOSPADM

## 2019-10-15 RX ORDER — PROPOFOL 10 MG/ML
INJECTION, EMULSION INTRAVENOUS AS NEEDED
Status: DISCONTINUED | OUTPATIENT
Start: 2019-10-15 | End: 2019-10-15 | Stop reason: HOSPADM

## 2019-10-15 RX ADMIN — SODIUM CHLORIDE: 900 INJECTION, SOLUTION INTRAVENOUS at 11:53

## 2019-10-15 RX ADMIN — PROPOFOL 100 MG: 10 INJECTION, EMULSION INTRAVENOUS at 12:04

## 2019-10-15 RX ADMIN — PROPOFOL 100 MG: 10 INJECTION, EMULSION INTRAVENOUS at 12:08

## 2019-10-15 RX ADMIN — PROPOFOL 50 MG: 10 INJECTION, EMULSION INTRAVENOUS at 12:11

## 2019-10-15 RX ADMIN — PROPOFOL 50 MG: 10 INJECTION, EMULSION INTRAVENOUS at 12:09

## 2019-10-15 RX ADMIN — PROPOFOL 50 MG: 10 INJECTION, EMULSION INTRAVENOUS at 12:07

## 2019-10-15 RX ADMIN — PROPOFOL 50 MG: 10 INJECTION, EMULSION INTRAVENOUS at 12:06

## 2019-10-15 RX ADMIN — LIDOCAINE HYDROCHLORIDE 100 MG: 20 INJECTION, SOLUTION EPIDURAL; INFILTRATION; INTRACAUDAL; PERINEURAL at 12:04

## 2019-10-15 NOTE — ANESTHESIA PREPROCEDURE EVALUATION
Anesthetic History   No history of anesthetic complications            Review of Systems / Medical History  Patient summary reviewed, nursing notes reviewed and pertinent labs reviewed    Pulmonary          Smoker         Neuro/Psych         Psychiatric history     Cardiovascular  Within defined limits                Exercise tolerance: >4 METS     GI/Hepatic/Renal     GERD: well controlled           Endo/Other    Diabetes: poorly controlled, type 2    Arthritis     Other Findings              Physical Exam    Airway  Mallampati: II  TM Distance: > 6 cm  Neck ROM: normal range of motion   Mouth opening: Normal     Cardiovascular  Regular rate and rhythm,  S1 and S2 normal,  no murmur, click, rub, or gallop             Dental  No notable dental hx       Pulmonary  Breath sounds clear to auscultation               Abdominal  GI exam deferred       Other Findings            Anesthetic Plan    ASA: 3  Anesthesia type: MAC          Induction: Intravenous  Anesthetic plan and risks discussed with: Patient

## 2019-10-15 NOTE — ROUTINE PROCESS
Nella Corral Ernestina  1975  [id number]    Situation:  Verbal report received from: Nery Paige RN  Procedure: Procedure(s):  ESOPHAGOGASTRODUODENOSCOPY (EGD)  ESOPHAGOGASTRODUODENAL (EGD) BIOPSY    Background:    Preoperative diagnosis: GERD, CHEST PAIN  Postoperative diagnosis: GASTRITIS    :  Dr. Keith Gotti  Assistant(s): Endoscopy Technician-1: Karina Sanders  Endoscopy RN-1: Arsen Amador RN    Specimens:   ID Type Source Tests Collected by Time Destination   1 : pathology Preservative Gastric  Uday Swain MD 10/15/2019 1212 Pathology   2 : GE--JUNCTION Preservative   Uday Swain MD 10/15/2019 1213 Pathology     H. Pylori  no    Assessment:  Intra-procedure medications Anesthesia gave intra-procedure sedation and medications, see anesthesia flow sheet yes    Intravenous fluids: NS@ KVO     Vital signs stable     Abdominal assessment: round and soft     Recommendation:  Discharge patient per MD order.     Family or Friend   Permission to share finding with family or friend yes

## 2019-10-15 NOTE — H&P
Pre-endoscopy H and P     The patient was seen and examined in the endoscopy suite. The airway was assessed and docuemented. The problem list and medications were reviewed.      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    Dysthymic disorder F34.1    Microalbuminuria R80.9    Hyperlipemia E78.5    Choriocarcinoma [id number]    Severe obesity ([facility] [address] E66.01     Social History     Socioeconomic History    Marital status: SINGLE     Spouse name: Not on file    Number of children: Not on file    Years of education: Not on file    Highest education level: Not on file   Occupational History    Not on file   Social Needs    Financial resource strain: Not on file    Food insecurity:     Worry: Not on file     Inability: Not on file    Transportation needs:     Medical: Not on file     Non-medical: Not on file   Tobacco Use    Smoking status: Current Every Day Smoker     Packs/day: 1.00     Years: 25.00     Pack years: 25.00    Smokeless tobacco: Never Used   Substance and Sexual Activity    Alcohol use: No     Alcohol/week: 0.0 standard drinks    Drug use: No    Sexual activity: Yes     Partners: Male     Birth control/protection: None   Lifestyle    Physical activity:     Days per week: Not on file     Minutes per session: Not on file    Stress: Not on file   Relationships    Social connections:     Talks on phone: Not on file     Gets together: Not on file     Attends [employer] service: Not on file     Active member of club or organization: Not on file     Attends meetings of clubs or organizations: Not on file     Relationship status: Not on file    Intimate partner violence:     Fear of current or ex partner: Not on file     Emotionally abused: Not on file     Physically abused: Not on file     Forced sexual activity: Not on file   Other Topics Concern    Not on file   Social History Narrative    Not on file     Past Medical History:   Diagnosis Date    Abnormal Pap smear of cervix     Acid indigestion     Cancer ([facility] [address]     choriocarcinoma  - UTERNE - surgery/chemo    Depression     Diabetes ([facility] [address]     Dysthymic disorder     GERD (gastroesophageal reflux disease)     Hypertriglyceridemia     Mixed hyperlipidemia 3/31/2010    MRSA (methicillin resistant Staphylococcus aureus)     Psychiatric disorder     depression; ANXIETY    Urinary incontinence     URGENCY AND INCONTINENECE         Prior to Admission Medications   Prescriptions Last Dose Informant Patient Reported? Taking? Omeprazole delayed release (PRILOSEC D/R) 20 mg tablet 10/14/2019 at Unknown time  No Yes   Sig: Take 1 Tab by mouth daily. QUEtiapine (SEROQUEL) 100 mg tablet 10/14/2019 at Unknown time  No Yes   Sig: TAKE 1 TABLET BY MOUTH ONCE DAILY   SITagliptin (JANUVIA) 100 mg tablet 10/14/2019 at Unknown time  No Yes   Sig: Take 1 Tab by mouth daily. albuterol (PROVENTIL HFA, VENTOLIN HFA, PROAIR HFA) 90 mcg/actuation inhaler Not Taking at Unknown time  No No   Sig: Take 2 Puffs by inhalation every four (4) hours as needed for Wheezing. albuterol (PROVENTIL VENTOLIN) 2.5 mg /3 mL (0.083 %) nebulizer solution Not Taking at Unknown time  No No   Sig: 3 mL by Nebulization route every four (4) hours as needed for Wheezing. benzonatate (TESSALON) 100 mg capsule Not Taking at Unknown time  No No   Sig: TAKE 1 CAPSULE BY MOUTH THREE TIMES DAILY AS NEEDED FOR COUGH FOR UP TO 7 DAYS   busPIRone (BUSPAR) 10 mg tablet 10/14/2019 at Unknown time  No Yes   Sig: Take 1 Tab by mouth two (2) times a day.    ergocalciferol (ERGOCALCIFEROL) 50,000 unit capsule 10/13/2019  No No   Sig: TAKE 1 CAPSULE BY MOUTH ONCE A WEEK   fenofibrate micronized (LOFIBRA) 134 mg capsule 10/14/2019 at Unknown time  No Yes   Sig: Take 1 Cap by mouth daily. fluticasone furoate-vilanterol (BREO ELLIPTA) 100-25 mcg/dose inhaler Not Taking at Unknown time  No No   Sig: Take 1 Puff by inhalation daily. glimepiride (AMARYL) 4 mg tablet 10/14/2019 at Unknown time  No Yes   Sig: Take 1 Tab by mouth two (2) times a day. oxybutynin chloride XL (DITROPAN XL) 10 mg CR tablet 10/14/2019 at Unknown time  No Yes   Sig: Take 1 Tab by mouth daily. pantoprazole (PROTONIX) 40 mg tablet 10/14/2019 at Unknown time  No Yes   Sig: TAKE ONE TABLET BY MOUTH EVERY DAY   pioglitazone (ACTOS) 15 mg tablet 10/14/2019 at Unknown time  No Yes   Sig: Take 1 Tab by mouth daily. pravastatin (PRAVACHOL) 40 mg tablet 10/14/2019 at Unknown time  No Yes   Sig: Take 1 Tab by mouth daily. raNITIdine (ZANTAC) 150 mg tablet 10/14/2019 at Unknown time  No Yes   Sig: Take 1 Tab by mouth nightly. zolpidem (AMBIEN) 10 mg tablet 10/14/2019 at Unknown time  No Yes   Sig: Take 1 Tab by mouth nightly as needed for Sleep. Max Daily Amount: 10 mg. Facility-Administered Medications: None       Chief complaint, history of present illness, and review of systems and Past medical History are positive for: refractory GERD and diabetes    The heart, lungs and mental status were satisfactory for the administration of sedation and for the procedure. I discussed with the patient the objectives, risks, consequences and alternatives to the procedure.      Plan: Endoscopic procedure with sedation     Judah Hernandez MD   10/15/2019  11:59 AM

## 2019-10-15 NOTE — PROCEDURES
[address]                 NAME:  Glenna Joyner   :   1975   MRN:   [id number]     Date/Time:  10/15/2019 12:22 PM    Esophagogastroduodenoscopy (EGD) Procedure Note    :  Dejan Garnica MD    Referring Provider:  Lokesh Douglas NP    Anethesia/Sedation:  MAC anesthesia Propofol    Preoperative diagnosis: GERD, CHEST PAIN    Postoperative diagnosis: GASTRITIS    Procedure Details     After infom consent was obtained for the procedure, with all risks and benefits of procedure explained the patient was taken to the endoscopy suite and placed in the left lateral decubitus position. Following sequential administration of sedation as per above, the [id number] gastroscope was inserted into the mouth and advanced under direct vision to second portion of the duodenum. A careful inspection was made as the gastroscope was withdrawn, including a retroflexed view of the proximal stomach; findings and interventions are described below. Findings:  Esophagus:Irregular Z line at 39 cm, biopsies done to rule out Mota esophagus. Stomach:Patchy erythema antrum, biopsies done  Duodenum/jejunum:normal      Therapies:  none    Specimens: antral, GE junction bx           EBL: None    Complications:   None; patient tolerated the procedure well. Impression:    See Postoperative diagnosis above    Recommendations:  -Acid suppression with a proton pump inhibitor. , -Await pathology.     Discharge disposition:  Home in the company of  when able to ambulate    Dejan Garnica MD

## 2019-10-15 NOTE — DISCHARGE INSTRUCTIONS
[facility]  [id number]  1975    EGD DISCHARGE INSTRUCTIONS  Discomfort:  Sore throat- throat lozenges or warm salt water gargle  redness at IV site- apply warm compress to area; if redness or soreness persist- contact your physician  Gaseous discomfort- walking, belching will help relieve any discomfort  You may not operate a vehicle for 12 hours  You may not engage in an occupation involving machinery or appliances for rest of today. You may not drink alcoholic beverages for at least 12 hours  Avoid making any critical decisions for at least 24 hour  DIET  You may resume your regular diet - however -  remember your colon is empty and a heavy meal will produce gas. Avoid these foods:  vegetables, fried / greasy foods, carbonated drinks  MEDICATIONS   Regarding Aspirin or Nonsteroidal medications specifically, please see below. ACTIVITY  You may resume your normal daily activities. Spend the remainder of the day resting -  avoid any strenuous activity. CALL M.D. ANY SIGN OF   Increasing pain, nausea, vomiting  Abdominal distension (swelling)  New increased bleeding (oral or rectal)  Fever (chills)  Pain in chest area  Bloody discharge from nose or mouth  Shortness of breath    You may not  take any Advil, Aspirin, Ibuprofen, Motrin, Aleve, or Goodys for 10 days, ONLY  Tylenol as needed for pain. Post procedure diagnosis: GASTRITIS      Follow-up Instructions:   Call Dr. Magaly Gaviria  Results of procedure / biopsy in 10 days  Telephone #  [phone]        DISCHARGE SUMMARY from Nurse    The following personal items collected during your admission are returned to you:   Dental Appliance: Dental Appliances: None  Vision: Visual Aid: None  Hearing Aid:    Jewelry:    Clothing:    Other Valuables:    Valuables sent to safe:          Patient Education        Gastritis: Care Instructions  Your Care Instructions    Gastritis is a sore and upset stomach.  It happens when something irritates the stomach lining. Many things can cause it. These include an infection such as the flu or something you ate or drank. Medicines or a sore on the lining of the stomach (ulcer) also can cause it. Your belly may bloat and ache. You may belch, vomit, and feel sick to your stomach. You should be able to relieve the problem by taking medicine. And it may help to change your diet. If gastritis lasts, your doctor may prescribe medicine. Follow-up care is a key part of your treatment and safety. Be sure to make and go to all appointments, and call your doctor if you are having problems. It's also a good idea to know your test results and keep a list of the medicines you take. How can you care for yourself at home? · If your doctor prescribed antibiotics, take them as directed. Do not stop taking them just because you feel better. You need to take the full course of antibiotics. · Be safe with medicines. If your doctor prescribed medicine to decrease stomach acid, take it as directed. Call your doctor if you think you are having a problem with your medicine. · Do not take any other medicine, including over-the-counter pain relievers, without talking to your doctor first.  · If your doctor recommends over-the-counter medicine to reduce stomach acid, such as Pepcid AC, Prilosec, Tagamet HB, or Zantac 75, follow the directions on the label. · Drink plenty of fluids (enough so that your urine is light yellow or clear like water) to prevent dehydration. Choose water and other caffeine-free clear liquids. If you have kidney, heart, or liver disease and have to limit fluids, talk with your doctor before you increase the amount of fluids you drink. · Limit how much alcohol you drink. · Avoid coffee, tea, cola drinks, chocolate, and other foods with caffeine. They increase stomach acid. When should you call for help? Call 911 anytime you think you may need emergency care.  For example, call if:    · You vomit blood or what looks like coffee grounds.     · You pass maroon or very bloody stools.    Call your doctor now or seek immediate medical care if:    · You start breathing fast and have not produced urine in the last 8 hours.     · You cannot keep fluids down.    Watch closely for changes in your health, and be sure to contact your doctor if:    · You do not get better as expected. Where can you learn more? Go to http://angelique-sheila.info/. Enter [id number] in the search box to learn more about \"Gastritis: Care Instructions. \"  Current as of: November 7, 2018  Content Version: 12.2  © 0874-8015 [employer]. Care instructions adapted under license by [employer] (which disclaims liability or warranty for this information). If you have questions about a medical condition or this instruction, always ask your healthcare professional. [address] any warranty or liability for your use of this information.

## 2019-10-15 NOTE — ANESTHESIA POSTPROCEDURE EVALUATION
Procedure(s):  ESOPHAGOGASTRODUODENOSCOPY (EGD)  ESOPHAGOGASTRODUODENAL (EGD) BIOPSY. MAC    Anesthesia Post Evaluation        Patient location during evaluation: PACU  Patient participation: complete - patient participated  Level of consciousness: awake  Pain management: adequate  Airway patency: patent  Anesthetic complications: no  Cardiovascular status: hemodynamically stable  Respiratory status: acceptable  Hydration status: acceptable  Comments: I have seen and evaluated the patient. The patient is ready for PACU discharge. [address], DO                         Vitals Value Taken Time   BP 0/0 10/15/2019  1:11 PM   Temp 36.6 °C (97.8 °F) 10/15/2019 12:28 PM   Pulse 0 10/15/2019  1:11 PM   Resp 0 10/15/2019  1:14 PM   SpO2 97 % 10/15/2019 12:39 PM   Vitals shown include unvalidated device data.

## 2019-10-23 DIAGNOSIS — G47.09 OTHER INSOMNIA: ICD-10-CM

## 2019-10-23 RX ORDER — ZOLPIDEM TARTRATE 10 MG/1
TABLET ORAL
Qty: 15 TAB | Refills: 0 | Status: SHIPPED | OUTPATIENT
Start: 2019-10-23 | End: 2019-11-19 | Stop reason: SDUPTHER

## 2019-11-19 DIAGNOSIS — E11.9 DIABETES MELLITUS WITHOUT COMPLICATION (HCC): ICD-10-CM

## 2019-11-19 DIAGNOSIS — G47.09 OTHER INSOMNIA: ICD-10-CM

## 2019-11-19 DIAGNOSIS — F40.01 AGORAPHOBIA WITH PANIC DISORDER: ICD-10-CM

## 2019-11-19 RX ORDER — BUSPIRONE HYDROCHLORIDE 10 MG/1
TABLET ORAL
Qty: 60 TAB | Refills: 3 | Status: SHIPPED | OUTPATIENT
Start: 2019-11-19 | End: 2020-03-23

## 2019-11-19 RX ORDER — PIOGLITAZONEHYDROCHLORIDE 15 MG/1
TABLET ORAL
Qty: 90 TAB | Refills: 0 | Status: SHIPPED | OUTPATIENT
Start: 2019-11-19 | End: 2020-02-24

## 2019-11-19 RX ORDER — ZOLPIDEM TARTRATE 10 MG/1
TABLET ORAL
Qty: 15 TAB | Refills: 0 | Status: SHIPPED | OUTPATIENT
Start: 2019-11-19 | End: 2019-12-13 | Stop reason: SDUPTHER

## 2019-11-19 RX ORDER — ERGOCALCIFEROL 1.25 MG/1
CAPSULE ORAL
Qty: 12 CAP | Refills: 0 | Status: SHIPPED | OUTPATIENT
Start: 2019-11-19 | End: 2020-02-24

## 2019-12-13 ENCOUNTER — OFFICE VISIT (OUTPATIENT)
Dept: FAMILY MEDICINE CLINIC | Age: 44
End: 2019-12-13

## 2019-12-13 VITALS
BODY MASS INDEX: 38.42 KG/M2 | OXYGEN SATURATION: 97 % | TEMPERATURE: 98.9 F | RESPIRATION RATE: 18 BRPM | DIASTOLIC BLOOD PRESSURE: 80 MMHG | HEIGHT: 67 IN | WEIGHT: 244.8 LBS | HEART RATE: 92 BPM | SYSTOLIC BLOOD PRESSURE: 110 MMHG

## 2019-12-13 DIAGNOSIS — J02.9 SORE THROAT: ICD-10-CM

## 2019-12-13 DIAGNOSIS — J06.9 ACUTE URI: ICD-10-CM

## 2019-12-13 DIAGNOSIS — R05.9 COUGH: ICD-10-CM

## 2019-12-13 DIAGNOSIS — G47.09 OTHER INSOMNIA: ICD-10-CM

## 2019-12-13 DIAGNOSIS — J40 BRONCHITIS: Primary | ICD-10-CM

## 2019-12-13 LAB
S PYO AG THROAT QL: NEGATIVE
VALID INTERNAL CONTROL?: YES

## 2019-12-13 RX ORDER — OMEPRAZOLE 20 MG/1
CAPSULE, DELAYED RELEASE ORAL
Refills: 5 | COMMUNITY
Start: 2019-11-19 | End: 2020-02-14 | Stop reason: SDUPTHER

## 2019-12-13 RX ORDER — METHYLPREDNISOLONE 4 MG/1
TABLET ORAL
Qty: 1 DOSE PACK | Refills: 0 | Status: SHIPPED | OUTPATIENT
Start: 2019-12-13 | End: 2020-06-30

## 2019-12-13 RX ORDER — ZOLPIDEM TARTRATE 10 MG/1
10 TABLET ORAL
Qty: 15 TAB | Refills: 0 | Status: SHIPPED | OUTPATIENT
Start: 2019-12-13 | End: 2020-01-19 | Stop reason: SDUPTHER

## 2019-12-13 RX ORDER — AZITHROMYCIN 250 MG/1
TABLET, FILM COATED ORAL
Qty: 6 TAB | Refills: 0 | Status: SHIPPED | OUTPATIENT
Start: 2019-12-13 | End: 2019-12-18

## 2019-12-13 NOTE — PROGRESS NOTES
Chief Complaint   Patient presents with    Cough     Times 2 days and patient is also hoarse. Throat and chest hurting. 1. Have you been to the ER, urgent care clinic since your last visit? Hospitalized since your last visit? No    2. Have you seen or consulted any other health care providers outside of the [address] [place] [address] since your last visit? Include any pap smears or colon screening.  No

## 2019-12-13 NOTE — PROGRESS NOTES
HISTORY OF PRESENT ILLNESS  Krishna Do is a 40 y.o. female. HPI: Patient is complaining of nasal congestion, sore throat, productive cough of yellow mucus x 2 days. cough is deep and keeping her up at night. Denies chills fever.  Requesting to check for strep   Past Medical History:   Diagnosis Date    Abnormal Pap smear of cervix     Acid indigestion     Cancer (HCC)     choriocarcinoma  - UTERNE - surgery/chemo    Depression     Diabetes ([address]     Dysthymic disorder     GERD (gastroesophageal reflux disease)     Hypertriglyceridemia     Mixed hyperlipidemia 3/31/2010    MRSA (methicillin resistant Staphylococcus aureus)     Psychiatric disorder     depression; ANXIETY    Urinary incontinence     URGENCY AND INCONTINENECE     Past Surgical History:   Procedure Laterality Date    HX APPENDECTOMY      HX GYN  12/08    D & C     HX GYN      Ovary 8/2/2019 [place]    HX ORTHOPAEDIC      R ACL knee    HX ORTHOPAEDIC      BILATERAL A/S KNEES    HX ORTHOPAEDIC  4/2016    REMOVAL OF HARDWARE IN KNEE    HX PARTIAL HYSTERECTOMY Bilateral May 4 2015    Dr. Sylvia Sampson     Allergies   Allergen Reactions    Ciprofloxacin Hives    Demerol [Meperidine] Other (comments)     Goofy feeling, hallucinates     Current Outpatient Medications:     omeprazole (PRILOSEC) 20 mg capsule, TAKE 1 CAPSULE BY MOUTH ONCE DAILY, Disp: , Rfl: 5    methylPREDNISolone (MEDROL DOSEPACK) 4 mg tablet, Use as directed, Disp: 1 Dose Pack, Rfl: 0    azithromycin (ZITHROMAX) 250 mg tablet, Take 2 tablets today, then take 1 tablet daily, Disp: 6 Tab, Rfl: 0    ergocalciferol (ERGOCALCIFEROL) 50,000 unit capsule, TAKE 1 CAPSULE BY MOUTH ONCE A WEEK, Disp: 12 Cap, Rfl: 0    busPIRone (BUSPAR) 10 mg tablet, TAKE 1 TABLET BY MOUTH TWICE DAILY, Disp: 60 Tab, Rfl: 3    zolpidem (AMBIEN) 10 mg tablet, TAKE 1 TABLET BY MOUTH ONCE DAILY AT BEDTIME AS NEEDED FOR INSOMNIA., Disp: 15 Tab, Rfl: 0    pioglitazone (ACTOS) 15 mg tablet, TAKE 1 TABLET BY MOUTH ONCE DAILY, Disp: 90 Tab, Rfl: 0    Omeprazole delayed release (PRILOSEC D/R) 20 mg tablet, Take 1 Tab by mouth daily. , Disp: 30 Tab, Rfl: 5    SITagliptin (JANUVIA) 100 mg tablet, Take 1 Tab by mouth daily. , Disp: 30 Tab, Rfl: 5    pravastatin (PRAVACHOL) 40 mg tablet, Take 1 Tab by mouth daily. , Disp: 30 Tab, Rfl: 5    fenofibrate micronized (LOFIBRA) 134 mg capsule, Take 1 Cap by mouth daily. , Disp: 30 Cap, Rfl: 5    oxybutynin chloride XL (DITROPAN XL) 10 mg CR tablet, Take 1 Tab by mouth daily. , Disp: 30 Tab, Rfl: 5    raNITIdine (ZANTAC) 150 mg tablet, Take 1 Tab by mouth nightly., Disp: 30 Tab, Rfl: 5    pantoprazole (PROTONIX) 40 mg tablet, TAKE ONE TABLET BY MOUTH EVERY DAY, Disp: 30 Tab, Rfl: 11    glimepiride (AMARYL) 4 mg tablet, Take 1 Tab by mouth two (2) times a day., Disp: 60 Tab, Rfl: 5    QUEtiapine (SEROQUEL) 100 mg tablet, TAKE 1 TABLET BY MOUTH ONCE DAILY, Disp: 30 Tab, Rfl: 3    benzonatate (TESSALON) 100 mg capsule, TAKE 1 CAPSULE BY MOUTH THREE TIMES DAILY AS NEEDED FOR COUGH FOR UP TO 7 DAYS, Disp: 30 Cap, Rfl: 0    fluticasone furoate-vilanterol (BREO ELLIPTA) 100-25 mcg/dose inhaler, Take 1 Puff by inhalation daily. , Disp: 1 Inhaler, Rfl: 0  Review of Systems   Constitutional: Negative. HENT: Positive for congestion and sore throat. Respiratory: Positive for cough and sputum production. Cardiovascular: Negative. Gastrointestinal: Negative. Blood pressure 110/80, pulse 92, temperature 98.9 °F (37.2 °C), temperature source Oral, resp. rate 18, height 5' 7\" (1.702 m), weight 244 lb 12.8 oz (111 kg), last menstrual period 09/10/2014, SpO2 97 %. Body mass index is 38.34 kg/m². Physical Exam  Constitutional:       Appearance: She is obese. HENT:      Right Ear: Tympanic membrane normal.      Left Ear: Tympanic membrane normal.      Nose: Congestion present. Mouth/Throat:      Pharynx: Posterior oropharyngeal erythema present.  No oropharyngeal exudate. Comments: Quick strep is negative  Neck:      Musculoskeletal: Normal range of motion and neck supple. Cardiovascular:      Rate and Rhythm: Normal rate and regular rhythm. Pulses: Normal pulses. Heart sounds: Normal heart sounds. Pulmonary:      Effort: Pulmonary effort is normal.      Breath sounds: Rhonchi present. Abdominal:      General: Bowel sounds are normal.      Palpations: Abdomen is soft. Neurological:      Mental Status: She is alert. ASSESSMENT and PLAN  Diagnoses and all orders for this visit:    1. Bronchitis  -     azithromycin (ZITHROMAX) 250 mg tablet; Take 2 tablets today, then take 1 tablet daily    2. Acute URI    3. Cough  -     methylPREDNISolone (MEDROL DOSEPACK) 4 mg tablet; Use as directed    4.  Sore throat  -     AMB POC RAPID STREP A  Pt was given an after visit summary which includes diagnosis, current medicines and vital and voiced understanding of treatment plan

## 2019-12-13 NOTE — TELEPHONE ENCOUNTER
PCP: Faina Gonzales NP    Last appt: 12/13/2019  No future appointments.     Requested Prescriptions     Pending Prescriptions Disp Refills    zolpidem (AMBIEN) 10 mg tablet 15 Tab 0   last refill 11/19/19

## 2020-01-19 DIAGNOSIS — G47.09 OTHER INSOMNIA: ICD-10-CM

## 2020-01-20 DIAGNOSIS — E78.5 HYPERLIPIDEMIA, UNSPECIFIED HYPERLIPIDEMIA TYPE: ICD-10-CM

## 2020-01-20 DIAGNOSIS — K21.00 GASTROESOPHAGEAL REFLUX DISEASE WITH ESOPHAGITIS: ICD-10-CM

## 2020-01-20 DIAGNOSIS — F31.9 BIPOLAR 1 DISORDER, DEPRESSED (HCC): ICD-10-CM

## 2020-01-20 DIAGNOSIS — E11.65 TYPE 2 DIABETES MELLITUS WITH HYPERGLYCEMIA, WITHOUT LONG-TERM CURRENT USE OF INSULIN (HCC): ICD-10-CM

## 2020-01-20 RX ORDER — PRAVASTATIN SODIUM 40 MG/1
TABLET ORAL
Qty: 30 TAB | Refills: 0 | Status: SHIPPED | OUTPATIENT
Start: 2020-01-20 | End: 2020-02-24

## 2020-01-20 RX ORDER — ZOLPIDEM TARTRATE 10 MG/1
10 TABLET ORAL
Qty: 15 TAB | Refills: 0 | Status: SHIPPED | OUTPATIENT
Start: 2020-01-20 | End: 2020-02-23 | Stop reason: SDUPTHER

## 2020-01-20 RX ORDER — FENOFIBRATE 134 MG/1
CAPSULE ORAL
Qty: 30 CAP | Refills: 0 | Status: SHIPPED | OUTPATIENT
Start: 2020-01-20 | End: 2020-02-24

## 2020-01-20 RX ORDER — SITAGLIPTIN 100 MG/1
TABLET, FILM COATED ORAL
Qty: 30 TAB | Refills: 0 | Status: SHIPPED | OUTPATIENT
Start: 2020-01-20 | End: 2020-02-24

## 2020-01-20 RX ORDER — OMEPRAZOLE 20 MG/1
CAPSULE, DELAYED RELEASE ORAL
Qty: 30 CAP | Refills: 0 | Status: SHIPPED | OUTPATIENT
Start: 2020-01-20 | End: 2020-02-24

## 2020-01-20 RX ORDER — GLIMEPIRIDE 4 MG/1
TABLET ORAL
Qty: 60 TAB | Refills: 0 | Status: SHIPPED | OUTPATIENT
Start: 2020-01-20 | End: 2020-02-24

## 2020-01-20 RX ORDER — RANITIDINE 150 MG/1
TABLET, FILM COATED ORAL
Qty: 30 TAB | Refills: 0 | Status: SHIPPED | OUTPATIENT
Start: 2020-01-20 | End: 2020-02-24

## 2020-01-20 RX ORDER — QUETIAPINE FUMARATE 100 MG/1
TABLET, FILM COATED ORAL
Qty: 30 TAB | Refills: 0 | Status: SHIPPED | OUTPATIENT
Start: 2020-01-20 | End: 2020-02-24

## 2020-01-20 RX ORDER — OXYBUTYNIN CHLORIDE 10 MG/1
TABLET, EXTENDED RELEASE ORAL
Qty: 30 TAB | Refills: 0 | Status: SHIPPED | OUTPATIENT
Start: 2020-01-20 | End: 2020-02-24

## 2020-01-24 ENCOUNTER — TELEPHONE (OUTPATIENT)
Dept: FAMILY MEDICINE CLINIC | Age: 45
End: 2020-01-24

## 2020-01-24 NOTE — TELEPHONE ENCOUNTER
Patient is calling requesting a call back. Pt stated that she is having chest pain, loss of voice, which she has been dealing with for a while now. Let the pt know no availability for providers today, offered the pt to go to urgent care, pt declined.          Best callback:[phone]  LOV: Friday, December 13, 2019

## 2020-01-24 NOTE — TELEPHONE ENCOUNTER
RUDY Seals LPN   Caller: Unspecified (Today, 12:48 PM)             Advised to use warm salt gargles and tylenol   Follow up tomorrow to to be seen at the office    Previous Messages

## 2020-01-24 NOTE — TELEPHONE ENCOUNTER
hoarseness for a year. Cough and congestion.    Is willing to see another provider but states last time Cristobal Houser said tat she would see her anytime  Would like to talk to Cristobal Houser

## 2020-01-25 ENCOUNTER — HOSPITAL ENCOUNTER (EMERGENCY)
Age: 45
Discharge: HOME | End: 2020-01-25
Attending: EMERGENCY MEDICINE
Payer: COMMERCIAL

## 2020-01-25 VITALS
DIASTOLIC BLOOD PRESSURE: 81 MMHG | TEMPERATURE: 98.6 F | HEART RATE: 102 BPM | BODY MASS INDEX: 39.62 KG/M2 | HEIGHT: 67 IN | OXYGEN SATURATION: 99 % | WEIGHT: 252.43 LBS | RESPIRATION RATE: 16 BRPM | SYSTOLIC BLOOD PRESSURE: 134 MMHG

## 2020-01-25 DIAGNOSIS — J20.9 ACUTE BRONCHITIS, UNSPECIFIED ORGANISM: Primary | ICD-10-CM

## 2020-01-25 PROCEDURE — 99282 EMERGENCY DEPT VISIT SF MDM: CPT

## 2020-01-25 RX ORDER — DOXYCYCLINE 100 MG/1
100 CAPSULE ORAL 2 TIMES DAILY
Qty: 14 CAP | Refills: 0 | Status: SHIPPED | OUTPATIENT
Start: 2020-01-25 | End: 2020-08-17

## 2020-01-25 RX ORDER — PREDNISONE 20 MG/1
20 TABLET ORAL DAILY
Qty: 5 TAB | Refills: 0 | Status: SHIPPED | OUTPATIENT
Start: 2020-01-25 | End: 2020-01-30

## 2020-01-25 NOTE — ED NOTES
The patient was discharged home by Dr Sujey Lopez in stable condition. The patient is alert and oriented, in no respiratory distress and discharge vital signs obtained. The patient's diagnosis, condition and treatment were explained. The patient expressed understanding. 2 prescriptions given. A discharge plan has been developed. Aftercare instructions were given. Pt ambulatory out of the ED.

## 2020-01-25 NOTE — DISCHARGE INSTRUCTIONS
Patient Education        Bronchitis: Care Instructions  Your Care Instructions    Bronchitis is inflammation of the bronchial tubes, which carry air to the lungs. The tubes swell and produce mucus, or phlegm. The mucus and inflamed bronchial tubes make you cough. You may have trouble breathing. Most cases of bronchitis are caused by viruses like those that cause colds. Antibiotics usually do not help and they may be harmful. Bronchitis usually develops rapidly and lasts about 2 to 3 weeks in otherwise healthy people. Follow-up care is a key part of your treatment and safety. Be sure to make and go to all appointments, and call your doctor if you are having problems. It's also a good idea to know your test results and keep a list of the medicines you take. How can you care for yourself at home? · Take all medicines exactly as prescribed. Call your doctor if you think you are having a problem with your medicine. · Get some extra rest.  · Take an over-the-counter pain medicine, such as acetaminophen (Tylenol), ibuprofen (Advil, Motrin), or naproxen (Aleve) to reduce fever and relieve body aches. Read and follow all instructions on the label. · Do not take two or more pain medicines at the same time unless the doctor told you to. Many pain medicines have acetaminophen, which is Tylenol. Too much acetaminophen (Tylenol) can be harmful. · Take an over-the-counter cough medicine that contains dextromethorphan to help quiet a dry, hacking cough so that you can sleep. Avoid cough medicines that have more than one active ingredient. Read and follow all instructions on the label. · Breathe moist air from a humidifier, hot shower, or sink filled with hot water. The heat and moisture will thin mucus so you can cough it out. · Do not smoke. Smoking can make bronchitis worse. If you need help quitting, talk to your doctor about stop-smoking programs and medicines.  These can increase your chances of quitting for good.  When should you call for help? Call 911 anytime you think you may need emergency care. For example, call if:    · You have severe trouble breathing.    Call your doctor now or seek immediate medical care if:    · You have new or worse trouble breathing.     · You cough up dark brown or bloody mucus (sputum).     · You have a new or higher fever.     · You have a new rash.    Watch closely for changes in your health, and be sure to contact your doctor if:    · You cough more deeply or more often, especially if you notice more mucus or a change in the color of your mucus.     · You are not getting better as expected. Where can you learn more? Go to http://angelique-sheila.info/. Enter H333 in the search box to learn more about \"Bronchitis: Care Instructions. \"  Current as of: June 9, 2019  Content Version: 12.2  © 3428-6499 [employer]. Care instructions adapted under license by [employer] (which disclaims liability or warranty for this information). If you have questions about a medical condition or this instruction, always ask your healthcare professional. [address] any warranty or liability for your use of this information.

## 2020-01-25 NOTE — ED TRIAGE NOTES
Pt dx with bronchitits back in Dec. Was treated and got better. Pt states cough is back but feels different. .feels \"deep\".

## 2020-01-25 NOTE — ED PROVIDER NOTES
HPI the patient has a 2-week history of dry cough intermittent fever and laryngitis. She denies having shortness of breath/wheezing or chest pain. She was treated for bronchitis approximately 6 weeks ago with [facility].     Past Medical History:   Diagnosis Date    Abnormal Pap smear of cervix     Acid indigestion     Cancer (HCC)     choriocarcinoma  - UTERNE - surgery/chemo    Depression     Diabetes (HCC)     Dysthymic disorder     GERD (gastroesophageal reflux disease)     Hypertriglyceridemia     Mixed hyperlipidemia 3/31/2010    MRSA (methicillin resistant Staphylococcus aureus)     Psychiatric disorder     depression; ANXIETY    Urinary incontinence     URGENCY AND INCONTINENECE       Past Surgical History:   Procedure Laterality Date    HX APPENDECTOMY      HX GYN  12/08    D & C     HX GYN      Ovary 8/2/2019 [place]    HX ORTHOPAEDIC      R ACL knee    HX ORTHOPAEDIC      BILATERAL A/S KNEES    HX ORTHOPAEDIC  4/2016    REMOVAL OF HARDWARE IN KNEE    HX PARTIAL HYSTERECTOMY Bilateral May 4 2015    Dr. Marky Bird         Family History:   Problem Relation Age of Onset    Hypertension Mother     Elevated Lipids Mother     Cancer Father         pancreatic    Thyroid Disease Paternal Aunt     Heart Disease Maternal Grandmother     Heart Disease Maternal Grandfather     Heart Disease Paternal Grandfather     Diabetes Paternal Aunt     Alcohol abuse Neg Hx     Arthritis-rheumatoid Neg Hx     Asthma Neg Hx     Bleeding Prob Neg Hx     Headache Neg Hx     Lung Disease Neg Hx     Migraines Neg Hx     Psychiatric Disorder Neg Hx     Stroke Neg Hx     Mental Retardation Neg Hx     Anesth Problems Neg Hx        Social History     Socioeconomic History    Marital status: SINGLE     Spouse name: Not on file    Number of children: Not on file    Years of education: Not on file    Highest education level: Not on file   Occupational History    Not on file   Social Needs    Financial resource strain: Not on file    Food insecurity:     Worry: Not on file     Inability: Not on file    Transportation needs:     Medical: Not on file     Non-medical: Not on file   Tobacco Use    Smoking status: Current Every Day Smoker     Packs/day: 1.00     Years: 25.00     Pack years: 25.00    Smokeless tobacco: Never Used   Substance and Sexual Activity    Alcohol use: No     Alcohol/week: 0.0 standard drinks    Drug use: No    Sexual activity: Yes     Partners: Male     Birth control/protection: None   Lifestyle    Physical activity:     Days per week: Not on file     Minutes per session: Not on file    Stress: Not on file   Relationships    Social connections:     Talks on phone: Not on file     Gets together: Not on file     Attends [employer] service: Not on file     Active member of club or organization: Not on file     Attends meetings of clubs or organizations: Not on file     Relationship status: Not on file    Intimate partner violence:     Fear of current or ex partner: Not on file     Emotionally abused: Not on file     Physically abused: Not on file     Forced sexual activity: Not on file   Other Topics Concern    Not on file   Social History Narrative    Not on file         ALLERGIES: Ciprofloxacin and Demerol [meperidine]    Review of Systems   Constitutional: Positive for fever. Eyes: Negative for visual disturbance. Respiratory: Positive for cough. Negative for shortness of breath and wheezing. Cardiovascular: Negative for chest pain and leg swelling. Gastrointestinal: Negative for abdominal pain, diarrhea, nausea and vomiting. Genitourinary: Negative for flank pain. Musculoskeletal: Negative. Negative for back pain and neck stiffness. Skin: Negative for rash. Neurological: Negative. Negative for syncope and headaches. Psychiatric/Behavioral: Negative for confusion. All other systems reviewed and are negative.       Vitals:    01/25/20 0815   BP: 134/81 Pulse: (!) 102   Resp: 16   Temp: 98.6 °F (37 °C)   SpO2: 99%   Weight: 114.5 kg (252 lb 6.8 oz)   Height: 5' 7\" (1.702 m)            Physical Exam  Constitutional:       General: She is not in acute distress. Appearance: She is well-developed. HENT:      Head: Normocephalic. Mouth/Throat:      Pharynx: No oropharyngeal exudate or posterior oropharyngeal erythema. Eyes:      Pupils: Pupils are equal, round, and reactive to light. Neck:      Musculoskeletal: Normal range of motion. Cardiovascular:      Rate and Rhythm: Normal rate. Heart sounds: No murmur. Pulmonary:      Effort: Pulmonary effort is normal.      Breath sounds: Normal breath sounds. Musculoskeletal: Normal range of motion. Skin:     General: Skin is warm and dry. Capillary Refill: Capillary refill takes less than 2 seconds. Neurological:      Mental Status: She is alert.    Psychiatric:         Behavior: Behavior normal.          MDM       Procedures

## 2020-01-31 ENCOUNTER — HOSPITAL ENCOUNTER (OUTPATIENT)
Dept: GENERAL RADIOLOGY | Age: 45
Discharge: HOME | End: 2020-01-31
Attending: INTERNAL MEDICINE
Payer: COMMERCIAL

## 2020-01-31 DIAGNOSIS — R06.02 SHORTNESS OF BREATH: ICD-10-CM

## 2020-01-31 PROCEDURE — 71046 X-RAY EXAM CHEST 2 VIEWS: CPT

## 2020-02-14 ENCOUNTER — OFFICE VISIT (OUTPATIENT)
Dept: OBGYN CLINIC | Age: 45
End: 2020-02-14

## 2020-02-14 ENCOUNTER — OFFICE VISIT (OUTPATIENT)
Dept: FAMILY MEDICINE CLINIC | Age: 45
End: 2020-02-14

## 2020-02-14 VITALS
DIASTOLIC BLOOD PRESSURE: 82 MMHG | BODY MASS INDEX: 39.9 KG/M2 | OXYGEN SATURATION: 95 % | HEART RATE: 94 BPM | HEIGHT: 67 IN | SYSTOLIC BLOOD PRESSURE: 126 MMHG | RESPIRATION RATE: 18 BRPM | TEMPERATURE: 99.1 F | WEIGHT: 254.2 LBS

## 2020-02-14 VITALS — DIASTOLIC BLOOD PRESSURE: 84 MMHG | WEIGHT: 254 LBS | SYSTOLIC BLOOD PRESSURE: 130 MMHG | BODY MASS INDEX: 39.78 KG/M2

## 2020-02-14 DIAGNOSIS — F31.9 BIPOLAR 1 DISORDER, DEPRESSED (HCC): ICD-10-CM

## 2020-02-14 DIAGNOSIS — N94.89 ADNEXAL MASS: ICD-10-CM

## 2020-02-14 DIAGNOSIS — E78.5 HYPERLIPIDEMIA, UNSPECIFIED HYPERLIPIDEMIA TYPE: ICD-10-CM

## 2020-02-14 DIAGNOSIS — E11.9 DIABETES MELLITUS WITHOUT COMPLICATION (HCC): ICD-10-CM

## 2020-02-14 DIAGNOSIS — N83.202 CYST OF LEFT OVARY: Primary | ICD-10-CM

## 2020-02-14 DIAGNOSIS — R10.2 PELVIC PAIN: Primary | ICD-10-CM

## 2020-02-14 RX ORDER — IBUPROFEN 800 MG/1
800 TABLET ORAL
Qty: 30 TAB | Refills: 0 | Status: SHIPPED | OUTPATIENT
Start: 2020-02-14

## 2020-02-14 RX ORDER — OXYCODONE AND ACETAMINOPHEN 5; 325 MG/1; MG/1
1 TABLET ORAL
Qty: 20 TAB | Refills: 0 | Status: SHIPPED | OUTPATIENT
Start: 2020-02-14 | End: 2020-02-21

## 2020-02-14 NOTE — PROGRESS NOTES
HISTORY OF PRESENT ILLNESS  Moriah Do is a 40 y.o. female. HPI: Patient is following up from [facility] ER for abdominal pain due to left ovarian cyst. She has history of choriocarcinoma in remission, S/P chemotherapy and hysterectomy. Right oophorectomy in August 2019, hypertension, type 2 DM, hyperlipidemia, bipolar depression. She reports that she went to ER for intermittent left abdominal pain, she described  pain as spasmodic, she took tylenol but it didn't help. Her abdominal Ct showed 4-5 cm left ovarian cyst. She was admitted at 3 PM for pain management. She states that \"  I didn't want IV pain medication such as morphine. \" and requested po pain medication but she didn't receive any and thus she left the hospital.  Today she states that I still have pain and that I want to change my GYN. Stating that \" I want this thing out, I want surgery. \"  Denies chills, fever, nausea, vomiting, urinary symptoms or vaginal discharge.    Past Medical History:   Diagnosis Date    Abnormal Pap smear of cervix     Acid indigestion     Cancer (HCC)     choriocarcinoma  - UTERNE - surgery/chemo    Depression     Diabetes (HCC)     Dysthymic disorder     GERD (gastroesophageal reflux disease)     Hypertriglyceridemia     Mixed hyperlipidemia 3/31/2010    MRSA (methicillin resistant Staphylococcus aureus)     Ovarian cyst     left     Psychiatric disorder     depression; ANXIETY    Urinary incontinence     URGENCY AND INCONTINENECE     Past Surgical History:   Procedure Laterality Date    HX APPENDECTOMY      HX GYN  12/08    D & C     HX GYN      Ovary 8/2/2019 [place]    HX ORTHOPAEDIC      R ACL knee    HX ORTHOPAEDIC      BILATERAL A/S KNEES    HX ORTHOPAEDIC  4/2016    REMOVAL OF HARDWARE IN KNEE    HX PARTIAL HYSTERECTOMY Bilateral May 4 2015    Dr. Deidra Barrios     Allergies   Allergen Reactions    Ciprofloxacin Hives    Demerol [Meperidine] Other (comments)     Goofy feeling, hallucinates     Current Outpatient Medications:     ibuprofen (MOTRIN) 800 mg tablet, Take 1 Tab by mouth every eight (8) hours as needed for Pain., Disp: 30 Tab, Rfl: 0    zolpidem (AMBIEN) 10 mg tablet, Take 1 Tab by mouth nightly as needed for Sleep.  Max Daily Amount: 10 mg., Disp: 15 Tab, Rfl: 0    raNITIdine (ZANTAC) 150 mg tablet, TAKE 1 TABLET BY MOUTH NIGHTLY, Disp: 30 Tab, Rfl: 0    pravastatin (PRAVACHOL) 40 mg tablet, TAKE 1 TABLET BY MOUTH ONCE DAILY, Disp: 30 Tab, Rfl: 0    QUEtiapine (SEROQUEL) 100 mg tablet, TAKE 1 TABLET BY MOUTH ONCE DAILY, Disp: 30 Tab, Rfl: 0    oxybutynin chloride XL (DITROPAN XL) 10 mg CR tablet, TAKE 1 TABLET BY MOUTH ONCE DAILY, Disp: 30 Tab, Rfl: 0    omeprazole (PRILOSEC) 20 mg capsule, TAKE 1 CAPSULE BY MOUTH ONCE DAILY, Disp: 30 Cap, Rfl: 0    glimepiride (AMARYL) 4 mg tablet, TAKE 1 TABLET BY MOUTH TWICE DAILY, Disp: 60 Tab, Rfl: 0    JANUVIA 100 mg tablet, TAKE 1 TABLET BY MOUTH ONCE DAILY, Disp: 30 Tab, Rfl: 0    fenofibrate micronized (LOFIBRA) 134 mg capsule, TAKE 1 CAPSULE BY MOUTH ONCE DAILY, Disp: 30 Cap, Rfl: 0    ergocalciferol (ERGOCALCIFEROL) 50,000 unit capsule, TAKE 1 CAPSULE BY MOUTH ONCE A WEEK, Disp: 12 Cap, Rfl: 0    busPIRone (BUSPAR) 10 mg tablet, TAKE 1 TABLET BY MOUTH TWICE DAILY, Disp: 60 Tab, Rfl: 3    pioglitazone (ACTOS) 15 mg tablet, TAKE 1 TABLET BY MOUTH ONCE DAILY, Disp: 90 Tab, Rfl: 0    pantoprazole (PROTONIX) 40 mg tablet, TAKE ONE TABLET BY MOUTH EVERY DAY, Disp: 30 Tab, Rfl: 11    umeclidinium-vilanterol (ANORO ELLIPTA) 62.5-25 mcg/actuation inhaler, DAILY, Disp: , Rfl:     doxycycline (MONODOX) 100 mg capsule, Take 1 Cap by mouth two (2) times a day., Disp: 14 Cap, Rfl: 0    methylPREDNISolone (MEDROL DOSEPACK) 4 mg tablet, Use as directed, Disp: 1 Dose Pack, Rfl: 0    benzonatate (TESSALON) 100 mg capsule, TAKE 1 CAPSULE BY MOUTH THREE TIMES DAILY AS NEEDED FOR COUGH FOR UP TO 7 DAYS, Disp: 30 Cap, Rfl: 0   fluticasone furoate-vilanterol (BREO ELLIPTA) 100-25 mcg/dose inhaler, Take 1 Puff by inhalation daily. , Disp: 1 Inhaler, Rfl: 0  Review of Systems   Constitutional: Negative. Respiratory: Negative. Cardiovascular: Negative. Gastrointestinal: Positive for abdominal pain. Negative for blood in stool, constipation, diarrhea, heartburn, melena, nausea and vomiting. Genitourinary: Negative. Blood pressure 126/82, pulse 94, temperature 99.1 °F (37.3 °C), temperature source Oral, resp. rate 18, height 5' 7\" (1.702 m), weight 254 lb 3.2 oz (115.3 kg), last menstrual period 09/10/2014, SpO2 95 %. Body mass index is 39.81 kg/m². Physical Exam  Constitutional:       Appearance: She is obese. HENT:      Mouth/Throat:      Mouth: Mucous membranes are moist.      Pharynx: Oropharynx is clear. Neck:      Musculoskeletal: Normal range of motion and neck supple. Cardiovascular:      Rate and Rhythm: Normal rate and regular rhythm. Pulses: Normal pulses. Heart sounds: Normal heart sounds. No murmur. Pulmonary:      Effort: Pulmonary effort is normal.      Breath sounds: Normal breath sounds. Abdominal:      General: Bowel sounds are normal. There is no distension. Palpations: Abdomen is soft. There is no mass. Tenderness: There is abdominal tenderness. There is no guarding or rebound. Hernia: No hernia is present. Comments: Mild to moderate tenderness in LLQ with palpation   Neurological:      Mental Status: She is alert. ASSESSMENT and PLAN    ICD-10-CM ICD-9-CM    1. Cyst of left ovary N83.202 620.2 REFERRAL TO GYNECOLOGY   2. Diabetes mellitus without complication (HCC) Y98.0 250.00    3. Hyperlipidemia, unspecified hyperlipidemia type E78.5 272.4    4.  Bipolar 1 disorder, depressed ([facility] F31.9 296.50    Pt was given an after visit summary which includes diagnosis, current medicines and vital and voiced understanding of treatment plan

## 2020-02-14 NOTE — PROGRESS NOTES
Chief Complaint   Patient presents with    Abdominal Pain     patient is in the office today for abdomen pain, went to [facility] and diagnosed with ovarian cyst     1. Have you been to the ER, urgent care clinic since your last visit? Hospitalized since your last visit? Yes When: 2-  [facility] [address] for abdominal pain    2. Have you seen or consulted any other health care providers outside of the [address] [place] [address] since your last visit? Include any pap smears or colon screening.  no

## 2020-02-14 NOTE — PROGRESS NOTES
[facility]  [email or web address]  [phone]    Ismael Lloyd MD, [address] [facility]       OB/GYN Problem visit  Same day urgent visit worked in from [address] [facility] office after patient left hospital     Chief Complaint:   Chief Complaint   Patient presents with    Ovarian Cyst       History of Present Illness: This is a new problem being evaluated by this provider. The patient is a 40 y.o.  female who reports having left lower quadrant pain for 2 days. She reports the symptoms are has worsened slightly. Aggravating factors include none. Alleviating factors include none. Patient was seen in hospital for pain , She was admitted for pain control and planned surgery for left adnexal mass and pain. Pt reports she had not received anything for pain x 6 hrs, surgery was not immediately available and preferred outpt pain management. She left ? AMA. She was awaiting rx sent for pain to [facility] by Dr. Cherelle Nicholson, but it was not there. Pt having trouble reaching Dr. Cherelle Nicholson for follow up and was told they would not send pain meds. She had hyst and RSO with Dr. Cherelle Nicholson (separate procedures) and at that time they saw left adnexal mass but elected not to remove it because of concerns for surgical menopause. Pt denies n/v/f/c but reports temp ~99 today. She has pain in LLQ x several days but it is not \"severe. \"  She wants to have that ovary removed, but does not want to be admitted for pain control. She does not have other concerns. LMP: Patient's last menstrual period was 09/10/2014 (exact date).     PFSH:  Past Medical History:   Diagnosis Date    Abnormal Pap smear of cervix     Acid indigestion     Cancer ([facility] [address] 2015    choriocarcinoma  - UTERNE - surgery/chemo    Depression     Diabetes (HCC)     Dysthymic disorder     GERD (gastroesophageal reflux disease)     Hypertriglyceridemia     Mixed hyperlipidemia 3/31/2010    MRSA (methicillin resistant Staphylococcus aureus)     Ovarian cyst     left     Psychiatric disorder     depression; ANXIETY    Urinary incontinence     URGENCY AND INCONTINENECE     Past Surgical History:   Procedure Laterality Date    HX APPENDECTOMY      HX GYN  12/08    D & C     HX GYN      Ovary 8/2/2019 [place]    HX ORTHOPAEDIC      R ACL knee    HX ORTHOPAEDIC      BILATERAL A/S KNEES    HX ORTHOPAEDIC  4/2016    REMOVAL OF HARDWARE IN KNEE    HX PARTIAL HYSTERECTOMY Bilateral May 4 2015    Dr. Hannah Mcmullen     Family History   Problem Relation Age of Onset    Hypertension Mother    [facility] Elevated Lipids Mother     Cancer Father         pancreatic    Thyroid Disease Paternal Aunt     Heart Disease Maternal Grandmother     Heart Disease Maternal Grandfather     Heart Disease Paternal Grandfather     Diabetes Paternal Aunt     Alcohol abuse Neg Hx     Arthritis-rheumatoid Neg Hx     Asthma Neg Hx     Bleeding Prob Neg Hx     Headache Neg Hx     Lung Disease Neg Hx     Migraines Neg Hx     Psychiatric Disorder Neg Hx     Stroke Neg Hx     Mental Retardation Neg Hx     Anesth Problems Neg Hx      Social History     Tobacco Use    Smoking status: Current Every Day Smoker     Packs/day: 1.00     Years: 25.00     Pack years: 25.00    Smokeless tobacco: Never Used   Substance Use Topics    Alcohol use: No     Alcohol/week: 0.0 standard drinks    Drug use: No     Allergies   Allergen Reactions    Ciprofloxacin Hives    Demerol [Meperidine] Other (comments)     Goofy feeling, hallucinates     Current Outpatient Medications   Medication Sig    umeclidinium-vilanterol (ANORO ELLIPTA) 62.5-25 mcg/actuation inhaler DAILY    oxyCODONE-acetaminophen (PERCOCET) 5-325 mg per tablet Take 1 Tab by mouth every four (4) hours as needed for Pain for up to 7 days. Max Daily Amount: 6 Tabs.  zolpidem (AMBIEN) 10 mg tablet Take 1 Tab by mouth nightly as needed for Sleep.  Max Daily Amount: 10 mg.    raNITIdine (ZANTAC) 150 mg tablet TAKE 1 TABLET BY MOUTH NIGHTLY    pravastatin (PRAVACHOL) 40 mg tablet TAKE 1 TABLET BY MOUTH ONCE DAILY    QUEtiapine (SEROQUEL) 100 mg tablet TAKE 1 TABLET BY MOUTH ONCE DAILY    oxybutynin chloride XL (DITROPAN XL) 10 mg CR tablet TAKE 1 TABLET BY MOUTH ONCE DAILY    omeprazole (PRILOSEC) 20 mg capsule TAKE 1 CAPSULE BY MOUTH ONCE DAILY    glimepiride (AMARYL) 4 mg tablet TAKE 1 TABLET BY MOUTH TWICE DAILY    JANUVIA 100 mg tablet TAKE 1 TABLET BY MOUTH ONCE DAILY    fenofibrate micronized (LOFIBRA) 134 mg capsule TAKE 1 CAPSULE BY MOUTH ONCE DAILY    ergocalciferol (ERGOCALCIFEROL) 50,000 unit capsule TAKE 1 CAPSULE BY MOUTH ONCE A WEEK    busPIRone (BUSPAR) 10 mg tablet TAKE 1 TABLET BY MOUTH TWICE DAILY    pioglitazone (ACTOS) 15 mg tablet TAKE 1 TABLET BY MOUTH ONCE DAILY    pantoprazole (PROTONIX) 40 mg tablet TAKE ONE TABLET BY MOUTH EVERY DAY    ibuprofen (MOTRIN) 800 mg tablet Take 1 Tab by mouth every eight (8) hours as needed for Pain.  doxycycline (MONODOX) 100 mg capsule Take 1 Cap by mouth two (2) times a day.  methylPREDNISolone (MEDROL DOSEPACK) 4 mg tablet Use as directed    benzonatate (TESSALON) 100 mg capsule TAKE 1 CAPSULE BY MOUTH THREE TIMES DAILY AS NEEDED FOR COUGH FOR UP TO 7 DAYS    fluticasone furoate-vilanterol (BREO ELLIPTA) 100-25 mcg/dose inhaler Take 1 Puff by inhalation daily. No current facility-administered medications for this visit.         Review of Systems:  History obtained from the patient  Constitutional: negative for fevers, chills and weight loss  ENT ROS: negative for - hearing change, oral lesions or visual changes  Respiratory: negative for cough, wheezing or dyspnea on exertion  Cardiovascular: negative for chest pain, irregular heart beats, exertional chest pressure/discomfort  Gastrointestinal: negative for dysphagia, nausea and vomiting  Genito-Urinary ROS:  see HPI  Inteument/breast: negative for rash, breast lump and nipple discharge  Musculoskeletal:negative for stiff joints, neck pain and muscle weakness  Endocrine ROS: negative for - breast changes, galactorrhea or temperature intolerance  Hematological and Lymphatic ROS: negative for - blood clots, bruising or swollen lymph nodes    Physical Exam:  Visit Vitals  /84   Wt 254 lb (115.2 kg)   BMI 39.78 kg/m²       GENERAL: alert, well appearing, and in no distress  HEAD: normocephalic, atraumatic. PULM: clear to auscultation, no wheezes, rales or rhonchi, symmetric air entry   COR: normal rate and regular rhythm, S1 and S2 normal   ABDOMEN: soft, nontender, nondistended, no masses or organomegaly   EGBUS: no lesions, no inflammation, no masses  VULVA: normal appearing vulva with no masses, tenderness or lesions  VAGINA: normal appearing vagina with normal color, no lesions, no discharge  UTERUS: absent  ADNEXA: normal adnexa in size, sl tender to deep palpation LLQ  NEURO: alert, oriented, normal speech    Assessment:  Encounter Diagnoses   Name Primary?  Pelvic pain Yes    Adnexal mass     Comment: left       Plan:  The patient is advised that she should contact the office if she does not note improvement or if symptoms recur  Recommend follow up with PCP for non-gynecologic complaints and chronic medical problems. She should contact our office with any questions or concerns  She could keep her routine annual exam appointment. Pain precautions, does not appear to have acute process.   Pain precautions,  search done, called [facility]: no recent narcotics Rx in system or filled recently  Caution given re narcotic use, rec avoid diving and operating heavy machinery  We discussed potential causes of lower abdominal/pelvic pain: GYN, GI, , musculoskeletal, infectious process, adhesions, or other etiology  We discussed evaluation of lower abdominal/pelvic pain: including but not limited to observation, surgical evaluation/laparoscopy, imaging   We discussed treatment of lower abdominal/pelvic pain: including but not limited to: pain medication, hormonal management, surgical intervention, bowel regimen. Since pain can be a symptoms of an underlying abnormal process she is encouraged to contact my office with persistent symptoms for additional evaluation and treatment if needed. If worsening pain rec return to hospital/ER  Appt made for Monday with Dr. Radha Dover, as does not want to return to Dr. Villeda Husbands office. Disc option of gyn vs gyn/onc surgical removal.  Rec fu with Dr. Mata Mahoney, and disc contacting her office for the pt, she declined. Rec to patient she make [facility] on Monday as possible to facilitate management  Pain precautions. Followed up with provider at [facility]: to let her know what the plan was and that narcotic pain meds were sent   search done: no narcotics x 8/19'    Outside [facility] CT reviewed: left adnexal mass 5-6cm (report)    Additional coordination of care: reviewing records, contact [address] [place] [address] office, contacting [facility] pharmacy and performing  medication search and scheduling gyn onc consult  ~20 minutes    Orders Placed This Encounter    oxyCODONE-acetaminophen (PERCOCET) 5-325 mg per tablet       No results found for this visit on 02/14/20.

## 2020-02-17 ENCOUNTER — OFFICE VISIT (OUTPATIENT)
Dept: GYNECOLOGY | Age: 45
End: 2020-02-17

## 2020-02-17 VITALS
BODY MASS INDEX: 40.02 KG/M2 | HEART RATE: 103 BPM | SYSTOLIC BLOOD PRESSURE: 132 MMHG | WEIGHT: 255 LBS | DIASTOLIC BLOOD PRESSURE: 98 MMHG | HEIGHT: 67 IN

## 2020-02-17 DIAGNOSIS — N83.202 CYST OF LEFT OVARY: Primary | ICD-10-CM

## 2020-02-17 DIAGNOSIS — F17.200 SMOKER: ICD-10-CM

## 2020-02-17 DIAGNOSIS — E11.9 DIABETES MELLITUS WITHOUT COMPLICATION (HCC): ICD-10-CM

## 2020-02-17 DIAGNOSIS — E66.01 SEVERE OBESITY (HCC): ICD-10-CM

## 2020-02-17 DIAGNOSIS — C58 CHORIOCARCINOMA OF FEMALE (HCC): ICD-10-CM

## 2020-02-17 NOTE — LETTER
2/23/20 Patient: Stefania Moraes YOB: 1975 Date of Visit: 2/17/2020 Liliane Grayson MD 
[address] [place] [id number] VIA In Basket Dear Liliane Grayson MD, Thank you for referring Ms. Bambi Do to [address] [place] [address] for evaluation. My notes for this consultation are attached. If you have questions, please do not hesitate to call me. I look forward to following your patient along with you.  
 
 
Sincerely, 
 
Charly Gale MD

## 2020-02-17 NOTE — PROGRESS NOTES
New Patient, Referred by Dr. Allan Marie for right adnexal mass, former patient of Dr. Ainsley Parker, pt states she went to the ER [facility] EM[facility] for pain on 2/13/2020, went to PCP and PCP sent her to Dr. Allan Marie and Dr. Allan Marie called Dr. Elsi Lyon    1. Have you been to the ER, urgent care clinic since your last visit? Hospitalized since your last visit? Yes, [facility] [place] [facility] ER on 2/13/2020    2. Have you seen or consulted any other health care providers outside of the [address] [place] [address] since your last visit? Include any pap smears or colon screening.    Yes, Dr. Ainsley Parker

## 2020-02-18 LAB
CANCER AG125 SERPL-ACNC: 10.9 U/ML (ref 0–38.1)
HCG INTACT+B SERPL-ACNC: <1 MIU/ML

## 2020-02-23 DIAGNOSIS — G47.09 OTHER INSOMNIA: ICD-10-CM

## 2020-02-23 PROBLEM — N83.202 CYST OF LEFT OVARY: Status: ACTIVE | Noted: 2020-02-23

## 2020-02-23 PROBLEM — F17.200 SMOKER: Status: ACTIVE | Noted: 2020-02-23

## 2020-02-24 RX ORDER — ZOLPIDEM TARTRATE 10 MG/1
10 TABLET ORAL
Qty: 15 TAB | Refills: 0 | Status: SHIPPED | OUTPATIENT
Start: 2020-02-24 | End: 2020-03-18 | Stop reason: SDUPTHER

## 2020-02-24 NOTE — PROGRESS NOTES
[address] [place] [address] Mathias Moritz 061, [address] [facility]  P [phone]  F [phone]    Office Note  Patient ID:  Name:  Pauline Tavares  MRN:  [id number]  :  1975/44 y.o. Date:  2020      HISTORY OF PRESENT ILLNESS:  Ms. Pauline Tavares is a 40 y.o.  premenopausal female who presents in consultation from Dr. Thanh Page for a left ovarian cyst measuring 4.93c x 4.46 x 5cm. The patient has been followed by Dr. Roman Robledo for a history of choriocarcinoma following her pregnancy. We do not have any records from Dr. Sha Oliver office as of yet, and it is unclear of her treatment history in regard to her choriocarcinoma. The patient also has a history of ovarian cysts and dysfunctional uterine bleeding, for which she reports a laparoscopic hysterectomy and right oophorectomy by Dr. Roman Robledo in 2019. We do not have these records. She was recently seen in the ER at [facility] for abdominal/pelvic pain on 2020, at which time a CT and pelvic ultrasound were performed, demonstrated a 4 x 4 x 5cm left ovarian cyst. The pelvic ultrasound notes \"enlargement of the left ovary due to a dominant cyst...4.83cm x 4.2 x 3.5cm\". The patient reports that no one at the hospital gave her any pain medicine, including Dr. Sha Oliver office. She subsequently requested to be seen by our office. After a long discussion with the patient, she offered the information that she is going on her honeymoon in 3 weeks and wants surgery now to take care of her cyst. Today she is without pain in her pelvis. Denies bloating, change in appetite or bowel habits, urinary symptoms, nausea, vomiting, CP, or SOB. She is an everyday smoker. Denies vaginal bleeding or discharge. Denies hematuria or hematochezia. Pertinent PMH/PSH: DM, obesity, h/o choriocarcinoma      Active, no restrictions. Imaging Review:   Pelvic ultrasound 2020:   Impression: Enlargement of the left ovary due to a cominant cyst as described above. No torsion identified. Prior hysterectomy and right salpingo-oophorectomy. Please see full report. CT A/P 2/13/2020: Impression: Large left ovarian cyst. This is confrimed on the ultrasound of the same day. Prior appendectomy. Please see scanned in full report. ROS:  A comprehensive review of systems was negative except for that written in the History of Present Illness. , 10 point ROS    OB/GYN ROS:  Patient denies significant menstrual problems.     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    Diabetes mellitus without complication ([address] 45/76/5491    Insomnia, unspecified 07/14/2010    Unspecified vitamin D deficiency 05/27/2010    Esophageal reflux 05/27/2010    Anxiety 06/03/2009     PMH:  Past Medical History:   Diagnosis Date    Abnormal Pap smear of cervix     Acid indigestion     Cancer ([address] 01/2015    choriocarcinoma  - UTERNE - surgery/chemo    Depression     Diabetes (HCC)     Dysthymic disorder     GERD (gastroesophageal reflux disease)     Hypertriglyceridemia     Mixed hyperlipidemia 3/31/2010    MRSA (methicillin resistant Staphylococcus aureus)     Ovarian cyst     left     Psychiatric disorder     depression; ANXIETY    Urinary incontinence     URGENCY AND INCONTINENECE      PSH:  Past Surgical History:   Procedure Laterality Date    HX APPENDECTOMY      HX GYN  12/08    D & C     HX GYN      Ovary 8/2/2019 [place]    HX ORTHOPAEDIC      R ACL knee    HX ORTHOPAEDIC      BILATERAL A/S KNEES    HX ORTHOPAEDIC  4/2016    REMOVAL OF HARDWARE IN KNEE    HX PARTIAL HYSTERECTOMY Bilateral May 4 2015    Dr. Rosemary Shi      Social History:  Social History     Tobacco Use    Smoking status: Current Every Day Smoker     Packs/day: 1.00     Years: 25.00     Pack years: 25.00    Smokeless tobacco: Never Used   Substance Use Topics    Alcohol use: No     Alcohol/week: 0.0 standard drinks      Family History:  Family History   Problem Relation Age of Onset    Hypertension Mother     Elevated Lipids Mother     Cancer Mother         bladder cancer    Cancer Father         pancreatic    Thyroid Disease Paternal Aunt     Heart Disease Maternal Grandmother     Heart Disease Maternal Grandfather     Heart Disease Paternal Grandfather     Diabetes Paternal Aunt     Alcohol abuse Neg Hx     Arthritis-rheumatoid Neg Hx     Asthma Neg Hx     Bleeding Prob Neg Hx     Headache Neg Hx     Lung Disease Neg Hx     Migraines Neg Hx     Psychiatric Disorder Neg Hx     Stroke Neg Hx     Mental Retardation Neg Hx     Anesth Problems Neg Hx       Medications: (reviewed)  Current Outpatient Medications   Medication Sig    umeclidinium-vilanterol (ANORO ELLIPTA) 62.5-25 mcg/actuation inhaler DAILY    zolpidem (AMBIEN) 10 mg tablet Take 1 Tab by mouth nightly as needed for Sleep.  Max Daily Amount: 10 mg.    raNITIdine (ZANTAC) 150 mg tablet TAKE 1 TABLET BY MOUTH NIGHTLY    pravastatin (PRAVACHOL) 40 mg tablet TAKE 1 TABLET BY MOUTH ONCE DAILY    QUEtiapine (SEROQUEL) 100 mg tablet TAKE 1 TABLET BY MOUTH ONCE DAILY    oxybutynin chloride XL (DITROPAN XL) 10 mg CR tablet TAKE 1 TABLET BY MOUTH ONCE DAILY    omeprazole (PRILOSEC) 20 mg capsule TAKE 1 CAPSULE BY MOUTH ONCE DAILY    glimepiride (AMARYL) 4 mg tablet TAKE 1 TABLET BY MOUTH TWICE DAILY    JANUVIA 100 mg tablet TAKE 1 TABLET BY MOUTH ONCE DAILY    fenofibrate micronized (LOFIBRA) 134 mg capsule TAKE 1 CAPSULE BY MOUTH ONCE DAILY    ergocalciferol (ERGOCALCIFEROL) 50,000 unit capsule TAKE 1 CAPSULE BY MOUTH ONCE A WEEK    busPIRone (BUSPAR) 10 mg tablet TAKE 1 TABLET BY MOUTH TWICE DAILY    pioglitazone (ACTOS) 15 mg tablet TAKE 1 TABLET BY MOUTH ONCE DAILY    pantoprazole (PROTONIX) 40 mg tablet TAKE ONE TABLET BY MOUTH EVERY DAY    ibuprofen (MOTRIN) 800 mg tablet Take 1 Tab by mouth every eight (8) hours as needed for Pain.  doxycycline (MONODOX) 100 mg capsule Take 1 Cap by mouth two (2) times a day.  methylPREDNISolone (MEDROL DOSEPACK) 4 mg tablet Use as directed    benzonatate (TESSALON) 100 mg capsule TAKE 1 CAPSULE BY MOUTH THREE TIMES DAILY AS NEEDED FOR COUGH FOR UP TO 7 DAYS    fluticasone furoate-vilanterol (BREO ELLIPTA) 100-25 mcg/dose inhaler Take 1 Puff by inhalation daily. No current facility-administered medications for this visit. Allergies: (reviewed)  Allergies   Allergen Reactions    Ciprofloxacin Hives    Demerol [Meperidine] Other (comments)     Goofy feeling, hallucinates        OBJECTIVE:    Physical Exam:  VITAL SIGNS: Vitals:    02/17/20 1340   BP: (!) 132/98   Pulse: (!) 103   Weight: 255 lb (115.7 kg)   Height: 5' 7\" (1.702 m)     Body mass index is 39.94 kg/m². GENERAL ANNI: Conversant, alert, oriented. No acute distress. HEENT: HEENT. No thyroid enlargement. No JVD. Neck: Supple without restrictions. RESPIRATORY: Clear to auscultation and percussion to the bases. No CVAT. CARDIOVASC: RRR without murmur/rub. GASTROINT: soft, non-tender, without masses or organomegaly   MUSCULOSKEL: no joint tenderness, deformity or swelling       EXTREMITIES: extremities normal, atraumatic, no cyanosis or edema   PELVIC: Exam chaperoned by nurse. Normal appearing external genitalia. On speculum exam, normal appearing vagina. Surgically absent uterus. No evidence of adnexal masses or nodularity on bimanual exam, although difficult to examine given body habitus. No clear tenderness   RECTAL: deferred   RAI SURVEY: No suspicious lymphadenopathy or edema noted. NEURO: Grossly intact.  No acute deficit. Lab Date as available:    Lab Results   Component Value Date/Time    WBC 10.1 05/16/2019 09:40 AM    HGB 14.6 05/16/2019 09:40 AM    HCT 44.3 05/16/2019 09:40 AM    PLATELET 872 51/15/3687 09:40 AM    MCV 89 05/16/2019 09:40 AM     Lab Results   Component Value Date/Time    Sodium 141 10/01/2019 10:29 AM    Potassium 4.1 10/01/2019 10:29 AM    Chloride 103 10/01/2019 10:29 AM    CO2 24 10/01/2019 10:29 AM    Anion gap 14 03/16/2019 07:28 PM    Glucose 172 (H) 10/01/2019 10:29 AM    BUN 12 10/01/2019 10:29 AM    Creatinine 0.81 10/01/2019 10:29 AM    BUN/Creatinine ratio 15 10/01/2019 10:29 AM    GFR est  10/01/2019 10:29 AM    GFR est non-AA 89 10/01/2019 10:29 AM    Calcium 9.1 10/01/2019 10:29 AM         IMPRESSION/PLAN:    Ms. Tabitha Veliz is a 40 y.o. female with a working diagnosis of a 5cm left ovarian cyst. 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 Bambi Do's course to date, including her medical records, recent studies, physical exam, and review of symptoms. The patient was previously followed by Dr. Aleksandar Taylor for her choriocarcinoma. She had a TLH/RSO in August of 2019 by Dr. Aleksandar Taylor, per the patient's report, for a right ovarian cyst and dysfunctional uterine bleeding.  We do not have any records from Dr. Weaver Hind office as of yet, as the patient was referred by Dr. Zamzam Slaughter as the patient was not satisfied at Dr. Erin Barrera office. It appears that Dr. Jessica Morales has taken very good care of the patient, but the patient became disgruntled after not receiving what she deemed to be adequate pain medication at her hospital visit on 2/13/2020. As well, the patient was very upfront after a long discussion that she just wants her ovary out, and that she wants it done before her honeymoon in 3 weeks. In regard to the actual cyst, based on her imaging results, this appears to be a simple dominant follicle and not a complex mass. I have recommended a Ca-125 and an HE-4, but I have recommended against any surgical intervention prior to her going on her honeymoon. I advised against any surgery prior to her honeymoon in 3 weeks, as she is flying. As well, her risk of malignancy based on imaging alone is <1%; and I do not believe that she needs to cancel any already planned trips. In fact, the patient said she wouldn't cancel them. We have agreed to follow-up her lab work as per above, and we will see her back in 6 weeks with a repeat ultrasound. I suspect her cyst will resolve on its own. In regard to her history of choriocarcinoma, we will check a [facility] now, and we will work to obtain her records from Dr. Jessica Morales. All questions and concerns were addressed with the patient and she is comfortable with the plan.      Defined Sensitive Document    >50% of total time allocated to visit dedicated to counseling, 60 minutes total.    Signed By: Rodolfo Delgado MD     2/23/2020/7:29 PM

## 2020-02-24 NOTE — TELEPHONE ENCOUNTER
PCP: Prerna Vásquez NP  L;ast refill 01/20/20  Last appt: 2/14/2020  Future Appointments   Date Time Provider Arti Winslowi   3/26/2020  1:00 PM [facility]   3/26/2020  1:30 PM [facility] OP 1 [facility]   3/31/2020  1:30 PM Yovani Gonzalez MD [facility]       Requested Prescriptions     Pending Prescriptions Disp Refills    zolpidem (AMBIEN) 10 mg tablet 15 Tab 0     Sig: Take 1 Tab by mouth nightly as needed for Sleep. Max Daily Amount: 10 mg.

## 2020-02-27 ENCOUNTER — ANESTHESIA EVENT (OUTPATIENT)
Dept: MEDSURG UNIT | Age: 45
End: 2020-02-27
Payer: COMMERCIAL

## 2020-02-28 ENCOUNTER — HOSPITAL ENCOUNTER (OUTPATIENT)
Age: 45
Setting detail: OUTPATIENT SURGERY
Discharge: HOME | End: 2020-02-28
Attending: OTOLARYNGOLOGY | Admitting: OTOLARYNGOLOGY
Payer: COMMERCIAL

## 2020-02-28 ENCOUNTER — ANESTHESIA (OUTPATIENT)
Dept: MEDSURG UNIT | Age: 45
End: 2020-02-28
Payer: COMMERCIAL

## 2020-02-28 VITALS
OXYGEN SATURATION: 98 % | HEART RATE: 95 BPM | DIASTOLIC BLOOD PRESSURE: 69 MMHG | SYSTOLIC BLOOD PRESSURE: 116 MMHG | HEIGHT: 67 IN | BODY MASS INDEX: 38.45 KG/M2 | RESPIRATION RATE: 18 BRPM | WEIGHT: 245 LBS | TEMPERATURE: 98 F

## 2020-02-28 DIAGNOSIS — L76.82 PAIN AT SURGICAL INCISION: Primary | ICD-10-CM

## 2020-02-28 LAB
GLUCOSE BLD STRIP.AUTO-MCNC: 206 MG/DL (ref 65–100)
SERVICE CMNT-IMP: ABNORMAL

## 2020-02-28 PROCEDURE — 74011250636 HC RX REV CODE- 250/636: Performed by: NURSE ANESTHETIST, CERTIFIED REGISTERED

## 2020-02-28 PROCEDURE — 77030039825 HC MSK NSL PAP SUPERNO2VA VYRM -B: Performed by: ANESTHESIOLOGY

## 2020-02-28 PROCEDURE — 88305 TISSUE EXAM BY PATHOLOGIST: CPT

## 2020-02-28 PROCEDURE — 76060000062 HC AMB SURG ANES 1 TO 1.5 HR: Performed by: OTOLARYNGOLOGY

## 2020-02-28 PROCEDURE — 74011000250 HC RX REV CODE- 250: Performed by: NURSE ANESTHETIST, CERTIFIED REGISTERED

## 2020-02-28 PROCEDURE — 74011250636 HC RX REV CODE- 250/636: Performed by: ANESTHESIOLOGY

## 2020-02-28 PROCEDURE — 77030008477 HC STYL SATN SLP COVD -A: Performed by: NURSE ANESTHETIST, CERTIFIED REGISTERED

## 2020-02-28 PROCEDURE — 77030018836 HC SOL IRR NACL ICUM -A: Performed by: OTOLARYNGOLOGY

## 2020-02-28 PROCEDURE — 82962 GLUCOSE BLOOD TEST: CPT

## 2020-02-28 PROCEDURE — 74011250637 HC RX REV CODE- 250/637: Performed by: ANESTHESIOLOGY

## 2020-02-28 PROCEDURE — 77030040922 HC BLNKT HYPOTHRM STRY -A

## 2020-02-28 PROCEDURE — 76030000001 HC AMB SURG OR TIME 1 TO 1.5: Performed by: OTOLARYNGOLOGY

## 2020-02-28 PROCEDURE — 77030040361 HC SLV COMPR DVT MDII -B: Performed by: OTOLARYNGOLOGY

## 2020-02-28 PROCEDURE — 76210000034 HC AMBSU PH I REC 0.5 TO 1 HR: Performed by: OTOLARYNGOLOGY

## 2020-02-28 PROCEDURE — 74011250637 HC RX REV CODE- 250/637: Performed by: OTOLARYNGOLOGY

## 2020-02-28 PROCEDURE — 77030008684 HC TU ET CUF COVD -B: Performed by: NURSE ANESTHETIST, CERTIFIED REGISTERED

## 2020-02-28 RX ORDER — SODIUM CHLORIDE 9 MG/ML
25 INJECTION, SOLUTION INTRAVENOUS CONTINUOUS
Status: DISCONTINUED | OUTPATIENT
Start: 2020-02-28 | End: 2020-02-28 | Stop reason: HOSPADM

## 2020-02-28 RX ORDER — OXYCODONE AND ACETAMINOPHEN 5; 325 MG/1; MG/1
1 TABLET ORAL AS NEEDED
Status: DISCONTINUED | OUTPATIENT
Start: 2020-02-28 | End: 2020-02-28 | Stop reason: HOSPADM

## 2020-02-28 RX ORDER — SUCCINYLCHOLINE CHLORIDE 20 MG/ML
INJECTION INTRAMUSCULAR; INTRAVENOUS AS NEEDED
Status: DISCONTINUED | OUTPATIENT
Start: 2020-02-28 | End: 2020-02-28 | Stop reason: HOSPADM

## 2020-02-28 RX ORDER — ROCURONIUM BROMIDE 10 MG/ML
INJECTION, SOLUTION INTRAVENOUS AS NEEDED
Status: DISCONTINUED | OUTPATIENT
Start: 2020-02-28 | End: 2020-02-28 | Stop reason: HOSPADM

## 2020-02-28 RX ORDER — ESMOLOL HYDROCHLORIDE 10 MG/ML
INJECTION INTRAVENOUS AS NEEDED
Status: DISCONTINUED | OUTPATIENT
Start: 2020-02-28 | End: 2020-02-28 | Stop reason: HOSPADM

## 2020-02-28 RX ORDER — FENTANYL CITRATE 50 UG/ML
INJECTION, SOLUTION INTRAMUSCULAR; INTRAVENOUS AS NEEDED
Status: DISCONTINUED | OUTPATIENT
Start: 2020-02-28 | End: 2020-02-28 | Stop reason: HOSPADM

## 2020-02-28 RX ORDER — PROPOFOL 10 MG/ML
INJECTION, EMULSION INTRAVENOUS AS NEEDED
Status: DISCONTINUED | OUTPATIENT
Start: 2020-02-28 | End: 2020-02-28 | Stop reason: HOSPADM

## 2020-02-28 RX ORDER — AMOXICILLIN AND CLAVULANATE POTASSIUM 875; 125 MG/1; MG/1
1 TABLET, FILM COATED ORAL EVERY 12 HOURS
Qty: 28 TAB | Refills: 0 | Status: SHIPPED | OUTPATIENT
Start: 2020-02-28 | End: 2020-03-13

## 2020-02-28 RX ORDER — LIDOCAINE HYDROCHLORIDE 10 MG/ML
0.1 INJECTION, SOLUTION EPIDURAL; INFILTRATION; INTRACAUDAL; PERINEURAL AS NEEDED
Status: DISCONTINUED | OUTPATIENT
Start: 2020-02-28 | End: 2020-02-28 | Stop reason: HOSPADM

## 2020-02-28 RX ORDER — SODIUM CHLORIDE 0.9 % (FLUSH) 0.9 %
5-40 SYRINGE (ML) INJECTION EVERY 8 HOURS
Status: DISCONTINUED | OUTPATIENT
Start: 2020-02-28 | End: 2020-02-28 | Stop reason: HOSPADM

## 2020-02-28 RX ORDER — SODIUM CHLORIDE, SODIUM LACTATE, POTASSIUM CHLORIDE, CALCIUM CHLORIDE 600; 310; 30; 20 MG/100ML; MG/100ML; MG/100ML; MG/100ML
125 INJECTION, SOLUTION INTRAVENOUS CONTINUOUS
Status: DISCONTINUED | OUTPATIENT
Start: 2020-02-28 | End: 2020-02-28 | Stop reason: HOSPADM

## 2020-02-28 RX ORDER — GLYCOPYRROLATE 0.2 MG/ML
INJECTION INTRAMUSCULAR; INTRAVENOUS AS NEEDED
Status: DISCONTINUED | OUTPATIENT
Start: 2020-02-28 | End: 2020-02-28 | Stop reason: HOSPADM

## 2020-02-28 RX ORDER — MIDAZOLAM HYDROCHLORIDE 1 MG/ML
0.5 INJECTION, SOLUTION INTRAMUSCULAR; INTRAVENOUS
Status: DISCONTINUED | OUTPATIENT
Start: 2020-02-28 | End: 2020-02-28 | Stop reason: HOSPADM

## 2020-02-28 RX ORDER — MIDAZOLAM HYDROCHLORIDE 1 MG/ML
INJECTION, SOLUTION INTRAMUSCULAR; INTRAVENOUS AS NEEDED
Status: DISCONTINUED | OUTPATIENT
Start: 2020-02-28 | End: 2020-02-28 | Stop reason: HOSPADM

## 2020-02-28 RX ORDER — ONDANSETRON 2 MG/ML
4 INJECTION INTRAMUSCULAR; INTRAVENOUS AS NEEDED
Status: DISCONTINUED | OUTPATIENT
Start: 2020-02-28 | End: 2020-02-28 | Stop reason: HOSPADM

## 2020-02-28 RX ORDER — OXYMETAZOLINE HCL 0.05 %
SPRAY, NON-AEROSOL (ML) NASAL AS NEEDED
Status: DISCONTINUED | OUTPATIENT
Start: 2020-02-28 | End: 2020-02-28 | Stop reason: HOSPADM

## 2020-02-28 RX ORDER — IBUPROFEN 200 MG
1 TABLET ORAL EVERY 24 HOURS
Qty: 30 PATCH | Refills: 0 | Status: SHIPPED | OUTPATIENT
Start: 2020-02-28 | End: 2020-03-29

## 2020-02-28 RX ORDER — ACETAMINOPHEN 325 MG/1
650 TABLET ORAL ONCE
Status: COMPLETED | OUTPATIENT
Start: 2020-02-28 | End: 2020-02-28

## 2020-02-28 RX ORDER — FENTANYL CITRATE 50 UG/ML
25 INJECTION, SOLUTION INTRAMUSCULAR; INTRAVENOUS
Status: DISCONTINUED | OUTPATIENT
Start: 2020-02-28 | End: 2020-02-28 | Stop reason: HOSPADM

## 2020-02-28 RX ORDER — SODIUM CHLORIDE 0.9 % (FLUSH) 0.9 %
5-40 SYRINGE (ML) INJECTION AS NEEDED
Status: DISCONTINUED | OUTPATIENT
Start: 2020-02-28 | End: 2020-02-28 | Stop reason: HOSPADM

## 2020-02-28 RX ORDER — MORPHINE SULFATE 10 MG/ML
2 INJECTION, SOLUTION INTRAMUSCULAR; INTRAVENOUS
Status: DISCONTINUED | OUTPATIENT
Start: 2020-02-28 | End: 2020-02-28 | Stop reason: HOSPADM

## 2020-02-28 RX ORDER — DEXMEDETOMIDINE HYDROCHLORIDE 100 UG/ML
INJECTION, SOLUTION INTRAVENOUS AS NEEDED
Status: DISCONTINUED | OUTPATIENT
Start: 2020-02-28 | End: 2020-02-28 | Stop reason: HOSPADM

## 2020-02-28 RX ORDER — MIDAZOLAM HYDROCHLORIDE 1 MG/ML
1 INJECTION, SOLUTION INTRAMUSCULAR; INTRAVENOUS AS NEEDED
Status: DISCONTINUED | OUTPATIENT
Start: 2020-02-28 | End: 2020-02-28 | Stop reason: HOSPADM

## 2020-02-28 RX ORDER — ONDANSETRON 2 MG/ML
INJECTION INTRAMUSCULAR; INTRAVENOUS AS NEEDED
Status: DISCONTINUED | OUTPATIENT
Start: 2020-02-28 | End: 2020-02-28 | Stop reason: HOSPADM

## 2020-02-28 RX ORDER — ONDANSETRON 8 MG/1
4-8 TABLET, ORALLY DISINTEGRATING ORAL
Qty: 15 TAB | Refills: 1 | Status: SHIPPED | OUTPATIENT
Start: 2020-02-28 | End: 2020-08-17

## 2020-02-28 RX ORDER — DEXAMETHASONE SODIUM PHOSPHATE 4 MG/ML
INJECTION, SOLUTION INTRA-ARTICULAR; INTRALESIONAL; INTRAMUSCULAR; INTRAVENOUS; SOFT TISSUE AS NEEDED
Status: DISCONTINUED | OUTPATIENT
Start: 2020-02-28 | End: 2020-02-28 | Stop reason: HOSPADM

## 2020-02-28 RX ORDER — FENTANYL CITRATE 50 UG/ML
50 INJECTION, SOLUTION INTRAMUSCULAR; INTRAVENOUS AS NEEDED
Status: DISCONTINUED | OUTPATIENT
Start: 2020-02-28 | End: 2020-02-28 | Stop reason: HOSPADM

## 2020-02-28 RX ORDER — LIDOCAINE HYDROCHLORIDE 20 MG/ML
INJECTION, SOLUTION EPIDURAL; INFILTRATION; INTRACAUDAL; PERINEURAL AS NEEDED
Status: DISCONTINUED | OUTPATIENT
Start: 2020-02-28 | End: 2020-02-28 | Stop reason: HOSPADM

## 2020-02-28 RX ORDER — HYDROMORPHONE HYDROCHLORIDE 1 MG/ML
0.2 INJECTION, SOLUTION INTRAMUSCULAR; INTRAVENOUS; SUBCUTANEOUS
Status: DISCONTINUED | OUTPATIENT
Start: 2020-02-28 | End: 2020-02-28 | Stop reason: HOSPADM

## 2020-02-28 RX ORDER — OXYCODONE AND ACETAMINOPHEN 5; 325 MG/1; MG/1
1 TABLET ORAL
Qty: 12 TAB | Refills: 0 | Status: SHIPPED | OUTPATIENT
Start: 2020-02-28 | End: 2020-04-10

## 2020-02-28 RX ORDER — DIPHENHYDRAMINE HYDROCHLORIDE 50 MG/ML
12.5 INJECTION, SOLUTION INTRAMUSCULAR; INTRAVENOUS AS NEEDED
Status: DISCONTINUED | OUTPATIENT
Start: 2020-02-28 | End: 2020-02-28 | Stop reason: HOSPADM

## 2020-02-28 RX ADMIN — ESMOLOL HYDROCHLORIDE 10 MG: 10 INJECTION, SOLUTION INTRAVENOUS at 09:15

## 2020-02-28 RX ADMIN — SUCCINYLCHOLINE CHLORIDE 140 MG: 20 INJECTION, SOLUTION INTRAMUSCULAR; INTRAVENOUS at 08:49

## 2020-02-28 RX ADMIN — GLYCOPYRROLATE 0.2 MG: 0.2 INJECTION INTRAMUSCULAR; INTRAVENOUS at 08:39

## 2020-02-28 RX ADMIN — PROPOFOL 100 MG: 10 INJECTION, EMULSION INTRAVENOUS at 08:49

## 2020-02-28 RX ADMIN — LIDOCAINE HYDROCHLORIDE 20 MG: 20 INJECTION, SOLUTION EPIDURAL; INFILTRATION; INTRACAUDAL; PERINEURAL at 09:24

## 2020-02-28 RX ADMIN — DEXAMETHASONE SODIUM PHOSPHATE 8 MG: 4 INJECTION, SOLUTION INTRAMUSCULAR; INTRAVENOUS at 08:55

## 2020-02-28 RX ADMIN — PROPOFOL 50 MG: 10 INJECTION, EMULSION INTRAVENOUS at 09:12

## 2020-02-28 RX ADMIN — DEXMEDETOMIDINE HYDROCHLORIDE 5 MCG: 100 INJECTION, SOLUTION, CONCENTRATE INTRAVENOUS at 08:55

## 2020-02-28 RX ADMIN — PROPOFOL 100 MG: 10 INJECTION, EMULSION INTRAVENOUS at 09:00

## 2020-02-28 RX ADMIN — ROCURONIUM BROMIDE 10 MG: 10 SOLUTION INTRAVENOUS at 08:48

## 2020-02-28 RX ADMIN — LIDOCAINE HYDROCHLORIDE 100 MG: 20 INJECTION, SOLUTION EPIDURAL; INFILTRATION; INTRACAUDAL; PERINEURAL at 08:48

## 2020-02-28 RX ADMIN — PROPOFOL 50 MG: 10 INJECTION, EMULSION INTRAVENOUS at 09:21

## 2020-02-28 RX ADMIN — DEXMEDETOMIDINE HYDROCHLORIDE 5 MCG: 100 INJECTION, SOLUTION, CONCENTRATE INTRAVENOUS at 09:00

## 2020-02-28 RX ADMIN — ESMOLOL HYDROCHLORIDE 20 MG: 10 INJECTION, SOLUTION INTRAVENOUS at 09:03

## 2020-02-28 RX ADMIN — DEXMEDETOMIDINE HYDROCHLORIDE 5 MCG: 100 INJECTION, SOLUTION, CONCENTRATE INTRAVENOUS at 08:52

## 2020-02-28 RX ADMIN — PROPOFOL 100 MG: 10 INJECTION, EMULSION INTRAVENOUS at 08:48

## 2020-02-28 RX ADMIN — ONDANSETRON HYDROCHLORIDE 4 MG: 2 INJECTION, SOLUTION INTRAVENOUS at 08:39

## 2020-02-28 RX ADMIN — FENTANYL CITRATE 50 MCG: 50 INJECTION, SOLUTION INTRAMUSCULAR; INTRAVENOUS at 08:55

## 2020-02-28 RX ADMIN — FENTANYL CITRATE 50 MCG: 50 INJECTION, SOLUTION INTRAMUSCULAR; INTRAVENOUS at 08:48

## 2020-02-28 RX ADMIN — DEXMEDETOMIDINE HYDROCHLORIDE 5 MCG: 100 INJECTION, SOLUTION, CONCENTRATE INTRAVENOUS at 08:58

## 2020-02-28 RX ADMIN — MIDAZOLAM 2 MG: 1 INJECTION INTRAMUSCULAR; INTRAVENOUS at 08:39

## 2020-02-28 RX ADMIN — SODIUM CHLORIDE, SODIUM LACTATE, POTASSIUM CHLORIDE, AND CALCIUM CHLORIDE 125 ML/HR: 600; 310; 30; 20 INJECTION, SOLUTION INTRAVENOUS at 08:11

## 2020-02-28 RX ADMIN — ACETAMINOPHEN 650 MG: 325 TABLET ORAL at 08:10

## 2020-02-28 RX ADMIN — SUCCINYLCHOLINE CHLORIDE 2 MG: 20 INJECTION, SOLUTION INTRAMUSCULAR; INTRAVENOUS at 09:00

## 2020-02-28 RX ADMIN — PROPOFOL 50 MG: 10 INJECTION, EMULSION INTRAVENOUS at 09:07

## 2020-02-28 NOTE — ANESTHESIA POSTPROCEDURE EVALUATION
Post-Anesthesia Evaluation and Assessment    Patient: Daniel Cat MRN: [id number]  SSN: [id number]    YOB: 1975  Age: 40 y.o. Sex: female      I have evaluated the patient and they are stable and ready for discharge from the PACU. Cardiovascular Function/Vital Signs  Visit Vitals  /69   Pulse 95   Temp 36.7 °C (98 °F)   Resp 18   Ht 5' 7\" (1.702 m)   Wt 111.1 kg (245 lb)   SpO2 98%   BMI 38.37 kg/m²       Patient is status post General anesthesia for Procedure(s):  DIRECT LARYNGOSCOPY AND NODULE EXCISION ON VOCAL CORDS BILATERALLY. Nausea/Vomiting: None    Postoperative hydration reviewed and adequate. Pain:  Pain Scale 1: Numeric (0 - 10) (02/28/20 1026)  Pain Intensity 1: 0 (02/28/20 1026)   Managed    Neurological Status:   Neuro (WDL): Within Defined Limits (02/28/20 1020)   At baseline    Mental Status, Level of Consciousness: Alert and  oriented to person, place, and time    Pulmonary Status:   O2 Device: Room air (02/28/20 1026)   Adequate oxygenation and airway patent    Complications related to anesthesia: None    Post-anesthesia assessment completed. No concerns    Signed By: Levar Gonzalez MD     February 28, 2020              Procedure(s):  DIRECT LARYNGOSCOPY AND NODULE EXCISION ON VOCAL CORDS BILATERALLY.     general    <BSHSIANPOST>    Vitals Value Taken Time   /69 2/28/2020 10:00 AM   Temp 36.7 °C (98 °F) 2/28/2020 10:40 AM   Pulse 95 2/28/2020 10:40 AM   Resp 18 2/28/2020 10:40 AM   SpO2 98 % 2/28/2020 10:40 AM

## 2020-02-28 NOTE — ANESTHESIA PREPROCEDURE EVALUATION
Relevant Problems   No relevant active problems       Anesthetic History   No history of anesthetic complications            Review of Systems / Medical History  Patient summary reviewed, nursing notes reviewed and pertinent labs reviewed    Pulmonary  Within defined limits        Smoker         Neuro/Psych   Within defined limits      Psychiatric history     Cardiovascular    Hypertension: well controlled              Exercise tolerance: >4 METS     GI/Hepatic/Renal  Within defined limits   GERD           Endo/Other  Within defined limits  Diabetes: poorly controlled, type 2    Obesity     Other Findings   Comments: choriocarcinoma  - UTERNE - surgery/chemo              Physical Exam    Airway  Mallampati: III  TM Distance: > 6 cm  Neck ROM: normal range of motion   Mouth opening: Normal     Cardiovascular  Regular rate and rhythm,  S1 and S2 normal,  no murmur, click, rub, or gallop             Dental  No notable dental hx       Pulmonary  Breath sounds clear to auscultation               Abdominal  GI exam deferred       Other Findings            Anesthetic Plan    ASA: 3  Anesthesia type: general          Induction: Intravenous  Anesthetic plan and risks discussed with: Patient

## 2020-02-28 NOTE — OP NOTES
Preoperative Diagnosis: Vocal cord tumor(s)   Postoperative Diagnosis:Same as above but just left true vocal cord tumor  Procedure Performed: Direct laryngoscopy with microscopy and excisional biopsy of vocal cord tumor left TVC  Surgeon: Ashlie Guzman MD  Assistants: None  Blood Loss: Less than 5 ml  Specimens to Lab:  Left true vocal cord tissue/tumor  Operative Findings:  Nodular lesion(s) left true vocal cord - some irregularity to it - easily biopsied - minimal nodular reaction right true vocal cord  Description of Procedure: The patient or patients parents were consented. The patient was brought back to the operating room and placed under general anesthesia. The patients name and sight for surgery were verified. The patient was prepped and draped in standard fashion. A mouth guard was then placed. A laryngoscope was then placed and suspended in appropriate position. The VC's were visualized. With microscopy - after photo - a microlaryngeal forcep s was utilized to remove   The lesions(s) on the left true vocal cord. Hemostasis was ensured. The  Laryngoscope and mouth guard were removed. The patient was awoken from anesthesia and taken to recovery.

## 2020-02-28 NOTE — DISCHARGE INSTRUCTIONS
Post Operative Laryngoscopy Instructions          [facility]  General:   Laryngoscopy and microlaryngeal surgery are performed to address disorders of the larynx (voice box) and hypopharynx (lowest portion of the throat) . These procedures can biopsy, remove or augment abnormal areas in the throat. Under general anesthesia, a scope is gently inserted into the throat through the mouth. A microscope and long, fine instruments are used to perform the surgery. Laryngoscopy and microlaryngeal surgery are generally performed on an outpatient basis. If you have medical conditions such as sleep apnea or bleeding disorders your surgeon may insist that you spend one night in the hospital for observation. Hospital admission may be required if excess bleeding or swelling are encountered during the procedure . Diet:   You may have liquids by mouth once you have awakened from anesthesia. Only liquids or soft foods should be taken for the first 3 days following surgery. Foods such as soup, noodles, scrambled eggs, oatmeal, yogurt, smoothies, applesauce, mashed potatoes and ice cream are usually well tolerated. Anything that has a hard edge (toast, chips, [employer]a bread) or that is difficult to chew (steak) should be avoided during the first few post-operative days. Pain control: You are likely to experience a mild to moderate sore throat for several days following surgery. Pain may also be referred to the ears. Dont be alarmed if you experience earaches during the first 1-2 weeks after surgery. The throat and ear pain is usually well controlled with prescription strength oral pain medications (Vicodin, Tylenol #3, Ultracet). These narcotic pain medications can cause constipation. If you have not been prescribed a medication for constipation then you should take an over-the-counter laxative or stool softener while you are on the prescribed pain medication.    Activity and Voice Rest:   Voice rest (no talking) is recommended for one week following the surgery unless your surgeon has directed otherwise. If you must use your voice during the first week avoid whispering, shouting and singing. Speak with your normal voice for limited periods of time. No heavy lifting or straining for 1 week following the surgery. Follow-up appointment:   Your follow up appointment in the office will be 1 month following your surgery. If you do not have the appointment made, please have a family member or friend contact our office when you arrive home from the surgery center. Please call our office immediately if you experience:   *Difficulty breathing   *Inability to swallow   *Coughing up or vomiting blood   *Fever greater than 101 degrees Fahrenheit   Office phone: [phone] (an on-call doctor is available at all times).  If the  does not return your call in 15 minutes, please proceed to your nearest emergency room

## 2020-02-28 NOTE — ROUTINE PROCESS
Patient: Daria Fierro MRN: [id number]  SSN: [id number]   YOB: 1975  Age: 40 y.o. Sex: female     Patient is status post Procedure(s):  DIRECT LARYNGOSCOPY AND NODULE EXCISION ON VOCAL CORDS BILATERALLY.     Surgeon(s) and Role:     * Dotty Blanco MD - Primary     * Marsha Morris MD - Assisting    Local/Dose/Irrigation:  See [facility]                          Airway - Endotracheal Tube 02/28/20 Oral (Active)                   Dressing/Packing:  Wound Throat-Dressing Type: Open to air (02/28/20 0900)    Splint/Cast:  ]    Other:

## 2020-03-06 ENCOUNTER — OFFICE VISIT (OUTPATIENT)
Dept: FAMILY MEDICINE CLINIC | Age: 45
End: 2020-03-06

## 2020-03-06 VITALS
HEART RATE: 88 BPM | OXYGEN SATURATION: 97 % | TEMPERATURE: 99.1 F | DIASTOLIC BLOOD PRESSURE: 74 MMHG | RESPIRATION RATE: 16 BRPM | WEIGHT: 248 LBS | BODY MASS INDEX: 38.92 KG/M2 | SYSTOLIC BLOOD PRESSURE: 118 MMHG | HEIGHT: 67 IN

## 2020-03-06 DIAGNOSIS — B37.31 VAGINAL YEAST INFECTION: Primary | ICD-10-CM

## 2020-03-06 DIAGNOSIS — B37.0 CANDIDA INFECTION, ORAL: ICD-10-CM

## 2020-03-06 RX ORDER — FLUCONAZOLE 150 MG/1
150 TABLET ORAL DAILY
Qty: 3 TAB | Refills: 1 | Status: SHIPPED | OUTPATIENT
Start: 2020-03-06 | End: 2020-03-09

## 2020-03-06 RX ORDER — FLUCONAZOLE 150 MG/1
150 TABLET ORAL DAILY
Qty: 7 TAB | Refills: 0 | Status: SHIPPED | OUTPATIENT
Start: 2020-03-06 | End: 2020-03-10 | Stop reason: SDUPTHER

## 2020-03-06 NOTE — PROGRESS NOTES
HISTORY OF PRESENT ILLNESS  Mora Do is a 40 y.o. female. HPI:Patient comes in complaining of oral and vaginal yeast infection after taking antibiotics. She just just has polypectomy form her vocal cords   She is asking me to call her surgeon to make appointment to make sure everything is OK. She is unable to speak. She is communication by witting     Past Medical History:   Diagnosis Date    Abnormal Pap smear of cervix     Acid indigestion     Cancer ([address] 01/2015    choriocarcinoma  - UTERNE - surgery/chemo    Depression     Diabetes ([address]     Dysthymic disorder     GERD (gastroesophageal reflux disease)     Hypertriglyceridemia     Ill-defined condition 02/27/2020    DENIES HISTORY OF MOTION SICKNESS OR VERTIGO    Mixed hyperlipidemia 3/31/2010    MRSA (methicillin resistant Staphylococcus aureus)     PRIOR TO 2017, NASAL SWAB NEG MRSA 1/19/2017    Ovarian cyst     left     Psychiatric disorder     depression; ANXIETY    Urinary incontinence     URGENCY AND INCONTINENECE     Past Surgical History:   Procedure Laterality Date    HX APPENDECTOMY      HX GYN  12/08    D & C     HX GYN      Ovary 8/2/2019 [place]    HX ORTHOPAEDIC      R ACL knee    HX ORTHOPAEDIC      BILATERAL A/S KNEES    HX ORTHOPAEDIC  4/2016    REMOVAL OF HARDWARE IN KNEE    HX PARTIAL HYSTERECTOMY Bilateral May 4 2015    Dr. Magalie Rousseau     Allergies   Allergen Reactions    Ciprofloxacin Hives    Demerol [Meperidine] Other (comments)     Goofy feeling, hallucinates     Current Outpatient Medications:     fluconazole (DIFLUCAN) 150 mg tablet, Take 1 Tab by mouth daily for 3 days. FDA advises cautious prescribing of oral fluconazole in pregnancy. , Disp: 3 Tab, Rfl: 1    fluconazole (DIFLUCAN) 150 mg tablet, Take 1 Tab by mouth daily for 7 days. FDA advises cautious prescribing of oral fluconazole in pregnancy. , Disp: 7 Tab, Rfl: 0    zolpidem (AMBIEN) 10 mg tablet, Take 1 Tab by mouth nightly as needed for Sleep. Max Daily Amount: 10 mg., Disp: 15 Tab, Rfl: 0    fenofibrate micronized (LOFIBRA) 134 mg capsule, TAKE 1 CAPSULE BY MOUTH ONCE DAILY, Disp: 30 Cap, Rfl: 0    glimepiride (AMARYL) 4 mg tablet, TAKE 1 TABLET BY MOUTH TWICE DAILY, Disp: 60 Tab, Rfl: 0    JANUVIA 100 mg tablet, TAKE 1 TABLET BY MOUTH ONCE DAILY, Disp: 30 Tab, Rfl: 0    omeprazole (PRILOSEC) 20 mg capsule, TAKE 1 CAPSULE BY MOUTH ONCE DAILY, Disp: 30 Cap, Rfl: 0    oxybutynin chloride XL (DITROPAN XL) 10 mg CR tablet, TAKE 1 TABLET BY MOUTH ONCE DAILY, Disp: 30 Tab, Rfl: 0    pravastatin (PRAVACHOL) 40 mg tablet, TAKE 1 TABLET BY MOUTH ONCE DAILY, Disp: 30 Tab, Rfl: 0    QUEtiapine (SEROQUEL) 100 mg tablet, TAKE 1 TABLET BY MOUTH ONCE DAILY, Disp: 30 Tab, Rfl: 0    raNITIdine (ZANTAC) 150 mg tablet, TAKE 1 TABLET BY MOUTH NIGHTLY, Disp: 30 Tab, Rfl: 0    pioglitazone (ACTOS) 15 mg tablet, TAKE 1 TABLET BY MOUTH ONCE DAILY, Disp: 90 Tab, Rfl: 0    ergocalciferol (ERGOCALCIFEROL) 1,250 mcg (50,000 unit) capsule, TAKE 1 CAPSULE BY MOUTH ONCE A WEEK, Disp: 12 Cap, Rfl: 0    umeclidinium-vilanterol (ANORO ELLIPTA) 62.5-25 mcg/actuation inhaler, DAILY, Disp: , Rfl:     busPIRone (BUSPAR) 10 mg tablet, TAKE 1 TABLET BY MOUTH TWICE DAILY, Disp: 60 Tab, Rfl: 3    pantoprazole (PROTONIX) 40 mg tablet, TAKE ONE TABLET BY MOUTH EVERY DAY, Disp: 30 Tab, Rfl: 11    ondansetron (ZOFRAN ODT) 8 mg disintegrating tablet, Take 0.5-1 Tabs by mouth every eight (8) hours as needed for Nausea., Disp: 15 Tab, Rfl: 1    docusate sodium (COLACE) 50 mg capsule, Take 2 Caps by mouth two (2) times daily as needed for Constipation. , Disp: 30 Cap, Rfl: 2    amoxicillin-clavulanate (AUGMENTIN) 875-125 mg per tablet, Take 1 Tab by mouth every twelve (12) hours for 14 days. , Disp: 28 Tab, Rfl: 0    oxyCODONE-acetaminophen (PERCOCET) 5-325 mg per tablet, Take 1 Tab by mouth every four (4) hours as needed for Pain for up to 42 days. Max Daily Amount: 6 Tabs. , Disp: 12 Tab, Rfl: 0    nicotine (NICODERM CQ) 14 mg/24 hr patch, 1 Patch by TransDERmal route every twenty-four (24) hours for 30 days. , Disp: 30 Patch, Rfl: 0    ibuprofen (MOTRIN) 800 mg tablet, Take 1 Tab by mouth every eight (8) hours as needed for Pain., Disp: 30 Tab, Rfl: 0    doxycycline (MONODOX) 100 mg capsule, Take 1 Cap by mouth two (2) times a day., Disp: 14 Cap, Rfl: 0    methylPREDNISolone (MEDROL DOSEPACK) 4 mg tablet, Use as directed, Disp: 1 Dose Pack, Rfl: 0    benzonatate (TESSALON) 100 mg capsule, TAKE 1 CAPSULE BY MOUTH THREE TIMES DAILY AS NEEDED FOR COUGH FOR UP TO 7 DAYS, Disp: 30 Cap, Rfl: 0    fluticasone furoate-vilanterol (BREO ELLIPTA) 100-25 mcg/dose inhaler, Take 1 Puff by inhalation daily. , Disp: 1 Inhaler, Rfl: 0  Review of Systems   Constitutional: Negative. HENT:        Tongue is red with white mucus   Respiratory: Negative. Cardiovascular: Negative. Gastrointestinal: Negative. Genitourinary:        Vaginal itching, redness and white discharge     Blood pressure 118/74, pulse 88, temperature 99.1 °F (37.3 °C), temperature source Oral, resp. rate 16, height 5' 7\" (1.702 m), weight 248 lb (112.5 kg), last menstrual period 09/10/2014, SpO2 97 %. Body mass index is 38.84 kg/m². Physical Exam  Constitutional:       Appearance: Normal appearance. She is obese. HENT:      Mouth/Throat:      Comments: Tongue ie erythematous, white mucus  Neck:      Musculoskeletal: Normal range of motion and neck supple. Cardiovascular:      Rate and Rhythm: Normal rate and regular rhythm. Pulses: Normal pulses. Heart sounds: Normal heart sounds. No murmur. Pulmonary:      Effort: Pulmonary effort is normal.      Breath sounds: Normal breath sounds. Abdominal:      General: Abdomen is flat. Palpations: Abdomen is soft. Genitourinary:     Vagina: Vaginal discharge present. Neurological:      Mental Status: She is alert.        ASSESSMENT and PLAN  Diagnoses and all orders for this visit:    1. Vaginal yeast infection  -     fluconazole (DIFLUCAN) 150 mg tablet; Take 1 Tab by mouth daily for 7 days. FDA advises cautious prescribing of oral fluconazole in pregnancy. 2. Candida infection, oral  -     fluconazole (DIFLUCAN) 150 mg tablet; Take 1 Tab by mouth daily for 7 days. FDA advises cautious prescribing of oral fluconazole in pregnancy.

## 2020-03-06 NOTE — PROGRESS NOTES
Chief Complaint   Patient presents with    Yeast Infection       Patient recently had vocal cord surgery, antibiotic given gave her an yeast infection. Was given one dose of medication to help but did not make it go away. 1. Have you been to the ER, urgent care clinic since your last visit? Hospitalized since your last visit? No    2. Have you seen or consulted any other health care providers outside of the [address] [place] [address] since your last visit? Include any pap smears or colon screening.  No

## 2020-03-10 ENCOUNTER — OFFICE VISIT (OUTPATIENT)
Dept: FAMILY MEDICINE CLINIC | Age: 45
End: 2020-03-10

## 2020-03-10 VITALS
BODY MASS INDEX: 39.62 KG/M2 | OXYGEN SATURATION: 95 % | SYSTOLIC BLOOD PRESSURE: 128 MMHG | TEMPERATURE: 99.2 F | RESPIRATION RATE: 18 BRPM | WEIGHT: 252.4 LBS | HEIGHT: 67 IN | HEART RATE: 96 BPM | DIASTOLIC BLOOD PRESSURE: 83 MMHG

## 2020-03-10 DIAGNOSIS — B37.0 CANDIDA INFECTION, ORAL: ICD-10-CM

## 2020-03-10 DIAGNOSIS — B37.31 VAGINAL YEAST INFECTION: Primary | ICD-10-CM

## 2020-03-10 RX ORDER — SITAGLIPTIN 100 MG/1
TABLET, FILM COATED ORAL
Qty: 30 TAB | Refills: 0 | Status: SHIPPED | OUTPATIENT
Start: 2020-03-10 | End: 2020-04-10 | Stop reason: SDUPTHER

## 2020-03-10 RX ORDER — FLUCONAZOLE 150 MG/1
150 TABLET ORAL DAILY
Qty: 7 TAB | Refills: 0 | Status: SHIPPED | OUTPATIENT
Start: 2020-03-10 | End: 2020-03-17

## 2020-03-10 NOTE — TELEPHONE ENCOUNTER
.Pharmacy is requesting for a new medication. .  Requested Prescriptions     Pending Prescriptions Disp Refills    fluconazole (DIFLUCAN) 150 mg tablet 7 Tab 0     Sig: Take 1 Tab by mouth daily for 7 days. FDA advises cautious prescribing of oral fluconazole in pregnancy. .Pharmacy on file verified        . Harriet Joe: 3/6/2020      LOV: Friday, March 06, 2020  UOV: Tuesday, March 10, 2020

## 2020-03-10 NOTE — PROGRESS NOTES
HISTORY OF PRESENT ILLNESS  Flo Do is a 40 y.o. female. HPI: Following up on oral and vaginal candida, finished taking diflucan x 7 days. Would like to check and make sure the infection has resolved. Denies vaginal discharge,or itching. Doesn't want pap or STD check. [place] is going out of [place]. Past Medical History:   Diagnosis Date    Abnormal Pap smear of cervix     Acid indigestion     Cancer ([address] 01/2015    choriocarcinoma  - UTERNE - surgery/chemo    Depression     Diabetes (HCC)     Dysthymic disorder     GERD (gastroesophageal reflux disease)     Hypertriglyceridemia     Ill-defined condition 02/27/2020    DENIES HISTORY OF MOTION SICKNESS OR VERTIGO    Mixed hyperlipidemia 3/31/2010    MRSA (methicillin resistant Staphylococcus aureus)     PRIOR TO 2017, NASAL SWAB NEG MRSA 1/19/2017    Ovarian cyst     left     Psychiatric disorder     depression; ANXIETY    Urinary incontinence     URGENCY AND INCONTINENECE     Past Surgical History:   Procedure Laterality Date    HX APPENDECTOMY      HX GYN  12/08    D & C     HX GYN      Ovary 8/2/2019 [place]    HX ORTHOPAEDIC      R ACL knee    HX ORTHOPAEDIC      BILATERAL A/S KNEES    HX ORTHOPAEDIC  4/2016    REMOVAL OF HARDWARE IN KNEE    HX PARTIAL HYSTERECTOMY Bilateral May 4 2015    Dr. Sixto Blake     Allergies   Allergen Reactions    Ciprofloxacin Hives    Demerol [Meperidine] Other (comments)     Goofy feeling, hallucinates     Current Outpatient Medications:     fluconazole (DIFLUCAN) 150 mg tablet, Take 1 Tab by mouth daily for 7 days. FDA advises cautious prescribing of oral fluconazole in pregnancy. , Disp: 7 Tab, Rfl: 0    JANUVIA 100 mg tablet, Take 1 tablet by mouth once daily, Disp: 30 Tab, Rfl: 0    zolpidem (AMBIEN) 10 mg tablet, Take 1 Tab by mouth nightly as needed for Sleep.  Max Daily Amount: 10 mg., Disp: 15 Tab, Rfl: 0    fenofibrate micronized (LOFIBRA) 134 mg capsule, TAKE 1 CAPSULE BY MOUTH ONCE DAILY, Disp: 30 Cap, Rfl: 0    glimepiride (AMARYL) 4 mg tablet, TAKE 1 TABLET BY MOUTH TWICE DAILY, Disp: 60 Tab, Rfl: 0    omeprazole (PRILOSEC) 20 mg capsule, TAKE 1 CAPSULE BY MOUTH ONCE DAILY, Disp: 30 Cap, Rfl: 0    oxybutynin chloride XL (DITROPAN XL) 10 mg CR tablet, TAKE 1 TABLET BY MOUTH ONCE DAILY, Disp: 30 Tab, Rfl: 0    pravastatin (PRAVACHOL) 40 mg tablet, TAKE 1 TABLET BY MOUTH ONCE DAILY, Disp: 30 Tab, Rfl: 0    QUEtiapine (SEROQUEL) 100 mg tablet, TAKE 1 TABLET BY MOUTH ONCE DAILY, Disp: 30 Tab, Rfl: 0    raNITIdine (ZANTAC) 150 mg tablet, TAKE 1 TABLET BY MOUTH NIGHTLY, Disp: 30 Tab, Rfl: 0    pioglitazone (ACTOS) 15 mg tablet, TAKE 1 TABLET BY MOUTH ONCE DAILY, Disp: 90 Tab, Rfl: 0    ergocalciferol (ERGOCALCIFEROL) 1,250 mcg (50,000 unit) capsule, TAKE 1 CAPSULE BY MOUTH ONCE A WEEK, Disp: 12 Cap, Rfl: 0    umeclidinium-vilanterol (ANORO ELLIPTA) 62.5-25 mcg/actuation inhaler, DAILY, Disp: , Rfl:     doxycycline (MONODOX) 100 mg capsule, Take 1 Cap by mouth two (2) times a day., Disp: 14 Cap, Rfl: 0    busPIRone (BUSPAR) 10 mg tablet, TAKE 1 TABLET BY MOUTH TWICE DAILY, Disp: 60 Tab, Rfl: 3    pantoprazole (PROTONIX) 40 mg tablet, TAKE ONE TABLET BY MOUTH EVERY DAY, Disp: 30 Tab, Rfl: 11    ondansetron (ZOFRAN ODT) 8 mg disintegrating tablet, Take 0.5-1 Tabs by mouth every eight (8) hours as needed for Nausea., Disp: 15 Tab, Rfl: 1    docusate sodium (COLACE) 50 mg capsule, Take 2 Caps by mouth two (2) times daily as needed for Constipation. , Disp: 30 Cap, Rfl: 2    amoxicillin-clavulanate (AUGMENTIN) 875-125 mg per tablet, Take 1 Tab by mouth every twelve (12) hours for 14 days. , Disp: 28 Tab, Rfl: 0    oxyCODONE-acetaminophen (PERCOCET) 5-325 mg per tablet, Take 1 Tab by mouth every four (4) hours as needed for Pain for up to 42 days.  Max Daily Amount: 6 Tabs., Disp: 12 Tab, Rfl: 0    nicotine (NICODERM CQ) 14 mg/24 hr patch, 1 Patch by TransDERmal route every twenty-four (24) hours for 30 days. , Disp: 30 Patch, Rfl: 0    ibuprofen (MOTRIN) 800 mg tablet, Take 1 Tab by mouth every eight (8) hours as needed for Pain., Disp: 30 Tab, Rfl: 0    methylPREDNISolone (MEDROL DOSEPACK) 4 mg tablet, Use as directed, Disp: 1 Dose Pack, Rfl: 0    benzonatate (TESSALON) 100 mg capsule, TAKE 1 CAPSULE BY MOUTH THREE TIMES DAILY AS NEEDED FOR COUGH FOR UP TO 7 DAYS, Disp: 30 Cap, Rfl: 0    fluticasone furoate-vilanterol (BREO ELLIPTA) 100-25 mcg/dose inhaler, Take 1 Puff by inhalation daily. , Disp: 1 Inhaler, Rfl: 0  Review of Systems   Respiratory: Negative. Cardiovascular: Negative. Gastrointestinal: Negative. Blood pressure 128/83, pulse 96, temperature 99.2 °F (37.3 °C), temperature source Oral, resp. rate 18, height 5' 7\" (1.702 m), weight 252 lb 6.4 oz (114.5 kg), last menstrual period 09/10/2014, SpO2 95 %. Body mass index is 39.53 kg/m². Physical Exam  Constitutional:       Appearance: Normal appearance. She is obese. HENT:      Mouth/Throat:      Mouth: Mucous membranes are moist.      Pharynx: Oropharynx is clear. Comments: Tongue look normal   No redness or white film noted  Neck:      Musculoskeletal: Normal range of motion and neck supple. Cardiovascular:      Rate and Rhythm: Normal rate and regular rhythm. Pulses: Normal pulses. Heart sounds: Normal heart sounds. No murmur. Pulmonary:      Effort: Pulmonary effort is normal.      Breath sounds: Normal breath sounds. Abdominal:      General: Bowel sounds are normal.      Palpations: Abdomen is soft. Genitourinary:     Vagina: No vaginal discharge. Comments: Labia looks normal,  No redness or white vaginal discharge   Neurological:      Mental Status: She is alert. ASSESSMENT and PLAN  Diagnoses and all orders for this visit:    1. Vaginal yeast infection resolved    2.  Candida infection, oral resolved  Follow up as needed  Pt was given an after visit summary which includes diagnosis, current medicines and vital and voiced understanding of treatment plan

## 2020-03-10 NOTE — PROGRESS NOTES
Chief Complaint   Patient presents with    Vaginal Discharge     Patient notice white  discharge vaginal/rectum x 1 week after SX to vocal cord. 1. Have you been to the ER, urgent care clinic since your last visit? Hospitalized since your last visit? No    2. Have you seen or consulted any other health care providers outside of the [address] [place] [address] since your last visit? Include any pap smears or colon screening.  No

## 2020-03-18 DIAGNOSIS — G47.09 OTHER INSOMNIA: ICD-10-CM

## 2020-03-18 RX ORDER — ZOLPIDEM TARTRATE 10 MG/1
10 TABLET ORAL
Qty: 15 TAB | Refills: 0 | Status: SHIPPED | OUTPATIENT
Start: 2020-03-18 | End: 2020-04-10 | Stop reason: SDUPTHER

## 2020-03-18 NOTE — TELEPHONE ENCOUNTER
PCP: Sumeet Valderrama NP    Last appt: 3/10/2020  Future Appointments   Date Time Provider Arti Martinez   3/26/2020  1:00 PM [facility] OP [address] [facility]   3/26/2020  1:30 PM [facility] OP 1 [facility]   3/31/2020  1:30 PM Lázaro Sanchez MD [facility]       Requested Prescriptions     Pending Prescriptions Disp Refills    zolpidem (AMBIEN) 10 mg tablet 15 Tab 0     Sig: Take 1 Tab by mouth nightly as needed for Sleep. Max Daily Amount: 10 mg.

## 2020-03-23 DIAGNOSIS — F31.9 BIPOLAR 1 DISORDER, DEPRESSED (HCC): ICD-10-CM

## 2020-03-23 DIAGNOSIS — E78.5 HYPERLIPIDEMIA, UNSPECIFIED HYPERLIPIDEMIA TYPE: ICD-10-CM

## 2020-03-23 DIAGNOSIS — K21.00 GASTROESOPHAGEAL REFLUX DISEASE WITH ESOPHAGITIS: ICD-10-CM

## 2020-03-23 DIAGNOSIS — F40.01 AGORAPHOBIA WITH PANIC DISORDER: ICD-10-CM

## 2020-03-23 DIAGNOSIS — E11.65 TYPE 2 DIABETES MELLITUS WITH HYPERGLYCEMIA, WITHOUT LONG-TERM CURRENT USE OF INSULIN (HCC): ICD-10-CM

## 2020-03-23 RX ORDER — FENOFIBRATE 134 MG/1
CAPSULE ORAL
Qty: 30 CAP | Refills: 0 | Status: SHIPPED | OUTPATIENT
Start: 2020-03-23 | End: 2020-04-10 | Stop reason: SDUPTHER

## 2020-03-23 RX ORDER — BUSPIRONE HYDROCHLORIDE 10 MG/1
TABLET ORAL
Qty: 60 TAB | Refills: 0 | Status: SHIPPED | OUTPATIENT
Start: 2020-03-23 | End: 2020-04-10 | Stop reason: SDUPTHER

## 2020-03-23 RX ORDER — GLIMEPIRIDE 4 MG/1
TABLET ORAL
Qty: 60 TAB | Refills: 0 | Status: SHIPPED | OUTPATIENT
Start: 2020-03-23 | End: 2020-04-10 | Stop reason: SDUPTHER

## 2020-03-23 RX ORDER — PRAVASTATIN SODIUM 40 MG/1
TABLET ORAL
Qty: 30 TAB | Refills: 0 | Status: SHIPPED | OUTPATIENT
Start: 2020-03-23 | End: 2020-04-10 | Stop reason: SDUPTHER

## 2020-03-23 RX ORDER — ERGOCALCIFEROL 1.25 MG/1
CAPSULE ORAL
Qty: 12 CAP | Refills: 0 | Status: SHIPPED | OUTPATIENT
Start: 2020-03-23 | End: 2020-09-06

## 2020-03-23 RX ORDER — QUETIAPINE FUMARATE 100 MG/1
TABLET, FILM COATED ORAL
Qty: 30 TAB | Refills: 0 | Status: SHIPPED | OUTPATIENT
Start: 2020-03-23 | End: 2020-04-10 | Stop reason: SDUPTHER

## 2020-03-23 RX ORDER — OMEPRAZOLE 20 MG/1
CAPSULE, DELAYED RELEASE ORAL
Qty: 30 CAP | Refills: 0 | Status: SHIPPED | OUTPATIENT
Start: 2020-03-23 | End: 2020-04-10 | Stop reason: SDUPTHER

## 2020-03-23 RX ORDER — RANITIDINE 150 MG/1
TABLET, FILM COATED ORAL
Qty: 30 TAB | Refills: 0 | Status: SHIPPED | OUTPATIENT
Start: 2020-03-23 | End: 2020-04-10

## 2020-03-23 RX ORDER — OXYBUTYNIN CHLORIDE 10 MG/1
TABLET, EXTENDED RELEASE ORAL
Qty: 30 TAB | Refills: 0 | Status: SHIPPED | OUTPATIENT
Start: 2020-03-23 | End: 2020-04-20 | Stop reason: SDUPTHER

## 2020-03-23 RX ORDER — OXYBUTYNIN CHLORIDE 10 MG/1
TABLET, EXTENDED RELEASE ORAL
Qty: 30 TAB | Refills: 0 | Status: SHIPPED | OUTPATIENT
Start: 2020-03-23 | End: 2020-04-10 | Stop reason: SDUPTHER

## 2020-04-10 DIAGNOSIS — E78.5 HYPERLIPIDEMIA, UNSPECIFIED HYPERLIPIDEMIA TYPE: ICD-10-CM

## 2020-04-10 DIAGNOSIS — G47.09 OTHER INSOMNIA: ICD-10-CM

## 2020-04-10 DIAGNOSIS — F31.9 BIPOLAR 1 DISORDER, DEPRESSED (HCC): ICD-10-CM

## 2020-04-10 DIAGNOSIS — E11.65 TYPE 2 DIABETES MELLITUS WITH HYPERGLYCEMIA, WITHOUT LONG-TERM CURRENT USE OF INSULIN (HCC): ICD-10-CM

## 2020-04-10 DIAGNOSIS — F40.01 AGORAPHOBIA WITH PANIC DISORDER: ICD-10-CM

## 2020-04-10 RX ORDER — FENOFIBRATE 134 MG/1
134 CAPSULE ORAL DAILY
Qty: 30 CAP | Refills: 3 | Status: SHIPPED | OUTPATIENT
Start: 2020-04-10 | End: 2020-04-20 | Stop reason: SDUPTHER

## 2020-04-10 RX ORDER — ZOLPIDEM TARTRATE 10 MG/1
10 TABLET ORAL
Qty: 15 TAB | Refills: 0 | Status: SHIPPED | OUTPATIENT
Start: 2020-04-10 | End: 2020-04-20

## 2020-04-10 RX ORDER — BUSPIRONE HYDROCHLORIDE 10 MG/1
10 TABLET ORAL 2 TIMES DAILY
Qty: 60 TAB | Refills: 3 | Status: SHIPPED | OUTPATIENT
Start: 2020-04-10 | End: 2020-04-20 | Stop reason: SDUPTHER

## 2020-04-10 RX ORDER — PRAVASTATIN SODIUM 40 MG/1
40 TABLET ORAL
Qty: 30 TAB | Refills: 3 | Status: SHIPPED | OUTPATIENT
Start: 2020-04-10 | End: 2020-04-20 | Stop reason: SDUPTHER

## 2020-04-10 RX ORDER — GLIMEPIRIDE 4 MG/1
4 TABLET ORAL 2 TIMES DAILY
Qty: 60 TAB | Refills: 3 | Status: SHIPPED | OUTPATIENT
Start: 2020-04-10 | End: 2020-04-20 | Stop reason: SDUPTHER

## 2020-04-10 RX ORDER — OMEPRAZOLE 20 MG/1
20 CAPSULE, DELAYED RELEASE ORAL DAILY
Qty: 30 CAP | Refills: 3 | Status: SHIPPED | OUTPATIENT
Start: 2020-04-10 | End: 2020-04-20 | Stop reason: SDUPTHER

## 2020-04-10 RX ORDER — QUETIAPINE FUMARATE 100 MG/1
100 TABLET, FILM COATED ORAL DAILY
Qty: 30 TAB | Refills: 3 | Status: SHIPPED | OUTPATIENT
Start: 2020-04-10 | End: 2020-04-20 | Stop reason: SDUPTHER

## 2020-04-10 RX ORDER — OXYBUTYNIN CHLORIDE 10 MG/1
10 TABLET, EXTENDED RELEASE ORAL DAILY
Qty: 30 TAB | Refills: 3 | Status: SHIPPED | OUTPATIENT
Start: 2020-04-10 | End: 2020-10-14

## 2020-04-20 ENCOUNTER — HOSPITAL ENCOUNTER (OUTPATIENT)
Dept: ULTRASOUND IMAGING | Age: 45
Discharge: HOME | End: 2020-04-20
Attending: OBSTETRICS & GYNECOLOGY
Payer: COMMERCIAL

## 2020-04-20 DIAGNOSIS — G47.09 OTHER INSOMNIA: Primary | ICD-10-CM

## 2020-04-20 DIAGNOSIS — N83.202 CYST OF LEFT OVARY: ICD-10-CM

## 2020-04-20 DIAGNOSIS — F31.9 BIPOLAR 1 DISORDER, DEPRESSED (HCC): ICD-10-CM

## 2020-04-20 DIAGNOSIS — E11.65 TYPE 2 DIABETES MELLITUS WITH HYPERGLYCEMIA, WITHOUT LONG-TERM CURRENT USE OF INSULIN (HCC): ICD-10-CM

## 2020-04-20 DIAGNOSIS — E78.5 HYPERLIPIDEMIA, UNSPECIFIED HYPERLIPIDEMIA TYPE: ICD-10-CM

## 2020-04-20 DIAGNOSIS — F40.01 AGORAPHOBIA WITH PANIC DISORDER: ICD-10-CM

## 2020-04-20 PROCEDURE — 76830 TRANSVAGINAL US NON-OB: CPT

## 2020-04-20 PROCEDURE — 76856 US EXAM PELVIC COMPLETE: CPT

## 2020-04-20 RX ORDER — QUETIAPINE FUMARATE 100 MG/1
100 TABLET, FILM COATED ORAL DAILY
Qty: 30 TAB | Refills: 3 | Status: SHIPPED | OUTPATIENT
Start: 2020-04-20 | End: 2020-08-18

## 2020-04-20 RX ORDER — ZOLPIDEM TARTRATE 5 MG/1
5 TABLET ORAL
Qty: 30 TAB | Refills: 1 | Status: SHIPPED | OUTPATIENT
Start: 2020-04-20 | End: 2020-06-20 | Stop reason: SDUPTHER

## 2020-04-20 RX ORDER — OMEPRAZOLE 20 MG/1
20 CAPSULE, DELAYED RELEASE ORAL DAILY
Qty: 30 CAP | Refills: 3 | Status: SHIPPED | OUTPATIENT
Start: 2020-04-20 | End: 2020-08-18

## 2020-04-20 RX ORDER — PRAVASTATIN SODIUM 40 MG/1
40 TABLET ORAL
Qty: 30 TAB | Refills: 3 | Status: SHIPPED | OUTPATIENT
Start: 2020-04-20 | End: 2020-08-18

## 2020-04-20 RX ORDER — BUSPIRONE HYDROCHLORIDE 10 MG/1
10 TABLET ORAL 2 TIMES DAILY
Qty: 60 TAB | Refills: 3 | Status: SHIPPED | OUTPATIENT
Start: 2020-04-20 | End: 2020-08-18

## 2020-04-20 RX ORDER — OXYBUTYNIN CHLORIDE 10 MG/1
10 TABLET, EXTENDED RELEASE ORAL DAILY
Qty: 30 TAB | Refills: 3 | Status: SHIPPED | OUTPATIENT
Start: 2020-04-20 | End: 2020-06-30 | Stop reason: SDUPTHER

## 2020-04-20 RX ORDER — GLIMEPIRIDE 4 MG/1
4 TABLET ORAL 2 TIMES DAILY
Qty: 60 TAB | Refills: 3 | Status: SHIPPED | OUTPATIENT
Start: 2020-04-20 | End: 2020-08-18

## 2020-04-20 RX ORDER — FENOFIBRATE 134 MG/1
134 CAPSULE ORAL DAILY
Qty: 30 CAP | Refills: 3 | Status: SHIPPED | OUTPATIENT
Start: 2020-04-20 | End: 2020-08-18

## 2020-04-21 ENCOUNTER — VIRTUAL VISIT (OUTPATIENT)
Dept: GYNECOLOGY | Age: 45
End: 2020-04-21

## 2020-04-21 DIAGNOSIS — C58 CHORIOCARCINOMA OF FEMALE (HCC): ICD-10-CM

## 2020-04-21 DIAGNOSIS — N83.202 CYST OF LEFT OVARY: Primary | ICD-10-CM

## 2020-04-21 DIAGNOSIS — E11.9 DIABETES MELLITUS WITHOUT COMPLICATION (HCC): ICD-10-CM

## 2020-04-21 DIAGNOSIS — R10.2 PELVIC PAIN: ICD-10-CM

## 2020-04-21 RX ORDER — OXYCODONE AND ACETAMINOPHEN 5; 325 MG/1; MG/1
1 TABLET ORAL
Qty: 15 TAB | Refills: 0 | Status: SHIPPED | OUTPATIENT
Start: 2020-04-21 | End: 2020-05-05

## 2020-04-21 NOTE — PROGRESS NOTES
virtual visit, phone call only, ultrasound results, no vitals taken due to virtual visit, pt states she is having some left sided soreness where her cyst is, she has been taking some 800 mg ibuprofen that was given to her by referring MD, she would like a refill or something to help with her pain, to go to [facility] on file    1. Have you been to the ER, urgent care clinic since your last visit? Hospitalized since your last visit?  no    2. Have you seen or consulted any other health care providers outside of the [address] [place] [address] since your last visit? Include any pap smears or colon screening.    no

## 2020-04-21 NOTE — PROGRESS NOTES
David Trinidad is a 40 y.o. female evaluated via telephone on 2020. Consent:  She and/or health care decision maker is aware that that she may receive a bill for this telephone service, depending on her insurance coverage, and has provided verbal consent to proceed: Yes      Documentation:  I communicated with the patient and/or health care decision maker about her ovarian cyst.   Details of this discussion including any medical advice provided: see below      [address] [place] [address] Mathias Moritz 1, [address] [facility]  P [phone]  F [phone]    Office Note  Patient ID:  Name:  David Trinidad  MRN:  [id number]  :  1975/44 y.o. Date:  2020      HISTORY OF PRESENT ILLNESS:  Ms. David Trinidad is a 40 y.o. female with a left ovarian cyst who was initially referred by Dr. Andres Martínez. She also has a history of choriocarcinoma for which she was followed previously by Dr. Parrish Johnson. See details below in her initial history. She presents back today for an interval 2 month follow-up and pelvic ultrasound results. She recently went on a trip in February, and luckily was able to complete her trip before restrictions were put in place regarding the ongoing COVID-19 pandemic. She reports some mile left sided pain here and there for which she uses ibuprofen when needed, and rarely a percocet. Her Ca-125 was normal 10.9 on 2020, and her Bhcg was negative (<1). Initial History:  Ms. David Trinidad is a 40 y.o.  premenopausal female who presents in consultation from Dr. Andres Martínez for a left ovarian cyst measuring 4.93c x 4.46 x 5cm. The patient has been followed by Dr. Parrish Johnson for a history of choriocarcinoma following her pregnancy. We do not have any records from Dr. Lani Fulton office as of yet, and it is unclear of her treatment history in regard to her choriocarcinoma.  The patient also has a history of ovarian cysts and dysfunctional uterine bleeding, for which she reports a laparoscopic hysterectomy and right oophorectomy by Dr. Elidia Bell in August of 2019. We do not have these records. She was recently seen in the ER at [place] [address] for abdominal/pelvic pain on 2/13/2020, at which time a CT and pelvic ultrasound were performed, demonstrated a 4 x 4 x 5cm left ovarian cyst. The pelvic ultrasound notes \"enlargement of the left ovary due to a dominant cyst...4.83cm x 4.2 x 3.5cm\". The patient reports that no one at the hospital gave her any pain medicine, including Dr. Nathaniel Dunbar office. She subsequently requested to be seen by our office. After a long discussion with the patient, she offered the information that she is going on her honeymoon in 3 weeks and wants surgery now to take care of her cyst. Today she is without pain in her pelvis. Denies bloating, change in appetite or bowel habits, urinary symptoms, nausea, vomiting, CP, or SOB. She is an everyday smoker. Denies vaginal bleeding or discharge. Denies hematuria or hematochezia. Pertinent PMH/PSH: DM, obesity, h/o choriocarcinoma      Active, no restrictions. Imaging Review:   Pelvic ultrasound 4/20/2020:  Transabdominal and transvaginal scanning of the pelvis was performed. Abdominal:  Uterus is surgically absent. Right ovary is also surgically absent. Left ovary is normal measures 8.8 x 3.1 cm. No cyst identified. Transvaginal:  Uterus and right ovary are surgically absent. Left ovary measures 3.7 x 3.3 cm. A 2.1 x 1.7 cm left ovarian cyst is noted. There is no definite solid ovarian lesion. IMPRESSION  IMPRESSION[de-identified] Small left ovarian cyst. Otherwise negative pelvic ultrasound  following hysterectomy and right oophorectomy      Pelvic ultrasound 2/13/2020: Impression: Enlargement of the left ovary due to a cominant cyst as described above. No torsion identified. Prior hysterectomy and right salpingo-oophorectomy. Please see full report.      CT A/P 2/13/2020: Impression: Large left ovarian cyst. This is confrimed on the ultrasound of the same day. Prior appendectomy. Please see scanned in full report. ROS:  A comprehensive review of systems was negative except for that written in the History of Present Illness. , 10 point ROS    OB/GYN ROS:  Patient denies significant menstrual problems.     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/2012    Diabetes mellitus without complication ([address] 15/51/2909    Insomnia, unspecified 07/14/2010    Unspecified vitamin D deficiency 05/27/2010    Esophageal reflux 05/27/2010    Anxiety 06/03/2009     PMH:  Past Medical History:   Diagnosis Date    Abnormal Pap smear of cervix     Acid indigestion     Cancer ([address] 01/2015    choriocarcinoma  - UTERNE - surgery/chemo    Depression     Diabetes ([address]     Dysthymic disorder     GERD (gastroesophageal reflux disease)     Hypertriglyceridemia     Ill-defined condition 02/27/2020    DENIES HISTORY OF MOTION SICKNESS OR VERTIGO    Mixed hyperlipidemia 3/31/2010    MRSA (methicillin resistant Staphylococcus aureus)     PRIOR TO 2017, NASAL SWAB NEG MRSA 1/19/2017    Ovarian cyst     left     Psychiatric disorder     depression; ANXIETY    Urinary incontinence     URGENCY AND INCONTINENECE      PSH:  Past Surgical History:   Procedure Laterality Date    HX APPENDECTOMY      HX GYN  12/08    D & C     HX GYN      Ovary 8/2/2019 [facility]    HX ORTHOPAEDIC      R ACL knee    HX ORTHOPAEDIC      BILATERAL A/S KNEES    HX ORTHOPAEDIC  4/2016    REMOVAL OF HARDWARE IN KNEE    HX PARTIAL HYSTERECTOMY Bilateral May 4 2015    Dr. Vane Delgado Social History:  Social History     Tobacco Use    Smoking status: Current Every Day Smoker     Packs/day: 1.00     Years: 25.00     Pack years: 25.00    Smokeless tobacco: Never Used   Substance Use Topics    Alcohol use: No     Alcohol/week: 0.0 standard drinks      Family History:  Family History   Problem Relation Age of Onset    Hypertension Mother     Elevated Lipids Mother     Cancer Mother         bladder cancer    Cancer Father         pancreatic    Thyroid Disease Paternal Aunt     Heart Disease Maternal Grandmother     Heart Disease Maternal Grandfather     Heart Disease Paternal Grandfather     Diabetes Paternal Aunt     Alcohol abuse Neg Hx     Arthritis-rheumatoid Neg Hx     Asthma Neg Hx     Bleeding Prob Neg Hx     Headache Neg Hx     Lung Disease Neg Hx     Migraines Neg Hx     Psychiatric Disorder Neg Hx     Stroke Neg Hx     Mental Retardation Neg Hx     Anesth Problems Neg Hx       Medications: (reviewed)  Current Outpatient Medications   Medication Sig    oxyCODONE-acetaminophen (PERCOCET) 5-325 mg per tablet Take 1 Tab by mouth every six (6) hours as needed for Pain for up to 14 days. Max Daily Amount: 4 Tabs.  busPIRone (BUSPAR) 10 mg tablet Take 1 Tab by mouth two (2) times a day.  fenofibrate micronized (LOFIBRA) 134 mg capsule Take 1 Cap by mouth daily.  omeprazole (PRILOSEC) 20 mg capsule Take 1 Cap by mouth daily.  pravastatin (PRAVACHOL) 40 mg tablet Take 1 Tab by mouth nightly.  SITagliptin (Januvia) 100 mg tablet Take 1 Tab by mouth daily.  glimepiride (AMARYL) 4 mg tablet Take 1 Tab by mouth two (2) times a day.  QUEtiapine (SEROquel) 100 mg tablet Take 1 Tab by mouth daily.  oxybutynin chloride XL (DITROPAN XL) 10 mg CR tablet Take 1 Tab by mouth daily.  zolpidem (AMBIEN) 5 mg tablet Take 1 Tab by mouth nightly as needed for Sleep. Max Daily Amount: 5 mg.     oxybutynin chloride XL (DITROPAN XL) 10 mg CR tablet Take 1 Tab by mouth daily.    ergocalciferol (ERGOCALCIFEROL) 1,250 mcg (50,000 unit) capsule Take 1 capsule by mouth once a week    pioglitazone (ACTOS) 15 mg tablet TAKE 1 TABLET BY MOUTH ONCE DAILY    umeclidinium-vilanterol (ANORO ELLIPTA) 62.5-25 mcg/actuation inhaler DAILY    ibuprofen (MOTRIN) 800 mg tablet Take 1 Tab by mouth every eight (8) hours as needed for Pain.  pantoprazole (PROTONIX) 40 mg tablet TAKE ONE TABLET BY MOUTH EVERY DAY    ondansetron (ZOFRAN ODT) 8 mg disintegrating tablet Take 0.5-1 Tabs by mouth every eight (8) hours as needed for Nausea.  docusate sodium (COLACE) 50 mg capsule Take 2 Caps by mouth two (2) times daily as needed for Constipation.  doxycycline (MONODOX) 100 mg capsule Take 1 Cap by mouth two (2) times a day.  methylPREDNISolone (MEDROL DOSEPACK) 4 mg tablet Use as directed    benzonatate (TESSALON) 100 mg capsule TAKE 1 CAPSULE BY MOUTH THREE TIMES DAILY AS NEEDED FOR COUGH FOR UP TO 7 DAYS    fluticasone furoate-vilanterol (BREO ELLIPTA) 100-25 mcg/dose inhaler Take 1 Puff by inhalation daily. No current facility-administered medications for this visit. Allergies: (reviewed)  Allergies   Allergen Reactions    Ciprofloxacin Hives    Demerol [Meperidine] Other (comments)     Goofy feeling, hallucinates        OBJECTIVE:  *deferred today given video-conference visit for ongoing COVID-19 pandemic*    Physical Exam:  VITAL SIGNS: There were no vitals filed for this visit. There is no height or weight on file to calculate BMI. GENERAL ANNI: Conversant, alert, oriented. No acute distress. HEENT: HEENT. No thyroid enlargement. No JVD. Neck: Supple without restrictions. RESPIRATORY: Clear to auscultation and percussion to the bases. No CVAT. CARDIOVASC: RRR without murmur/rub.    GASTROINT: soft, non-tender, without masses or organomegaly   MUSCULOSKEL: no joint tenderness, deformity or swelling       EXTREMITIES: extremities normal, atraumatic, no cyanosis or edema   PELVIC: Exam chaperoned by nurse. Normal appearing external genitalia. On speculum exam, normal appearing vagina. Surgically absent uterus. No evidence of adnexal masses or nodularity on bimanual exam, although difficult to examine given body habitus. No clear tenderness   RECTAL: deferred   RAI SURVEY: No suspicious lymphadenopathy or edema noted. NEURO: Grossly intact. No acute deficit. Lab Date as available:    Lab Results   Component Value Date/Time    WBC 10.1 05/16/2019 09:40 AM    HGB 14.6 05/16/2019 09:40 AM    HCT 44.3 05/16/2019 09:40 AM    PLATELET 242 60/15/6918 09:40 AM    MCV 89 05/16/2019 09:40 AM     Lab Results   Component Value Date/Time    Sodium 141 10/01/2019 10:29 AM    Potassium 4.1 10/01/2019 10:29 AM    Chloride 103 10/01/2019 10:29 AM    CO2 24 10/01/2019 10:29 AM    Anion gap 14 03/16/2019 07:28 PM    Glucose 172 (H) 10/01/2019 10:29 AM    BUN 12 10/01/2019 10:29 AM    Creatinine 0.81 10/01/2019 10:29 AM    BUN/Creatinine ratio 15 10/01/2019 10:29 AM    GFR est  10/01/2019 10:29 AM    GFR est non-AA 89 10/01/2019 10:29 AM    Calcium 9.1 10/01/2019 10:29 AM         IMPRESSION/PLAN:    Ms. Tali Grant is a 40 y.o. female with a working diagnosis of a 5cm left ovarian cyst. History of gestational choriocarcinoma. Negative bhcg 2/17/2020, normal Ca-125 10.9.  Repeat pelvic ultrasound 4/20/2020 with simple appearing 3cm left ovarian cyst, otherwise normal.     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 Reviewed normal Ca-125 and negative Bhcg (given her history of choriocarcinoma). Please see my initial note from 2/17/2020 for our initial discussion. Given the cyst is smaller, simple-appearing, and her tumor markers are normal, I do not believe surgery is indicated at this time; as well, with the ongoing COVID-19 pandemic, we have been asked to limit all non-essential and non-emergent cases, which would include this case. The patient also does not want to consider any surgery at this time given the ongoing pandemic. At this time we will continue to follow this cyst and plan to RTC in 4-6 months for repeat pelvic ultrasound and discussion. Reviewed precautionary symptoms to return sooner. All questions and concerns were addressed with the patient and she is comfortable with the plan. Daxa Payton MD      I affirm this is a Patient Initiated Episode with an Established Patient who has not had a related appointment within my department in the past 7 days or scheduled within the next 24 hours.     Total Time: minutes: 11-20 minutes    Note: not billable if this call serves to triage the patient into an appointment for the relevant concern      Daxa Payton MD

## 2020-05-04 ENCOUNTER — TELEPHONE (OUTPATIENT)
Dept: FAMILY MEDICINE CLINIC | Age: 45
End: 2020-05-04

## 2020-05-04 RX ORDER — FLUCONAZOLE 150 MG/1
150 TABLET ORAL DAILY
COMMUNITY
End: 2020-05-04 | Stop reason: SDUPTHER

## 2020-05-04 NOTE — TELEPHONE ENCOUNTER
Patient is calling stating that she has  Yeast infection, she has it all the tie frequently requesting medication fluconazole (DIFLUCAN) 150 mg tablet [[id number]]  ENDED    [facility]# [phone]  Pharmacy verified   LOV : March 10, 2020 03:00 PM

## 2020-05-04 NOTE — TELEPHONE ENCOUNTER
Pt called again stressing on her pain level and requesting for her medication.     [facility]: [phone]

## 2020-05-05 RX ORDER — FLUCONAZOLE 150 MG/1
150 TABLET ORAL DAILY
Qty: 6 TAB | Refills: 0 | Status: CANCELLED | OUTPATIENT
Start: 2020-05-05

## 2020-05-05 RX ORDER — TERCONAZOLE 4 MG/G
1 CREAM VAGINAL
Qty: 45 G | Refills: 0 | Status: SHIPPED | OUTPATIENT
Start: 2020-05-05 | End: 2020-08-17

## 2020-05-05 RX ORDER — FLUCONAZOLE 150 MG/1
150 TABLET ORAL DAILY
Qty: 1 TAB | Refills: 0 | Status: SHIPPED | OUTPATIENT
Start: 2020-05-05 | End: 2020-05-05 | Stop reason: SDUPTHER

## 2020-05-05 NOTE — TELEPHONE ENCOUNTER
Rx has already been sent by Dr Kate De León, just 2 hours ago. Fluconazole takes time o see result  as it is oral medicine not topical. How can patient say that oral medicine is not working ? ?     Will send Terconazole cream. Let her know  thanks

## 2020-05-05 NOTE — TELEPHONE ENCOUNTER
Patient requesting #7 tablets per pharmacy request.  Please review/advise for sig. Last Visit: 3/10/20  RUDY Rivera  Next Appointment: Not scheduled  Previous Refill Encounter(s): today for #1 tab     Requested Prescriptions     Pending Prescriptions Disp Refills    fluconazole (Diflucan) 150 mg tablet 7 Tab 0     Sig: Take 150 mg by mouth once for yeast infection as needed. FDA advises cautious prescribing of oral fluconazole in pregnancy.

## 2020-05-05 NOTE — TELEPHONE ENCOUNTER
Called, spoke to pt. Two pt identifiers confirmed. Writer informed patient that Meds was sent to pharmacy and if no relief call back for assistance Pt verbalized understanding of information discussed w/ no further questions at this time.

## 2020-05-05 NOTE — TELEPHONE ENCOUNTER
----- Message from Dotty Macario sent at 5/5/2020 12:29 PM EDT -----  Regarding: Robert Herrera/telephone  Patient is requesting a prescription for \"Fluconazole\" to the pharmacy on file. She stated that this is an emergency. Best contact number is [phone]. Spoke with Anam Monreal at [facility] (called on the back line), I advised Claudean Lively that she called on the back line and this call was not an emergency. Claudean Lively advised me that the pt had a rx called in for the yeast infection and it was not effective. She continues to experience the same symptoms.     [facility]#[phone]    LOV:Tuesday, March 10, 2020 03:00

## 2020-05-06 ENCOUNTER — TELEPHONE (OUTPATIENT)
Dept: FAMILY MEDICINE CLINIC | Age: 45
End: 2020-05-06

## 2020-05-06 NOTE — TELEPHONE ENCOUNTER
Called, spoke to pt. Two pt identifiers confirmed. Patient informed writer that pain itching and burning has shows no relief and she did not sleep all night and just woke up apox 2 hours ago. She took Diflucan and used the cream . She is saying that [address] has order 7 pills at one time. This was done 3/10/2020. This was the only way she got any relief. Spoke with Bassam Montejo and she said will you address ASAP                                                           Please and Thank you.

## 2020-05-07 RX ORDER — FLUCONAZOLE 150 MG/1
TABLET ORAL
Qty: 7 TAB | Refills: 0 | Status: SHIPPED | OUTPATIENT
Start: 2020-05-07 | End: 2020-06-30 | Stop reason: SDUPTHER

## 2020-05-21 RX ORDER — INSULIN PUMP SYRINGE, 3 ML
EACH MISCELLANEOUS
Qty: 1 KIT | Refills: 0 | Status: SHIPPED | OUTPATIENT
Start: 2020-05-21 | End: 2022-08-01 | Stop reason: ALTCHOICE

## 2020-05-21 RX ORDER — LANCETS
EACH MISCELLANEOUS
Qty: 200 EACH | Refills: 11 | Status: SHIPPED | OUTPATIENT
Start: 2020-05-21 | End: 2022-08-25 | Stop reason: SDUPTHER

## 2020-05-21 NOTE — TELEPHONE ENCOUNTER
Request for diabetic supplies. Attached requested supplies and approve if appropriate. Last Visit: 3/10/20  RUDY Rivera  Next Appointment: not scheduled  Previous Refill Encounter(s): not found in hx. Requested Prescriptions     Pending Prescriptions Disp Refills    lancets (OneTouch UltraSoft Lancets) misc 200 Each 11     Sig: Use to check glucose twice daily.  glucose blood VI test strips (OneTouch Verio test strips) strip 200 Strip 5     Sig: Use 1 strip to check glucose twice daily. (DX: E11.9)    Blood-Glucose Meter (OneTouch Ultra2 Meter) monitoring kit 1 Kit 0     Sig: Use as directed to test blood sugar two times a day. Use meter covered by insurance.

## 2020-05-23 DIAGNOSIS — E11.9 DIABETES MELLITUS WITHOUT COMPLICATION (HCC): ICD-10-CM

## 2020-05-26 RX ORDER — PIOGLITAZONEHYDROCHLORIDE 15 MG/1
TABLET ORAL
Qty: 30 TAB | Refills: 0 | Status: SHIPPED | OUTPATIENT
Start: 2020-05-26 | End: 2020-06-23

## 2020-06-30 ENCOUNTER — OFFICE VISIT (OUTPATIENT)
Dept: FAMILY MEDICINE CLINIC | Age: 45
End: 2020-06-30

## 2020-06-30 VITALS
TEMPERATURE: 97.9 F | RESPIRATION RATE: 16 BRPM | SYSTOLIC BLOOD PRESSURE: 126 MMHG | WEIGHT: 250 LBS | HEIGHT: 67 IN | BODY MASS INDEX: 39.24 KG/M2 | OXYGEN SATURATION: 97 % | DIASTOLIC BLOOD PRESSURE: 84 MMHG | HEART RATE: 85 BPM

## 2020-06-30 DIAGNOSIS — N95.1 PERIMENOPAUSAL SYMPTOM: ICD-10-CM

## 2020-06-30 DIAGNOSIS — F31.9 BIPOLAR 1 DISORDER, DEPRESSED (HCC): ICD-10-CM

## 2020-06-30 DIAGNOSIS — E11.9 DIABETES MELLITUS WITHOUT COMPLICATION (HCC): Primary | ICD-10-CM

## 2020-06-30 DIAGNOSIS — Z23 ENCOUNTER FOR IMMUNIZATION: ICD-10-CM

## 2020-06-30 DIAGNOSIS — G47.09 OTHER INSOMNIA: ICD-10-CM

## 2020-06-30 DIAGNOSIS — E66.01 SEVERE OBESITY (HCC): ICD-10-CM

## 2020-06-30 RX ORDER — ZOLPIDEM TARTRATE 5 MG/1
5 TABLET ORAL
Qty: 30 TAB | Refills: 5 | Status: SHIPPED | OUTPATIENT
Start: 2020-06-30 | End: 2020-10-14 | Stop reason: SDUPTHER

## 2020-06-30 RX ORDER — FLUCONAZOLE 150 MG/1
TABLET ORAL
Qty: 7 TAB | Refills: 0 | Status: SHIPPED | OUTPATIENT
Start: 2020-06-30 | End: 2020-11-12 | Stop reason: SDUPTHER

## 2020-06-30 NOTE — PROGRESS NOTES
Chief Complaint   Patient presents with    Medication Evaluation    Diabetes     follow up      1. Have you been to the ER, urgent care clinic since your last visit? Hospitalized since your last visit? No    2. Have you seen or consulted any other health care providers outside of the [address] [place] [address] since your last visit? Include any pap smears or colon screening. No           Patient presents in office for DM follow up and medication management. Was advised ov needed for continuation of refills.

## 2020-06-30 NOTE — PATIENT INSTRUCTIONS

## 2020-06-30 NOTE — PROGRESS NOTES
[address] [facility] Note    Hiro Sinha is a 40 y.o. female who was seen in clinic today (6/30/2020). Subjective:  Cardiovascular Review:  The patient has diabetes, hypertension, hyperlipidemia and obesity. Diet and Lifestyle: not attempting to follow a low fat, low cholesterol diet, not attempting to follow a low sodium diet, does not rigorously follow a diabetic diet, sedentary, nonsmoker  Home BP Monitoring: is not measured at home. Pertinent ROS: taking medications as instructed, no medication side effects noted, no TIA's, no chest pain on exertion, no dyspnea on exertion, no swelling of ankles. Last Hemoglobin A1c: 6.9% (10/2019). Reports compliance with her medications. Home glucose readings 150-250. Admits to drinking Pepsi and sweet tea routinely. Insominia  Patient reports presents with difficulty sleeping. Onset: several years ago. Description of symtoms:  difficulty initiating sleep, frequent awakening. Associated symptoms: chronic generalized anxiety. Denies: ETOH overuse. Taking Ambien nghtly for last 4 years. Previously tried Trazodone and Melatonin without effectiveness. Vaginitis  Patient complains of an abnormal vaginal discharge for several days. Vaginal symptoms include discharge described as white and local irritation. STI Risk: Very low risk of STD exposure. Patient is s/p hysterectomy and unilateral oophorectomy. Seen by GYN, Dr. Marshall Simons. Prior to Admission medications    Medication Sig Start Date End Date Taking? Authorizing Provider   fluconazole (Diflucan) 150 mg tablet Take 150 mg by mouth once for yeast infection as needed. FDA advises cautious prescribing of oral fluconazole in pregnancy. 6/30/20  Yes Marky Sanchez, NP   zolpidem (AMBIEN) 5 mg tablet Take 1 Tab by mouth nightly as needed for Sleep.  Max Daily Amount: 5 mg. 6/30/20  Yes Marky Sanchez NP   pioglitazone (ACTOS) 15 mg tablet Take 1 tablet by mouth once daily 6/23/20  Yes Ngoc Hills MD   lancets (OneTouch UltraSoft Lancets) misc Use to check glucose twice daily. 5/21/20  Yes Ngoc Hills MD   glucose blood VI test strips (OneTouch Verio test strips) strip Use 1 strip to check glucose twice daily. (DX: E11.9) 5/21/20  Yes Ngoc Hills MD   Blood-Glucose Meter (OneTouch Ultra2 Meter) monitoring kit Use as directed to test blood sugar two times a day. Use meter covered by insurance. 5/21/20  Yes Ngoc Hills MD   terconazole (TERAZOL 7) 0.4 % vaginal cream Insert 1 Applicator into vagina nightly. 5/5/20  Yes Lefty Monique MD   busPIRone (BUSPAR) 10 mg tablet Take 1 Tab by mouth two (2) times a day. 4/20/20  Yes Bennett Rivera NP   fenofibrate micronized (LOFIBRA) 134 mg capsule Take 1 Cap by mouth daily. 4/20/20  Yes Bennett Rivera NP   omeprazole (PRILOSEC) 20 mg capsule Take 1 Cap by mouth daily. 4/20/20  Yes Bennett Rivera NP   pravastatin (PRAVACHOL) 40 mg tablet Take 1 Tab by mouth nightly. 4/20/20  Yes Bennett Rivera NP   SITagliptin (Januvia) 100 mg tablet Take 1 Tab by mouth daily. 4/20/20  Yes Bennett Rivera NP   glimepiride (AMARYL) 4 mg tablet Take 1 Tab by mouth two (2) times a day. 4/20/20  Yes Bennett Rivera NP   QUEtiapine (SEROquel) 100 mg tablet Take 1 Tab by mouth daily. 4/20/20  Yes Bennett Rivera NP   oxybutynin chloride XL (DITROPAN XL) 10 mg CR tablet Take 1 Tab by mouth daily. 4/10/20  Yes Bennett Rivera NP   ergocalciferol (ERGOCALCIFEROL) 1,250 mcg (50,000 unit) capsule Take 1 capsule by mouth once a week 3/23/20  Yes Robert Rivera NP   ibuprofen (MOTRIN) 800 mg tablet Take 1 Tab by mouth every eight (8) hours as needed for Pain. 2/14/20  Yes Bennett Rivera NP   pantoprazole (PROTONIX) 40 mg tablet TAKE ONE TABLET BY MOUTH EVERY DAY 7/5/19  Yes Robert Rivera NP   fluticasone furoate-vilanterol (BREO ELLIPTA) 100-25 mcg/dose inhaler Take 1 Puff by inhalation daily. 4/26/19  Yes Romie Corley NP   zolpidem (AMBIEN) 5 mg tablet Take 1 Tab by mouth nightly as needed for Sleep. Max Daily Amount: 5 mg. 6/23/20 6/30/20  Kacey Luna MD   fluconazole (Diflucan) 150 mg tablet Take 150 mg by mouth once for yeast infection as needed. FDA advises cautious prescribing of oral fluconazole in pregnancy. 5/7/20 6/30/20  Jonatan Esteves MD   oxybutynin chloride XL (DITROPAN XL) 10 mg CR tablet Take 1 Tab by mouth daily. 4/20/20 6/30/20  Effie Rivera NP   ondansetron (ZOFRAN ODT) 8 mg disintegrating tablet Take 0.5-1 Tabs by mouth every eight (8) hours as needed for Nausea. 2/28/20   Andi Romero MD   docusate sodium (COLACE) 50 mg capsule Take 2 Caps by mouth two (2) times daily as needed for Constipation. 2/28/20 6/30/20  Andi Romero MD   umeclidinium-vilanterol [place] ELLIPTA) 62.5-25 mcg/actuation inhaler DAILY  6/30/20  Provider, Historical   doxycycline (MONODOX) 100 mg capsule Take 1 Cap by mouth two (2) times a day. 1/25/20   Rio Rousseau MD   methylPREDNISolone (MEDROL DOSEPACK) 4 mg tablet Use as directed 12/13/19 6/30/20  Effie Rivera NP   benzonatate (TESSALON) 100 mg capsule TAKE 1 CAPSULE BY MOUTH THREE TIMES DAILY AS NEEDED FOR COUGH FOR UP TO 7 DAYS 5/17/19 6/30/20  Antonio DeWitt, NP          Allergies   Allergen Reactions    Ciprofloxacin Hives    Demerol [Meperidine] Other (comments)     Goofy feeling, hallucinates           ROS  See HPI    Objective:   Physical Exam  Vitals signs and nursing note reviewed. Constitutional:       Appearance: She is well-developed. Neck:      Musculoskeletal: Normal range of motion and neck supple. Thyroid: No thyromegaly. Vascular: No carotid bruit or JVD. Cardiovascular:      Rate and Rhythm: Normal rate and regular rhythm. Heart sounds: No murmur. No friction rub. No gallop. Pulmonary:      Effort: Pulmonary effort is normal. No respiratory distress.       Breath sounds: Normal breath sounds. Lymphadenopathy:      Cervical: No cervical adenopathy. Neurological:      Mental Status: She is alert and oriented to person, place, and time. Psychiatric:         Behavior: Behavior normal.           Visit Vitals  /84 (BP 1 Location: Right arm, BP Patient Position: Sitting)   Pulse 85   Temp 97.9 °F (36.6 °C) (Oral)   Resp 16   Ht 5' 7\" (1.702 m)   Wt 250 lb (113.4 kg)   LMP 09/10/2014 (Exact Date)   SpO2 97%   BMI 39.16 kg/m²       Assessment & Plan:  Diagnoses and all orders for this visit:    1. Diabetes mellitus without complication ([address]  Recheck labs, adjust dosing as needed. Reviewed diet and lifestyle changes. -     CBC W/O DIFF  -     METABOLIC PANEL, COMPREHENSIVE  -     LIPID PANEL  -     HEMOGLOBIN A1C W/O EAG    2. Other insomnia  Failure with alternative treatments. Reviewed risks with Ambien. Patient would like to continue therapy. -     zolpidem (AMBIEN) 5 mg tablet; Take 1 Tab by mouth nightly as needed for Sleep. Max Daily Amount: 5 mg.    3. Bipolar 1 disorder, depressed (HCC)  Stable, no changes to current therapy    4. Severe obesity ([address]  Discussed need for weight loss through diet and exercise. Reviewed decreased caloric intake and increased activity.  -     TSH AND FREE T4    5. Perimenopausal symptom  -     FSH AND LH  -     ESTRADIOL    6. Encounter for immunization  -     PNEUMOCOCCAL POLYSACCHARIDE VACCINE, 23-VALENT, ADULT OR IMMUNOSUPPRESSED PT DOSE,    Other orders  -     fluconazole (Diflucan) 150 mg tablet; Take 150 mg by mouth once for yeast infection as needed. FDA advises cautious prescribing of oral fluconazole in pregnancy. I have discussed the diagnosis with the patient and the intended plan as seen in the above orders. The patient has received an after-visit summary along with patient information handout. I have discussed medication side effects and warnings with the patient as well.       Follow-up and Dispositions    · Return in about 4 months (around 10/30/2020) for diabetes.            Diamante Cesar NP

## 2020-07-01 LAB
ALBUMIN SERPL-MCNC: 4.3 G/DL (ref 3.8–4.8)
ALBUMIN/GLOB SERPL: 1.7 {RATIO} (ref 1.2–2.2)
ALP SERPL-CCNC: 73 IU/L (ref 39–117)
ALT SERPL-CCNC: 27 IU/L (ref 0–32)
AST SERPL-CCNC: 30 IU/L (ref 0–40)
BILIRUB SERPL-MCNC: 0.3 MG/DL (ref 0–1.2)
BUN SERPL-MCNC: 8 MG/DL (ref 6–24)
BUN/CREAT SERPL: 10 (ref 9–23)
CALCIUM SERPL-MCNC: 9.2 MG/DL (ref 8.7–10.2)
CHLORIDE SERPL-SCNC: 103 MMOL/L (ref 96–106)
CHOLEST SERPL-MCNC: 167 MG/DL (ref 100–199)
CO2 SERPL-SCNC: 22 MMOL/L (ref 20–29)
CREAT SERPL-MCNC: 0.78 MG/DL (ref 0.57–1)
ERYTHROCYTE [DISTWIDTH] IN BLOOD BY AUTOMATED COUNT: 13.1 % (ref 11.7–15.4)
ESTRADIOL SERPL-MCNC: 219.3 PG/ML
FSH SERPL-ACNC: 4.7 MIU/ML
GLOBULIN SER CALC-MCNC: 2.5 G/DL (ref 1.5–4.5)
GLUCOSE SERPL-MCNC: 198 MG/DL (ref 65–99)
HBA1C MFR BLD: 9.5 % (ref 4.8–5.6)
HCT VFR BLD AUTO: 43.5 % (ref 34–46.6)
HDLC SERPL-MCNC: 39 MG/DL
HGB BLD-MCNC: 14.4 G/DL (ref 11.1–15.9)
INTERPRETATION, 910389: NORMAL
LDLC SERPL CALC-MCNC: 91 MG/DL (ref 0–99)
LH SERPL-ACNC: 11.7 MIU/ML
MCH RBC QN AUTO: 28.9 PG (ref 26.6–33)
MCHC RBC AUTO-ENTMCNC: 33.1 G/DL (ref 31.5–35.7)
MCV RBC AUTO: 87 FL (ref 79–97)
PLATELET # BLD AUTO: 198 X10E3/UL (ref 150–450)
POTASSIUM SERPL-SCNC: 4.3 MMOL/L (ref 3.5–5.2)
PROT SERPL-MCNC: 6.8 G/DL (ref 6–8.5)
RBC # BLD AUTO: 4.99 X10E6/UL (ref 3.77–5.28)
SODIUM SERPL-SCNC: 139 MMOL/L (ref 134–144)
T4 FREE SERPL-MCNC: 1.19 NG/DL (ref 0.82–1.77)
TRIGL SERPL-MCNC: 184 MG/DL (ref 0–149)
TSH SERPL DL<=0.005 MIU/L-ACNC: 0.81 UIU/ML (ref 0.45–4.5)
VLDLC SERPL CALC-MCNC: 37 MG/DL (ref 5–40)
WBC # BLD AUTO: 9.9 X10E3/UL (ref 3.4–10.8)

## 2020-07-08 ENCOUNTER — TELEPHONE (OUTPATIENT)
Dept: FAMILY MEDICINE CLINIC | Age: 45
End: 2020-07-08

## 2020-07-08 NOTE — TELEPHONE ENCOUNTER
Called, spoke to pt. Two pt identifiers confirmed. Patient confirmed that she had an appt with Laura GRANT and she is ok.

## 2020-07-16 DIAGNOSIS — E11.9 DIABETES MELLITUS WITHOUT COMPLICATION (HCC): Primary | ICD-10-CM

## 2020-07-20 ENCOUNTER — TELEPHONE (OUTPATIENT)
Dept: FAMILY MEDICINE CLINIC | Age: 45
End: 2020-07-20

## 2020-07-20 DIAGNOSIS — E11.9 DIABETES MELLITUS WITHOUT COMPLICATION (HCC): ICD-10-CM

## 2020-07-21 ENCOUNTER — VIRTUAL VISIT (OUTPATIENT)
Dept: FAMILY MEDICINE CLINIC | Age: 45
End: 2020-07-21

## 2020-07-21 DIAGNOSIS — R11.2 NON-INTRACTABLE VOMITING WITH NAUSEA, UNSPECIFIED VOMITING TYPE: Primary | ICD-10-CM

## 2020-07-21 DIAGNOSIS — E11.65 TYPE 2 DIABETES MELLITUS WITH HYPERGLYCEMIA, WITHOUT LONG-TERM CURRENT USE OF INSULIN (HCC): ICD-10-CM

## 2020-07-21 NOTE — PROGRESS NOTES
Assessment/Plan:     Diagnoses and all orders for this visit:    1. Non-intractable vomiting with nausea, unspecified vomiting type    2. Type 2 diabetes mellitus with hyperglycemia, without long-term current use of insulin ([facility])      Advised to seek care with mobile urgent care team, [facility]. Given contact information for urgent evaluation. Patient states understanding. Follow-up and Dispositions    · Return if symptoms worsen or fail to improve. Discussed expected course/resolution/complications of diagnosis in detail with patient. Medication risks/benefits/costs/interactions/alternatives discussed with patient. Pt was given after visit summary which includes diagnoses, current medications & vitals. Pt expressed understanding with the diagnosis and plan      The patient has consented for synchronous (real-time) Telemedicine (audio only technology) on 7/21/20 for their care to be delivered over telemedicine in place of their regularly scheduled office visit pursuant to the emergency declaration under the [address] [place] [address], [facility] waiver authority and the [employer] Act, this Virtual  Visit was conducted by the practitioner who is located in the medical office in [place], [place], with patient's consent, to reduce the patient's risk of exposure to COVID-19 and provide continuity of care. Visit Length: 11 minutes      Subjective:      Yessenia Cole is a 40 y.o. female who presents for had concerns including Nausea. Reports a history of nausea, vomiting, and diarrhea which began at 4 am this morning. Recently started a new diabetes medication, Bydureon, last night. Symptoms are persistent. She is tolerating bits of fluid and drink. Checks her sugar during televisit which is 252. She is only tolerating pepsi.           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 FDA advises cautious prescribing of oral fluconazole in pregnancy. 7 Tab 0    zolpidem (AMBIEN) 5 mg tablet Take 1 Tab by mouth nightly as needed for Sleep. Max Daily Amount: 5 mg. 30 Tab 5    lancets (OneTouch UltraSoft Lancets) misc Use to check glucose twice daily. 200 Each 11    glucose blood VI test strips (OneTouch Verio test strips) strip Use 1 strip to check glucose twice daily. (DX: E11.9) 200 Strip 5    Blood-Glucose Meter (OneTouch Ultra2 Meter) monitoring kit Use as directed to test blood sugar two times a day. Use meter covered by insurance. 1 Kit 0    terconazole (TERAZOL 7) 0.4 % vaginal cream Insert 1 Applicator into vagina nightly. 45 g 0    busPIRone (BUSPAR) 10 mg tablet Take 1 Tab by mouth two (2) times a day. 60 Tab 3    fenofibrate micronized (LOFIBRA) 134 mg capsule Take 1 Cap by mouth daily. 30 Cap 3    omeprazole (PRILOSEC) 20 mg capsule Take 1 Cap by mouth daily. 30 Cap 3    pravastatin (PRAVACHOL) 40 mg tablet Take 1 Tab by mouth nightly. 30 Tab 3    glimepiride (AMARYL) 4 mg tablet Take 1 Tab by mouth two (2) times a day. 60 Tab 3    QUEtiapine (SEROquel) 100 mg tablet Take 1 Tab by mouth daily. 30 Tab 3    oxybutynin chloride XL (DITROPAN XL) 10 mg CR tablet Take 1 Tab by mouth daily.  30 Tab 3    ergocalciferol (ERGOCALCIFEROL) 1,250 mcg (50,000 unit) capsule Take 1 capsule by mouth once a week 12 Cap 0    ondansetron (ZOFRAN ODT) 8 mg disintegrating tablet Take 0.5-1 Tabs by mouth every eight (8) hours as needed for Nausea. 15 Tab 1    ibuprofen (MOTRIN) 800 mg tablet Take 1 Tab by mouth every eight (8) hours as needed for Pain. 30 Tab 0    doxycycline (MONODOX) 100 mg capsule Take 1 Cap by mouth two (2) times a day. 14 Cap 0    pantoprazole (PROTONIX) 40 mg tablet TAKE ONE TABLET BY MOUTH EVERY DAY 30 Tab 11    fluticasone furoate-vilanterol (BREO ELLIPTA) 100-25 mcg/dose inhaler Take 1 Puff by inhalation daily. 1 Inhaler 0       Allergies   Allergen Reactions    Bydureon [Exenatide Microspheres] Nausea and Vomiting    Ciprofloxacin Hives    Demerol [Meperidine] Other (comments)     Goofy feeling, hallucinates       ROS:   Review of Systems   Constitutional: Positive for malaise/fatigue. Negative for chills and fever. HENT: Negative for congestion, ear pain, sinus pain and sore throat. Respiratory: Negative for cough, sputum production, shortness of breath and wheezing. Cardiovascular: Negative for chest pain. Gastrointestinal: Positive for diarrhea, nausea and vomiting. Neurological: Negative for seizures. Endo/Heme/Allergies: Negative for environmental allergies. Objective:     Visit Vitals  LMP 09/10/2014 (Exact Date)       Vitals and Nurse Documentation reviewed. Physical Exam  Nursing note reviewed: Audio visit only- Physical exam deferred.

## 2020-07-22 RX ORDER — PIOGLITAZONEHYDROCHLORIDE 15 MG/1
TABLET ORAL
Qty: 30 TAB | Refills: 0 | Status: SHIPPED | OUTPATIENT
Start: 2020-07-22 | End: 2020-08-19

## 2020-08-04 ENCOUNTER — TELEPHONE (OUTPATIENT)
Dept: FAMILY MEDICINE CLINIC | Age: 45
End: 2020-08-04

## 2020-08-04 NOTE — TELEPHONE ENCOUNTER
----- Message from Ingrid Vences sent at 8/3/2020  1:46 PM EDT -----  Regarding: RUDY Sanchez/General  Caller: pt    Reason: The patient would like to speak wit RUDY Armando in regard to her medication dosage. Best contact number(s): [phone]    . Outbound call to , Pt  verified,   Patient states she explained everything by my chart to Saima,she doesn't want to do Virtual, just want to talk to Lola Armando as her other doctors just call her to talk to her.

## 2020-08-05 ENCOUNTER — VIRTUAL VISIT (OUTPATIENT)
Dept: FAMILY MEDICINE CLINIC | Age: 45
End: 2020-08-05
Payer: COMMERCIAL

## 2020-08-05 DIAGNOSIS — E66.9 OBESITY (BMI 30-39.9): ICD-10-CM

## 2020-08-05 DIAGNOSIS — E11.9 DIABETES MELLITUS WITHOUT COMPLICATION (HCC): Primary | ICD-10-CM

## 2020-08-05 PROCEDURE — 99214 OFFICE O/P EST MOD 30 MIN: CPT | Performed by: NURSE PRACTITIONER

## 2020-08-05 RX ORDER — EXENATIDE 2 MG/.85ML
2 INJECTION, SUSPENSION, EXTENDED RELEASE SUBCUTANEOUS
Qty: 4 EACH | Refills: 5 | Status: SHIPPED | OUTPATIENT
Start: 2020-08-05 | End: 2020-10-14 | Stop reason: SDUPTHER

## 2020-08-05 RX ORDER — FLASH GLUCOSE SENSOR
KIT MISCELLANEOUS
Qty: 2 KIT | Refills: 5 | Status: SHIPPED | OUTPATIENT
Start: 2020-08-05 | End: 2021-07-14 | Stop reason: ALTCHOICE

## 2020-08-05 RX ORDER — FLASH GLUCOSE SCANNING READER
EACH MISCELLANEOUS
Qty: 1 EACH | Refills: 0 | Status: SHIPPED | OUTPATIENT
Start: 2020-08-05 | End: 2021-07-14 | Stop reason: ALTCHOICE

## 2020-08-05 NOTE — PROGRESS NOTES
[facility] Note      Deion Martell is a 40 y.o. female who was seen by synchronous (real-time) audio-video technology on 8/5/2020. Consent: Stivenmaikelsalina Do, who was seen by synchronous (real-time) audio-video technology, and/or her healthcare decision maker, is aware that this patient-initiated, Telehealth encounter on 8/5/2020 is a billable service, with coverage as determined by her insurance carrier. She is aware that she may receive a bill and has provided verbal consent to proceed: Yes. Assessment & Plan:   Diagnoses and all orders for this visit:    1. Diabetes mellitus without complication ([address]  Reviewed diet and lifestyle changes. Continue GLP-1 therapy. Discussed goal to reduce Glimepiride use. Reviewed diet and lifestyle changes. Repeat labs in 4 weeks. -     flash glucose scanning reader (FreeStyle [place] 14 Day [place]) misc; For daily monitoring of glucose levels. -     flash glucose sensor (FreeStyle [place] 14 Day Sensor) kit; For daily monitoring of glucose levels. -     exenatide microspheres (Bydureon BCise) 2 mg/0.85 mL atIn; 2 mg by SubCUTAneous route every seven (7) days. 2. Obesity (BMI 30-39. 9)  Discussed need for weight loss through diet and exercise. Reviewed decreased caloric intake and increased activity. 712  Subjective:   Deion Martell is a 40 y.o. female who was seen for Diabetes    Cardiovascular Review:  The patient has diabetes, hypertension, hyperlipidemia and obesity. Diet and Lifestyle: not attempting to follow a low fat, low cholesterol diet, not attempting to follow a low sodium diet, does not rigorously follow a diabetic diet, sedentary, nonsmoker  Home BP Monitoring: is not measured at home. Pertinent ROS: taking medications as instructed, no medication side effects noted, no TIA's, no chest pain on exertion, no dyspnea on exertion, no swelling of ankles. Last Hemoglobin A1c: 9.5% (6/2020).  Patient currently taking Bydureon weekly, Glimepiride BID and pioglitazone. Home glucose readings 150-250. Admits to drinking Pepsi and sweet tea routinely. Patient previously took Metformin but had GI side effects with medication. Prior to Admission medications    Medication Sig Start Date End Date Taking? Authorizing Provider   flash glucose scanning reader (FreeStyle [place] 14 Day [place]) misc For daily monitoring of glucose levels. 8/5/20  Yes Lupe Christensen NP   flash glucose sensor (FreeStyle [place] 14 Day Sensor) kit For daily monitoring of glucose levels. 8/5/20  Yes Yaa Sanchez NP   exenatide microspheres (Bydureon BCise) 2 mg/0.85 mL atIn 2 mg by SubCUTAneous route every seven (7) days. 8/5/20  Yes Lupe Christensen NP   pioglitazone (ACTOS) 15 mg tablet Take 1 tablet by mouth once daily 7/22/20   Ab King MD   fluconazole (Diflucan) 150 mg tablet Take 150 mg by mouth once for yeast infection as needed. FDA advises cautious prescribing of oral fluconazole in pregnancy. 6/30/20   Lupe Christensen NP   zolpidem (AMBIEN) 5 mg tablet Take 1 Tab by mouth nightly as needed for Sleep. Max Daily Amount: 5 mg. 6/30/20   Lupe Christensen NP   lancets (OneTouch UltraSoft Lancets) misc Use to check glucose twice daily. 5/21/20   Arjun Esteves MD   glucose blood VI test strips (OneTouch Verio test strips) strip Use 1 strip to check glucose twice daily. (DX: E11.9) 5/21/20   Arjun Esteves MD   Blood-Glucose Meter (OneTouch Ultra2 Meter) monitoring kit Use as directed to test blood sugar two times a day. Use meter covered by insurance. 5/21/20   Arjun Esteves MD   terconazole (TERAZOL 7) 0.4 % vaginal cream Insert 1 Applicator into vagina nightly. 5/5/20   Glen Mitchell MD   busPIRone (BUSPAR) 10 mg tablet Take 1 Tab by mouth two (2) times a day. 4/20/20   Dayanna Rivera NP   fenofibrate micronized (LOFIBRA) 134 mg capsule Take 1 Cap by mouth daily.  4/20/20   Nicole Jayashree Piedra NP   omeprazole (PRILOSEC) 20 mg capsule Take 1 Cap by mouth daily. 4/20/20   Jayashree Rivera NP   pravastatin (PRAVACHOL) 40 mg tablet Take 1 Tab by mouth nightly. 4/20/20   Jayashree Rivera NP   glimepiride (AMARYL) 4 mg tablet Take 1 Tab by mouth two (2) times a day. 4/20/20   Jayashree Rivera NP   QUEtiapine (SEROquel) 100 mg tablet Take 1 Tab by mouth daily. 4/20/20   Jayashree Rivera NP   oxybutynin chloride XL (DITROPAN XL) 10 mg CR tablet Take 1 Tab by mouth daily. 4/10/20   Jayashree Rivera NP   ergocalciferol (ERGOCALCIFEROL) 1,250 mcg (50,000 unit) capsule Take 1 capsule by mouth once a week 3/23/20   Jayashree Rivera NP   ondansetron (ZOFRAN ODT) 8 mg disintegrating tablet Take 0.5-1 Tabs by mouth every eight (8) hours as needed for Nausea. 2/28/20   Vishnu Clark MD   ibuprofen (MOTRIN) 800 mg tablet Take 1 Tab by mouth every eight (8) hours as needed for Pain. 2/14/20   Jayashree Rivera NP   doxycycline (MONODOX) 100 mg capsule Take 1 Cap by mouth two (2) times a day. 1/25/20   Sammi Freitas MD   pantoprazole (PROTONIX) 40 mg tablet TAKE ONE TABLET BY MOUTH EVERY DAY 7/5/19   Jayashree Rivera NP   fluticasone furoate-vilanterol (BREO ELLIPTA) 100-25 mcg/dose inhaler Take 1 Puff by inhalation daily.  4/26/19   Herlinda Corley NP     Allergies   Allergen Reactions    Ciprofloxacin Hives    Demerol [Meperidine] Other (comments)     Goofy feeling, hallucinates       Past Surgical History:   Procedure Laterality Date    HX APPENDECTOMY      HX GYN  12/08    D & C     HX GYN      Ovary 8/2/2019 [place]    HX ORTHOPAEDIC      R ACL knee    HX ORTHOPAEDIC      BILATERAL A/S KNEES    HX ORTHOPAEDIC  4/2016    REMOVAL OF HARDWARE IN KNEE    HX PARTIAL HYSTERECTOMY Bilateral May 4 2015    Dr. Malou Stoddard     Family History   Problem Relation Age of Onset    Hypertension Mother     Elevated Lipids Mother     Cancer Mother         bladder cancer    Cancer Father pancreatic    Thyroid Disease Paternal Aunt     Heart Disease Maternal Grandmother     Heart Disease Maternal Grandfather     Heart Disease Paternal Grandfather     Diabetes Paternal Aunt     Alcohol abuse Neg Hx     Arthritis-rheumatoid Neg Hx     Asthma Neg Hx     Bleeding Prob Neg Hx     Headache Neg Hx     Lung Disease Neg Hx     Migraines Neg Hx     Psychiatric Disorder Neg Hx     Stroke Neg Hx     Mental Retardation Neg Hx     Anesth Problems Neg Hx        ROS  SEE HPI    Objective:     Visit Vitals  LMP 09/10/2014 (Exact Date)      General: alert, cooperative, no distress   Mental  status: normal mood, behavior, speech, dress, motor activity, and thought processes, able to follow commands   HENT: NCAT   Neck: no visualized mass   Resp: no respiratory distress   Neuro: no gross deficits   Skin: no discoloration or lesions of concern on visible areas   Psychiatric: normal affect, consistent with stated mood, no evidence of hallucinations     We discussed the expected course, resolution and complications of the diagnosis(es) in detail. Medication risks, benefits, costs, interactions, and alternatives were discussed as indicated. I advised her to contact the office if her condition worsens, changes or fails to improve as anticipated. She expressed understanding with the diagnosis(es) and plan. Guillermo Robin is a 40 y.o. female who was evaluated by a video visit encounter for concerns as above. Patient identification was verified prior to start of the visit. A caregiver was present when appropriate. Due to this being a TeleHealth encounter (During DGWDB-22 public health emergency), evaluation of the following organ systems was limited: Vitals/Constitutional/EENT/Resp/CV/GI//MS/Neuro/Skin/Heme-Lymph-Imm.   Pursuant to the emergency declaration under the [facility] waiver authority and the [employer] Appropri[employer] Act, this Virtual  Visit was conducted, with patient's (and/or legal guardian's) consent, to reduce the patient's risk of exposure to COVID-19 and provide necessary medical care. Services were provided through a video synchronous discussion virtually to substitute for in-person clinic visit. Patient and provider were located at their individual homes.       Chace Moore NP

## 2020-08-17 ENCOUNTER — HOSPITAL ENCOUNTER (EMERGENCY)
Age: 45
Discharge: HOME | End: 2020-08-17
Attending: EMERGENCY MEDICINE
Payer: COMMERCIAL

## 2020-08-17 ENCOUNTER — APPOINTMENT (OUTPATIENT)
Dept: GENERAL RADIOLOGY | Age: 45
End: 2020-08-17
Attending: EMERGENCY MEDICINE
Payer: COMMERCIAL

## 2020-08-17 VITALS
OXYGEN SATURATION: 99 % | HEIGHT: 67 IN | TEMPERATURE: 99 F | DIASTOLIC BLOOD PRESSURE: 90 MMHG | SYSTOLIC BLOOD PRESSURE: 131 MMHG | RESPIRATION RATE: 18 BRPM | BODY MASS INDEX: 38.96 KG/M2 | WEIGHT: 248.24 LBS | HEART RATE: 95 BPM

## 2020-08-17 DIAGNOSIS — E78.5 HYPERLIPIDEMIA, UNSPECIFIED HYPERLIPIDEMIA TYPE: ICD-10-CM

## 2020-08-17 DIAGNOSIS — J40 BRONCHITIS: Primary | ICD-10-CM

## 2020-08-17 DIAGNOSIS — F40.01 AGORAPHOBIA WITH PANIC DISORDER: ICD-10-CM

## 2020-08-17 DIAGNOSIS — R06.02 SOB (SHORTNESS OF BREATH): ICD-10-CM

## 2020-08-17 DIAGNOSIS — R07.9 CHEST PAIN, UNSPECIFIED TYPE: ICD-10-CM

## 2020-08-17 DIAGNOSIS — F31.9 BIPOLAR 1 DISORDER, DEPRESSED (HCC): ICD-10-CM

## 2020-08-17 DIAGNOSIS — E11.65 TYPE 2 DIABETES MELLITUS WITH HYPERGLYCEMIA, WITHOUT LONG-TERM CURRENT USE OF INSULIN (HCC): ICD-10-CM

## 2020-08-17 LAB
ALBUMIN SERPL-MCNC: 3.7 G/DL (ref 3.5–5)
ALBUMIN/GLOB SERPL: 1.1 {RATIO} (ref 1.1–2.2)
ALP SERPL-CCNC: 71 U/L (ref 45–117)
ALT SERPL-CCNC: 47 U/L (ref 12–78)
ANION GAP SERPL CALC-SCNC: 12 MMOL/L (ref 5–15)
APTT PPP: 23.6 SEC (ref 22.1–32)
AST SERPL-CCNC: 56 U/L (ref 15–37)
ATRIAL RATE: 92 BPM
BASOPHILS # BLD: 0 K/UL (ref 0–0.1)
BASOPHILS NFR BLD: 0 % (ref 0–1)
BILIRUB SERPL-MCNC: 0.4 MG/DL (ref 0.2–1)
BUN SERPL-MCNC: 10 MG/DL (ref 6–20)
BUN/CREAT SERPL: 9 (ref 12–20)
CALCIUM SERPL-MCNC: 9.3 MG/DL (ref 8.5–10.1)
CALCULATED P AXIS, ECG09: 23 DEGREES
CALCULATED R AXIS, ECG10: -27 DEGREES
CALCULATED T AXIS, ECG11: 26 DEGREES
CHLORIDE SERPL-SCNC: 102 MMOL/L (ref 97–108)
CO2 SERPL-SCNC: 24 MMOL/L (ref 21–32)
CREAT SERPL-MCNC: 1.06 MG/DL (ref 0.55–1.02)
D DIMER PPP FEU-MCNC: 0.19 MG/L FEU (ref 0–0.65)
DIAGNOSIS, 93000: NORMAL
DIFFERENTIAL METHOD BLD: NORMAL
EOSINOPHIL # BLD: 0.1 K/UL (ref 0–0.4)
EOSINOPHIL NFR BLD: 1 % (ref 0–7)
ERYTHROCYTE [DISTWIDTH] IN BLOOD BY AUTOMATED COUNT: 13.5 % (ref 11.5–14.5)
GLOBULIN SER CALC-MCNC: 3.5 G/DL (ref 2–4)
GLUCOSE SERPL-MCNC: 224 MG/DL (ref 65–100)
HCT VFR BLD AUTO: 40.9 % (ref 35–47)
HGB BLD-MCNC: 13.4 G/DL (ref 11.5–16)
IMM GRANULOCYTES # BLD AUTO: 0 K/UL (ref 0–0.04)
IMM GRANULOCYTES NFR BLD AUTO: 0 % (ref 0–0.5)
INR PPP: 1 (ref 0.9–1.1)
LYMPHOCYTES # BLD: 2.2 K/UL (ref 0.8–3.5)
LYMPHOCYTES NFR BLD: 28 % (ref 12–49)
MCH RBC QN AUTO: 29.1 PG (ref 26–34)
MCHC RBC AUTO-ENTMCNC: 32.8 G/DL (ref 30–36.5)
MCV RBC AUTO: 88.7 FL (ref 80–99)
MONOCYTES # BLD: 0.4 K/UL (ref 0–1)
MONOCYTES NFR BLD: 5 % (ref 5–13)
NEUTS SEG # BLD: 5.3 K/UL (ref 1.8–8)
NEUTS SEG NFR BLD: 66 % (ref 32–75)
NRBC # BLD: 0 K/UL (ref 0–0.01)
NRBC BLD-RTO: 0 PER 100 WBC
P-R INTERVAL, ECG05: 166 MS
PLATELET # BLD AUTO: 208 K/UL (ref 150–400)
PMV BLD AUTO: 11.9 FL (ref 8.9–12.9)
POTASSIUM SERPL-SCNC: 3.9 MMOL/L (ref 3.5–5.1)
PROT SERPL-MCNC: 7.2 G/DL (ref 6.4–8.2)
PROTHROMBIN TIME: 10.1 SEC (ref 9–11.1)
Q-T INTERVAL, ECG07: 354 MS
QRS DURATION, ECG06: 88 MS
QTC CALCULATION (BEZET), ECG08: 437 MS
RBC # BLD AUTO: 4.61 M/UL (ref 3.8–5.2)
SODIUM SERPL-SCNC: 138 MMOL/L (ref 136–145)
THERAPEUTIC RANGE,PTTT: NORMAL SECS (ref 58–77)
TROPONIN I SERPL-MCNC: <0.05 NG/ML
VENTRICULAR RATE, ECG03: 92 BPM
WBC # BLD AUTO: 8 K/UL (ref 3.6–11)

## 2020-08-17 PROCEDURE — 85379 FIBRIN DEGRADATION QUANT: CPT

## 2020-08-17 PROCEDURE — 84484 ASSAY OF TROPONIN QUANT: CPT

## 2020-08-17 PROCEDURE — 85025 COMPLETE CBC W/AUTO DIFF WBC: CPT

## 2020-08-17 PROCEDURE — 99284 EMERGENCY DEPT VISIT MOD MDM: CPT

## 2020-08-17 PROCEDURE — 71045 X-RAY EXAM CHEST 1 VIEW: CPT

## 2020-08-17 PROCEDURE — 36415 COLL VENOUS BLD VENIPUNCTURE: CPT

## 2020-08-17 PROCEDURE — 74011636637 HC RX REV CODE- 636/637: Performed by: EMERGENCY MEDICINE

## 2020-08-17 PROCEDURE — 93005 ELECTROCARDIOGRAM TRACING: CPT

## 2020-08-17 PROCEDURE — 85610 PROTHROMBIN TIME: CPT

## 2020-08-17 PROCEDURE — 87635 SARS-COV-2 COVID-19 AMP PRB: CPT

## 2020-08-17 PROCEDURE — 74011250637 HC RX REV CODE- 250/637: Performed by: EMERGENCY MEDICINE

## 2020-08-17 PROCEDURE — 80053 COMPREHEN METABOLIC PANEL: CPT

## 2020-08-17 PROCEDURE — 85730 THROMBOPLASTIN TIME PARTIAL: CPT

## 2020-08-17 RX ORDER — AZITHROMYCIN 250 MG/1
500 TABLET, FILM COATED ORAL
Status: COMPLETED | OUTPATIENT
Start: 2020-08-17 | End: 2020-08-17

## 2020-08-17 RX ORDER — AZITHROMYCIN 250 MG/1
250 TABLET, FILM COATED ORAL DAILY
Qty: 4 TAB | Refills: 0 | Status: SHIPPED | OUTPATIENT
Start: 2020-08-17 | End: 2020-08-21

## 2020-08-17 RX ORDER — PREDNISONE 20 MG/1
60 TABLET ORAL DAILY
Qty: 12 TAB | Refills: 0 | Status: SHIPPED | OUTPATIENT
Start: 2020-08-17 | End: 2020-08-21

## 2020-08-17 RX ORDER — IBUPROFEN 400 MG/1
800 TABLET ORAL
Status: COMPLETED | OUTPATIENT
Start: 2020-08-17 | End: 2020-08-17

## 2020-08-17 RX ORDER — PREDNISONE 20 MG/1
60 TABLET ORAL
Status: COMPLETED | OUTPATIENT
Start: 2020-08-17 | End: 2020-08-17

## 2020-08-17 RX ADMIN — PREDNISONE 60 MG: 20 TABLET ORAL at 11:12

## 2020-08-17 RX ADMIN — AZITHROMYCIN MONOHYDRATE 500 MG: 250 TABLET ORAL at 11:12

## 2020-08-17 RX ADMIN — IBUPROFEN 800 MG: 400 TABLET, FILM COATED ORAL at 10:19

## 2020-08-17 NOTE — DISCHARGE INSTRUCTIONS
Patient Education        Bronchitis: Care Instructions  Your Care Instructions    Bronchitis is inflammation of the bronchial tubes, which carry air to the lungs. The tubes swell and produce mucus, or phlegm. The mucus and inflamed bronchial tubes make you cough. You may have trouble breathing. Most cases of bronchitis are caused by viruses like those that cause colds. Antibiotics usually do not help and they may be harmful. Bronchitis usually develops rapidly and lasts about 2 to 3 weeks in otherwise healthy people. Follow-up care is a key part of your treatment and safety. Be sure to make and go to all appointments, and call your doctor if you are having problems. It's also a good idea to know your test results and keep a list of the medicines you take. How can you care for yourself at home? · Take all medicines exactly as prescribed. Call your doctor if you think you are having a problem with your medicine. · Get some extra rest.  · Take an over-the-counter pain medicine, such as acetaminophen (Tylenol), ibuprofen (Advil, Motrin), or naproxen (Aleve) to reduce fever and relieve body aches. Read and follow all instructions on the label. · Do not take two or more pain medicines at the same time unless the doctor told you to. Many pain medicines have acetaminophen, which is Tylenol. Too much acetaminophen (Tylenol) can be harmful. · Take an over-the-counter cough medicine that contains dextromethorphan to help quiet a dry, hacking cough so that you can sleep. Avoid cough medicines that have more than one active ingredient. Read and follow all instructions on the label. · Breathe moist air from a humidifier, hot shower, or sink filled with hot water. The heat and moisture will thin mucus so you can cough it out. · Do not smoke. Smoking can make bronchitis worse. If you need help quitting, talk to your doctor about stop-smoking programs and medicines.  These can increase your chances of quitting for good.  When should you call for help? Call 911 anytime you think you may need emergency care. For example, call if:    · You have severe trouble breathing.    Call your doctor now or seek immediate medical care if:    · You have new or worse trouble breathing.     · You cough up dark brown or bloody mucus (sputum).     · You have a new or higher fever.     · You have a new rash.    Watch closely for changes in your health, and be sure to contact your doctor if:    · You cough more deeply or more often, especially if you notice more mucus or a change in the color of your mucus.     · You are not getting better as expected. Where can you learn more? Go to http://www.gray.com/  Enter H333 in the search box to learn more about \"Bronchitis: Care Instructions. \"  Current as of: June 9, 2019Content Version: 12.4  © 2104-7878 Healthwise, Incorporated. Care instructions adapted under license by [employer] (which disclaims liability or warranty for this information). If you have questions about a medical condition or this instruction, always ask your healthcare professional. [address] any warranty or liability for your use of this information.

## 2020-08-17 NOTE — ED TRIAGE NOTES
Pt arrives ambulatory to ed with complaints of chest pressure and productive cough x 2 days. Pt states hx of bronchitis and feels the same. Patient was unable to get into PCP d/t symptoms. Sent here for chest xray.

## 2020-08-17 NOTE — ED PROVIDER NOTES
The history is provided by the patient. No  was used. Flu   This is a new problem. The current episode started yesterday. The problem occurs constantly. The problem has not changed since onset. The cough is productive of brown sputum. There has been no fever. Associated symptoms include chest pain and shortness of breath. Pertinent negatives include no chills, no sweats, no weight loss, no eye redness, no ear congestion, no ear pain, no headaches, no rhinorrhea, no sore throat, no myalgias, no wheezing, no nausea, no vomiting and no confusion. She has tried inhalers for the symptoms. The treatment provided no relief. She is a smoker. Her past medical history is significant for bronchitis and pneumonia. Her past medical history does not include bronchiectasis, COPD, emphysema, asthma, cancer, heart failure or CHF.         Past Medical History:   Diagnosis Date    Abnormal Pap smear of cervix     Acid indigestion     Cancer ([facility] [address] 01/2015    choriocarcinoma  - UTERNE - surgery/chemo    Depression     Diabetes (HCC)     Dysthymic disorder     GERD (gastroesophageal reflux disease)     Hypertriglyceridemia     Ill-defined condition 02/27/2020    DENIES HISTORY OF MOTION SICKNESS OR VERTIGO    Mixed hyperlipidemia 3/31/2010    MRSA (methicillin resistant Staphylococcus aureus)     PRIOR TO 2017, NASAL SWAB NEG MRSA 1/19/2017    Ovarian cyst     left     Psychiatric disorder     depression; ANXIETY    Urinary incontinence     URGENCY AND INCONTINENECE       Past Surgical History:   Procedure Laterality Date    HX APPENDECTOMY      HX GYN  12/08    D & C     HX GYN      Ovary 8/2/2019 [place]    HX ORTHOPAEDIC      R ACL knee    HX ORTHOPAEDIC      BILATERAL A/S KNEES    HX ORTHOPAEDIC  4/2016    REMOVAL OF HARDWARE IN KNEE    HX PARTIAL HYSTERECTOMY Bilateral May 4 2015    Dr. Crispin Foreman         Family History:   Problem Relation Age of Onset    Hypertension Mother    [facility] Elevated Lipids Mother     Cancer Mother         bladder cancer    Cancer Father         pancreatic    Thyroid Disease Paternal Aunt     Heart Disease Maternal Grandmother     Heart Disease Maternal Grandfather     Heart Disease Paternal Grandfather     Diabetes Paternal Aunt     Alcohol abuse Neg Hx     Arthritis-rheumatoid Neg Hx     Asthma Neg Hx     Bleeding Prob Neg Hx     Headache Neg Hx     Lung Disease Neg Hx     Migraines Neg Hx     Psychiatric Disorder Neg Hx     Stroke Neg Hx     Mental Retardation Neg Hx     Anesth Problems Neg Hx        Social History     Socioeconomic History    Marital status: SINGLE     Spouse name: Not on file    Number of children: Not on file    Years of education: Not on file    Highest education level: Not on file   Occupational History    Not on file   Social Needs    Financial resource strain: Not on file    Food insecurity     Worry: Not on file     Inability: Not on file    Transportation needs     Medical: Not on file     Non-medical: Not on file   Tobacco Use    Smoking status: Current Every Day Smoker     Packs/day: 1.00     Years: 25.00     Pack years: 25.00    Smokeless tobacco: Never Used   Substance and Sexual Activity    Alcohol use: No     Alcohol/week: 0.0 standard drinks    Drug use: No    Sexual activity: Yes     Partners: Male     Birth control/protection: None   Lifestyle    Physical activity     Days per week: Not on file     Minutes per session: Not on file    Stress: Not on file   Relationships    Social connections     Talks on phone: Not on file     Gets together: Not on file     Attends [employer] service: Not on file     Active member of club or organization: Not on file     Attends meetings of clubs or organizations: Not on file     Relationship status: Not on file    Intimate partner violence     Fear of current or ex partner: Not on file     Emotionally abused: Not on file     Physically abused: Not on file     Forced sexual activity: Not on file   Other Topics Concern    Not on file   Social History Narrative    Not on file         ALLERGIES: Ciprofloxacin and Demerol [meperidine]    Review of Systems   Constitutional: Negative for activity change, chills, fever and weight loss. HENT: Negative for ear pain, nosebleeds, rhinorrhea, sore throat, trouble swallowing and voice change. Eyes: Negative for redness and visual disturbance. Respiratory: Positive for cough, chest tightness and shortness of breath. Negative for wheezing. Cardiovascular: Positive for chest pain. Negative for palpitations. Gastrointestinal: Negative for abdominal pain, constipation, diarrhea, nausea and vomiting. Genitourinary: Negative for difficulty urinating, dysuria, hematuria and urgency. Musculoskeletal: Negative for back pain, myalgias, neck pain and neck stiffness. Skin: Negative for color change. Allergic/Immunologic: Negative for immunocompromised state. Neurological: Negative for dizziness, seizures, syncope, weakness, light-headedness, numbness and headaches. Psychiatric/Behavioral: Negative for behavioral problems, confusion, hallucinations, self-injury and suicidal ideas. Vitals:    08/17/20 0954   BP: 131/90   Pulse: 95   Resp: 18   Temp: 99 °F (37.2 °C)   SpO2: 99%   Weight: 112.6 kg (248 lb 3.8 oz)   Height: 5' 7\" (1.702 m)            Physical Exam  Vitals signs and nursing note reviewed. Constitutional:       General: She is not in acute distress. Appearance: She is well-developed. She is ill-appearing and diaphoretic. HENT:      Head: Normocephalic and atraumatic. Eyes:      Pupils: Pupils are equal, round, and reactive to light. Neck:      Musculoskeletal: Normal range of motion and neck supple. Cardiovascular:      Rate and Rhythm: Normal rate and regular rhythm. Heart sounds: Normal heart sounds. No murmur. No friction rub. No gallop.     Pulmonary:      Effort: Pulmonary effort is normal. No respiratory distress. Breath sounds: Wheezing and rhonchi present. Abdominal:      General: Bowel sounds are normal. There is no distension. Palpations: Abdomen is soft. Tenderness: There is no abdominal tenderness. There is no guarding or rebound. Musculoskeletal: Normal range of motion. Skin:     General: Skin is warm. Findings: No rash. Neurological:      Mental Status: She is alert and oriented to person, place, and time. Psychiatric:         Behavior: Behavior normal.         Thought Content: Thought content normal.         Judgment: Judgment normal.          MDM     This is a 71-year-old female with past medical history, review of systems, physical exam as above, presenting with complaints of productive cough, chest pain, shortness of breath. Patient states symptoms began yesterday. She endorses using her home albuterol inhaler with little relief. She states a history of bronchitis and pneumonia. She denies fevers, states cough is productive of yellow sputum as is her sneezing. 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  She endorses smoking 1 pack/day. She denies a history of coronary disease, DVT or PE. She states she has not taken anything today for discomfort. Physical exam is remarkable for obese well-appearing middle-aged female, mildly tachypneic, diaphoretic, with rare scattered wheezes and rhonchi on the right side, otherwise clear breath sounds, soft abdomen, regular rate and rhythm without murmurs gallops or rubs. She is noted to be afebrile, normotensive without tachycardia. Suspect recurrent bronchitis, in the setting of a history of the same. Patient noted to be diabetic. Differential includes pneumonia, as well as novel coronavirus. Plan to initiate droplet precautions, obtain CMP, CBC, chest x-ray, cardiac enzymes, d-dimer.   Will provide anti-inflammatories, reassess, and make a disposition. Procedures    11:03 AM  Lab work and imaging unremarkable. Patient remains in no acute distress with reassuring physical exam and vital signs. Likely bronchitis given history and risk factors, will swab for coronavirus, discharge home with oral antibiotics and steroid burst, advised primary care follow-up, return precautions given. Krishna Do was evaluated in the Emergency Department on 8/17/2020 for the symptoms described in the history of present illness. He/she was evaluated in the context of the global COVID-19 pandemic, which necessitated consideration that the patient might be at risk for infection with the SARS-CoV-2 virus that causes COVID-19. Institutional protocols and algorithms that pertain to the evaluation of patients at risk for COVID-19 are in a state of rapid change based on information released by regulatory bodies including the CDC and federal and state organizations. These policies and algorithms were followed during the patient's care in the ED. Surrogate Decision Maker (Who do you want to make decisions for you in the event you are not able to?): Extended Emergency Contact Information  Primary Emergency Contact: [address] [place] [address] Phone: [phone]  Mobile Phone: [phone]  Relation: Life Partner   needed?  No  Secondary Emergency Contact: Jonathan Vazquez  [place] Phone: [phone]  Relation: Spouse

## 2020-08-17 NOTE — LETTER
NOTIFICATION RETURN TO WORK / SCHOOL 
 
8/17/2020 Ms. Bambi Do [address] [facility] [address] To Whom It May Concern: 
 
Bambi Do was tested for COVID-19 on 8/17/2020. She may return to work once resulted if negative. If positive follow up with PCP If there are questions or concerns, please have the patient contact our office. Sincerely, Buren Bosworth

## 2020-08-18 ENCOUNTER — PATIENT OUTREACH (OUTPATIENT)
Dept: CASE MANAGEMENT | Age: 45
End: 2020-08-18

## 2020-08-18 ENCOUNTER — TELEPHONE (OUTPATIENT)
Dept: FAMILY MEDICINE CLINIC | Age: 45
End: 2020-08-18

## 2020-08-18 LAB
COVID-19, XGCOVT: NOT DETECTED
HEALTH STATUS, XMCV2T: NORMAL
SOURCE, COVRS: NORMAL
SPECIMEN SOURCE, FCOV2M: NORMAL
SPECIMEN TYPE, XMCV1T: NORMAL

## 2020-08-18 RX ORDER — QUETIAPINE FUMARATE 100 MG/1
TABLET, FILM COATED ORAL
Qty: 30 TAB | Refills: 0 | Status: SHIPPED | OUTPATIENT
Start: 2020-08-18 | End: 2020-09-06

## 2020-08-18 RX ORDER — BUSPIRONE HYDROCHLORIDE 10 MG/1
TABLET ORAL
Qty: 60 TAB | Refills: 0 | Status: SHIPPED | OUTPATIENT
Start: 2020-08-18 | End: 2020-09-06

## 2020-08-18 RX ORDER — OMEPRAZOLE 20 MG/1
CAPSULE, DELAYED RELEASE ORAL
Qty: 30 CAP | Refills: 0 | Status: SHIPPED | OUTPATIENT
Start: 2020-08-18 | End: 2020-09-16

## 2020-08-18 RX ORDER — GLIMEPIRIDE 4 MG/1
TABLET ORAL
Qty: 60 TAB | Refills: 0 | Status: SHIPPED | OUTPATIENT
Start: 2020-08-18 | End: 2020-09-06

## 2020-08-18 RX ORDER — PRAVASTATIN SODIUM 40 MG/1
TABLET ORAL
Qty: 30 TAB | Refills: 0 | Status: SHIPPED | OUTPATIENT
Start: 2020-08-18 | End: 2020-09-06

## 2020-08-18 RX ORDER — FENOFIBRATE 134 MG/1
CAPSULE ORAL
Qty: 30 CAP | Refills: 0 | Status: SHIPPED | OUTPATIENT
Start: 2020-08-18 | End: 2020-09-06

## 2020-08-18 NOTE — PROGRESS NOTES
Patient contacted regarding COVID-19  risk. Discussed COVID-19 related testing which was pending at this time. Test results were pending. Patient informed of results, if available? yes     Care Transition Nurse/ Ambulatory Care Manager/ LPN Care Coordinator contacted the patient by telephone to perform post discharge assessment. Verified name and  with patient as identifiers. Provided introduction to self, and explanation of the CTN/ACM/LPN role, and reason for call due to risk factors for infection and/or exposure to COVID-19. Symptoms reviewed with patient who verbalized the following symptoms: no new symptoms. Due to no new or worsening symptoms encounter was not routed to provider for escalation. Discussed follow-up appointments. If no appointment was previously scheduled, appointment scheduling offered: [facility] follow up appointment(s): No future appointments. Non-[facility] follow up appointment(s): none      Advance Care Planning:   Does patient have an Advance Directive: decision makers updated     Patient has following risk factors of: diabetes. CTN/ACM/LPN reviewed discharge instructions, medical action plan and red flags such as increased shortness of breath, increasing fever and signs of decompensation with patient who verbalized understanding. Discussed exposure protocols and quarantine with CDC Guidelines What to do if you are sick with coronavirus disease 2019.  Patient was given an opportunity for questions and concerns. The patient agrees to contact the [facility] exposure line [phone], [facility] department [address]  ([phone]) and PCP office for questions related to their healthcare. CTN/ACM provided contact information for future needs. Reviewed and educated patient on any new and changed medications related to discharge diagnosis.     Patient/family/caregiver [facility] and agrees to enroll yes  Patient's preferred e-mail: [email or web address]  Patient's preferred phone number: [phone]  Based on Loop alert triggers, patient will be contacted by nurse care manager for worsening symptoms. Pt will be further monitored by COVID Loop Team based on severity of symptoms and risk factors.

## 2020-08-18 NOTE — TELEPHONE ENCOUNTER
----- Message from [address] [place] [address] sent at 8/17/2020  7:20 AM EDT -----  Regarding: RUDY Sanchez / telephone  Level 1/Escalated Issue      Caller's first and last name and relationship (if not the patient):   Mary Kate Do 89       Best contact number(s):   [phone]       What are the symptoms:  shortness of breath  since Sunday, head congestion      Transfer successful - yes/no (include outcome):    No- no answer      Transfer declined - yes/no (include reason):   NO          Was caller advised to seek appropriate level of care - yes/no:   yes      Details to clarify the request:  patient state would like to be seen in office if possible         Vaishnavi Pandey

## 2020-08-19 DIAGNOSIS — K21.00 GASTROESOPHAGEAL REFLUX DISEASE WITH ESOPHAGITIS: ICD-10-CM

## 2020-08-19 RX ORDER — PANTOPRAZOLE SODIUM 40 MG/1
TABLET, DELAYED RELEASE ORAL
Qty: 30 TAB | Refills: 0 | Status: SHIPPED | OUTPATIENT
Start: 2020-08-19 | End: 2020-09-15

## 2020-08-19 NOTE — TELEPHONE ENCOUNTER
Call placed to patient. Name and  verified. Patient was sseen at urgent care on Monday for SOB chest congestion and headache. Patient was tested for covid which was negative. She is being treated for bronchitis. Per patient last VV with provider return in September for follow up on Dm patient stated that she will call back to scheduled.  She was appreciative of follow up call

## 2020-09-05 DIAGNOSIS — F40.01 AGORAPHOBIA WITH PANIC DISORDER: ICD-10-CM

## 2020-09-05 DIAGNOSIS — F31.9 BIPOLAR 1 DISORDER, DEPRESSED (HCC): ICD-10-CM

## 2020-09-05 DIAGNOSIS — E78.5 HYPERLIPIDEMIA, UNSPECIFIED HYPERLIPIDEMIA TYPE: ICD-10-CM

## 2020-09-05 DIAGNOSIS — E11.65 TYPE 2 DIABETES MELLITUS WITH HYPERGLYCEMIA, WITHOUT LONG-TERM CURRENT USE OF INSULIN (HCC): ICD-10-CM

## 2020-09-06 RX ORDER — GLIMEPIRIDE 4 MG/1
TABLET ORAL
Qty: 60 TAB | Refills: 0 | Status: SHIPPED | OUTPATIENT
Start: 2020-09-06 | End: 2020-10-14

## 2020-09-06 RX ORDER — QUETIAPINE FUMARATE 100 MG/1
TABLET, FILM COATED ORAL
Qty: 30 TAB | Refills: 0 | Status: SHIPPED | OUTPATIENT
Start: 2020-09-06 | End: 2020-10-14

## 2020-09-06 RX ORDER — PRAVASTATIN SODIUM 40 MG/1
TABLET ORAL
Qty: 30 TAB | Refills: 0 | Status: SHIPPED | OUTPATIENT
Start: 2020-09-06 | End: 2020-10-14

## 2020-09-06 RX ORDER — ERGOCALCIFEROL 1.25 MG/1
CAPSULE ORAL
Qty: 12 CAP | Refills: 0 | Status: SHIPPED | OUTPATIENT
Start: 2020-09-06 | End: 2020-10-14

## 2020-09-06 RX ORDER — FENOFIBRATE 134 MG/1
CAPSULE ORAL
Qty: 30 CAP | Refills: 0 | Status: SHIPPED | OUTPATIENT
Start: 2020-09-06 | End: 2020-10-14

## 2020-09-06 RX ORDER — BUSPIRONE HYDROCHLORIDE 10 MG/1
TABLET ORAL
Qty: 60 TAB | Refills: 0 | Status: SHIPPED | OUTPATIENT
Start: 2020-09-06 | End: 2020-10-14

## 2020-09-14 DIAGNOSIS — K21.00 GASTROESOPHAGEAL REFLUX DISEASE WITH ESOPHAGITIS: ICD-10-CM

## 2020-09-15 RX ORDER — PANTOPRAZOLE SODIUM 40 MG/1
TABLET, DELAYED RELEASE ORAL
Qty: 30 TAB | Refills: 0 | Status: SHIPPED | OUTPATIENT
Start: 2020-09-15 | End: 2020-10-14

## 2020-09-16 RX ORDER — OMEPRAZOLE 20 MG/1
CAPSULE, DELAYED RELEASE ORAL
Qty: 30 CAP | Refills: 0 | Status: SHIPPED | OUTPATIENT
Start: 2020-09-16 | End: 2020-10-14

## 2020-09-24 ENCOUNTER — TELEPHONE (OUTPATIENT)
Dept: FAMILY MEDICINE CLINIC | Age: 45
End: 2020-09-24

## 2020-09-24 NOTE — TELEPHONE ENCOUNTER
RUDY Waller,    Pharmacy stating omeprazole requires Prior Authorization. Rx sent 9/16/20 by RUDY Rivera.   Thanks, Leonor    Insurance: [facility]  Member ID [id number]  Group [id number]  Insurance contact # [phone]

## 2020-10-08 ENCOUNTER — TRANSCRIBE ORDER (OUTPATIENT)
Dept: GENERAL RADIOLOGY | Age: 45
End: 2020-10-08

## 2020-10-08 ENCOUNTER — HOSPITAL ENCOUNTER (OUTPATIENT)
Dept: GENERAL RADIOLOGY | Age: 45
Discharge: HOME | End: 2020-10-08
Attending: INTERNAL MEDICINE
Payer: COMMERCIAL

## 2020-10-08 DIAGNOSIS — R06.02 SHORTNESS OF BREATH: ICD-10-CM

## 2020-10-08 DIAGNOSIS — R06.02 SHORTNESS OF BREATH: Primary | ICD-10-CM

## 2020-10-08 PROCEDURE — 71046 X-RAY EXAM CHEST 2 VIEWS: CPT

## 2020-10-13 ENCOUNTER — TELEPHONE (OUTPATIENT)
Dept: FAMILY MEDICINE CLINIC | Age: 45
End: 2020-10-13

## 2020-10-14 DIAGNOSIS — E78.5 HYPERLIPIDEMIA, UNSPECIFIED HYPERLIPIDEMIA TYPE: ICD-10-CM

## 2020-10-14 DIAGNOSIS — K21.00 GASTROESOPHAGEAL REFLUX DISEASE WITH ESOPHAGITIS: ICD-10-CM

## 2020-10-14 DIAGNOSIS — E11.65 TYPE 2 DIABETES MELLITUS WITH HYPERGLYCEMIA, WITHOUT LONG-TERM CURRENT USE OF INSULIN (HCC): ICD-10-CM

## 2020-10-14 DIAGNOSIS — F31.9 BIPOLAR 1 DISORDER, DEPRESSED (HCC): ICD-10-CM

## 2020-10-14 DIAGNOSIS — F40.01 AGORAPHOBIA WITH PANIC DISORDER: ICD-10-CM

## 2020-10-14 RX ORDER — QUETIAPINE FUMARATE 100 MG/1
TABLET, FILM COATED ORAL
Qty: 30 TAB | Refills: 0 | Status: SHIPPED | OUTPATIENT
Start: 2020-10-14 | End: 2020-11-12 | Stop reason: SDUPTHER

## 2020-10-14 RX ORDER — FENOFIBRATE 134 MG/1
CAPSULE ORAL
Qty: 30 CAP | Refills: 0 | Status: SHIPPED | OUTPATIENT
Start: 2020-10-14 | End: 2020-11-12 | Stop reason: SDUPTHER

## 2020-10-14 RX ORDER — BUSPIRONE HYDROCHLORIDE 10 MG/1
TABLET ORAL
Qty: 60 TAB | Refills: 0 | Status: SHIPPED | OUTPATIENT
Start: 2020-10-14 | End: 2020-11-12 | Stop reason: SDUPTHER

## 2020-10-14 RX ORDER — QUETIAPINE FUMARATE 100 MG/1
TABLET, FILM COATED ORAL
Qty: 30 TAB | Refills: 0 | Status: CANCELLED | OUTPATIENT
Start: 2020-10-14

## 2020-10-14 RX ORDER — OMEPRAZOLE 20 MG/1
CAPSULE, DELAYED RELEASE ORAL
Qty: 30 CAP | Refills: 0 | Status: CANCELLED | OUTPATIENT
Start: 2020-10-14

## 2020-10-14 RX ORDER — PANTOPRAZOLE SODIUM 40 MG/1
TABLET, DELAYED RELEASE ORAL
Qty: 30 TAB | Refills: 0 | Status: SHIPPED | OUTPATIENT
Start: 2020-10-14 | End: 2020-11-12 | Stop reason: SDUPTHER

## 2020-10-14 RX ORDER — BUSPIRONE HYDROCHLORIDE 10 MG/1
TABLET ORAL
Qty: 60 TAB | Refills: 0 | Status: CANCELLED | OUTPATIENT
Start: 2020-10-14

## 2020-10-14 RX ORDER — OMEPRAZOLE 20 MG/1
CAPSULE, DELAYED RELEASE ORAL
Qty: 30 CAP | Refills: 0 | Status: SHIPPED | OUTPATIENT
Start: 2020-10-14 | End: 2020-11-12 | Stop reason: ALTCHOICE

## 2020-10-14 RX ORDER — PRAVASTATIN SODIUM 40 MG/1
TABLET ORAL
Qty: 30 TAB | Refills: 0 | Status: CANCELLED | OUTPATIENT
Start: 2020-10-14

## 2020-10-14 RX ORDER — ERGOCALCIFEROL 1.25 MG/1
CAPSULE ORAL
Qty: 12 CAP | Refills: 0 | Status: SHIPPED | OUTPATIENT
Start: 2020-10-14 | End: 2021-01-22 | Stop reason: SDUPTHER

## 2020-10-14 RX ORDER — FENOFIBRATE 134 MG/1
CAPSULE ORAL
Qty: 30 CAP | Refills: 0 | Status: CANCELLED | OUTPATIENT
Start: 2020-10-14

## 2020-10-14 RX ORDER — GLIMEPIRIDE 4 MG/1
TABLET ORAL
Qty: 60 TAB | Refills: 0 | Status: CANCELLED | OUTPATIENT
Start: 2020-10-14

## 2020-10-14 RX ORDER — OXYBUTYNIN CHLORIDE 10 MG/1
10 TABLET, EXTENDED RELEASE ORAL DAILY
Qty: 30 TAB | Refills: 3 | Status: CANCELLED | OUTPATIENT
Start: 2020-10-14

## 2020-10-14 RX ORDER — PANTOPRAZOLE SODIUM 40 MG/1
TABLET, DELAYED RELEASE ORAL
Qty: 30 TAB | Refills: 0 | Status: CANCELLED | OUTPATIENT
Start: 2020-10-14

## 2020-10-14 RX ORDER — OXYBUTYNIN CHLORIDE 10 MG/1
TABLET, EXTENDED RELEASE ORAL
Qty: 30 TAB | Refills: 0 | Status: SHIPPED | OUTPATIENT
Start: 2020-10-14 | End: 2020-11-12 | Stop reason: SDUPTHER

## 2020-10-14 RX ORDER — GLIMEPIRIDE 4 MG/1
TABLET ORAL
Qty: 60 TAB | Refills: 0 | Status: SHIPPED | OUTPATIENT
Start: 2020-10-14 | End: 2020-11-12 | Stop reason: SDUPTHER

## 2020-10-14 RX ORDER — PRAVASTATIN SODIUM 40 MG/1
TABLET ORAL
Qty: 30 TAB | Refills: 0 | Status: SHIPPED | OUTPATIENT
Start: 2020-10-14 | End: 2020-11-12 | Stop reason: SDUPTHER

## 2020-10-14 RX ORDER — ERGOCALCIFEROL 1.25 MG/1
CAPSULE ORAL
Qty: 12 CAP | Refills: 0 | Status: CANCELLED | OUTPATIENT
Start: 2020-10-14

## 2020-10-15 ENCOUNTER — TRANSCRIBE ORDER (OUTPATIENT)
Dept: SCHEDULING | Age: 45
End: 2020-10-15

## 2020-10-15 DIAGNOSIS — R05.3 CHRONIC COUGH: ICD-10-CM

## 2020-10-15 DIAGNOSIS — Z72.0 TOBACCO USE: ICD-10-CM

## 2020-10-15 DIAGNOSIS — C58 CHORIOCARCINOMA OF FEMALE (HCC): Primary | ICD-10-CM

## 2020-10-15 DIAGNOSIS — Z92.21 STATUS POST CHEMOTHERAPY: ICD-10-CM

## 2020-10-15 DIAGNOSIS — R06.02 SOB (SHORTNESS OF BREATH): ICD-10-CM

## 2020-10-15 NOTE — TELEPHONE ENCOUNTER
Chief Complaint   Patient presents with    Medication Refill     Duplicate encounter. All medications refilled by [facility] MAYCOL REMY NP on 10/14/2020.   Ganesh Belcher LPN

## 2020-10-23 ENCOUNTER — OFFICE VISIT (OUTPATIENT)
Dept: FAMILY MEDICINE CLINIC | Age: 45
End: 2020-10-23
Payer: COMMERCIAL

## 2020-10-23 VITALS
DIASTOLIC BLOOD PRESSURE: 73 MMHG | SYSTOLIC BLOOD PRESSURE: 122 MMHG | OXYGEN SATURATION: 98 % | BODY MASS INDEX: 38.17 KG/M2 | TEMPERATURE: 97.5 F | RESPIRATION RATE: 16 BRPM | HEART RATE: 87 BPM | HEIGHT: 67 IN | WEIGHT: 243.2 LBS

## 2020-10-23 DIAGNOSIS — Z23 NEEDS FLU SHOT: ICD-10-CM

## 2020-10-23 DIAGNOSIS — Z23 ENCOUNTER FOR IMMUNIZATION: ICD-10-CM

## 2020-10-23 DIAGNOSIS — E11.65 TYPE 2 DIABETES MELLITUS WITH HYPERGLYCEMIA, WITHOUT LONG-TERM CURRENT USE OF INSULIN (HCC): Primary | ICD-10-CM

## 2020-10-23 DIAGNOSIS — F41.9 ANXIETY: ICD-10-CM

## 2020-10-23 DIAGNOSIS — E66.01 SEVERE OBESITY (HCC): ICD-10-CM

## 2020-10-23 DIAGNOSIS — E78.2 MIXED HYPERLIPIDEMIA: ICD-10-CM

## 2020-10-23 DIAGNOSIS — F31.9 BIPOLAR 1 DISORDER, DEPRESSED (HCC): ICD-10-CM

## 2020-10-23 LAB — HBA1C MFR BLD HPLC: 7.9 %

## 2020-10-23 PROCEDURE — 90686 IIV4 VACC NO PRSV 0.5 ML IM: CPT | Performed by: NURSE PRACTITIONER

## 2020-10-23 PROCEDURE — 90471 IMMUNIZATION ADMIN: CPT | Performed by: NURSE PRACTITIONER

## 2020-10-23 PROCEDURE — 3051F HG A1C>EQUAL 7.0%<8.0%: CPT | Performed by: NURSE PRACTITIONER

## 2020-10-23 PROCEDURE — 99214 OFFICE O/P EST MOD 30 MIN: CPT | Performed by: NURSE PRACTITIONER

## 2020-10-23 PROCEDURE — 83036 HEMOGLOBIN GLYCOSYLATED A1C: CPT | Performed by: NURSE PRACTITIONER

## 2020-10-23 RX ORDER — UMECLIDINIUM BROMIDE AND VILANTEROL TRIFENATATE 62.5; 25 UG/1; UG/1
1 POWDER RESPIRATORY (INHALATION) DAILY
COMMUNITY
Start: 2020-09-16 | End: 2021-06-22

## 2020-10-23 NOTE — PROGRESS NOTES
[facility] Note    Coral Andrew is a 39 y.o. female who was seen in clinic today (10/23/2020). Subjective:  Cardiovascular Review:  The patient has diabetes, hypertension, hyperlipidemia and obesity. Diet and Lifestyle: not attempting to follow a low fat, low cholesterol diet, not attempting to follow a low sodium diet, does not rigorously follow a diabetic diet, sedentary, nonsmoker  Home BP Monitoring: is not measured at home. Pertinent ROS: taking medications as instructed, no medication side effects noted, no TIA's, no chest pain on exertion, no dyspnea on exertion, no swelling of ankles. Last Hemoglobin A1c: 9.5% (6/2020). Patient currently taking Bydureon weekly, Glimepiride BID and pioglitazone. Home glucose readings 150-250. Admits to drinking Pepsi and sweet tea routinely. Patient previously took Metformin but had GI side effects with medication. Insominia  Patient reports presents with difficulty sleeping. Onset: several years ago. Description of symtoms:  difficulty initiating sleep, frequent awakening. Associated symptoms: chronic generalized anxiety. Denies: ETOH overuse. Taking Ambien nghtly for last 4 years. Previously tried Trazodone and Melatonin without effectiveness. Prior to Admission medications    Medication Sig Start Date End Date Taking? Authorizing Provider   Anoro Ellipta 62.5-25 mcg/actuation inhaler Take 1 Puff by inhalation daily. 9/16/20  Yes Provider, Historical   zolpidem (AMBIEN) 5 mg tablet Take 1 Tab by mouth nightly as needed for Sleep. Max Daily Amount: 5 mg. 10/16/20  Yes Maye Sanchez NP   exenatide microspheres (Bydureon BCise) 2 mg/0.85 mL atIn 2 mg by SubCUTAneous route every seven (7) days. 10/16/20  Yes Maye Sanchez NP   pioglitazone (ACTOS) 15 mg tablet Take 1 Tab by mouth daily.  10/16/20  Yes Pranav Morocho NP   pantoprazole (PROTONIX) 40 mg tablet Take 1 tablet by mouth once daily 10/14/20 Yes Susan Rivera NP   omeprazole (PRILOSEC) 20 mg capsule Take 1 capsule by mouth once daily 10/14/20  Yes Estelle Gilbert NP   oxybutynin chloride XL (DITROPAN XL) 10 mg CR tablet Take 1 tablet by mouth once daily 10/14/20  Yes Estelle Gilbert NP   fenofibrate micronized (LOFIBRA) 134 mg capsule Take 1 capsule by mouth once daily 10/14/20  Yes Estelle Gilbert NP   busPIRone (BUSPAR) 10 mg tablet Take 1 tablet by mouth twice daily 10/14/20  Yes Estelle Gilbert NP   glimepiride (AMARYL) 4 mg tablet Take 1 tablet by mouth twice daily 10/14/20  Yes Robert Rivera NP   pravastatin (PRAVACHOL) 40 mg tablet Take 1 tablet by mouth nightly 10/14/20  Yes Robert Rivera NP   QUEtiapine (SEROquel) 100 mg tablet Take 1 tablet by mouth once daily 10/14/20  Yes Robert Rivera NP   ergocalciferol (ERGOCALCIFEROL) 1,250 mcg (50,000 unit) capsule Take 1 capsule by mouth once a week 10/14/20  Yes Robert Rivera NP   lancets (OneTouch UltraSoft Lancets) misc Use to check glucose twice daily. 5/21/20  Yes Imelda Riggs MD   glucose blood VI test strips (OneTouch Verio test strips) strip Use 1 strip to check glucose twice daily. (DX: E11.9) 5/21/20  Yes Imelda Riggs MD   Blood-Glucose Meter (OneTouch Ultra2 Meter) monitoring kit Use as directed to test blood sugar two times a day. Use meter covered by insurance. 5/21/20  Yes Imelda Riggs MD   ibuprofen (MOTRIN) 800 mg tablet Take 1 Tab by mouth every eight (8) hours as needed for Pain. 2/14/20  Yes Robert Rivera NP   flash glucose scanning reader (FreeStyle [place] 14 Day [place]ansom) misc For daily monitoring of glucose levels. 8/5/20   Cliff Morton NP   flash glucose sensor (FreeStyle [place] 14 Day Sensor) kit For daily monitoring of glucose levels. 8/5/20   Cliff Morton NP   fluconazole (Diflucan) 150 mg tablet Take 150 mg by mouth once for yeast infection as needed.  FDA advises cautious prescribing of oral fluconazole in pregnancy. 6/30/20   Jeremy Akers, RUDY   fluticasone furoate-vilanterol (BREO ELLIPTA) 100-25 mcg/dose inhaler Take 1 Puff by inhalation daily. 4/26/19   Aundrea Corley Chateaugay, NP          Allergies   Allergen Reactions    Ciprofloxacin Hives    Demerol [Meperidine] Other (comments)     Goofy feeling, hallucinates           ROS  See HPI    Objective:   Physical Exam  Vitals signs and nursing note reviewed. Constitutional:       Appearance: She is well-developed. Neck:      Musculoskeletal: Normal range of motion and neck supple. Thyroid: No thyromegaly. Vascular: No carotid bruit or JVD. Cardiovascular:      Rate and Rhythm: Normal rate and regular rhythm. Heart sounds: No murmur. No friction rub. No gallop. Pulmonary:      Effort: Pulmonary effort is normal. No respiratory distress. Breath sounds: Normal breath sounds. Lymphadenopathy:      Cervical: No cervical adenopathy. Neurological:      Mental Status: She is alert and oriented to person, place, and time. Psychiatric:         Behavior: Behavior normal.     Diabetic foot exam - no evidence of ulcerations of infection, sensation intact with filament testing. Pedal pulse palpable. Visit Vitals  /73 (BP 1 Location: Left arm, BP Patient Position: Sitting)   Pulse 87   Temp 97.5 °F (36.4 °C) (Temporal)   Resp 16   Ht 5' 7\" (1.702 m)   Wt 243 lb 3.2 oz (110.3 kg)   LMP 09/10/2014 (Exact Date)   SpO2 98%   BMI 38.09 kg/m²       Assessment & Plan:  Diagnoses and all orders for this visit:    1. Type 2 diabetes mellitus with hyperglycemia, without long-term current use of insulin ([facility])  AMB POC HEMOGLOBIN A1C: 7.9%  -significantly improved. Reviewed diet and lifestyle changes.  -      DIABETES FOOT EXAM    2. Mixed hyperlipidemia  Continue staitn. 3. Anxiety  Stable, no changes to current therapy    4. Bipolar 1 disorder, depressed (HCC)  Stable, no changes to current therapy    5.  Severe obesity [facility])  Discussed need for weight loss through diet and exercise. Reviewed decreased caloric intake and increased activity. 6. Encounter for immunization  -     IN IMMUNIZ ADMIN,1 SINGLE/COMB VAC/TOXOID  -     INFLUENZA VIRUS VAC QUAD,SPLIT,PRESV FREE SYRINGE IM      I have discussed the diagnosis with the patient and the intended plan as seen in the above orders. The patient has received an after-visit summary along with patient information handout. I have discussed medication side effects and warnings with the patient as well. Follow-up and Dispositions    · Return in about 3 months (around 1/23/2021) for diabetes.            Luz Marina Santiago, RUDY

## 2020-10-23 NOTE — PROGRESS NOTES
Chief Complaint   Patient presents with    Diabetes     follow up     Anxiety     1. Have you been to the ER, urgent care clinic since your last visit? Hospitalized since your last visit? No    2. Have you seen or consulted any other health care providers outside of the [address] [place] [address] since your last visit? Include any pap smears or colon screening.  Yes When: Dr Joshua Moser pulmonology      Dr Maria Esther Fuentes ENT

## 2020-10-23 NOTE — PATIENT INSTRUCTIONS

## 2020-11-16 ENCOUNTER — TELEPHONE (OUTPATIENT)
Dept: FAMILY MEDICINE CLINIC | Age: 45
End: 2020-11-16

## 2020-11-16 NOTE — TELEPHONE ENCOUNTER
RUDY Sanchez,    Patient call requesting refill of the omeprazole 20mg. She had requested both meds last week thru my chart. She had been on both pantoprazole and omeprazole. Omeprazole was discontinued with alternate therapy by you on 11/12/20. Is she aware of it being discontinued? Please call her to advise.   [phone]  Thanks,  [facility]

## 2020-11-17 RX ORDER — OMEPRAZOLE 20 MG/1
CAPSULE, DELAYED RELEASE ORAL
Qty: 30 CAP | Refills: 0 | Status: SHIPPED | OUTPATIENT
Start: 2020-11-17 | End: 2020-12-15

## 2020-11-30 ENCOUNTER — VIRTUAL VISIT (OUTPATIENT)
Dept: FAMILY MEDICINE CLINIC | Age: 45
End: 2020-11-30
Payer: COMMERCIAL

## 2020-11-30 DIAGNOSIS — H57.9 DISCOLORATION OF EYE: Primary | ICD-10-CM

## 2020-11-30 PROCEDURE — 99213 OFFICE O/P EST LOW 20 MIN: CPT | Performed by: FAMILY MEDICINE

## 2020-11-30 NOTE — PROGRESS NOTES
[id number] [address] Note      Subjective:   No chief complaint on file. Jonah Clements is a 39y.o. year old female who presents for virtual evaluation of the following:      Pink Eye:   Left Eye   Watering, burning self limtied in seconds  2  days ago  Wears contacts  Tried changing them with no improving   Denies itching, foreign body sensation, purulent discharge, crusting in the morning      Review of Systems   Pertinent positives and negative per HPI. All other systems  reviewed are negative for a Comprehensive ROS (10+).        Past Medical History:   Diagnosis Date    Abnormal Pap smear of cervix     Acid indigestion     Cancer ([facility] [address] 01/2015    choriocarcinoma  - UTERNE - surgery/chemo    Depression     Diabetes (HCC)     Dysthymic disorder     GERD (gastroesophageal reflux disease)     Hypertriglyceridemia     Ill-defined condition 02/27/2020    DENIES HISTORY OF MOTION SICKNESS OR VERTIGO    Mixed hyperlipidemia 3/31/2010    MRSA (methicillin resistant Staphylococcus aureus)     PRIOR TO 2017, NASAL SWAB NEG MRSA 1/19/2017    Ovarian cyst     left     Psychiatric disorder     depression; ANXIETY    Urinary incontinence     URGENCY AND INCONTINENECE        Social History     Socioeconomic History    Marital status: SINGLE     Spouse name: Not on file    Number of children: Not on file    Years of education: Not on file    Highest education level: Not on file   Occupational History    Not on file   Social Needs    Financial resource strain: Not on file    Food insecurity     Worry: Not on file     Inability: Not on file    Transportation needs     Medical: Not on file     Non-medical: Not on file   Tobacco Use    Smoking status: Current Every Day Smoker     Packs/day: 1.00     Years: 25.00     Pack years: 25.00    Smokeless tobacco: Never Used   Substance and Sexual Activity    Alcohol use: No     Alcohol/week: 0.0 standard drinks    Drug use: No    Sexual activity: Yes     Partners: Male     Birth control/protection: None   Lifestyle    Physical activity     Days per week: Not on file     Minutes per session: Not on file    Stress: Not on file   Relationships    Social connections     Talks on phone: Not on file     Gets together: Not on file     Attends [employer] service: Not on file     Active member of club or organization: Not on file     Attends meetings of clubs or organizations: Not on file     Relationship status: Not on file    Intimate partner violence     Fear of current or ex partner: Not on file     Emotionally abused: Not on file     Physically abused: Not on file     Forced sexual activity: Not on file   Other Topics Concern    Not on file   Social History Narrative    Not on file       Family History   Problem Relation Age of Onset    Hypertension Mother    [facility] Elevated Lipids Mother     Cancer Mother         bladder cancer    Cancer Father         pancreatic    Thyroid Disease Paternal Aunt     Heart Disease Maternal Grandmother     Heart Disease Maternal Grandfather     Heart Disease Paternal Grandfather     Diabetes Paternal Aunt     Alcohol abuse Neg Hx     Arthritis-rheumatoid Neg Hx     Asthma Neg Hx     Bleeding Prob Neg Hx     Headache Neg Hx     Lung Disease Neg Hx     Migraines Neg Hx     Psychiatric Disorder Neg Hx     Stroke Neg Hx     Mental Retardation Neg Hx     Anesth Problems Neg Hx        Current Outpatient Medications   Medication Sig    omeprazole (PRILOSEC) 20 mg capsule Take 1 capsule by mouth once daily    fluconazole (Diflucan) 150 mg tablet Take 150 mg by mouth once for yeast infection as needed. FDA advises cautious prescribing of oral fluconazole in pregnancy.  zolpidem (AMBIEN) 5 mg tablet Take 1 Tab by mouth nightly as needed for Sleep. Max Daily Amount: 5 mg.  pioglitazone (ACTOS) 15 mg tablet Take 1 Tab by mouth daily.     pantoprazole (PROTONIX) 40 mg tablet Take 1 tablet by mouth once daily for acid reflux    oxybutynin chloride XL (DITROPAN XL) 10 mg CR tablet Take 1 Tab by mouth daily.  fenofibrate micronized (LOFIBRA) 134 mg capsule Take 1 Cap by mouth daily.  busPIRone (BUSPAR) 10 mg tablet Take 1 Tab by mouth two (2) times a day. For anxiety    glimepiride (AMARYL) 4 mg tablet Take 1 Tab by mouth daily. For diabetes    pravastatin (PRAVACHOL) 40 mg tablet Take 1 Tab by mouth nightly. For cholesterol    QUEtiapine (SEROquel) 100 mg tablet Take 1 Tab by mouth daily.  Anoro Ellipta 62.5-25 mcg/actuation inhaler Take 1 Puff by inhalation daily.  exenatide microspheres (Bydureon BCise) 2 mg/0.85 mL atIn 2 mg by SubCUTAneous route every seven (7) days.  ergocalciferol (ERGOCALCIFEROL) 1,250 mcg (50,000 unit) capsule Take 1 capsule by mouth once a week    flash glucose scanning reader ([employer] [place] 14 Day [place]) misc For daily monitoring of glucose levels.  flash glucose sensor ([employer] [place] 14 Day Sensor) kit For daily monitoring of glucose levels.  lancets (OneTouch UltraSoft Lancets) misc Use to check glucose twice daily.  glucose blood VI test strips (OneTouch Verio test strips) strip Use 1 strip to check glucose twice daily. (DX: E11.9)    Blood-Glucose Meter (OneTouch Ultra2 Meter) monitoring kit Use as directed to test blood sugar two times a day. Use meter covered by insurance.  ibuprofen (MOTRIN) 800 mg tablet Take 1 Tab by mouth every eight (8) hours as needed for Pain.  fluticasone furoate-vilanterol (BREO ELLIPTA) 100-25 mcg/dose inhaler Take 1 Puff by inhalation daily. No current facility-administered medications for this visit. Objective:   No flowsheet data found. Vitals measurement not available        Physical Examination:  General: Alert, cooperative, no distress, appears stated age. Eyes: Left conjunctival injection. Pupils equally round. Extraocular muscles intact.   Ears: Normal appearing external ear   Nose: Nares normal appearing  Mouth/Throat: Lips, mucosa, and tongue normal. Moist mucous membranes. No tonsillar enlargement noted. Neck: Supple, symmetrical, trachea midline, no neck mass visualized. Respiratory: Breathing comfortably, in no acute respiratory distress. Cardiovascular: Visualized extremities without edema. MSK: Upper extremities normal appearing. Skin: No significant erythematous lesions or discoloration noted on facial skin. No rashes or lesions on exposed skin. Neurologic: No facial asymmetry. Normal gaze. Cranial nerves intact. Psychiatric: Affect appropriate. Mood euthymic. Thoughts logical. Speech volume and speed normal. No hallucinations. Well kempt. Office Visit on 10/23/2020   Component Date Value Ref Range Status    Hemoglobin A1c (POC) 10/23/2020 7.9  % Final         Assessment/ Plan:   Diagnoses and all orders for this visit:    1. Discoloration of eye      Left eye discoloration likely vial or allergic conjunctivitis but need in person examination with optometrist to rule out keratitis use of contacts. We discussed the expected course, resolution and complications of the diagnosis(es) in detail. Medication risks, benefits, costs, interactions, and alternatives were discussed as indicated. I advised her to contact the office if her condition worsens, changes or fails to improve as anticipated. She expressed understanding with the diagnosis(es) and plan. Jaxon Cesar is a 39 y.o. female being evaluated by a video visit encounter for concerns as above. A caregiver was present when appropriate. Due to this being a TeleHealth encounter (During [facility]-15 public health emergency), evaluation of the following organ systems was limited: Vitals/Constitutional/EENT/Resp/CV/GI//MS/Neuro/Skin/Heme-Lymph-Imm.   Pursuant to the emergency declaration under the [facility] waiver authority and the Coronavirus Preparedness and Response Supplemental Appropriations Act, this Virtual  Visit was conducted, with patient's (and/or legal guardian's) consent, to reduce the patient's risk of exposure to COVID-19 and provide necessary medical care. Services were provided through a video synchronous discussion virtually to substitute for in-person clinic visit. Provider was in the office while conducting this encounter. Patient was at home during encounter. Other persons participating in call: None  Consent:  She and/or her healthcare decision maker is aware that this patient-initiated Telehealth encounter is a billable service, with coverage as determined by her insurance carrier. She is aware that she may receive a bill and has provided verbal consent to proceed: Yes  This virtual visit was conducted via [employer]. Pursuant to the emergency declaration under the [facility], [facility] waiver authority and the [employer] Act, this Virtual  Visit was conducted to reduce the patient's risk of exposure to COVID-19 and provide continuity of care for an established patient. Services were provided through a video synchronous discussion virtually to substitute for in-person clinic visit. Due to this being a TeleHealth evaluation, many elements of the physical examination are unable to be assessed. Total Time: minutes: 11-20 minutes. Educated patient on red flag symptoms to warrant return to clinic or emergency room visit. I have discussed the diagnosis with the patient and the intended plan as seen in the above orders. The patient has been offered or received an after-visit summary and questions were answered concerning future plans. I have discussed medication side effects and warnings with the patient as well. Follow-up and Dispositions    · Return if symptoms worsen or fail to improve.          Signed,    Kai Rouse, MD  11/30/2020

## 2020-12-03 NOTE — PATIENT INSTRUCTIONS
A Healthy Lifestyle: Care Instructions  Your Care Instructions     A healthy lifestyle can help you feel good, stay at a healthy weight, and have plenty of energy for both work and play. A healthy lifestyle is something you can share with your whole family. A healthy lifestyle also can lower your risk for serious health problems, such as high blood pressure, heart disease, and diabetes. You can follow a few steps listed below to improve your health and the health of your family. Follow-up care is a key part of your treatment and safety. Be sure to make and go to all appointments, and call your doctor if you are having problems. It's also a good idea to know your test results and keep a list of the medicines you take. How can you care for yourself at home? · Do not eat too much sugar, fat, or fast foods. You can still have dessert and treats now and then. The goal is moderation. · Start small to improve your eating habits. Pay attention to portion sizes, drink less juice and soda pop, and eat more fruits and vegetables. ? Eat a healthy amount of food. A 3-ounce serving of meat, for example, is about the size of a deck of cards. Fill the rest of your plate with vegetables and whole grains. ? Limit the amount of soda and sports drinks you have every day. Drink more water when you are thirsty. ? Eat at least 5 servings of fruits and vegetables every day. It may seem like a lot, but it is not hard to reach this goal. A serving or helping is 1 piece of fruit, 1 cup of vegetables, or 2 cups of leafy, raw vegetables. Have an apple or some carrot sticks as an afternoon snack instead of a candy bar. Try to have fruits and/or vegetables at every meal.  · Make exercise part of your daily routine. You may want to start with simple activities, such as walking, bicycling, or slow swimming. Try to be active 30 to 60 minutes every day. You do not need to do all 30 to 60 minutes all at once.  For example, you can exercise 3 times a day for 10 or 20 minutes. Moderate exercise is safe for most people, but it is always a good idea to talk to your doctor before starting an exercise program.  · Keep moving. [place]re [place] the lawn, work in the garden, or [employer]. Take the stairs instead of the elevator at work. · If you smoke, quit. People who smoke have an increased risk for heart attack, stroke, cancer, and other lung illnesses. Quitting is hard, but there are ways to boost your chance of quitting tobacco for good. ? Use nicotine gum, patches, or lozenges. ? Ask your doctor about stop-smoking programs and medicines. ? Keep trying. In addition to reducing your risk of diseases in the future, you will notice some benefits soon after you stop using tobacco. If you have shortness of breath or asthma symptoms, they will likely get better within a few weeks after you quit. · Limit how much alcohol you drink. Moderate amounts of alcohol (up to 2 drinks a day for men, 1 drink a day for women) are okay. But drinking too much can lead to liver problems, high blood pressure, and other health problems. Family health  If you have a family, there are many things you can do together to improve your health. · Eat meals together as a family as often as possible. · Eat healthy foods. This includes fruits, vegetables, lean meats and dairy, and whole grains. · Include your family in your fitness plan. Most people think of activities such as jogging or tennis as the way to fitness, but there are many ways you and your family can be more active. Anything that makes you breathe hard and gets your heart pumping is exercise. Here are some tips:  ? Walk to do errands or to take your child to school or the bus.  ? Go for a family bike ride after dinner instead of watching TV. Where can you learn more?   Go to http://www.gray.com/  Enter R161 in the search box to learn more about \"A Healthy Lifestyle: Care Instructions. \"  Current as of: January 31, 2020               Content Version: 12.6  © 9993-3069 [employer]. Care instructions adapted under license by [employer] (which disclaims liability or warranty for this information). If you have questions about a medical condition or this instruction, always ask your healthcare professional. [place] [address] any warranty or liability for your use of this information.

## 2020-12-15 RX ORDER — OMEPRAZOLE 20 MG/1
CAPSULE, DELAYED RELEASE ORAL
Qty: 30 CAP | Refills: 0 | Status: SHIPPED | OUTPATIENT
Start: 2020-12-15 | End: 2021-01-22 | Stop reason: SDUPTHER

## 2021-01-07 NOTE — PROGRESS NOTES
Chief Complaint   Patient presents with    Diabetes     f/u    Hoarse     x 6 mth comes and goes. No pain     1. Have you been to the ER, urgent care clinic since your last visit? Hospitalized since your last visit? No    2. Have you seen or consulted any other health care providers outside of the [address] [place] [address] since your last visit? Include any pap smears or colon screening. Yes Where: Dr. Lasha Briones  8/2/2019 DX: OVary Bekah Gilbert is a 37 y.o. female  who presents for routine immunizations. she denies any symptoms , reactions or allergies that would exclude them from being immunized today. Risks and adverse reactions were discussed and the VIS was given to them. All questions were addressed. she was observed for 10 min post injection. There were no reactions observed.     Vanessa Akhtar, BOLIVARN Complex Repair And Graft Additional Text (Will Appearing After The Standard Complex Repair Text): The complex repair was not sufficient to completely close the primary defect. The remaining additional defect was repaired with the graft mentioned below.

## 2021-01-19 ENCOUNTER — APPOINTMENT (OUTPATIENT)
Dept: GENERAL RADIOLOGY | Age: 46
End: 2021-01-19
Attending: EMERGENCY MEDICINE
Payer: COMMERCIAL

## 2021-01-19 ENCOUNTER — HOSPITAL ENCOUNTER (EMERGENCY)
Age: 46
Discharge: HOME | End: 2021-01-19
Attending: STUDENT IN AN ORGANIZED HEALTH CARE EDUCATION/TRAINING PROGRAM
Payer: COMMERCIAL

## 2021-01-19 VITALS
WEIGHT: 247.14 LBS | OXYGEN SATURATION: 98 % | TEMPERATURE: 97.8 F | DIASTOLIC BLOOD PRESSURE: 85 MMHG | BODY MASS INDEX: 38.71 KG/M2 | SYSTOLIC BLOOD PRESSURE: 149 MMHG | HEART RATE: 87 BPM | RESPIRATION RATE: 16 BRPM

## 2021-01-19 DIAGNOSIS — M54.6 ACUTE RIGHT-SIDED THORACIC BACK PAIN: Primary | ICD-10-CM

## 2021-01-19 PROCEDURE — 99282 EMERGENCY DEPT VISIT SF MDM: CPT

## 2021-01-19 PROCEDURE — 96372 THER/PROPH/DIAG INJ SC/IM: CPT

## 2021-01-19 PROCEDURE — 73030 X-RAY EXAM OF SHOULDER: CPT

## 2021-01-19 PROCEDURE — 71046 X-RAY EXAM CHEST 2 VIEWS: CPT

## 2021-01-19 PROCEDURE — 74011250636 HC RX REV CODE- 250/636: Performed by: EMERGENCY MEDICINE

## 2021-01-19 RX ORDER — KETOROLAC TROMETHAMINE 10 MG/1
10 TABLET, FILM COATED ORAL
Qty: 18 TAB | Refills: 0 | Status: SHIPPED | OUTPATIENT
Start: 2021-01-19 | End: 2021-02-15 | Stop reason: ALTCHOICE

## 2021-01-19 RX ORDER — KETOROLAC TROMETHAMINE 30 MG/ML
60 INJECTION, SOLUTION INTRAMUSCULAR; INTRAVENOUS
Status: COMPLETED | OUTPATIENT
Start: 2021-01-19 | End: 2021-01-19

## 2021-01-19 RX ORDER — METHOCARBAMOL 750 MG/1
750 TABLET, FILM COATED ORAL 3 TIMES DAILY
Qty: 18 TAB | Refills: 0 | Status: SHIPPED | OUTPATIENT
Start: 2021-01-19 | End: 2021-02-15 | Stop reason: SDUPTHER

## 2021-01-19 RX ADMIN — KETOROLAC TROMETHAMINE 60 MG: 30 INJECTION, SOLUTION INTRAMUSCULAR at 19:05

## 2021-01-19 NOTE — ED PROVIDER NOTES
HPI patient is a 70-year-old obese female with past medical history significant for cancer, type 2 diabetes, depression, GERD, MRSA, anxiety and urinary incontinence who presents to the ED with right posterior thoracic area back pain since Friday evening. 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 She denies any falls, direct injury or blunt trauma. Skin integrity is intact. There is no obvious deformity, rash, bruising or erythema. Good neurovascular sensation. No apparent tendon or nerve injury. Pain increases with sneezing, coughing and certain position changes. She has been taking Tylenol and Aleve without relief. She drove herself here. Pt is right hand dominant.       Past Medical History:   Diagnosis Date    Abnormal Pap smear of cervix     Acid indigestion     Cancer ([facility] [address] 01/2015    choriocarcinoma  - UTERNE - surgery/chemo    Depression     Diabetes (HCC)     Dysthymic disorder     GERD (gastroesophageal reflux disease)     Hypertriglyceridemia     Ill-defined condition 02/27/2020    DENIES HISTORY OF MOTION SICKNESS OR VERTIGO    Mixed hyperlipidemia 3/31/2010    MRSA (methicillin resistant Staphylococcus aureus)     PRIOR TO 2017, NASAL SWAB NEG MRSA 1/19/2017    Ovarian cyst     left     Psychiatric disorder     depression; ANXIETY    Urinary incontinence     URGENCY AND INCONTINENECE       Past Surgical History:   Procedure Laterality Date    HX APPENDECTOMY      HX GYN  12/08    D & C     HX GYN      Ovary 8/2/2019 [place]    HX ORTHOPAEDIC      R ACL knee    HX ORTHOPAEDIC      BILATERAL A/S KNEES    HX ORTHOPAEDIC  4/2016    REMOVAL OF HARDWARE IN KNEE    HX PARTIAL HYSTERECTOMY Bilateral May 4 2015    Dr. Sharan Gibson         Family History:   Problem Relation Age of Onset    Hypertension Mother     Elevated Lipids Mother     Cancer Mother         bladder cancer    Cancer Father         pancreatic    Thyroid Disease Paternal Renaye Few Heart Disease Maternal Grandmother     Heart Disease Maternal Grandfather     Heart Disease Paternal Grandfather     Diabetes Paternal Aunt     Alcohol abuse Neg Hx     Arthritis-rheumatoid Neg Hx     Asthma Neg Hx     Bleeding Prob Neg Hx     Headache Neg Hx     Lung Disease Neg Hx     Migraines Neg Hx     Psychiatric Disorder Neg Hx     Stroke Neg Hx     Mental Retardation Neg Hx     Anesth Problems Neg Hx        Social History     Socioeconomic History    Marital status: SINGLE     Spouse name: Not on file    Number of children: Not on file    Years of education: Not on file    Highest education level: Not on file   Occupational History    Not on file   Social Needs    Financial resource strain: Not on file    Food insecurity     Worry: Not on file     Inability: Not on file    Transportation needs     Medical: Not on file     Non-medical: Not on file   Tobacco Use    Smoking status: Current Every Day Smoker     Packs/day: 1.00     Years: 25.00     Pack years: 25.00    Smokeless tobacco: Never Used   Substance and Sexual Activity    Alcohol use: No     Alcohol/week: 0.0 standard drinks    Drug use: No    Sexual activity: Yes     Partners: Male     Birth control/protection: None   Lifestyle    Physical activity     Days per week: Not on file     Minutes per session: Not on file    Stress: Not on file   Relationships    Social connections     Talks on phone: Not on file     Gets together: Not on file     Attends Gnosticism service: Not on file     Active member of club or organization: Not on file     Attends meetings of clubs or organizations: Not on file     Relationship status: Not on file    Intimate partner violence     Fear of current or ex partner: Not on file     Emotionally abused: Not on file     Physically abused: Not on file     Forced sexual activity: Not on file   Other Topics Concern    Not on file   Social History Narrative    Not on file         ALLERGIES: Ciprofloxacin and Demerol [meperidine]    Review of Systems   Constitutional: Negative for activity change, appetite change, fever and unexpected weight change. HENT: Negative for congestion, rhinorrhea, sore throat and trouble swallowing. Eyes: Negative for visual disturbance. Respiratory: Positive for cough. Negative for shortness of breath. Cardiovascular: Negative for chest pain, palpitations and leg swelling. Gastrointestinal: Negative for abdominal pain, diarrhea, nausea and vomiting. Genitourinary: Negative for difficulty urinating and dysuria. Musculoskeletal: Positive for back pain. Negative for joint swelling. Skin: Negative for rash. Neurological: Negative for dizziness, light-headedness and headaches. All other systems reviewed and are negative. Vitals:    01/19/21 1743   BP: (!) 139/91   Pulse: 88   Resp: 16   Temp: 97.8 °F (36.6 °C)   SpO2: 99%   Weight: 112.1 kg (247 lb 2.2 oz)            Physical Exam  Vitals signs and nursing note reviewed. Constitutional:       General: She is not in acute distress. Appearance: Normal appearance. She is obese. She is not ill-appearing, toxic-appearing or diaphoretic. 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 Nose: Nose normal.   Neck:      Musculoskeletal: Normal range of motion. No muscular tenderness. Cardiovascular:      Rate and Rhythm: Normal rate and regular rhythm. Pulmonary:      Effort: Pulmonary effort is normal.      Breath sounds: Normal breath sounds. Abdominal:      General: Bowel sounds are normal.      Palpations: Abdomen is soft. Tenderness: There is no abdominal tenderness. There is no guarding or rebound. Musculoskeletal:         General: Tenderness present. No deformity or signs of injury. Right lower leg: No edema. Left lower leg: No edema.       Comments: Reports right sided posterior thoracic area pain underneath the shoulder blade area;Skin integrity is intact. There is no obvious bony deformity, rash, bruising or erythema. Good neurovascular sensation. No apparent tendon or nerve injury. Skin:     General: Skin is warm and dry. Neurological:      General: No focal deficit present. Mental Status: She is alert. MDM       Procedures      Xr Chest Pa Lat    Result Date: 1/19/2021  No acute findings. Xr Shoulder Rt Ap/lat Min 2 V    Result Date: 1/19/2021  No acute abnormality. Patient has been reexamined and reports some relief from medications given. Plan to discharge home on Toradol and Robaxin with close follow-up with PCP and/or orthopedic specialist for further evaluation. Patient's results and plan of care have been reviewed with her. Patient has verbally conveyed her understanding and agreement of her signs, symptoms, diagnosis, treatment and prognosis and additionally agrees to follow up as recommended or return to the Emergency Room should her condition change prior to follow-up. Discharge instructions have also been provided to the patient with some educational information regarding her diagnosis as well a list of reasons why she would want to return to the ER prior to her follow-up appointment should her condition change. Sarahi Parker NP  Discussed plan of care with Dr. Tevin Mitchell.  Sarahi Parker NP

## 2021-01-19 NOTE — ED TRIAGE NOTES
Triage Note: Patient complains of right upper back pain since Friday. Denies injury. Denies pain with movement. Patient only has pain when she sneezes.

## 2021-01-20 ENCOUNTER — PATIENT OUTREACH (OUTPATIENT)
Dept: CASE MANAGEMENT | Age: 46
End: 2021-01-20

## 2021-01-20 NOTE — ED NOTES
Pt ambulatory out of ED with discharge instructions and prescriptions in hand given by Dr. Teodoro Vasquez and Etelvina So., NP; pt verbalized understanding of discharge paperwork and time allotted for questions. VSS. Pt alert and oriented.

## 2021-01-20 NOTE — PROGRESS NOTES
Patient contacted regarding recent discharge and COVID-19 risk. Discussed COVID-19 related testing which was not done at this time. Test results were not done. Outreach made within 2 business days of discharge: Yes    Care Transition Nurse/ Ambulatory Care Manager/ LPN Care Coordinator contacted the patient by telephone to perform post discharge assessment. Verified name and  with patient as identifiers. Patient reports back pain still there. The Toradol given in ER did not help. Pain seems to be moving up back. Has appt on Friday with pcp, NP Faiza Mclain, and will discuss with him at that time. Suggested ice pack to area. Patient has following risk factors of: diabetes. CTN/ACM/LPN reviewed discharge instructions, medical action plan and red flags related to discharge diagnosis. Reviewed and educated them on any new and changed medications related to discharge diagnosis. Advised obtaining a 90-day supply of all daily and as-needed medications. Advance Care Planning:   Does patient have an Advance Directive: currently not on file; patient declined education    Education provided regarding infection prevention, and signs and symptoms of COVID-19 and when to seek medical attention with patient who verbalized understanding. Discussed exposure protocols and quarantine from [address] you at higher risk for severe illness 2019 and given an opportunity for questions and concerns. The patient agrees to contact the COVID-19 hotline [phone] or PCP office for questions related to their healthcare. CTN/ACM/LPN provided contact information for future reference. From CDC: Are you at higher risk for severe illness?  Wash your hands often.  Avoid close contact (6 feet, which is about two arm lengths) with people who are sick.  Put distance between yourself and other people if COVID-19 is spreading in your community.  Clean and disinfect frequently touched surfaces.    Avoid all cruise travel and non-essential air travel.  Call your healthcare professional if you have concerns about COVID-19 and your underlying condition or if you are sick. For more information on steps you can take to protect yourself, see CDC's How to Protect Yourself      Patient/family/caregiver given information for GetWell Loop and agrees to enroll yes  Patient's preferred e-mail:  [email or web address]  Patient's preferred phone number: [phone]   Based on Loop alert triggers, patient will be contacted by nurse care manager for worsening symptoms. Pt will be further monitored by COVID Loop Team, pending account activation by patient.  based on severity of symptoms and risk factors.

## 2021-01-22 ENCOUNTER — OFFICE VISIT (OUTPATIENT)
Dept: FAMILY MEDICINE CLINIC | Age: 46
End: 2021-01-22
Payer: COMMERCIAL

## 2021-01-22 DIAGNOSIS — K21.00 GASTROESOPHAGEAL REFLUX DISEASE WITH ESOPHAGITIS: ICD-10-CM

## 2021-01-22 DIAGNOSIS — E55.9 VITAMIN D DEFICIENCY: ICD-10-CM

## 2021-01-22 DIAGNOSIS — E11.9 DIABETES MELLITUS WITHOUT COMPLICATION (HCC): ICD-10-CM

## 2021-01-22 DIAGNOSIS — G47.09 OTHER INSOMNIA: ICD-10-CM

## 2021-01-22 DIAGNOSIS — F40.01 AGORAPHOBIA WITH PANIC DISORDER: ICD-10-CM

## 2021-01-22 DIAGNOSIS — Z00.00 ROUTINE GENERAL MEDICAL EXAMINATION AT A HEALTH CARE FACILITY: Primary | ICD-10-CM

## 2021-01-22 DIAGNOSIS — F31.9 BIPOLAR 1 DISORDER, DEPRESSED (HCC): ICD-10-CM

## 2021-01-22 DIAGNOSIS — Z12.31 VISIT FOR SCREENING MAMMOGRAM: ICD-10-CM

## 2021-01-22 DIAGNOSIS — E78.5 HYPERLIPIDEMIA, UNSPECIFIED HYPERLIPIDEMIA TYPE: ICD-10-CM

## 2021-01-22 PROCEDURE — 99396 PREV VISIT EST AGE 40-64: CPT | Performed by: NURSE PRACTITIONER

## 2021-01-22 PROCEDURE — 3051F HG A1C>EQUAL 7.0%<8.0%: CPT | Performed by: NURSE PRACTITIONER

## 2021-01-22 RX ORDER — ZOLPIDEM TARTRATE 5 MG/1
5 TABLET ORAL
Qty: 90 TAB | Refills: 0 | Status: SHIPPED | OUTPATIENT
Start: 2021-01-22 | End: 2021-04-20

## 2021-01-22 RX ORDER — PIOGLITAZONEHYDROCHLORIDE 15 MG/1
15 TABLET ORAL DAILY
Qty: 90 TAB | Refills: 1 | Status: SHIPPED | OUTPATIENT
Start: 2021-01-22 | End: 2021-07-26 | Stop reason: SDUPTHER

## 2021-01-22 RX ORDER — EXENATIDE 2 MG/.85ML
2 INJECTION, SUSPENSION, EXTENDED RELEASE SUBCUTANEOUS
Qty: 4 EACH | Refills: 0 | Status: SHIPPED | OUTPATIENT
Start: 2021-01-22 | End: 2021-01-29 | Stop reason: CLARIF

## 2021-01-22 RX ORDER — GLIMEPIRIDE 4 MG/1
4 TABLET ORAL DAILY
Qty: 90 TAB | Refills: 1 | Status: SHIPPED | OUTPATIENT
Start: 2021-01-22 | End: 2021-11-15 | Stop reason: SDUPTHER

## 2021-01-22 RX ORDER — PANTOPRAZOLE SODIUM 40 MG/1
TABLET, DELAYED RELEASE ORAL
Qty: 90 TAB | Refills: 1 | Status: SHIPPED | OUTPATIENT
Start: 2021-01-22 | End: 2021-04-20

## 2021-01-22 RX ORDER — BUSPIRONE HYDROCHLORIDE 10 MG/1
10 TABLET ORAL 2 TIMES DAILY
Qty: 180 TAB | Refills: 1 | Status: SHIPPED | OUTPATIENT
Start: 2021-01-22

## 2021-01-22 RX ORDER — QUETIAPINE FUMARATE 100 MG/1
100 TABLET, FILM COATED ORAL DAILY
Qty: 90 TAB | Refills: 1 | Status: SHIPPED | OUTPATIENT
Start: 2021-01-22 | End: 2021-07-26 | Stop reason: SDUPTHER

## 2021-01-22 RX ORDER — PRAVASTATIN SODIUM 40 MG/1
40 TABLET ORAL
Qty: 90 TAB | Refills: 1 | Status: SHIPPED | OUTPATIENT
Start: 2021-01-22 | End: 2021-07-26 | Stop reason: SDUPTHER

## 2021-01-22 RX ORDER — ERGOCALCIFEROL 1.25 MG/1
CAPSULE ORAL
Qty: 90 CAP | Refills: 3 | Status: SHIPPED | OUTPATIENT
Start: 2021-01-22 | End: 2022-04-08 | Stop reason: SDUPTHER

## 2021-01-22 RX ORDER — OXYBUTYNIN CHLORIDE 10 MG/1
10 TABLET, EXTENDED RELEASE ORAL DAILY
Qty: 90 TAB | Refills: 1 | Status: SHIPPED | OUTPATIENT
Start: 2021-01-22 | End: 2021-12-27 | Stop reason: SDUPTHER

## 2021-01-22 RX ORDER — OMEPRAZOLE 20 MG/1
CAPSULE, DELAYED RELEASE ORAL
Qty: 30 CAP | Refills: 0 | Status: SHIPPED | OUTPATIENT
Start: 2021-01-22 | End: 2021-01-31

## 2021-01-22 RX ORDER — FENOFIBRATE 134 MG/1
134 CAPSULE ORAL DAILY
Qty: 90 CAP | Refills: 1 | Status: SHIPPED | OUTPATIENT
Start: 2021-01-22 | End: 2021-07-26 | Stop reason: SDUPTHER

## 2021-01-22 NOTE — PROGRESS NOTES
[facility] Note    Luis Santana is a 39 y.o. female who was seen in clinic today (1/22/2021). Subjective:  Cardiovascular Review:  The patient has diabetes, hypertension, hyperlipidemia and obesity. Diet and Lifestyle: not attempting to follow a low fat, low cholesterol diet, not attempting to follow a low sodium diet, does not rigorously follow a diabetic diet, sedentary, nonsmoker  Home BP Monitoring: is not measured at home. Pertinent ROS: taking medications as instructed, no medication side effects noted, no TIA's, no chest pain on exertion, no dyspnea on exertion, no swelling of ankles. Last Hemoglobin A1c: 7.9% (10/2020). Patient currently taking Bydureon weekly, Glimepiride BID and pioglitazone. Home glucose readings 150-250. Admits to drinking Pepsi and sweet tea routinely. Patient previously took Metformin but had GI side effects with medication. Insominia  Patient reports presents with difficulty sleeping. Onset: several years ago. Description of symtoms:  difficulty initiating sleep, frequent awakening. Associated symptoms: chronic generalized anxiety. Denies: ETOH overuse. Taking Ambien nghtly for last 4 years. Previously tried Trazodone and Melatonin without effectiveness. Prior to Admission medications    Medication Sig Start Date End Date Taking? Authorizing Provider   zolpidem (AMBIEN) 5 mg tablet Take 1 Tab by mouth nightly as needed for Sleep. Max Daily Amount: 5 mg. 1/22/21  Yes Cristin Sanchez NP   pioglitazone (ACTOS) 15 mg tablet Take 1 Tab by mouth daily. 1/22/21  Yes Cherrie Jackson NP   pantoprazole (PROTONIX) 40 mg tablet Take 1 tablet by mouth once daily for acid reflux 1/22/21  Yes Cristin Sanchez NP   oxybutynin chloride XL (DITROPAN XL) 10 mg CR tablet Take 1 Tab by mouth daily. 1/22/21  Yes Cristin Sanchez NP   busPIRone (BUSPAR) 10 mg tablet Take 1 Tab by mouth two (2) times a day.  For anxiety 1/22/21 Yes Genaro Washburn NP   glimepiride (AMARYL) 4 mg tablet Take 1 Tab by mouth daily. For diabetes 1/22/21  Yes Otto Sanchez NP   pravastatin (PRAVACHOL) 40 mg tablet Take 1 Tab by mouth nightly. For cholesterol 1/22/21  Yes Otto Sanchez NP   QUEtiapine (SEROquel) 100 mg tablet Take 1 Tab by mouth daily. 1/22/21  Yes Otto Sanchez NP   exenatide microspheres (Bydureon BCise) 2 mg/0.85 mL atIn 2 mg by SubCUTAneous route every seven (7) days. 1/22/21  Yes Genaro Washburn NP   ergocalciferol (ERGOCALCIFEROL) 1,250 mcg (50,000 unit) capsule Take 1 capsule by mouth once a week 1/22/21  Yes Otto Sanchez NP   fenofibrate micronized (LOFIBRA) 134 mg capsule Take 1 Cap by mouth daily. 1/22/21  Yes Genaro Washburn NP   omeprazole (PRILOSEC) 20 mg capsule Take 1 capsule by mouth once daily 1/22/21  Yes Otto Sanchez NP   ketorolac (TORADOL) 10 mg tablet Take 1 Tab by mouth three (3) times daily as needed for Pain. 1/19/21  Yes Pat Alfonso NP   methocarbamoL (Robaxin-750) 750 mg tablet Take 1 Tab by mouth three (3) times daily. 1/19/21  Yes Pat Alfonso NP   fluconazole (Diflucan) 150 mg tablet Take 150 mg by mouth once for yeast infection as needed. FDA advises cautious prescribing of oral fluconazole in pregnancy. 11/15/20  Yes Otto Sanchez NP   lancets (OneTouch UltraSoft Lancets) misc Use to check glucose twice daily. 5/21/20  Yes Shyann Roblero MD   glucose blood VI test strips (OneTouch Verio test strips) strip Use 1 strip to check glucose twice daily. (DX: E11.9) 5/21/20  Yes Shyann Roblero MD   Blood-Glucose Meter (OneTouch Ultra2 Meter) monitoring kit Use as directed to test blood sugar two times a day. Use meter covered by insurance. 5/21/20  Yes Shyann Roblero MD   ibuprofen (MOTRIN) 800 mg tablet Take 1 Tab by mouth every eight (8) hours as needed for Pain.  2/14/20  Yes Kieran Rivera NP   Anoro Ellipta 62.5-25 mcg/actuation inhaler Take 1 Puff by inhalation daily. 9/16/20   Provider, Britta   flash glucose scanning reader (FreeStyle [place] 14 Day [place]) misc For daily monitoring of glucose levels. 8/5/20   Gennaro Ch NP   flash glucose sensor (FreeStyle [place] 14 Day Sensor) kit For daily monitoring of glucose levels. 8/5/20   Gennaro Ch NP   fluticasone furoate-vilanterol (BREO ELLIPTA) 100-25 mcg/dose inhaler Take 1 Puff by inhalation daily. 4/26/19   Alfredito Corley NP          Allergies   Allergen Reactions    Ciprofloxacin Hives    Demerol [Meperidine] Other (comments)     Goofy feeling, hallucinates           ROS  See HPI    Objective:   Physical Exam  Vitals signs and nursing note reviewed. Constitutional:       Appearance: She is well-developed. Neck:      Musculoskeletal: Normal range of motion and neck supple. Thyroid: No thyromegaly. Vascular: No carotid bruit or JVD. Cardiovascular:      Rate and Rhythm: Normal rate and regular rhythm. Heart sounds: No murmur. No friction rub. No gallop. Pulmonary:      Effort: Pulmonary effort is normal. No respiratory distress. Breath sounds: Normal breath sounds. Lymphadenopathy:      Cervical: No cervical adenopathy. Neurological:      Mental Status: She is alert and oriented to person, place, and time. Psychiatric:         Behavior: Behavior normal.           Visit Vitals  /89 (BP 1 Location: Left arm, BP Patient Position: Sitting)   Pulse 84   Temp 98.3 °F (36.8 °C) (Temporal)   Resp 16   Ht 5' 7\" (1.702 m)   Wt 250 lb (113.4 kg)   LMP 09/10/2014 (Exact Date)   SpO2 95%   BMI 39.16 kg/m²       Assessment & Plan:  Diagnoses and all orders for this visit:    1. Routine general medical examination at a health care facility  Well adult, reviewed routine screenings as well as healthy diet and lifestyle practices  -     METABOLIC PANEL, COMPREHENSIVE; Future  -     LIPID PANEL; Future  -     HEMOGLOBIN A1C WITH EAG;  Future  -     MICROALBUMIN, UR, RAND W/ MICROALB/CREAT RATIO; Future  -     CBC WITH AUTOMATED DIFF; Future  -     TSH 3RD GENERATION; Future    2. Diabetes mellitus without complication ([facility] [address]  Recheck labs, adjust dosing as needed. Reviewed diet and lifestyle changes. -     pioglitazone (ACTOS) 15 mg tablet; Take 1 Tab by mouth daily.  -     glimepiride (AMARYL) 4 mg tablet; Take 1 Tab by mouth daily. For diabetes  -     exenatide microspheres (Bydureon BCise) 2 mg/0.85 mL atIn; 2 mg by SubCUTAneous route every seven (7) days. 3. Hyperlipidemia, unspecified hyperlipidemia type  -     pravastatin (PRAVACHOL) 40 mg tablet; Take 1 Tab by mouth nightly. For cholesterol  -     fenofibrate micronized (LOFIBRA) 134 mg capsule; Take 1 Cap by mouth daily. 4. Gastroesophageal reflux disease with esophagitis  -     pantoprazole (PROTONIX) 40 mg tablet; Take 1 tablet by mouth once daily for acid reflux    5. Bipolar 1 disorder, depressed (HCC)  Stable, no changes to current therapy  -     QUEtiapine (SEROquel) 100 mg tablet; Take 1 Tab by mouth daily. 6. Agoraphobia with panic disorder  -     Refill busPIRone (BUSPAR) 10 mg tablet; Take 1 Tab by mouth two (2) times a day. For anxiety    7. Other insomnia  -     Refill zolpidem (AMBIEN) 5 mg tablet; Take 1 Tab by mouth nightly as needed for Sleep. Max Daily Amount: 5 mg. 8. Vitamin D deficiency  -     ergocalciferol (ERGOCALCIFEROL) 1,250 mcg (50,000 unit) capsule; Take 1 capsule by mouth once a week  -     VITAMIN D, 25 HYDROXY; Future    9. Visit for screening mammogram  -     [facility] MAMMO BI SCREENING INCL CAD; Future    Other orders  -     oxybutynin chloride XL (DITROPAN XL) 10 mg CR tablet; Take 1 Tab by mouth daily. I have discussed the diagnosis with the patient and the intended plan as seen in the above orders. The patient has received an after-visit summary along with patient information handout.   I have discussed medication side effects and warnings with the patient as well.      Follow-up and Dispositions    · Return in about 4 months (around 5/22/2021) for diabetes, disease management.            Miles Martinez NP

## 2021-01-22 NOTE — PATIENT INSTRUCTIONS

## 2021-01-22 NOTE — PROGRESS NOTES
Chief Complaint   Patient presents with    Complete Physical    Medication Refill     1. Have you been to the ER, urgent care clinic since your last visit? Hospitalized since your last visit? Yes When: 1/19/21 [facility] was dx with muscle spasms     2. Have you seen or consulted any other health care providers outside of the [address] [place] [address] since your last visit? Include any pap smears or colon screening.  No

## 2021-01-23 LAB
25(OH)D3 SERPL-MCNC: 47.4 NG/ML (ref 30–100)
ALBUMIN SERPL-MCNC: 3.8 G/DL (ref 3.5–5)
ALBUMIN/GLOB SERPL: 1.4 {RATIO} (ref 1.1–2.2)
ALP SERPL-CCNC: 74 U/L (ref 45–117)
ALT SERPL-CCNC: 27 U/L (ref 12–78)
ANION GAP SERPL CALC-SCNC: 4 MMOL/L (ref 5–15)
AST SERPL-CCNC: 21 U/L (ref 15–37)
BASOPHILS # BLD: 0 K/UL (ref 0–0.1)
BASOPHILS NFR BLD: 0 % (ref 0–1)
BILIRUB SERPL-MCNC: 0.4 MG/DL (ref 0.2–1)
BUN SERPL-MCNC: 14 MG/DL (ref 6–20)
BUN/CREAT SERPL: 13 (ref 12–20)
CALCIUM SERPL-MCNC: 9.4 MG/DL (ref 8.5–10.1)
CHLORIDE SERPL-SCNC: 109 MMOL/L (ref 97–108)
CHOLEST SERPL-MCNC: 146 MG/DL
CO2 SERPL-SCNC: 27 MMOL/L (ref 21–32)
CREAT SERPL-MCNC: 1.08 MG/DL (ref 0.55–1.02)
CREAT UR-MCNC: 179 MG/DL
DIFFERENTIAL METHOD BLD: ABNORMAL
EOSINOPHIL # BLD: 0.1 K/UL (ref 0–0.4)
EOSINOPHIL NFR BLD: 1 % (ref 0–7)
ERYTHROCYTE [DISTWIDTH] IN BLOOD BY AUTOMATED COUNT: 13.5 % (ref 11.5–14.5)
EST. AVERAGE GLUCOSE BLD GHB EST-MCNC: 166 MG/DL
GLOBULIN SER CALC-MCNC: 2.8 G/DL (ref 2–4)
GLUCOSE SERPL-MCNC: 163 MG/DL (ref 65–100)
HBA1C MFR BLD: 7.4 % (ref 4–5.6)
HCT VFR BLD AUTO: 39.2 % (ref 35–47)
HDLC SERPL-MCNC: 36 MG/DL
HDLC SERPL: 4.1 {RATIO} (ref 0–5)
HGB BLD-MCNC: 12.6 G/DL (ref 11.5–16)
IMM GRANULOCYTES # BLD AUTO: 0 K/UL (ref 0–0.04)
IMM GRANULOCYTES NFR BLD AUTO: 0 % (ref 0–0.5)
LDLC SERPL CALC-MCNC: 52.8 MG/DL (ref 0–100)
LIPID PROFILE,FLP: ABNORMAL
LYMPHOCYTES # BLD: 2.8 K/UL (ref 0.8–3.5)
LYMPHOCYTES NFR BLD: 33 % (ref 12–49)
MCH RBC QN AUTO: 29.2 PG (ref 26–34)
MCHC RBC AUTO-ENTMCNC: 32.1 G/DL (ref 30–36.5)
MCV RBC AUTO: 90.7 FL (ref 80–99)
MICROALBUMIN UR-MCNC: 2.36 MG/DL
MICROALBUMIN/CREAT UR-RTO: 13 MG/G (ref 0–30)
MONOCYTES # BLD: 0.3 K/UL (ref 0–1)
MONOCYTES NFR BLD: 4 % (ref 5–13)
NEUTS SEG # BLD: 5.3 K/UL (ref 1.8–8)
NEUTS SEG NFR BLD: 62 % (ref 32–75)
NRBC # BLD: 0 K/UL (ref 0–0.01)
NRBC BLD-RTO: 0 PER 100 WBC
PLATELET # BLD AUTO: 208 K/UL (ref 150–400)
PMV BLD AUTO: 12 FL (ref 8.9–12.9)
POTASSIUM SERPL-SCNC: 3.9 MMOL/L (ref 3.5–5.1)
PROT SERPL-MCNC: 6.6 G/DL (ref 6.4–8.2)
RBC # BLD AUTO: 4.32 M/UL (ref 3.8–5.2)
SODIUM SERPL-SCNC: 140 MMOL/L (ref 136–145)
TRIGL SERPL-MCNC: 286 MG/DL (ref ?–150)
TSH SERPL DL<=0.05 MIU/L-ACNC: 0.83 UIU/ML (ref 0.36–3.74)
VLDLC SERPL CALC-MCNC: 57.2 MG/DL
WBC # BLD AUTO: 8.6 K/UL (ref 3.6–11)

## 2021-01-25 VITALS
WEIGHT: 250 LBS | RESPIRATION RATE: 16 BRPM | DIASTOLIC BLOOD PRESSURE: 89 MMHG | SYSTOLIC BLOOD PRESSURE: 139 MMHG | HEIGHT: 67 IN | HEART RATE: 84 BPM | BODY MASS INDEX: 39.24 KG/M2 | TEMPERATURE: 98.3 F | OXYGEN SATURATION: 95 %

## 2021-01-29 ENCOUNTER — HOSPITAL ENCOUNTER (OUTPATIENT)
Dept: MAMMOGRAPHY | Age: 46
Discharge: HOME | End: 2021-01-29
Payer: COMMERCIAL

## 2021-01-29 DIAGNOSIS — Z12.31 VISIT FOR SCREENING MAMMOGRAM: ICD-10-CM

## 2021-01-29 PROCEDURE — 77067 SCR MAMMO BI INCL CAD: CPT

## 2021-01-29 RX ORDER — EXENATIDE 2 MG/.65ML
2 INJECTION, SUSPENSION, EXTENDED RELEASE SUBCUTANEOUS
Qty: 4 EACH | Refills: 5 | Status: SHIPPED | OUTPATIENT
Start: 2021-01-29 | End: 2021-04-07 | Stop reason: SDUPTHER

## 2021-02-01 ENCOUNTER — TELEPHONE (OUTPATIENT)
Dept: FAMILY MEDICINE CLINIC | Age: 46
End: 2021-02-01

## 2021-02-15 ENCOUNTER — OFFICE VISIT (OUTPATIENT)
Dept: FAMILY MEDICINE CLINIC | Age: 46
End: 2021-02-15
Payer: COMMERCIAL

## 2021-02-15 VITALS
HEIGHT: 67 IN | SYSTOLIC BLOOD PRESSURE: 117 MMHG | DIASTOLIC BLOOD PRESSURE: 79 MMHG | TEMPERATURE: 97.7 F | RESPIRATION RATE: 18 BRPM | WEIGHT: 248.6 LBS | HEART RATE: 86 BPM | OXYGEN SATURATION: 98 % | BODY MASS INDEX: 39.02 KG/M2

## 2021-02-15 DIAGNOSIS — S76.311A STRAIN OF PIRIFORMIS MUSCLE, RIGHT, INITIAL ENCOUNTER: Primary | ICD-10-CM

## 2021-02-15 PROCEDURE — 99213 OFFICE O/P EST LOW 20 MIN: CPT | Performed by: FAMILY MEDICINE

## 2021-02-15 RX ORDER — METHOCARBAMOL 750 MG/1
750 TABLET, FILM COATED ORAL 3 TIMES DAILY
Qty: 30 TAB | Refills: 0 | Status: SHIPPED | OUTPATIENT
Start: 2021-02-15 | End: 2021-06-22

## 2021-02-15 RX ORDER — DICLOFENAC SODIUM 75 MG/1
75 TABLET, DELAYED RELEASE ORAL 2 TIMES DAILY
Qty: 30 TAB | Refills: 0 | Status: SHIPPED | OUTPATIENT
Start: 2021-02-15 | End: 2021-06-22

## 2021-02-15 NOTE — PROGRESS NOTES
HISTORY OF PRESENT ILLNESS  Bambi Do is a 39 y.o. female. seen for acute right buttock pain for a week  HPI  Hip Pain  Patient complains of right hip pain. Onset of the symptoms was a week ago. Inciting event: twisted while reaching out to get piece. Current symptoms include right sided hip pain. Severity = moderate  right sided buttock pain. Severity = moderate  tailbone pain. Severity = not present  location: posterior  radiates to: right sided inguinal region, right sided  upper leg(s): posterior  worse after period of inactivity. Associated symptoms: none. Aggravating symptoms: squatting, kneeling, rising after sitting. Patient's course of pain: symptoms have progressed to a point and plateaued. . Patient has had no prior hip problems. Previous visits for this problem: none. Evaluation to date: none. Treatment to date: prescription analgesics per med orders: she is taking Ibuprofen and Methocarbamol. somewhat effective. Review of Systems   Constitutional: Negative for chills, fever and malaise/fatigue. HENT: Negative for congestion, ear pain, sore throat and tinnitus. Eyes: Negative for blurred vision, double vision, pain and discharge. Respiratory: Negative for cough, shortness of breath and wheezing. Cardiovascular: Negative for chest pain, palpitations and leg swelling. Gastrointestinal: Negative for abdominal pain, blood in stool, constipation, diarrhea, nausea and vomiting. Genitourinary: Negative for dysuria, frequency, hematuria and urgency. Musculoskeletal: Negative for back pain, joint pain and myalgias. Right buttock pain   Skin: Negative for rash. Neurological: Negative for dizziness, tremors, seizures and headaches. Endo/Heme/Allergies: Negative for polydipsia. Does not bruise/bleed easily. Psychiatric/Behavioral: Negative for depression and substance abuse. The patient is not nervous/anxious. Physical Exam  Vitals signs and nursing note reviewed. Constitutional:       Appearance: She is well-developed. HENT:      Head: Normocephalic and atraumatic. Nose: Nose normal.   Eyes:      Conjunctiva/sclera: Conjunctivae normal.      Pupils: Pupils are equal, round, and reactive to light. Neck:      Musculoskeletal: Normal range of motion and neck supple. Cardiovascular:      Rate and Rhythm: Normal rate and regular rhythm. Heart sounds: Normal heart sounds. Pulmonary:      Effort: Pulmonary effort is normal.      Breath sounds: Normal breath sounds. Abdominal:      General: Bowel sounds are normal.      Palpations: Abdomen is soft. Musculoskeletal: Normal range of motion. Legs:       Comments: SLR negative both sides  LISSA and FADIR strongly positive on right hip   Skin:     General: Skin is warm and dry. Neurological:      Mental Status: She is alert and oriented to person, place, and time. Deep Tendon Reflexes: Reflexes are normal and symmetric. Comments: Sensations normal   Psychiatric:         Behavior: Behavior normal.         ASSESSMENT and PLAN  Diagnoses and all orders for this visit:    1. Strain of piriformis muscle, right, initial encounter  -     methocarbamoL (Robaxin-750) 750 mg tablet; Take 1 Tab by mouth three (3) times daily. -     diclofenac EC (VOLTAREN) 75 mg EC tablet; Take 1 Tab by mouth two (2) times a day. discussed stretches of Piriformis and print out was given  Discussed lifestyle issues and health guidance given  Patient was given an after visit summary which includes diagnoses, vital signs, current medications, instructions and references & authorized prescriptions . Results of labs will be conveyed to patient, once available. Pt verbalized instructions I provided and expressed understanding of discussion that was held today.

## 2021-02-15 NOTE — PROGRESS NOTES
Chief Complaint   Patient presents with    Back Pain     Sciatica. Right Side       1. Have you been to the ER, urgent care clinic since your last visit? Hospitalized since your last visit? No    2. Have you seen or consulted any other health care providers outside of the [address] [place] [address] since your last visit? Include any pap smears or colon screening. No    Have you had the covid vaccine. Tianna Cruz No. when    3 most recent PHQ Screens 2/15/2021   Little interest or pleasure in doing things Not at all   Feeling down, depressed, irritable, or hopeless Not at all   Total Score PHQ 2 0       Health Maintenance Due   Topic Date Due    COVID-19 Vaccine (1 of 2) 10/05/1991    Eye Exam Retinal or Dilated  02/12/2015

## 2021-02-15 NOTE — PATIENT INSTRUCTIONS
Gluteal Strain: Rehab Exercises  Introduction  Here are some examples of exercises for you to try. The exercises may be suggested for a condition or for rehabilitation. Start each exercise slowly. Ease off the exercises if you start to have pain. You will be told when to start these exercises and which ones will work best for you. How to do the exercises  Hip rotator stretch   1. Lie on your back with both knees bent and your feet flat on the floor. 2. Put the ankle of your affected leg on your opposite thigh near your knee. 3. Use your hand to gently push the knee of your affected leg away from your body until you feel a gentle stretch around your hip. 4. Hold the stretch for 15 to 30 seconds. 5. Repeat 2 to 4 times. 6. Repeat steps 1 through 5, but this time use your hand to gently pull your knee toward your opposite shoulder. 7. Switch legs and repeat steps 1 through 6, even if only one hip is sore. Seated hip rotator stretch   1. Sit in a sturdy chair. 2. Cross your affected leg over your knee, resting your foot on top of your knee. 3. Keep your back straight, and slowly lean forward until you feel a stretch in your hip. 4. Hold for 15 to 30 seconds. 5. Switch legs and repeat steps 1 through 4 on your other side. 6. Repeat 2 to 4 times. Hamstring stretch (lying down)   1. Lie flat on your back with your legs straight. If you feel discomfort in your back, place a small towel roll under your lower back. 2. Holding the back of your affected leg, lift your leg straight up and toward your body until you feel a stretch at the back of your thigh. 3. Hold the stretch for at least 30 seconds. 4. Repeat 2 to 4 times. 5. Switch legs and repeat steps 1 through 4, even if only one hip is sore. Bridging   1. Lie on your back with both knees bent. Your knees should be bent about 90 degrees.   2. Then push your feet into the floor, squeeze your buttocks, and lift your hips off the floor until your shoulders, hips, and knees are all in a straight line. 3. Hold for about 6 seconds as you continue to breathe normally, and then slowly lower your hips back down to the floor and rest for up to 10 seconds. 4. Repeat 8 to 12 times. Single-leg bridge   1. Lie on your back, with your arms at your sides. 2. Bend one knee, and keep that foot flat on the floor. The other leg should be straight. 3. Raise the straight leg up so that the knee is level with the bent knee. 4. Tighten your belly muscles by pulling your belly button in toward your spine. Lift your buttocks up and be careful not to let your hips drop down. 5. Hold for about 6 seconds as you continue to breathe normally, and then slowly lower your hips back down to the floor. 6. Switch legs and repeat steps 1 though 5.  7. Repeat 8 to 12 times. Follow-up care is a key part of your treatment and safety. Be sure to make and go to all appointments, and call your doctor if you are having problems. It's also a good idea to know your test results and keep a list of the medicines you take. Where can you learn more? Go to http://www.gray.com/  Enter K741 in the search box to learn more about \"Gluteal Strain: Rehab Exercises. \"  Current as of: March 2, 2020               Content Version: 12.6  © 5171-5407 [employer]. Care instructions adapted under license by [employer] (which disclaims liability or warranty for this information). If you have questions about a medical condition or this instruction, always ask your healthcare professional. [place] [address] any warranty or liability for your use of this information.

## 2021-03-03 ENCOUNTER — TELEPHONE (OUTPATIENT)
Dept: FAMILY MEDICINE CLINIC | Age: 46
End: 2021-03-03

## 2021-03-03 NOTE — TELEPHONE ENCOUNTER
NP Radha [place],    Receiving fax from [facility] for [employer]" for a PA request for the Bydureon 2mg pens. I believe this is the correct patient found thru [facility] with same address. Rx sent: 1/29/21 4 + 5  (Requires PA)    Insurance Plan: [employer]   ID: [id number]  Contact# [phone]    No other info given.   Thanks, Tobias Orellana

## 2021-03-04 ENCOUNTER — TELEPHONE (OUTPATIENT)
Dept: FAMILY MEDICINE CLINIC | Age: 46
End: 2021-03-04

## 2021-03-04 NOTE — TELEPHONE ENCOUNTER
Prior authorization completed on Bydureon 2 mg pen inj. Prior authorization is resolved , no further PA is necessary.   Ron Couch LPN

## 2021-03-04 NOTE — TELEPHONE ENCOUNTER
Chief Complaint   Patient presents with    Prior Auth     BYDUREON 2 MG PEN INJ:  Your PA has been resolved, no additional PA is required

## 2021-03-16 ENCOUNTER — TELEPHONE (OUTPATIENT)
Dept: FAMILY MEDICINE CLINIC | Age: 46
End: 2021-03-16

## 2021-03-16 NOTE — TELEPHONE ENCOUNTER
Patient called stating she is unable to get her (insulin) injection because she is required to have an authorization. Patient is requesting a call back.          BCB#[phone]

## 2021-03-16 NOTE — TELEPHONE ENCOUNTER
Chief Complaint   Patient presents with    Prior Auth     exenatide microspheres (Bydureon) 2 mg/0.65 mL pnij      Prior authorization completed on 3/4/2021, response PA resolved , no further action. Contacted [place]/ [employer] [facility] to get clarification about medication PA requirements ( [phone] ) . Spoke with an representative and completed a verbal PA and faxed in office notes ( [phone]). Awaiting decision of PA. Original PA completed on covermymeds and notification stated PA resolved. Also, patient updated name change from Ernestina to Cesar Hinton. Soham Corbett LPN    PA reference number:  [id number].   Soham Corbett LPN

## 2021-03-17 NOTE — TELEPHONE ENCOUNTER
S/w 2 identifiers received, patient in reference to her concerns and assured her we will make sure she receive medication today. Patient also made aware that further coaching will be done with staff. Patient states understanding, call disconnected with no further concerns or questions at this time.

## 2021-03-17 NOTE — TELEPHONE ENCOUNTER
Pt called on backline wanting to speak to only a manager    Pt was transferred to [facility]

## 2021-03-18 NOTE — TELEPHONE ENCOUNTER
Chief Complaint   Patient presents with    Prior Auth     exenatide microspheres (Bydureon) 2 mg/0.65 mL pnij :  APPROVED:  3/17/2021 THROUGH 3/17/2022. [facility] pharmacy was faxed approval notification at [phone] with confirmation received. Julio Hernández LPN    Patient informed via [employer]Notebook[employer].   Julio Hernández LPN

## 2021-04-07 NOTE — TELEPHONE ENCOUNTER
----- Message from [facility] sent at 4/7/2021  9:14 AM EDT -----  Regarding: RUDY Sanchez/Telephone  General Message/Vendor Calls    Caller's first and last name:  N/A       Reason for call:   Medication coding      Callback required yes/no and why:  Y      Best contact number(s):    [phone]  OR send message through [facility]      Details to clarify the request:  Patient needs her medication called Bydureon Bcise, 2mg, AUTO-INJECT. She stated this was coded incorrectly as being for obesity. It is not for obesity. This is for diabetes. She needs this coded for diabetes and resubmitted to [facility] asa. She has been without this injection for the past four weeks. She stated that it has to specifically state it's for diabetes and with the words \"auto-inject\" or else [employer] will not cover this. She is requesting a message through [employer] to confirm this has been taken care of or call her if there is any reason this cannot be done.       [facility]

## 2021-04-13 ENCOUNTER — TELEPHONE (OUTPATIENT)
Dept: FAMILY MEDICINE CLINIC | Age: 46
End: 2021-04-13

## 2021-04-13 RX ORDER — EXENATIDE 2 MG/.65ML
2 INJECTION, SUSPENSION, EXTENDED RELEASE SUBCUTANEOUS
Status: CANCELLED | OUTPATIENT
Start: 2021-04-13

## 2021-04-13 RX ORDER — EXENATIDE 2 MG/.65ML
2 INJECTION, SUSPENSION, EXTENDED RELEASE SUBCUTANEOUS
Qty: 4 EACH | Refills: 5 | Status: SHIPPED | OUTPATIENT
Start: 2021-04-13 | End: 2021-07-14 | Stop reason: ALTCHOICE

## 2021-04-13 NOTE — TELEPHONE ENCOUNTER
Chief Complaint   Patient presents with    Prior Auth     Bydureon BCise 2MG/0.85ML SC AUIJ:  PA Case: [id number], Status: Approved, Coverage Starts on: 4/13/2021 12:00:00 AM, Coverage Ends on: 4/13/2022 12:00:00 AM.     [facility] pharmacy was faxed approval notification at [phone] with confirmation received.   Saima Mosqueda LPN

## 2021-04-14 ENCOUNTER — TELEPHONE (OUTPATIENT)
Dept: FAMILY MEDICINE CLINIC | Age: 46
End: 2021-04-14

## 2021-04-14 NOTE — TELEPHONE ENCOUNTER
Chief Complaint   Patient presents with    Prior Auth     pioglitazone 15 mg tablet :  APPROVED:  PA Case :  [id number] approved 04/14/2021 through 04/14/2022. [facility] pharmacy was faxed at [phone] with confirmation received.   Caryle Cecil, LPN

## 2021-04-20 ENCOUNTER — TELEPHONE (OUTPATIENT)
Dept: FAMILY MEDICINE CLINIC | Age: 46
End: 2021-04-20

## 2021-04-20 DIAGNOSIS — K21.00 GASTROESOPHAGEAL REFLUX DISEASE WITH ESOPHAGITIS: ICD-10-CM

## 2021-04-20 DIAGNOSIS — G47.09 OTHER INSOMNIA: ICD-10-CM

## 2021-04-20 RX ORDER — ZOLPIDEM TARTRATE 5 MG/1
5 TABLET ORAL
Qty: 90 TAB | Refills: 0 | Status: SHIPPED | OUTPATIENT
Start: 2021-04-20 | End: 2021-07-14 | Stop reason: SDUPTHER

## 2021-04-20 RX ORDER — PANTOPRAZOLE SODIUM 40 MG/1
TABLET, DELAYED RELEASE ORAL
Qty: 90 TAB | Refills: 0 | Status: SHIPPED | OUTPATIENT
Start: 2021-04-20 | End: 2021-07-26 | Stop reason: SDUPTHER

## 2021-04-20 NOTE — TELEPHONE ENCOUNTER
Chief Complaint   Patient presents with    Prior Auth     GLIMEPIRIDE 4 MG TABLET :  APPROVED:  PA Case: [id number], Status: Approved, Coverage Starts on: 4/20/2021 12:00:00 AM, Coverage Ends on: 4/20/2022 12:00:00 AM     [facility] pharmacy was faxed approval notification at [phone] with confirmation received.   Angie Gomez LPN

## 2021-04-20 NOTE — TELEPHONE ENCOUNTER
Pt is prescribed Prilosec and Protonix please review. No access to      Last visit 02/15/2021 MD Senthil Delacruz   Next appointment Nothing scheduled   Previous refill encounter(s)   01/22/2021:  - Protonix #90 with 1 refill,   - Ambien #90     Requested Prescriptions     Pending Prescriptions Disp Refills    pantoprazole (PROTONIX) 40 mg tablet [Pharmacy Med Name: Pantoprazole Sodium 40 MG Oral Tablet Delayed Release] 90 Tab 0     Sig: TAKE 1 TABLET BY MOUTH ONCE DAILY FOR ACID REFLUX    zolpidem (AMBIEN) 5 mg tablet [Pharmacy Med Name: Zolpidem Tartrate 5 MG Oral Tablet] 90 Tab 0     Sig: Take 1 Tab by mouth nightly as needed for Sleep. Max Daily Amount: 5 mg.

## 2021-06-21 ENCOUNTER — TELEPHONE (OUTPATIENT)
Dept: GYNECOLOGY | Age: 46
End: 2021-06-21

## 2021-06-21 ENCOUNTER — TELEPHONE (OUTPATIENT)
Dept: FAMILY MEDICINE CLINIC | Age: 46
End: 2021-06-21

## 2021-06-21 DIAGNOSIS — N83.202 CYST OF LEFT OVARY: Primary | ICD-10-CM

## 2021-06-21 DIAGNOSIS — R19.00 PELVIC MASS: ICD-10-CM

## 2021-06-21 NOTE — TELEPHONE ENCOUNTER
Returned call to patient. Name and  verified. Patient received notice that PCP is leaving. She had questions regarding who she should see. Patient is scheduled tomorrow with RUDY Corley.

## 2021-06-21 NOTE — TELEPHONE ENCOUNTER
Pt  and states she would like to see Dr. Lopez Dunbar for the symptoms she is having, mood swings, hotflashes, night sweats, I advised to see her OBGYN and she states she has tried to make an appt with three of them and no one will see her due to her history of choriocarcinoma, uterine. She also needed a follow up with us, she was seen on 4/21/2020 and was advised to follow up with our office in 4 to 6 months with a repeat pelvic ultrasound, that was not done.   Will have Dr. Lopez Dunbar place a pelvic ultrasound order and she will get that this week and we will see her next Tuesday 6/29/2021

## 2021-06-22 ENCOUNTER — OFFICE VISIT (OUTPATIENT)
Dept: FAMILY MEDICINE CLINIC | Age: 46
End: 2021-06-22
Payer: COMMERCIAL

## 2021-06-22 VITALS
TEMPERATURE: 98.3 F | SYSTOLIC BLOOD PRESSURE: 123 MMHG | OXYGEN SATURATION: 98 % | HEIGHT: 67 IN | BODY MASS INDEX: 38.77 KG/M2 | DIASTOLIC BLOOD PRESSURE: 77 MMHG | RESPIRATION RATE: 18 BRPM | HEART RATE: 95 BPM | WEIGHT: 247 LBS

## 2021-06-22 DIAGNOSIS — Z13.21 ENCOUNTER FOR VITAMIN DEFICIENCY SCREENING: ICD-10-CM

## 2021-06-22 DIAGNOSIS — R23.2 HOT FLASHES: ICD-10-CM

## 2021-06-22 DIAGNOSIS — F41.9 ANXIETY: Primary | ICD-10-CM

## 2021-06-22 DIAGNOSIS — F31.9 BIPOLAR 1 DISORDER, DEPRESSED (HCC): ICD-10-CM

## 2021-06-22 DIAGNOSIS — N39.3 STRESS INCONTINENCE: ICD-10-CM

## 2021-06-22 DIAGNOSIS — R80.9 MICROALBUMINURIA: ICD-10-CM

## 2021-06-22 DIAGNOSIS — J42 CHRONIC BRONCHITIS, UNSPECIFIED CHRONIC BRONCHITIS TYPE (HCC): ICD-10-CM

## 2021-06-22 DIAGNOSIS — G47.09 OTHER INSOMNIA: ICD-10-CM

## 2021-06-22 DIAGNOSIS — E11.9 DIABETES MELLITUS WITHOUT COMPLICATION (HCC): ICD-10-CM

## 2021-06-22 DIAGNOSIS — K21.00 GASTROESOPHAGEAL REFLUX DISEASE WITH ESOPHAGITIS, UNSPECIFIED WHETHER HEMORRHAGE: ICD-10-CM

## 2021-06-22 PROCEDURE — 99214 OFFICE O/P EST MOD 30 MIN: CPT | Performed by: FAMILY MEDICINE

## 2021-06-22 NOTE — PROGRESS NOTES
Lacey Yousif  39 y.o. female  1975  [address] [facility] [id number]  [facility]       Encounter Date: 6/22/2021           Established Patient Visit Note: Robert Ivy MD    Reason for Appointment:  Chief Complaint   Patient presents with    New Patient     New to provider       History of Present Illness:  History provided by patient    Lacey Yousif is a 39 y.o. female who presents to clinic today for:    New Concerns:     · Patient Concern for Early Menopause  She reports that she is concerned about going through menopause  She reports being easily agitated  She reports that she has also had hot flashes  She reports that her oncologist willnot see her until she has another pelvic ultrasound  She has reached out to her GYN (Dr. Case Blair), but she states that she would not see her d/t hx of choriocarcinoma  She would like to have blood work done (including all of her hormones), so that when she goes to a new GYN Mima Pluck) she will have those levels ready    Chronic Conditions  · Insomnia: she has been on [place] since around 2016 according to the patient. She takes it every night. She reports that it works for her most nights. · Chronic Bronchitis: No longer on anoro Ellipta. It was prescribed by pulmonology. She reports that her breathing has been okay recently  · Anxiety/Bipolar: on Buspar 10mg bid, anxiety has been worse recently. She reports that she was diagnosed with bipolar, but she disagrees with this diagnosis. She denies any SI. She reports that she has seen six psychiatrists and none of them have helped her. She is not interested in seeing a psychiatrist. She reports that she has had counseling in the past, but she did not find it to be helpful.    · Vit D Deficiency: takes 50k international units  Weekly  · DM2: last A1c 7.4 in Jan, 2021; has not been check BG, reports that she has been doing well since being on the bydureon, (takes Actos, bydureon, Amaryl)  · HLD: takes Fenofibrate and pravastatin, last LDL was 52.8 in Jan, 2021  · GERD: she reports as well controlled on both Prilosec and protonix (she reports that she needs both for control). She is followed by GI who she states agrees with regimen  · Stress Incontinence: takes Ditropan, followed by [facility]        Review of Systems  Review of Systems   Constitutional: Negative for chills and fever (endorses hot flashes but no fever). Respiratory: Negative for cough, shortness of breath and wheezing. Cardiovascular: Negative for chest pain and palpitations. Allergies: Ciprofloxacin and Meperidine    Medications: (Updated to reflect final medication list after visit)    Current Outpatient Medications:     pantoprazole (PROTONIX) 40 mg tablet, TAKE 1 TABLET BY MOUTH ONCE DAILY FOR ACID REFLUX, Disp: 90 Tab, Rfl: 0    zolpidem (AMBIEN) 5 mg tablet, Take 1 Tab by mouth nightly as needed for Sleep. Max Daily Amount: 5 mg., Disp: 90 Tab, Rfl: 0    exenatide microspheres (Bydureon) 2 mg/0.65 mL pnij, 0.65 mL by SubCUTAneous route every seven (7) days. Indications: type 2 diabetes mellitus, Disp: 4 Each, Rfl: 5    omeprazole (PRILOSEC) 20 mg capsule, Take 1 capsule by mouth once daily, Disp: 90 Cap, Rfl: 1    pioglitazone (ACTOS) 15 mg tablet, Take 1 Tab by mouth daily. , Disp: 90 Tab, Rfl: 1    oxybutynin chloride XL (DITROPAN XL) 10 mg CR tablet, Take 1 Tab by mouth daily. , Disp: 90 Tab, Rfl: 1    busPIRone (BUSPAR) 10 mg tablet, Take 1 Tab by mouth two (2) times a day. For anxiety, Disp: 180 Tab, Rfl: 1    glimepiride (AMARYL) 4 mg tablet, Take 1 Tab by mouth daily. For diabetes, Disp: 90 Tab, Rfl: 1    pravastatin (PRAVACHOL) 40 mg tablet, Take 1 Tab by mouth nightly. For cholesterol, Disp: 90 Tab, Rfl: 1    QUEtiapine (SEROquel) 100 mg tablet, Take 1 Tab by mouth daily. , Disp: 90 Tab, Rfl: 1    ergocalciferol (ERGOCALCIFEROL) 1,250 mcg (50,000 unit) capsule, Take 1 capsule by mouth once a week, Disp: 90 Cap, Rfl: 3    fenofibrate micronized (LOFIBRA) 134 mg capsule, Take 1 Cap by mouth daily. , Disp: 90 Cap, Rfl: 1    flash glucose scanning reader (FreeStyle [place] 14 Day Story) misc, For daily monitoring of glucose levels. , Disp: 1 Each, Rfl: 0    flash glucose sensor (FreeStyle [place] 14 Day Sensor) kit, For daily monitoring of glucose levels. , Disp: 2 Kit, Rfl: 5    lancets (OneTouch UltraSoft Lancets) misc, Use to check glucose twice daily. , Disp: 200 Each, Rfl: 11    glucose blood VI test strips (OneTouch Verio test strips) strip, Use 1 strip to check glucose twice daily. (DX: E11.9), Disp: 200 Strip, Rfl: 5    Blood-Glucose Meter (OneTouch Ultra2 Meter) monitoring kit, Use as directed to test blood sugar two times a day. Use meter covered by insurance., Disp: 1 Kit, Rfl: 0    ibuprofen (MOTRIN) 800 mg tablet, Take 1 Tab by mouth every eight (8) hours as needed for Pain., Disp: 30 Tab, Rfl: 0    fluconazole (Diflucan) 150 mg tablet, Take 150 mg by mouth once for yeast infection as needed. FDA advises cautious prescribing of oral fluconazole in pregnancy. (Patient not taking: Reported on 6/22/2021), Disp: 7 Tab, Rfl: 0    fluticasone furoate-vilanterol (BREO ELLIPTA) 100-25 mcg/dose inhaler, Take 1 Puff by inhalation daily.  (Patient not taking: Reported on 6/22/2021), Disp: 1 Inhaler, Rfl: 0    History  Patient Care Team:  Radha Anderson NP as PCP - General (Nurse Practitioner)  Radha Anderson NP as PCP - [address] [place] [address] [facility]  Blair Schaumann, MD as Physician (Obstetrics & Gynecology)  Jaison Ponce MD as Physician (Gynecologic Oncology)    Past Medical History: she has a past medical history of Abnormal Pap smear of cervix, Acid indigestion, Cancer ([facility] [facility] (01/2015), Depression, Diabetes ([facility] [facility] Dysthymic disorder, GERD (gastroesophageal reflux disease), Hypertriglyceridemia, Ill-defined condition (02/27/2020), Mixed hyperlipidemia (3/31/2010), MRSA (methicillin resistant Staphylococcus aureus), Ovarian cyst, Psychiatric disorder, and Urinary incontinence. She also has no past medical history of Abuse, Adverse effect of anesthesia, Anemia NEC, Autoimmune disease ([address] Calculus of kidney, Contact dermatitis and other eczema, due to unspecified cause, Headache(784.0), Psychotic disorder ([address] Sleep apnea, or Trauma. Past Surgical History: she has a past surgical history that includes hx appendectomy; hx partial hysterectomy (Bilateral, May 4 2015); hx orthopaedic; hx orthopaedic; hx orthopaedic (4/2016); hx gyn (12/08); and hx gyn. Family Medical History: family history includes Cancer in her father and mother; Diabetes in her paternal aunt; Elevated Lipids in her mother; Heart Disease in her maternal grandfather, maternal grandmother, and paternal grandfather; Hypertension in her mother; Thyroid Disease in her paternal aunt. Social History: she reports that she has been smoking. She has a 25.00 pack-year smoking history. She has never used smokeless tobacco. She reports that she does not drink alcohol and does not use drugs. Health Maintenance Due   Topic Date Due    COVID-19 Vaccine (1) Never done    Eye Exam Retinal or Dilated  02/12/2015       Objective:   Visit Vitals  /77 (BP 1 Location: Left upper arm, BP Patient Position: Sitting, BP Cuff Size: Adult)   Pulse 95   Temp 98.3 °F (36.8 °C) (Temporal)   Resp 18   Ht 5' 7\" (1.702 m)   Wt 247 lb (112 kg)   LMP 09/10/2014 (Exact Date)   SpO2 98%   BMI 38.69 kg/m²     Wt Readings from Last 3 Encounters:   06/22/21 247 lb (112 kg)   02/15/21 248 lb 9.6 oz (112.8 kg)   01/22/21 250 lb (113.4 kg)       Physical Exam  Vitals and nursing note reviewed. Constitutional:       General: She is not in acute distress. Appearance: Normal appearance. HENT:      Head: Normocephalic and atraumatic.       Nose: Nose normal.      Mouth/Throat:      Mouth: Mucous membranes are moist.   Eyes: Extraocular Movements: Extraocular movements intact. Conjunctiva/sclera: Conjunctivae normal.      Pupils: Pupils are equal, round, and reactive to light. Cardiovascular:      Rate and Rhythm: Normal rate and regular rhythm. Pulses: Normal pulses. Heart sounds: Normal heart sounds. No murmur heard. No friction rub. No gallop. Pulmonary:      Effort: Pulmonary effort is normal. No respiratory distress. Breath sounds: Normal breath sounds. No wheezing, rhonchi or rales. Musculoskeletal:         General: Normal range of motion. Cervical back: Normal range of motion and neck supple. No rigidity. No muscular tenderness. Lymphadenopathy:      Cervical: No cervical adenopathy. Skin:     General: Skin is warm. Coloration: Skin is not jaundiced. Neurological:      General: No focal deficit present. Mental Status: She is alert. Mental status is at baseline. Cranial Nerves: Cranial nerves are intact. Motor: Motor function is intact. Coordination: Coordination is intact. Psychiatric:         Mood and Affect: Mood normal.         Behavior: Behavior normal.         Thought Content: Thought content normal.         Judgment: Judgment normal.         Assessment & Plan:      ICD-10-CM ICD-9-CM    1. Anxiety  F41.9 300.00 REFERRAL TO PSYCHOLOGY   2. Bipolar 1 disorder, depressed (HCC)  F31.9 296.50    3. Hot flashes  R23.2 782.62 TSH 3RD GENERATION      T4, FREE      FSH AND LH      PROLACTIN      ESTROGENS, FRACTIONATED      ESTROGENS, FRACTIONATED      PROLACTIN      FSH AND LH      T4, FREE      TSH 3RD GENERATION      CANCELED: ANTI-MULLERIAN HORMONE ([facility])      CANCELED: ANTI-MULLERIAN HORMONE ([facility])   4. Other insomnia  G47.09 780.52 DRUG ABUSE PROF, URINE (SEVEN DRUGS), MS COFIRM      DRUG ABUSE PROF, URINE (SEVEN DRUGS), MS COFIRM   5. Chronic bronchitis, unspecified chronic [facility] ([facility]  J42 491.9    6.  Diabetes mellitus without complication ([facility] E11.9 250.00 CBC W/O DIFF      HEMOGLOBIN A1C WITH EAG      URINALYSIS W/ RFLX MICROSCOPIC      URINALYSIS W/ RFLX MICROSCOPIC      HEMOGLOBIN A1C WITH EAG      CBC W/O DIFF      CANCELED: LIPID PANEL      CANCELED: METABOLIC PANEL, COMPREHENSIVE      CANCELED: METABOLIC PANEL, COMPREHENSIVE      CANCELED: LIPID PANEL   7. Microalbuminuria  R80.9 791.0    8. Gastroesophageal reflux disease with esophagitis, unspecified whether hemorrhage  K21.00 530.11    9. Encounter for vitamin deficiency screening  Z13.21 V77.99 VITAMIN D, 25 HYDROXY      VITAMIN D, 25 HYDROXY   10. Stress incontinence  N39.3 [id number]      · Anxiety/Bipolar 1: Chronic, uncontrolled. Referral to counseling. · Hot Flashes: Chronic, uncontrolled. Check labs as above and follow up with GYN  · Insomnia: Chronic, improved on current regimen. Will continue. Discussed CSC, PDMP review, and UDS prior to any refills. Patient will bring home CSC to review. · Chronic Bronchitis: Chronic, stable. Follow up with pulmonology as scheduled. · Diabetes and Microalbuminuria: Chronic, unclear control. Check labs and continue current regimen. · GERD: Chronic, stable. Continue current regimen. · Stress Incontinence: Chronic, stable. Follow up with urology as scheduled. I have discussed the diagnosis with the patient and the intended plan as seen in the above orders. The patient has received an after-visit summary along with patient information handout. I have discussed medication side effects and warnings with the patient as well. Disposition  Follow-up and Dispositions    · Return in about 4 weeks (around 7/20/2021) for insomnia and ambien.            Allan Diaz MD

## 2021-06-22 NOTE — PROGRESS NOTES
Chief Complaint   Patient presents with    New Patient     New to provider     1. Have you been to the ER, urgent care clinic since your last visit? Hospitalized since your last visit? No    2. Have you seen or consulted any other health care providers outside of the [address] [place] [address] since your last visit? Include any pap smears or colon screening.  No

## 2021-06-22 NOTE — LETTER
Name:Caryl HARDEN  :1975   MR #:[id number]   Office:[facility]   Page 1 of 5        CONTROLLED SUBSTANCE AGREEMENT     I may be prescribed medications that are controlled substances as part  of my treatment plan for management of my medical condition(s). The goal of my treatment plan is to maintain and/or improve my health and wellbeing. Because controlled substances have an increased risk of abuse or harm, continual re-evaluation is needed determine if the goals of my treatment plan are being met for my safety and the safety of others. IMagdi  am entering into this Controlled Substance Agreement with my provider, __________________________________ at [facility] . I understand that successful treatment requires mutual trust and honesty between me and my provider. I understand that there are state and federal laws and regulations which apply to the medications that my provider may prescribe that must be followed. I understand there are risks and benefits ts of taking the medicines that my provider may prescribe. I understand and agree that following this Agreement is necessary in continuing my provider-patient relationship and success of my treatment plan. As a part of my treatment plan, I agree to the following:    COMMUNICATION:    1. I will communicate fully with my provider about my medical condition(s), including the effect on my daily life and how well my medications are helping. I will tell my provider all of the medications that I take for any reason, including medications I receive from another health care provider, and will notify my provider about all issues, problems or concerns, including any side effects, which may be related to my medications. I understand that this information allows my provider to adjust my treatment plan to help manage my medical condition.  I understand that this information will become part of my permanent medical record. 2. I will notify my provider if I have a history of alcohol/drug misuse/addiction or if I have had treatment for alcohol/drug addiction in the past, or if I have a new problem with or concern about alcohol/drug use/addiction, because this increases the likelihood of high risk behaviors and may lead to serious medical conditions. 3. Females Only: I will notify my provider if I am or become pregnant, or if I intend to become pregnant, or if I intend to breastfeed. I understand that communication of these issues with my provider is important, due to possible effects my medication could have on an unborn fetus or breastfeeding child. Initials_____      Name:.Bambi HARDEN   :1975   MR #:[id number]   Office:[facility]   Page 2 of 5       MISUSE OF MEDICATIONS / DRUGS:    1. I agree to take all controlled substances as prescribed, and will not misuse or abuse any controlled substances prescribed by my provider. For my safety, I will not increase the amount of medicine I take without first talking with and getting permission from my provider. 2. If I have a medical emergency, another health care provider may prescribe me medication. If I seek emergency treatment, I will notify my provider within seventy-two (72) hours. 3. I understand that my provider may discuss my use and/or possible misuse/abuse of controlled substances and alcohol, as appropriate, with any health care provider involved in my care, pharmacist or legal authority. ILLEGAL DRUGS:    1. I will not use illegal drugs of any kind, including but not limited to marijuana, heroin, cocaine, or any prescription drug which is not prescribed to me. DRUG DIVERSION / PRESCRIPTION FRAUD:    1. I will not share, sell, trade, give away, or otherwise misuse my prescriptions or medications. 2. I will not alter any prescriptions provided to me by my provider.     SINGLE PROVIDER:    1. I agree that all controlled substances that I take will be prescribed only by my provider (or his/her covering provider) under this Agreement. This agreement does not prevent me from seeking emergency medical treatment or receiving pain management related to a surgery. PROTECTING MEDICATIONS:    1. I am responsible for keeping my prescriptions and medications in a safe and secure place including safeguarding them from loss or theft. I understand that lost, stolen or damaged/destroyed prescriptions or medications will not be replaced. Initials____          Name:Caryl HARDEN   :1975   MR #:[id number]   [facility]   Page 3 of 5   PRESCRIPTION RENEWALS/REFILLS:    1. I will follow my controlled substance medication schedule as prescribed by my provider. 2. I understand and agree that I will make any requests for renewals or refills of my prescriptions only at the time of an office visit or during my providers regular office hours subject to the prescription refill requirements of the individual practice. 3. I understand that my provider may not call in prescriptions for controlled substances to my pharmacy. 4. I understand that my provider may adjust or discontinue these medications as deemed appropriate for my medical treatment plan. This Agreement does not guarantee the prescription of controlled medications. 5. I agree that if my medications are adjusted or discontinued, I will properly dispose of any remaining medications. I understand that I will be required to dispose of any remaining controlled medications prior to being provided with any prescriptions for other controlled medications. 6. I understand that the renewal of my prescription depends on my medical condition, my consistent participation, and my adherence with my treatment plan and this Agreement.     7. I understand that if I do not keep an appointment with my provider, I may not receive a renewal or refill for my controlled substance medication. PRESCRIPTION MONITORING / DRUG TESTIN. I understand that my provider may require me to provide urine, saliva or blood for testing at any time. I understand that this testing will be used to monitor for safety and adherence with my treatment plan and this Agreement. 2. I understand that my provider may ask me to provide an observed urine specimen, which means that a nurse or other health care provider may watch me provide urine, and I agree to cooperate if I am asked to provide an observed specimen. 3. I understand that if I do not provide urine, saliva or blood samples within two (2) hours of my providers request, or other timeframe decided by my provider, my treatment plan could be changed, or my prescriptions and medications may be changed or ended. 4. I understand that urine, saliva and blood test results will be a part of my permanent medical record. Initials_____        Name:.Bambi HARDEN   :1975   MR #:[id number]   [address]   Page 4 of 5    5. I understand that my provider is required to obtain a copy of my State Prescription Monitoring Program () Report at any time in order to safely prescribe medications. 6. I will bring all of my prescribed controlled substance medications in their original bottles to all of my scheduled appointments. 7. I understand that my provider may ask me to come to the practice with all of my prescribed medications for a random pill count at any time. I agree to cooperate if I am asked to come in for a random pill count. I understand that if I do not arrive in the timeframe decided by my provider, my treatment plan could be changed, or my prescriptions and medications may be changed or ended.     COOPERATION WITH INVESTIGATIONS:    1. I authorize my provider and my pharmacy to cooperate fully with any local, state, or federal law enforcement agency in the investigation of any possible misuse, sale, or other diversion of my controlled substance prescriptions or medications. RISKS:    1. I understand that my level of consciousness may be affected from the use of controlled substances, and I understand that there are risks, benefits, effects and potential alternatives (including no treatment) to the medications that my provider has prescribed. 2. I understand that I may become drowsy, tired, dizzy, constipated, and sick to my stomach, or have changes in my mood or in my sleep while taking my medications. I have talked with my provider about these possible side effects, risks, benefits, and alternative treatments, and my provider has answered all of my questions. 3. I understand that I should not suddenly stop taking my medications without first speaking with my provider. I understand that if I suddenly stop taking my medications, I may experience nausea, vomiting, sweating,anxiety, sleeplessness, itching or other uncomfortable feelings. 4. I will not take my medications with alcohol of any kind, including beer, wine or liquor. I understand that drinking alcohol with my medications increases the chances of side effects, including breathing problems or even death. 5. I understand that if I have a history of alcoholism or other drug addiction I may be at increased risk of addiction to my medications. Signs of addiction might include craving, compulsive use, and continued use despite harm. Since addiction is a disease, I understand my provider may decide to change my medications and refer me to appropriate treatment services. I understand that this information would become part of my permanent medical record. Initials_____        Name:.Bambi KWAKU HARDEN   :1975   MR #:[id number]   [address]   Page 5 of 5       6.  Females only: Children born to women who regularly take controlled substances are likely to have physical problems and suffer withdrawal symptoms at birth. If I am of child-bearing age, I understand that I should use safe and effective birth control while taking any controlled substances to avoid the impact of medications on an unborn fetus or  child. I agree to notify my provider immediately if I should become pregnant so that my treatment plan can be adjusted. 7. Males only: I understand that chronic use of controlled substances has been associated with low testosterone levels in males which may affect my mood, stamina, sexual desire, and general health. I understand that my provider may order the appropriate laboratory test to determine my testosterone level,and I agree to this testing. ADHERENCE:    1. I understand that if I do not adhere to this Agreement in any way, my provider may change my prescriptions, stop prescribing controlled substances or end our provider-patient relationship. 2. If my provider decides to stop prescribing medication, or decides to end our provider-patient relationship,my provider may require that I taper my medications slowly. If necessary, my provider may also provide a prescription for other medications to treat my withdrawal symptoms. UNDERSTANDING THIS AGREEMENT:    I understand that my provider may adjust or stop my prescriptions for controlled substances based on my medical condition and my treatment plan. I understand that this Agreement does not guarantee that I will be prescribed medications or controlled substances. I understand that controlled substances may be just one part  of my treatment plan. My initial on each page and my signature below shows that I have read each page of this Agreement, I have had an opportunity to ask questions, and all of my questions have been answered to my satisfaction by my provider.     By signing below, I agree to comply with this Agreement, and I understand that if I do not follow the Agreements listed above, my provider may stop    _________________________________________  Date/Time 6/22/2021 11:31 AM                 (Patient Signature)    ________________________________________    Date/Time 6/22/2021 11:31 AM   (Parent or Guardian Signature if <18 yrs)    _________________________________________ Date/Time 6/22/2021 11:31 AM   (Provider Signature)

## 2021-06-23 LAB
25(OH)D3 SERPL-MCNC: 61 NG/ML (ref 30–100)
ALBUMIN SERPL-MCNC: 4 G/DL (ref 3.5–5)
ALBUMIN/GLOB SERPL: 1.3 {RATIO} (ref 1.1–2.2)
ALP SERPL-CCNC: 83 U/L (ref 45–117)
ALT SERPL-CCNC: 41 U/L (ref 12–78)
ANION GAP SERPL CALC-SCNC: 6 MMOL/L (ref 5–15)
APPEARANCE UR: ABNORMAL
AST SERPL-CCNC: 45 U/L (ref 15–37)
BACTERIA URNS QL MICRO: ABNORMAL /HPF
BILIRUB SERPL-MCNC: 0.4 MG/DL (ref 0.2–1)
BILIRUB UR QL: NEGATIVE
BUN SERPL-MCNC: 8 MG/DL (ref 6–20)
BUN/CREAT SERPL: 9 (ref 12–20)
CALCIUM SERPL-MCNC: 9.4 MG/DL (ref 8.5–10.1)
CHLORIDE SERPL-SCNC: 107 MMOL/L (ref 97–108)
CHOLEST SERPL-MCNC: 157 MG/DL
CO2 SERPL-SCNC: 26 MMOL/L (ref 21–32)
COLOR UR: ABNORMAL
CREAT SERPL-MCNC: 0.89 MG/DL (ref 0.55–1.02)
EPITH CASTS URNS QL MICRO: ABNORMAL /LPF
ERYTHROCYTE [DISTWIDTH] IN BLOOD BY AUTOMATED COUNT: 13.9 % (ref 11.5–14.5)
EST. AVERAGE GLUCOSE BLD GHB EST-MCNC: 217 MG/DL
FSH SERPL-ACNC: 10.7 MIU/ML
GLOBULIN SER CALC-MCNC: 3 G/DL (ref 2–4)
GLUCOSE SERPL-MCNC: 190 MG/DL (ref 65–100)
GLUCOSE UR STRIP.AUTO-MCNC: 250 MG/DL
HBA1C MFR BLD: 9.2 % (ref 4–5.6)
HCT VFR BLD AUTO: 43.4 % (ref 35–47)
HDLC SERPL-MCNC: 38 MG/DL
HDLC SERPL: 4.1 {RATIO} (ref 0–5)
HGB BLD-MCNC: 13.8 G/DL (ref 11.5–16)
HGB UR QL STRIP: NEGATIVE
KETONES UR QL STRIP.AUTO: ABNORMAL MG/DL
LDLC SERPL CALC-MCNC: 65 MG/DL (ref 0–100)
LEUKOCYTE ESTERASE UR QL STRIP.AUTO: ABNORMAL
LH SERPL-ACNC: 9.1 MIU/ML
MCH RBC QN AUTO: 29.7 PG (ref 26–34)
MCHC RBC AUTO-ENTMCNC: 31.8 G/DL (ref 30–36.5)
MCV RBC AUTO: 93.5 FL (ref 80–99)
NITRITE UR QL STRIP.AUTO: POSITIVE
NRBC # BLD: 0 K/UL (ref 0–0.01)
NRBC BLD-RTO: 0 PER 100 WBC
PH UR STRIP: 5.5 [PH] (ref 5–8)
PLATELET # BLD AUTO: 240 K/UL (ref 150–400)
PMV BLD AUTO: 12.2 FL (ref 8.9–12.9)
POTASSIUM SERPL-SCNC: 3.7 MMOL/L (ref 3.5–5.1)
PROLACTIN SERPL-MCNC: 4.2 NG/ML
PROT SERPL-MCNC: 7 G/DL (ref 6.4–8.2)
PROT UR STRIP-MCNC: ABNORMAL MG/DL
RBC # BLD AUTO: 4.64 M/UL (ref 3.8–5.2)
RBC #/AREA URNS HPF: ABNORMAL /HPF (ref 0–5)
SODIUM SERPL-SCNC: 139 MMOL/L (ref 136–145)
SP GR UR REFRACTOMETRY: 1.03 (ref 1–1.03)
T4 FREE SERPL-MCNC: 1 NG/DL (ref 0.8–1.5)
TRIGL SERPL-MCNC: 270 MG/DL (ref ?–150)
TSH SERPL DL<=0.05 MIU/L-ACNC: 1.03 UIU/ML (ref 0.36–3.74)
UROBILINOGEN UR QL STRIP.AUTO: 1 EU/DL (ref 0.2–1)
VLDLC SERPL CALC-MCNC: 54 MG/DL
WBC # BLD AUTO: 11.3 K/UL (ref 3.6–11)
WBC URNS QL MICRO: ABNORMAL /HPF (ref 0–4)

## 2021-06-25 LAB
AMPHETAMINES UR QL SCN: NEGATIVE NG/ML
BARBITURATES UR QL SCN: NEGATIVE NG/ML
BENZODIAZ UR QL: NEGATIVE NG/ML
BZE UR QL: NEGATIVE NG/ML
CANNABINOIDS UR QL SCN: NEGATIVE NG/ML
ESTRADIOL SERPL-MCNC: 129 PG/ML
ESTRONE SERPL-MCNC: 172 PG/ML
OPIATES UR QL: NEGATIVE NG/ML
PCP UR QL: NEGATIVE NG/ML

## 2021-06-26 LAB — MIS SERPL-MCNC: <0.015 NG/ML

## 2021-06-28 ENCOUNTER — TELEPHONE (OUTPATIENT)
Dept: FAMILY MEDICINE CLINIC | Age: 46
End: 2021-06-28

## 2021-06-28 NOTE — TELEPHONE ENCOUNTER
Patient called stated she did not authorize a drug test and wants to know who's going to pay for it.     Request a call back    Best call back #[phone]

## 2021-06-28 NOTE — TELEPHONE ENCOUNTER
This was discussed during her visit. Called patient and reminded her that the UDS is a standard screening that is required for any patient taking controlled substances. At that time she hung up the phone.      Vince Lopez MD

## 2021-06-29 NOTE — TELEPHONE ENCOUNTER
Pt is calling again requesting to speak to [facility]    Call was transferred to missy

## 2021-06-29 NOTE — TELEPHONE ENCOUNTER
Spoke with patient about her 06/22 appt w/ . adv patient of the controlled substance protocol including urine drug screenings. Patient was very upset that she was not explicitly adv that she would be taking a drug test during visit. Patient is now aware that such test can be given at anytime when taking certain medications.  Patient aware of 07/14 appt w/ NP Holsinger

## 2021-07-14 ENCOUNTER — OFFICE VISIT (OUTPATIENT)
Dept: FAMILY MEDICINE CLINIC | Age: 46
End: 2021-07-14
Payer: COMMERCIAL

## 2021-07-14 VITALS
OXYGEN SATURATION: 98 % | WEIGHT: 246 LBS | BODY MASS INDEX: 39.53 KG/M2 | HEART RATE: 89 BPM | HEIGHT: 66 IN | SYSTOLIC BLOOD PRESSURE: 122 MMHG | DIASTOLIC BLOOD PRESSURE: 76 MMHG | TEMPERATURE: 98.5 F

## 2021-07-14 DIAGNOSIS — E11.65 UNCONTROLLED TYPE 2 DIABETES MELLITUS WITH HYPERGLYCEMIA (HCC): Primary | ICD-10-CM

## 2021-07-14 DIAGNOSIS — F51.04 CHRONIC INSOMNIA: ICD-10-CM

## 2021-07-14 DIAGNOSIS — K21.9 GASTROESOPHAGEAL REFLUX DISEASE WITHOUT ESOPHAGITIS: ICD-10-CM

## 2021-07-14 DIAGNOSIS — Z72.0 TOBACCO ABUSE: ICD-10-CM

## 2021-07-14 PROCEDURE — 99214 OFFICE O/P EST MOD 30 MIN: CPT | Performed by: NURSE PRACTITIONER

## 2021-07-14 RX ORDER — EXENATIDE 2 MG/.85ML
INJECTION, SUSPENSION, EXTENDED RELEASE SUBCUTANEOUS
COMMUNITY
Start: 2021-07-05 | End: 2021-09-17 | Stop reason: SDUPTHER

## 2021-07-14 RX ORDER — ZOLPIDEM TARTRATE 5 MG/1
5 TABLET ORAL
Qty: 90 TABLET | Refills: 1 | Status: SHIPPED | OUTPATIENT
Start: 2021-07-14 | End: 2022-02-02 | Stop reason: SDUPTHER

## 2021-07-14 RX ORDER — FAMOTIDINE 40 MG/1
40 TABLET, FILM COATED ORAL DAILY
Qty: 90 TABLET | Refills: 1 | Status: SHIPPED | OUTPATIENT
Start: 2021-07-14 | End: 2021-11-19 | Stop reason: ALTCHOICE

## 2021-07-14 NOTE — TELEPHONE ENCOUNTER
Patient is requesting to not be liable for the drug screen labs done on 06/22/21. Patient adv that she did not sign a controlled substance agreement and was not aware that the screening was being done on that date.

## 2021-07-14 NOTE — PROGRESS NOTES
Chief Complaint   Patient presents with   [address] TO PROVIDER prior Lulu Gracia NP patient    Cholesterol Problem     follow up     Diabetes     follow up      1. Have you been to the ER, urgent care clinic since your last visit? Hospitalized since your last visit? No    2. Have you seen or consulted any other health care providers outside of the [address] [place] [address] since your last visit? Include any pap smears or colon screening.  No

## 2021-07-14 NOTE — PROGRESS NOTES
Assessment/Plan:     Diagnoses and all orders for this visit:    1. Uncontrolled type 2 diabetes mellitus with hyperglycemia (HCC)  Discussed the institution of [facility]. Will have her establish with the pharmacist and call with BS readings in one week. 2. Chronic insomnia  -     zolpidem (AMBIEN) 5 mg tablet; Take 1 Tablet by mouth nightly as needed for Sleep. Max Daily Amount: 5 mg. Stable. Refilled today. Continue current therapy. Will discuss long term alternatives in the future.  reviewed and appropriate. 3. Gastroesophageal reflux disease without esophagitis  -     famotidine (PEPCID) 40 mg tablet; Take 1 Tablet by mouth daily. She is taking two PPIs. Discontinue Prilosec and start Pepcid. Continue Protonix. Follow up with Dr. Cristian Lanza as discussed. Negative for Mota's in 2019.     4. Tobacco abuse         Follow-up and Dispositions    · Return in about 3 months (around 10/14/2021) for Follow Up. Discussed expected course/resolution/complications of diagnosis in detail with patient. Medication risks/benefits/costs/interactions/alternatives discussed with patient. Pt was given after visit summary which includes diagnoses, current medications & vitals. Pt expressed understanding with the diagnosis and plan          Subjective:      Az Perez is a 39 y.o. female who presents for had concerns including Establish Care (NEW TO PROVIDER prior Adeola Garg, NP patient), Cholesterol Problem (follow up ), and Diabetes (follow up ). She is here to establish care as a previous patient of Adeola Garg. She declines COVID vaccination. Insomnia  Az Perez is a 39 y.o. female presents with difficulty sleeping. ONSET: problem is longstanding  SEEN PREVIOUSLY: this is a diagnosis of longstanding.   DESCRIPTION OF SX:  difficulty initiating sleep.  frequent awakening  ASSOCIATED ISSUES: Bipolar Disorder  DENIES: N/A  TREATMENT TO DATE: Seroquel, Ambien, She is due for colonoscopy. She plans to reach out to Dr. Janny Woodson. Patient is a 39 y.o. female that comes today for follow up of Diabetes Mellitus Type 2. Patient does not regularly monitor  their FSBG at home. The fasting plasma glucose (fpg) is unknown. not attempting to follow a low fat, low cholesterol diet  Patients activity level: sedentery  there was no change in patient's weight. .  The patient has not experienced recent hypoglycemia. reports that she has been smoking. She has a 25.00 pack-year smoking history. She has never used smokeless tobacco. Ran out of Bcise for 5 weeks several months ago. 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 90 Tablet 1    zolpidem (AMBIEN) 5 mg tablet Take 1 Tablet by mouth nightly as needed for Sleep. Max Daily Amount: 5 mg. 90 Tablet 1    pantoprazole (PROTONIX) 40 mg tablet TAKE 1 TABLET BY MOUTH ONCE DAILY FOR ACID REFLUX 90 Tab 0    pioglitazone (ACTOS) 15 mg tablet Take 1 Tab by mouth daily.  90 Tab 1    oxybutynin chloride XL (DITROPAN XL) 10 mg CR tablet Take 1 Tab by mouth daily. 90 Tab 1    busPIRone (BUSPAR) 10 mg tablet Take 1 Tab by mouth two (2) times a day. For anxiety 180 Tab 1    glimepiride (AMARYL) 4 mg tablet Take 1 Tab by mouth daily. For diabetes 90 Tab 1    pravastatin (PRAVACHOL) 40 mg tablet Take 1 Tab by mouth nightly. For cholesterol 90 Tab 1    QUEtiapine (SEROquel) 100 mg tablet Take 1 Tab by mouth daily. 90 Tab 1    ergocalciferol (ERGOCALCIFEROL) 1,250 mcg (50,000 unit) capsule Take 1 capsule by mouth once a week 90 Cap 3    fenofibrate micronized (LOFIBRA) 134 mg capsule Take 1 Cap by mouth daily. 90 Cap 1    fluconazole (Diflucan) 150 mg tablet Take 150 mg by mouth once for yeast infection as needed. FDA advises cautious prescribing of oral fluconazole in pregnancy. 7 Tab 0    lancets (OneTouch UltraSoft Lancets) misc Use to check glucose twice daily. 200 Each 11    glucose blood VI test strips (OneTouch Verio test strips) strip Use 1 strip to check glucose twice daily. (DX: E11.9) 200 Strip 5    Blood-Glucose Meter (OneTouch Ultra2 Meter) monitoring kit Use as directed to test blood sugar two times a day. Use meter covered by insurance. 1 Kit 0    ibuprofen (MOTRIN) 800 mg tablet Take 1 Tab by mouth every eight (8) hours as needed for Pain. 30 Tab 0       Allergies   Allergen Reactions    Ciprofloxacin Hives    Meperidine Other (comments)     Goofy feeling, hallucinates  Other reaction(s): Other (See Comments)  Off balance       ROS:   Review of Systems   Constitutional: Negative for malaise/fatigue. Eyes: Negative for blurred vision. Respiratory: Negative for shortness of breath. Cardiovascular: Negative for chest pain.        Objective:     Visit Vitals  /76 (BP 1 Location: Right arm, BP Patient Position: Sitting, BP Cuff Size: Adult)   Pulse 89   Temp 98.5 °F (36.9 °C) (Temporal)   Ht 5' 6\" (1.676 m)   Wt 246 lb (111.6 kg)   LMP 09/10/2014 (Exact Date)   SpO2 98%   BMI 39.71 kg/m²       Vitals and Nurse Documentation reviewed.      Physical Exam

## 2021-07-19 ENCOUNTER — TELEPHONE (OUTPATIENT)
Dept: FAMILY MEDICINE CLINIC | Age: 46
End: 2021-07-19

## 2021-07-19 NOTE — TELEPHONE ENCOUNTER
Pt is calling requesting to speak to [facility] again regarding the drug screening    [facility]# [phone]

## 2021-07-20 ENCOUNTER — TELEPHONE (OUTPATIENT)
Dept: FAMILY MEDICINE CLINIC | Age: 46
End: 2021-07-20

## 2021-07-20 NOTE — TELEPHONE ENCOUNTER
Pharmacy Progress Note - Telephone Encounter    S/O: Ms. Markell Nash 39 y.o. female, referred by Dr. Rosa Maria Joe, RUDY, was contacted via an outbound telephone call to discuss scheduling DM management visit today. Verified patients identifiers (name & ) per HIPAA policy. - Pt states her BG drops in the evening after dinner. She does not have evidence of this but states that she could eat a large meal for dinner and when she sits down to go to bed she starts to feel hungry. She takes Glimepiride at night and is concerned that this is causing her to feel this way. - Will eat turkey sandwich or crackers/cheese/tomatoes. - Continues to drink Pepsi and sweet tea. Drinks more of the sugary beverages during the summer compared to winter. \"Can't drink plain water. \"      - Restarted Bydureon BCIse. Feels like overt sx of hyperglycemia - thirst - has decreased since restarting. BG Rdgs  Yesterday AM - 254  Today AM - 193    A/P:  - Scheduled 21 at 2PM  - Pt instructed to check BG every day the week before visit and bring in log  - Will provide CGM sample to show BG fluctuations thru out the day  - Will likely initiate SGLT2 if BG continues to be elevated as it currently is  - Patient endorses understanding to the provided information. All questions answered at this time. There are no discontinued medications. No orders of the defined types were placed in this encounter. Pastor Golden, PharmD, BCGP  Clinical Pharmacist Specialist      For Pharmacy Admin Tracking Only     CPA in place:  Yes   Recommendation Provided To: Patient/Caregiver: 1 via Telephone   Intervention Detail: Scheduled Appointment   Gap Closed?: No   Intervention Accepted By: Patient/Caregiver: 1   Time Spent (min): 30

## 2021-07-20 NOTE — TELEPHONE ENCOUNTER
----- Message from Morgan Castro sent at 7/20/2021 12:35 PM EDT -----  Regarding: Kathryn Akin  General Message/Vendor Calls    Caller's first and last name:  N/A      Reason for call:   Unknown      Callback required yes/no and why:  Y      Best contact number(s):  [phone]      Details to clarify the request: Patient is requesting a call back from Ms. Josue Zaman specifically, and she will go into more details during the return call. She said Ms. Josue Zaman was supposed to return her call yesterday, but she hasn't heard back yet.       Morgan Castro

## 2021-07-21 NOTE — TELEPHONE ENCOUNTER
Spoke with patient adv manager has sent request for lab charges to be removed from patient. Adv will see if any update has been given. Patient adv that she believes charges have been reversed per call to lab billing. Patient requested a letter stating that she is no longer responsible for lab drug screen charges. Adv that since the lab is separate from us no such letter can be provided.  Patient is now requesting that all record of drug test be removed from her EHR

## 2021-07-26 DIAGNOSIS — F31.9 BIPOLAR 1 DISORDER, DEPRESSED (HCC): ICD-10-CM

## 2021-07-26 DIAGNOSIS — E11.9 DIABETES MELLITUS WITHOUT COMPLICATION (HCC): ICD-10-CM

## 2021-07-26 DIAGNOSIS — E78.5 HYPERLIPIDEMIA, UNSPECIFIED HYPERLIPIDEMIA TYPE: ICD-10-CM

## 2021-07-26 DIAGNOSIS — K21.00 GASTROESOPHAGEAL REFLUX DISEASE WITH ESOPHAGITIS: ICD-10-CM

## 2021-07-26 NOTE — TELEPHONE ENCOUNTER
[address] [facility] called to check status of refill.     Requesting a call back    Best call back # .[phone]

## 2021-07-26 NOTE — TELEPHONE ENCOUNTER
Last Visit: 7/14/21 NP Jory  Next Appointment: Not scheduled- due 10/2021  Previous refill encounter(s)   01/22/2021 Actos #90 with 1 refill. Requested Prescriptions     Pending Prescriptions Disp Refills    pioglitazone (ACTOS) 15 mg tablet 90 Tablet 1     Sig: Take 1 Tablet by mouth daily.

## 2021-07-26 NOTE — TELEPHONE ENCOUNTER
NP Jory,    Requests for refills below. Request for omeprazole also but noted discontinue.  (entered to discontinue omeprazole on the pantoprazole order to the pharmacy if appropriate.)  [address]    Last Visit: 7/14/21 RUDY Corley  Next Appointment: Not scheduled- due 10/2021  Previous Refill Encounter(s):   Fenofibrate 1/22/21 90 + 1  Pantoprazole 4/20/21 90  Pravastatin 1/22/21 90 + 1  Quetiapine 1/22/21 90 + 1      Requested Prescriptions     Pending Prescriptions Disp Refills    fenofibrate micronized (LOFIBRA) 134 mg capsule 90 Capsule 1     Sig: Take 1 Capsule by mouth daily.  pantoprazole (PROTONIX) 40 mg tablet 90 Tablet 1     Sig: Take 1 Tablet by mouth daily. For acid reflux    pravastatin (PRAVACHOL) 40 mg tablet 90 Tablet 1     Sig: Take 1 Tablet by mouth nightly. For cholesterol    QUEtiapine (SEROquel) 100 mg tablet 90 Tablet 1     Sig: Take 1 Tablet by mouth daily.

## 2021-07-26 NOTE — TELEPHONE ENCOUNTER
Last Visit: 7/14/21 NP Jory  Next Appointment: Not scheduled- due 10/2021  Previous refill encounter(s)   01/22/2021 Actos #90 with 1 refill     Requested Prescriptions     Pending Prescriptions Disp Refills    pioglitazone (ACTOS) 15 mg tablet 90 Tablet 1     Sig: Take 1 Tablet by mouth daily.

## 2021-07-27 RX ORDER — PIOGLITAZONEHYDROCHLORIDE 15 MG/1
15 TABLET ORAL DAILY
Qty: 90 TABLET | Refills: 1 | OUTPATIENT
Start: 2021-07-27

## 2021-07-27 RX ORDER — FENOFIBRATE 134 MG/1
134 CAPSULE ORAL DAILY
Qty: 90 CAPSULE | Refills: 1 | Status: SHIPPED | OUTPATIENT
Start: 2021-07-27 | End: 2022-02-02 | Stop reason: SDUPTHER

## 2021-07-27 RX ORDER — PANTOPRAZOLE SODIUM 40 MG/1
40 TABLET, DELAYED RELEASE ORAL DAILY
Qty: 90 TABLET | Refills: 1 | Status: SHIPPED | OUTPATIENT
Start: 2021-07-27 | End: 2022-02-02 | Stop reason: SDUPTHER

## 2021-07-27 RX ORDER — PRAVASTATIN SODIUM 40 MG/1
40 TABLET ORAL
Qty: 90 TABLET | Refills: 1 | Status: SHIPPED | OUTPATIENT
Start: 2021-07-27 | End: 2022-02-02 | Stop reason: SDUPTHER

## 2021-07-27 RX ORDER — QUETIAPINE FUMARATE 100 MG/1
100 TABLET, FILM COATED ORAL DAILY
Qty: 90 TABLET | Refills: 1 | Status: SHIPPED | OUTPATIENT
Start: 2021-07-27 | End: 2022-02-02 | Stop reason: SDUPTHER

## 2021-07-27 RX ORDER — PIOGLITAZONEHYDROCHLORIDE 15 MG/1
15 TABLET ORAL DAILY
Qty: 90 TABLET | Refills: 1 | Status: SHIPPED | OUTPATIENT
Start: 2021-07-27 | End: 2021-12-22

## 2021-08-02 ENCOUNTER — OFFICE VISIT (OUTPATIENT)
Dept: FAMILY MEDICINE CLINIC | Age: 46
End: 2021-08-02

## 2021-08-02 VITALS
HEART RATE: 92 BPM | BODY MASS INDEX: 39.06 KG/M2 | DIASTOLIC BLOOD PRESSURE: 89 MMHG | WEIGHT: 242 LBS | SYSTOLIC BLOOD PRESSURE: 122 MMHG

## 2021-08-02 DIAGNOSIS — E11.65 UNCONTROLLED TYPE 2 DIABETES MELLITUS WITH HYPERGLYCEMIA (HCC): Primary | ICD-10-CM

## 2021-08-02 NOTE — PATIENT INSTRUCTIONS
Your A1c was 9.2% which was elevated. Goal A1c is <7.0%. Scan your Yosvany [facility] 2 sensor 4x per day.     Blood Sugar Goal  Fastin-130 mg/dL  1-2 after meals: Less than 180 mg/dL    Carbohydrate Goals  Meal: 30-45 grams   Snacks: 15 grams

## 2021-08-02 NOTE — PROGRESS NOTES
Pharmacy Progress Note - Diabetes Management    S/O: Ms. Alireza Mock is a 39 y.o. female, referred by Dr. Kerry Sellers NP, with a PMH of T2DM, GERD, anxiety, vitamin D deficiency, insomnia, dysthymic disorder, microalbuminuria, HLD, choriocarcinoma, obesity, tobacco abuse, was seen today for diabetes management. Patient's last A1c was 9.2% (.       Interim update: Former pt of Dmoinique Vela NP, whose care was recently switched to [facility], NP. Last seen by new PCP on 7/14/21 for establishing care. During that visit, pt was instructed to stop duplicate PPI - was on Pantoprazole and Omeprazole. Rx for Famotidine provided to replace Omeprazole. PCP discussed potential need for Jardiance and referred to PharmD. During today's visit, pt reported that she thought she had had COVID. Denies getting vaccine. She came in very close contact with 2 people who were positive for COVID. Several people around her were positive for COVID. She endorsed coughing a lot, denied fever, denied SOB. Rapid test completed over the weekend and resulted negative. Pt reports she saw her new gynecologist - Bobby Herrera and was started on Venlafaxine 75 mg every day for vasomotor sx of menopause. Medication caused nausea and jitteriness and pt stopped medication.       SHx:  -  provider for [facility]    Medication Adherence/Access:  - Pepcid - cost $35 - will not take until old med is used up    Current anti-hyperglycemic regimen includes:    - Bydureon 2 mg Qweek on Mondays  - Pioglitazone 15 mg QPM  - Glimepiride 4 mg QPM    ROS:  Today, Pt endorses:  - Symptoms of Hyperglycemia: fatigue  - Symptoms of Hypoglycemia: jitteriness - this occurred during use of Venlafaxine - unsure if hypoglycemic event or adverse drug event    Self Monitoring Blood Glucose (SMBG) or CGM:  - Brought in home glucometer/blood glucose log/CGM reader today:  no  - Checks BG: fasting  - Fasting BG avg 150-170  Saturday night went to a party and evening BG was 399  Uses OneTouch Verio glucometer  7 day average: 172  14 day av    Diabetes Health Maintenance:  · ASCVD Risk Factors: Age, Diabetes and Smoking History  · ASA therapy:  None  · ACE/ARB therapy: none  · Optimized statin therapy: Pravastatin 40 mg  · The 10-year ASCVD risk score (Bassam Fulton et al., 2013) is: 5.8%    · LDL: 65 mg/dL (21)  · Nicotine dependence: Yes  · Last eye exam: 13  · Last foot exam: 10/23/20  · Last influenza vaccine: 10/23/20  · Last Pneumovax 23 vaccine: 20  · Last Prevnar-13 vaccine: will discuss at future visit  · Hepatitis B Series: unknown   · COVID-19 vaccine: denied      Vitals: Wt Readings from Last 3 Encounters:   21 246 lb (111.6 kg)   21 247 lb (112 kg)   02/15/21 248 lb 9.6 oz (112.8 kg)     BP Readings from Last 3 Encounters:   21 122/76   21 123/77   02/15/21 117/79     Pulse Readings from Last 3 Encounters:   21 89   21 95   02/15/21 86       Past Medical History:   Diagnosis Date    Abnormal Pap smear of cervix     Acid indigestion     Cancer ([facility] [address] 2015    choriocarcinoma  - UTERNE - surgery/chemo    Depression     [facility]     Dysthymic disorder     GERD (gastroesophageal reflux disease)     Hypertriglyceridemia     Ill-defined condition 2020    DENIES HISTORY OF MOTION SICKNESS OR VERTIGO    Mixed hyperlipidemia 3/31/2010    MRSA (methicillin resistant Staphylococcus aureus)     PRIOR TO , NASAL SWAB NEG MRSA 2017    Ovarian cyst     left     Psychiatric disorder     depression; ANXIETY    Urinary incontinence     URGENCY AND INCONTINENECE     Allergies   Allergen Reactions    Ciprofloxacin Hives    Meperidine Other (comments)     Goofy feeling, hallucinates  Other reaction(s):  Other (See Comments)  Off balance       Current Outpatient Medications   Medication Sig    fenofibrate micronized (LOFIBRA) 134 mg capsule Take 1 Capsule by mouth daily.  pantoprazole (PROTONIX) 40 mg tablet Take 1 Tablet by mouth daily. For acid reflux    pravastatin (PRAVACHOL) 40 mg tablet Take 1 Tablet by mouth nightly. For cholesterol    QUEtiapine (SEROquel) 100 mg tablet Take 1 Tablet by mouth daily.  pioglitazone (ACTOS) 15 mg tablet Take 1 Tablet by mouth daily.  Bydureon BCise 2 mg/0.85 mL atIn INJECT 2 MG SUBCUTANEOUSLY EVERY 7 DAYS    famotidine (PEPCID) 40 mg tablet Take 1 Tablet by mouth daily.  zolpidem (AMBIEN) 5 mg tablet Take 1 Tablet by mouth nightly as needed for Sleep. Max Daily Amount: 5 mg.  oxybutynin chloride XL (DITROPAN XL) 10 mg CR tablet Take 1 Tab by mouth daily.  busPIRone (BUSPAR) 10 mg tablet Take 1 Tab by mouth two (2) times a day. For anxiety    glimepiride (AMARYL) 4 mg tablet Take 1 Tab by mouth daily. For diabetes    ergocalciferol (ERGOCALCIFEROL) 1,250 mcg (50,000 unit) capsule Take 1 capsule by mouth once a week    fluconazole (Diflucan) 150 mg tablet Take 150 mg by mouth once for yeast infection as needed. FDA advises cautious prescribing of oral fluconazole in pregnancy.  lancets (OneTouch UltraSoft Lancets) misc Use to check glucose twice daily.  glucose blood VI test strips (OneTouch Verio test strips) strip Use 1 strip to check glucose twice daily. (DX: E11.9)    Blood-Glucose Meter (OneTouch Ultra2 Meter) monitoring kit Use as directed to test blood sugar two times a day. Use meter covered by insurance.  ibuprofen (MOTRIN) 800 mg tablet Take 1 Tab by mouth every eight (8) hours as needed for Pain. No current facility-administered medications for this visit.      Lab Results   Component Value Date/Time    Sodium 139 06/22/2021 11:42 AM    Potassium 3.7 06/22/2021 11:42 AM    Chloride 107 06/22/2021 11:42 AM    CO2 26 06/22/2021 11:42 AM    Anion gap 6 06/22/2021 11:42 AM    Glucose 190 (H) 06/22/2021 11:42 AM    BUN 8 06/22/2021 11:42 AM    Creatinine 0.89 06/22/2021 11:42 AM    BUN/Creatinine ratio 9 (L) 06/22/2021 11:42 AM    GFR est AA >60 06/22/2021 11:42 AM    GFR est non-AA >60 06/22/2021 11:42 AM    Calcium 9.4 06/22/2021 11:42 AM    Bilirubin, total 0.4 06/22/2021 11:42 AM    Alk. phosphatase 83 06/22/2021 11:42 AM    Protein, total 7.0 06/22/2021 11:42 AM    Albumin 4.0 06/22/2021 11:42 AM    Globulin 3.0 06/22/2021 11:42 AM    A-G Ratio 1.3 06/22/2021 11:42 AM    ALT (SGPT) 41 06/22/2021 11:42 AM     Lab Results   Component Value Date/Time    Cholesterol, total 157 06/22/2021 11:42 AM    HDL Cholesterol 38 06/22/2021 11:42 AM    LDL,Direct 85 07/16/2018 09:15 AM    LDL, calculated 65 06/22/2021 11:42 AM    VLDL, calculated 54 06/22/2021 11:42 AM    Triglyceride 270 (H) 06/22/2021 11:42 AM    CHOL/HDL Ratio 4.1 06/22/2021 11:42 AM     Lab Results   Component Value Date/Time    WBC 11.3 (H) 06/22/2021 11:42 AM    WBC 8.8 05/18/2012 09:00 AM    Hemoglobin (POC) 12.1 03/01/2012 11:26 AM    HGB 13.8 06/22/2021 11:42 AM    HCT 43.4 06/22/2021 11:42 AM    PLATELET 470 32/92/5872 11:42 AM    MCV 93.5 06/22/2021 11:42 AM       Lab Results   Component Value Date/Time    Microalbumin/Creat ratio (mg/g creat) 13 01/22/2021 11:53 AM    MICROALBUMIN,MG/DAY CANCELED 10/12/2016 09:18 AM    Microalbumin,urine random 2.36 01/22/2021 11:53 AM       Lab Results   Component Value Date/Time    Hemoglobin A1c 9.2 (H) 06/22/2021 11:42 AM    Hemoglobin A1c 7.4 (H) 01/22/2021 11:53 AM    Hemoglobin A1c 9.5 (H) 06/30/2020 08:47 AM     Hemoglobin A1c (POC)   Date Value Ref Range Status   10/23/2020 7.9 % Final        CrCl cannot be calculated (Unknown ideal weight. ). A/P:    1) T2DM: Per ADA guidelines, Pt's A1c is not at goal of < 7%. Current SMBG(s)/CGM trend is elevated at most times of the day. Pt is on sulfonylurea, thiazolidinedione, and GLP-1 agonist.  GLP-1 is the only agent that is at max dose. Would not want to increase Pioglitazone d/t increased risk of weight gain and edema. GFR WNL. Trialed metformin the past, but nonadherent d/t GI side effects. Ozempic and Trulicity have greater Z3B and weight reduction. Other agents are associated with weight gain. May consider switching DM meds to other agents - including Jardiance to help aid in weight reduction. Provided Freestyle Jessee 2 sample in order to get additional rdgs and understand how food choices and medications affect BG rdgs. Discussed how CGM works, how to scan, application, reports, and alarms. Sensor was placed on pt's right upper arm.    - Continue Bydureon 2 mg Qweek  - Continue Glimepiride 4 mg every day  - Continue Pioglitazone 15 mg every day  - Discuss nutrition and physical activity at f/up visit  - Will assess pt willingness to adjust therapy choices to receive greater A1c and weight reduction    2) HTN: BP is at goal of < 130/80. Currently taking no therapy. No evidence of microalbuminuria. 3) Primary Prevention ASCVD: Per ADA guidelines, pts age 43-69 yrs are recommended for statin therapy. Currently taking moderate intensity statin. - Continue Pravastatin 40 mg every day      Established patient-centered goal(s) to follow up on at the next visit:    - Scan CGM 4x per day    Resources provided:  - Freestyle Jessee 2 reader and sensor x1    Medication reconciliation was completed during the visit. There are no discontinued medications. No orders of the defined types were placed in this encounter. Patient verbalized understanding of the information presented and all of the patients questions were answered. AVS was handed to the patient. Patient advised to call the office with any additional questions or concerns. Notifications of recommendations will be sent to Dr. Sherry Quispe NP for review. Patient will return to clinic in 2 week(s) for follow up.      Martina Orona, PharmD, BCGP  Clinical Pharmacist Specialist          For Pharmacy Admin Tracking Only     CPA in place: Yes   Recommendation Provided To: Patient/Caregiver: 3 via In person   Intervention Detail: Device Training, Patient Access Assistance/Sample Provided and Scheduled Appointment   Gap Closed?: No   Intervention Accepted By: Patient/Caregiver: 3   Time Spent (min): 60

## 2021-08-04 DIAGNOSIS — E11.65 UNCONTROLLED TYPE 2 DIABETES MELLITUS WITH HYPERGLYCEMIA (HCC): Primary | ICD-10-CM

## 2021-08-04 RX ORDER — FLASH GLUCOSE SENSOR
KIT MISCELLANEOUS
Qty: 2 KIT | Refills: 11 | Status: SHIPPED | OUTPATIENT
Start: 2021-08-04 | End: 2021-12-09 | Stop reason: SDUPTHER

## 2021-08-04 RX ORDER — FLASH GLUCOSE SCANNING READER
EACH MISCELLANEOUS
Qty: 1 EACH | Refills: 0
Start: 2021-08-04

## 2021-08-04 NOTE — PROGRESS NOTES
Pharmacy Progress Note     Pt requests CGM Freestyle Jessee 2 sensor prescription sent to pharmacy. Order input. There are no discontinued medications. Orders Placed This Encounter    flash glucose sensor (FreeStyle Jessee 2 Sensor) kit     Sig: Scan sensor 4x per day for blood sugar readings. Dx E11.65     Dispense:  2 Kit     Refill:  11    flash glucose scanning reader (FreeStyle Jessee 2 [place]) misc     Sig: Scan sensor 4x per day for blood sugar readings. Dx E11.65     Dispense:  1 Each     Refill:  0         Colt CastrejonD, [facility]  Clinical Pharmacist Specialist      For Pharmacy Admin Tracking Only     CPA in place:  Yes   Recommendation Provided To: Patient/Caregiver: 2 via Telephone   Intervention Detail: New Rx: 2, reason: Improve Adherence and Needs Additional Therapy   Gap Closed?: No   Intervention Accepted By: Patient/Caregiver: 2   Time Spent (min): 10

## 2021-08-17 ENCOUNTER — TELEPHONE (OUTPATIENT)
Dept: FAMILY MEDICINE CLINIC | Age: 46
End: 2021-08-17

## 2021-08-17 ENCOUNTER — OFFICE VISIT (OUTPATIENT)
Dept: FAMILY MEDICINE CLINIC | Age: 46
End: 2021-08-17

## 2021-08-17 VITALS
SYSTOLIC BLOOD PRESSURE: 123 MMHG | WEIGHT: 244.6 LBS | BODY MASS INDEX: 39.48 KG/M2 | DIASTOLIC BLOOD PRESSURE: 86 MMHG | HEART RATE: 92 BPM

## 2021-08-17 DIAGNOSIS — E11.65 UNCONTROLLED TYPE 2 DIABETES MELLITUS WITH HYPERGLYCEMIA (HCC): Primary | ICD-10-CM

## 2021-08-17 NOTE — TELEPHONE ENCOUNTER
Pharmacy Progress Note - Telephone Encounter    S/O: Ms. Sotero Ratliff 39 y.o. female, referred by Dr. Matty Plata, RUDY, was contacted via an outbound telephone call to discuss CGM device today. Verified patients identifiers (name & ) per HIPAA policy. - Pt had reached out to this writer about replacing Carly Lyle 2 sensor. She will be placing it in the same location. She really likes the convenience of scanning her BG rdgs; however, she was concerned abotu significant variations in BG rdgs. - Sensor replaced yesterday. Last night's BG rdgs were 86 mg/dL ---> 67 mg/dL --> 276 mg/dL without food and after taking Glimepiride and Pioglitazone. This AM was high.    - Over the past week pt had stopped drinking Pepsi. Had 1 sweet tea. Drinking flavored bain instead. A/P:  - Discussed that rdgs may be inaccurate the first 12 hours of sensor placement as sensor acclimates to placement in interstitial fluid. - This writer will evaluate placement of sensor during visit later today  - Patient endorses understanding to the provided information. All questions answered at this time. There are no discontinued medications. No orders of the defined types were placed in this encounter.       Sim Acosta, PharmD, BCGP  Clinical Pharmacist Specialist      For Pharmacy Admin Tracking Only     Time Spent (min): 10

## 2021-08-17 NOTE — PROGRESS NOTES
Pharmacy Progress Note - Diabetes Management    S/O: Ms. Karen Coles is a 39 y.o. female, referred by Dr. Pedro Jimenez, RUDY, with a PMH of T2DM, GERD, anxiety, vitamin D deficiency, insomnia, dysthymic disorder, microalbuminuria, HLD, choriocarcinoma, obesity, tobacco abuse, was seen today for diabetes management. Patient's last A1c was 9.2% (2021) which was elevated from 7.4% (2021). Interim update: Pt was last seen by this writer 21 for [facility] teaching and placement. Pt had not been checking BG. Pt arrives to clinic today with CGM sensor and reader. Sensor was placed appropriately. Discussed pathophysiology of DM, complications, progression, BG/A1c goals. Discussed food choices - Healthy Plate Method, carb goals, how to read a nutrition label. Discussed physical activity goal - 150 min moderate exercise per week. Discussed medications - mechanism of action, side effects, efficacy. Attempted to discuss other medication options with pt. She is hesitant to try any other meds at this time. She does not want to [facility][note 3] like an experiment\".  is on Ozempic and is not tolerating agent.       SHx:  -  provider for [facility]    Diabetes Management:  - Previous anti-hyperglycemic agents includes: Metformin stopped d/t GI side effects    Current anti-hyperglycemic regimen includes:    - Glimepiride 4 mg every day  - Pioglitazone 15 mg every day  - Bydureon BCise 2 mg Qweek    ROS:  Today, Pt endorses:  - Symptoms of Hyperglycemia: none  - Symptoms of Hypoglycemia: none    Self Monitoring Blood Glucose (SMBG) or CGM:  - Brought in home glucometer/blood glucose log/CGM reader today:  yes - Freestyle Jes[place] 2  - Checks Bx per day  - Pt scanned reader in office visit after [employer] roast beef sandwich, 4-5 fries, couple sips small sweet tea -  mg/dL   14 day avg BG - 202 (198, 170, 230, 217)  14 day time in target - above 57%, in target 43%, below 0%    Nutrition:  - Eats 3 meals/day   - Breakfast: 6-7 slices of mccullough, cinnamon raisin toast or bagel, butter and cream cheese with pepsi - pepsi changed to sugar free lemonade  - Lunch: honey maple ham or turkey sandwich, tomato sandwich, chips - veggie straws, leftovers from dinner  - Dinner: varies - stir cedeno, baked potato and salad, if have late lunch - may not have anything  - Snack(s): m&m's, peanuts, cashews  - Beverage(s): 6-8 canned pepsi's per day - has not had a pepsi since last Monday, V8 diet energy, medium sweet tea 3-5x per week  - Alcohol consumption? Yes - 1-2x a year  - has a goal to cut sweet tea to half and half  - biggest difficulties with bread and pepsi  - goes out to eat at restaurants 2-5x per week    Physical Activity:   no  - Fairly sedentary, may go for walks or hikes but knees impede physical activity most of the time    Diabetes Health Maintenance:  · ASCVD Risk Factors: Age, Diabetes and Smoking History  · ASA therapy:  None   · ACE/ARB therapy: none  · Optimized statin therapy: Pravastatin 40 mg  · The 10-year ASCVD risk score (Vijaya Hammond et al., 2013) is: 5.8%    · LDL: 65 mg/dL (6/22/21)  · Nicotine dependence: Yes  · Last eye exam: 2/12/13  · Last foot exam: 10/23/20  · Last influenza vaccine: 10/23/20  · Last Pneumovax 23 vaccine: 6/30/20  · Last Prevnar-13 vaccine: will discuss at future visit  · Hepatitis B Series: unknown   · COVID-19 vaccine: denied    Vitals:   Wt Readings from Last 3 Encounters:   08/02/21 242 lb (109.8 kg)   07/14/21 246 lb (111.6 kg)   06/22/21 247 lb (112 kg)     BP Readings from Last 3 Encounters:   08/02/21 122/89   07/14/21 122/76   06/22/21 123/77     Pulse Readings from Last 3 Encounters:   08/02/21 92   07/14/21 89   06/22/21 95       Past Medical History:   Diagnosis Date    Abnormal Pap smear of cervix     Acid indigestion     [facility]2015    choriocarcinoma  - UTERNE - surgery/chemo    Depression     Diabetes ([facility]     Dysthymic disorder     GERD (gastroesophageal reflux disease)     Hypertriglyceridemia     Ill-defined condition 02/27/2020    DENIES HISTORY OF MOTION SICKNESS OR VERTIGO    Mixed hyperlipidemia 3/31/2010    MRSA (methicillin resistant Staphylococcus aureus)     PRIOR TO 2017, NASAL SWAB NEG MRSA 1/19/2017    Ovarian cyst     left     Psychiatric disorder     depression; ANXIETY    Urinary incontinence     URGENCY AND INCONTINENECE     Allergies   Allergen Reactions    Ciprofloxacin Hives    Meperidine Other (comments)     Goofy feeling, hallucinates  Other reaction(s): Other (See Comments)  Off balance       Current Outpatient Medications   Medication Sig    flash glucose sensor (FreeStyle [place] 2 Sensor) kit Scan sensor 4x per day for blood sugar readings. Dx E11.65    flash glucose scanning reader (FreeStyle [place] 2 [place]) misc Scan sensor 4x per day for blood sugar readings. Dx E11.65    fenofibrate micronized (LOFIBRA) 134 mg capsule Take 1 Capsule by mouth daily.  pantoprazole (PROTONIX) 40 mg tablet Take 1 Tablet by mouth daily. For acid reflux    pravastatin (PRAVACHOL) 40 mg tablet Take 1 Tablet by mouth nightly. For cholesterol    QUEtiapine (SEROquel) 100 mg tablet Take 1 Tablet by mouth daily.  pioglitazone (ACTOS) 15 mg tablet Take 1 Tablet by mouth daily.  Bydureon BCise 2 mg/0.85 mL atIn INJECT 2 MG SUBCUTANEOUSLY EVERY 7 DAYS    famotidine (PEPCID) 40 mg tablet Take 1 Tablet by mouth daily. (Patient not taking: Reported on 8/2/2021)    zolpidem (AMBIEN) 5 mg tablet Take 1 Tablet by mouth nightly as needed for Sleep. Max Daily Amount: 5 mg.  oxybutynin chloride XL (DITROPAN XL) 10 mg CR tablet Take 1 Tab by mouth daily.  busPIRone (BUSPAR) 10 mg tablet Take 1 Tab by mouth two (2) times a day. For anxiety    glimepiride (AMARYL) 4 mg tablet Take 1 Tab by mouth daily.  For diabetes    ergocalciferol (ERGOCALCIFEROL) 1,250 mcg (50,000 unit) capsule Take 1 capsule by mouth once a week    fluconazole (Diflucan) 150 mg tablet Take 150 mg by mouth once for yeast infection as needed. FDA advises cautious prescribing of oral fluconazole in pregnancy.  lancets (OneTouch UltraSoft Lancets) misc Use to check glucose twice daily.  glucose blood VI test strips (OneTouch Verio test strips) strip Use 1 strip to check glucose twice daily. (DX: E11.9)    Blood-Glucose Meter (OneTouch Ultra2 Meter) monitoring kit Use as directed to test blood sugar two times a day. Use meter covered by insurance.  ibuprofen (MOTRIN) 800 mg tablet Take 1 Tab by mouth every eight (8) hours as needed for Pain. No current facility-administered medications for this visit. Lab Results   Component Value Date/Time    Sodium 139 06/22/2021 11:42 AM    Potassium 3.7 06/22/2021 11:42 AM    Chloride 107 06/22/2021 11:42 AM    CO2 26 06/22/2021 11:42 AM    Anion gap 6 06/22/2021 11:42 AM    Glucose 190 (H) 06/22/2021 11:42 AM    BUN 8 06/22/2021 11:42 AM    Creatinine 0.89 06/22/2021 11:42 AM    BUN/Creatinine ratio 9 (L) 06/22/2021 11:42 AM    GFR est AA >60 06/22/2021 11:42 AM    GFR est non-AA >60 06/22/2021 11:42 AM    Calcium 9.4 06/22/2021 11:42 AM    Bilirubin, total 0.4 06/22/2021 11:42 AM    Alk.  phosphatase 83 06/22/2021 11:42 AM    Protein, total 7.0 06/22/2021 11:42 AM    Albumin 4.0 06/22/2021 11:42 AM    Globulin 3.0 06/22/2021 11:42 AM    A-G Ratio 1.3 06/22/2021 11:42 AM    ALT (SGPT) 41 06/22/2021 11:42 AM     Lab Results   Component Value Date/Time    Cholesterol, total 157 06/22/2021 11:42 AM    HDL Cholesterol 38 06/22/2021 11:42 AM    LDL,Direct 85 07/16/2018 09:15 AM    LDL, calculated 65 06/22/2021 11:42 AM    VLDL, calculated 54 06/22/2021 11:42 AM    Triglyceride 270 (H) 06/22/2021 11:42 AM    CHOL/HDL Ratio 4.1 06/22/2021 11:42 AM     Lab Results   Component Value Date/Time    WBC 11.3 (H) 06/22/2021 11:42 AM    WBC 8.8 05/18/2012 09:00 AM    Hemoglobin (POC) 12.1 03/01/2012 11:26 AM    HGB 13.8 06/22/2021 11:42 AM    HCT 43.4 06/22/2021 11:42 AM    PLATELET 262 23/06/7593 11:42 AM    MCV 93.5 06/22/2021 11:42 AM       Lab Results   Component Value Date/Time    Microalbumin/Creat ratio (mg/g creat) 13 01/22/2021 11:53 AM    MICROALBUMIN,MG/DAY CANCELED 10/12/2016 09:18 AM    Microalbumin,urine random 2.36 01/22/2021 11:53 AM       Lab Results   Component Value Date/Time    Hemoglobin A1c 9.2 (H) 06/22/2021 11:42 AM    Hemoglobin A1c 7.4 (H) 01/22/2021 11:53 AM    Hemoglobin A1c 9.5 (H) 06/30/2020 08:47 AM     Hemoglobin A1c (POC)   Date Value Ref Range Status   10/23/2020 7.9 % Final        CrCl cannot be calculated (Unknown ideal weight. ). A/P:    1) T2DM: Per ADA guidelines, Pt's A1c is not at goal of < 7%. Current SMBG(s)/CGM trend is elevated most of the time. Pt had a low BG rdg; however, that was after she placed a new sensor. Rdgs can be inaccurate for first 12 hours after placement. Visit focused on diet and exercise. Pt has been targeting areas to decrease BG rdgs - sugary drinks. She is eating out several times per week and eating high carb meals and snacks. Pt unwilling to change meds at this time. Focusing on food choices. May consider switching Bydureon to Trulicity, stopping Glimepiride (been on since 2016), and starting SGLT2i.   - Continue Bydureon 2 mg Qweek  - Continue Glimepiride 4 mg every day  - Continue Pioglitazone 15 mg every day  - Encouraged low carb diet - <30 gm per meal, <15 gm per snack  - Encouraged physical activity - 150 minutes/wk of moderate activity  - Will adjust meds at next visit if BG is not significantly improved  - Draw A1c at f/up visit    2) HTN: BP is at goal of < 130/80. Currently not on therapy. No evidence of microalbuminuria. 3) Primary Prevention ASCVD: Per ADA guidelines, pts age 43-69 yrs are recommended for statin therapy. Currently taking moderate intensity statin.   - Continue Pravastatin 40 mg every day  - Continue [id number] mg every day       Established patient-centered goal(s) to follow up on at the next visit:    - Increase vegetables  - Continue decreasing sugary drinks    Resources provided:  - Healthy Plate Method handout      Medication reconciliation was completed during the visit. There are no discontinued medications. No orders of the defined types were placed in this encounter. Patient verbalized understanding of the information presented and all of the patients questions were answered. AVS was handed to the patient. Patient advised to call the office with any additional questions or concerns. Notifications of recommendations will be sent to Dr. Jayson Braga, NP for review. Patient will return to clinic in 8 week(s) for follow up. William Delcid, PharmD, [facility]  Clinical Pharmacist Specialist          For Pharmacy Admin Tracking Only     CPA in place:  Yes   Time Spent (min): 60

## 2021-08-17 NOTE — PATIENT INSTRUCTIONS
Your A1c was 9.2% which was elevated.       Blood Sugar Goal  Fastin-130 mg/dL  1-2 after meals: Less than 180 mg/dL    Carbohydrate Goals  Meal: 30-45 grams   Snacks: 15 grams    Personal Goals:  - Increase vegetables  - Continue decreasing sugary drinks    Pharmacist Contact Information:  Colt SkinnerD, [facility]  Work Cell: [phone]

## 2021-08-19 ENCOUNTER — TELEPHONE (OUTPATIENT)
Dept: FAMILY MEDICINE CLINIC | Age: 46
End: 2021-08-19

## 2021-08-20 NOTE — TELEPHONE ENCOUNTER
----- Message from Dawna Oscar sent at 8/19/2021 12:08 PM EDT -----  Regarding: RUDY Corley/Telephone  Appointment not available    Caller's first and last name and relationship to patient (if not the patient): n/a      Best contact number: [phone]      Preferred date and time: 8/19/21 VV      Scheduled appointment date and time: none available     Reason for appointment: Acute stomach cramping issues, nauseous, diarrhea, came in contact with someone with covid in last 14 days        Details to clarify the request: requesting any pcp except Dr. Chavez Apo
Called Pt and let her know there were no appointments available. Stated since we are unable to provide the appointment she need she should look at other options like an urgent care.
no fever and no chills.

## 2021-09-17 NOTE — TELEPHONE ENCOUNTER
Last Visit: 7/14/21 NP Jory  Next Appointment: Not scheduled  Previous Refill Encounter(s): Historical provider     Requested Prescriptions     Pending Prescriptions Disp Refills    Bydureon BCise 2 mg/0.85 mL atIn 4 Each 2     Sig: INJECT 2 MG SUBCUTANEOUSLY EVERY 7 DAYS

## 2021-09-20 RX ORDER — EXENATIDE 2 MG/.85ML
INJECTION, SUSPENSION, EXTENDED RELEASE SUBCUTANEOUS
Qty: 4 EACH | Refills: 2 | Status: SHIPPED | OUTPATIENT
Start: 2021-09-20 | End: 2021-12-08

## 2021-11-15 DIAGNOSIS — E11.9 DIABETES MELLITUS WITHOUT COMPLICATION (HCC): ICD-10-CM

## 2021-11-15 RX ORDER — GLIMEPIRIDE 4 MG/1
4 TABLET ORAL DAILY
Qty: 90 TABLET | Refills: 1 | Status: SHIPPED | OUTPATIENT
Start: 2021-11-15 | End: 2022-04-08

## 2021-11-15 NOTE — TELEPHONE ENCOUNTER
Last Visit: 7/14/21 RUDY Corley  Next Appointment: 11/19/21 RUDY Corley  Previous Refill Encounter(s): 1/22/21 90 + 1    Requested Prescriptions     Pending Prescriptions Disp Refills    glimepiride (AMARYL) 4 mg tablet 90 Tablet 1     Sig: Take 1 Tablet by mouth daily.  For diabetes

## 2021-11-19 ENCOUNTER — OFFICE VISIT (OUTPATIENT)
Dept: FAMILY MEDICINE CLINIC | Age: 46
End: 2021-11-19
Payer: COMMERCIAL

## 2021-11-19 VITALS
OXYGEN SATURATION: 96 % | DIASTOLIC BLOOD PRESSURE: 75 MMHG | RESPIRATION RATE: 16 BRPM | HEART RATE: 95 BPM | HEIGHT: 66 IN | SYSTOLIC BLOOD PRESSURE: 109 MMHG | TEMPERATURE: 97.8 F | WEIGHT: 237.2 LBS | BODY MASS INDEX: 38.12 KG/M2

## 2021-11-19 DIAGNOSIS — E11.65 UNCONTROLLED TYPE 2 DIABETES MELLITUS WITH HYPERGLYCEMIA (HCC): Primary | ICD-10-CM

## 2021-11-19 DIAGNOSIS — R05.3 CHRONIC COUGH: ICD-10-CM

## 2021-11-19 DIAGNOSIS — Z23 NEEDS FLU SHOT: ICD-10-CM

## 2021-11-19 PROCEDURE — 99214 OFFICE O/P EST MOD 30 MIN: CPT | Performed by: NURSE PRACTITIONER

## 2021-11-19 PROCEDURE — 90686 IIV4 VACC NO PRSV 0.5 ML IM: CPT | Performed by: NURSE PRACTITIONER

## 2021-11-19 PROCEDURE — 83036 HEMOGLOBIN GLYCOSYLATED A1C: CPT | Performed by: NURSE PRACTITIONER

## 2021-11-19 PROCEDURE — 90471 IMMUNIZATION ADMIN: CPT | Performed by: NURSE PRACTITIONER

## 2021-11-19 RX ORDER — BENZONATATE 200 MG/1
200 CAPSULE ORAL
Qty: 20 CAPSULE | Refills: 0 | Status: SHIPPED | OUTPATIENT
Start: 2021-11-19 | End: 2021-11-26

## 2021-11-19 RX ORDER — FLUCONAZOLE 150 MG/1
TABLET ORAL
Qty: 3 TABLET | Refills: 0 | Status: SHIPPED | OUTPATIENT
Start: 2021-11-19 | End: 2021-12-07 | Stop reason: SDUPTHER

## 2021-11-19 NOTE — PROGRESS NOTES
Chief Complaint   Patient presents with    Diabetes     6 mo f/u        1. \"Have you been to the ER, urgent care clinic since your last visit? Hospitalized since your last visit? \" Yes When: 11/21 [facility] Reason for visit: strep throat - had a bad cough, low grade fever   Taking abx and developed sore throat and is fatigued. rx abx and cough syrup. 2. \"Have you seen or consulted any other health care providers outside of the [address] [place] [address] since your last visit? \" No      3. For patients over 45: Has the patient had a colonoscopy? No     If the patient is female:    4. For patients over 40: Has the patient had a mammogram? Yes, HM satisfied with blue hyperlink    5. For patients over 21: Has the patient had a pap smear?  Yes, HM satisfied with blue hyperlink    3 most recent PHQ Screens 11/19/2021   Little interest or pleasure in doing things Not at all   Feeling down, depressed, irritable, or hopeless Not at all   Total Score PHQ 2 0

## 2021-11-19 NOTE — PROGRESS NOTES
Assessment/Plan:     Diagnoses and all orders for this visit:    1. Uncontrolled type 2 diabetes mellitus with hyperglycemia (HCC)  -     AMB POC HEMOGLOBIN A1C  -     empagliflozin (Jardiance) 10 mg tablet; Take 1 Tablet by mouth daily. -     empagliflozin (JARDIANCE) 25 mg tablet; Take 1 Tablet by mouth daily. -     fluconazole (Diflucan) 150 mg tablet; Take 150 mg by mouth once for yeast infection as needed. FDA advises cautious prescribing of oral fluconazole in pregnancy. -     HM DIABETES FOOT EXAM  Not to goal.  Add Jardiance as above. Follow up in three months or sooner as needed. Consider Lantus if no improvement. 2. Needs flu shot  -     INFLUENZA VIRUS VAC QUAD,SPLIT,PRESV FREE SYRINGE IM    3. Chronic cough  -     benzonatate (TESSALON) 200 mg capsule; Take 1 Capsule by mouth three (3) times daily as needed for Cough for up to 7 days. Treatment as above. Resolving. Follow-up and Dispositions    · Return in about 3 months (around 2/19/2022) for Follow Up. Discussed expected course/resolution/complications of diagnosis in detail with patient. Medication risks/benefits/costs/interactions/alternatives discussed with patient. Pt was given after visit summary which includes diagnoses, current medications & vitals. Pt expressed understanding with the diagnosis and plan          Subjective:      Noreen Harris is a 55 y.o. female who presents for had concerns including Diabetes (6 mo f/u ). Patient is a 55 y.o. female that comes today for follow up of Diabetes Mellitus Type 2. Patient does not regularly monitor  their FSBG at home. not attempting to follow a low fat, low cholesterol diet  Patients activity level: some walking  lost,  5 lbs. .  The patient has not experienced recent hypoglycemia. reports that she has been smoking. She has a 25.00 pack-year smoking history.  She has never used smokeless tobacco.    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 30 Tablet 0    [START ON 12/17/2021] empagliflozin (JARDIANCE) 25 mg tablet Take 1 Tablet by mouth daily. 30 Tablet 3    fluconazole (Diflucan) 150 mg tablet Take 150 mg by mouth once for yeast infection as needed. FDA advises cautious prescribing of oral fluconazole in pregnancy. 3 Tablet 0    benzonatate (TESSALON) 200 mg capsule Take 1 Capsule by mouth three (3) times daily as needed for Cough for up to 7 days. 20 Capsule 0    glimepiride (AMARYL) 4 mg tablet Take 1 Tablet by mouth daily. For diabetes 90 Tablet 1    Bydureon BCise 2 mg/0.85 mL atIn INJECT 2 MG SUBCUTANEOUSLY EVERY 7 DAYS 4 Each 2    flash glucose sensor (FreeStyle [place] 2 Sensor) kit Scan sensor 4x per day for blood sugar readings. Dx E11.65 2 Kit 11    flash glucose scanning reader (FreeStyle Jessee 2 [place]) misc Scan sensor 4x per day for blood sugar readings. Dx E11.65 1 Each 0    fenofibrate micronized (LOFIBRA) 134 mg capsule Take 1 Capsule by mouth daily. 90 Capsule 1    pantoprazole (PROTONIX) 40 mg tablet Take 1 Tablet by mouth daily.  For acid reflux 90 Tablet 1    pravastatin (PRAVACHOL) 40 mg tablet Take 1 Tablet by mouth nightly. For cholesterol 90 Tablet 1    QUEtiapine (SEROquel) 100 mg tablet Take 1 Tablet by mouth daily. 90 Tablet 1    pioglitazone (ACTOS) 15 mg tablet Take 1 Tablet by mouth daily. 90 Tablet 1    zolpidem (AMBIEN) 5 mg tablet Take 1 Tablet by mouth nightly as needed for Sleep. Max Daily Amount: 5 mg. 90 Tablet 1    oxybutynin chloride XL (DITROPAN XL) 10 mg CR tablet Take 1 Tab by mouth daily. 90 Tab 1    busPIRone (BUSPAR) 10 mg tablet Take 1 Tab by mouth two (2) times a day. For anxiety 180 Tab 1    ergocalciferol (ERGOCALCIFEROL) 1,250 mcg (50,000 unit) capsule Take 1 capsule by mouth once a week 90 Cap 3    lancets (OneTouch UltraSoft Lancets) misc Use to check glucose twice daily. 200 Each 11    glucose blood VI test strips (OneTouch Verio test strips) strip Use 1 strip to check glucose twice daily. (DX: E11.9) 200 Strip 5    Blood-Glucose Meter (OneTouch Ultra2 Meter) monitoring kit Use as directed to test blood sugar two times a day. Use meter covered by insurance. 1 Kit 0    ibuprofen (MOTRIN) 800 mg tablet Take 1 Tab by mouth every eight (8) hours as needed for Pain. 30 Tab 0       Allergies   Allergen Reactions    Ciprofloxacin Hives    Meperidine Other (comments)     Goofy feeling, hallucinates  Other reaction(s): Other (See Comments)  Off balance       ROS:   Review of Systems   Constitutional: Negative for malaise/fatigue. Eyes: Negative for blurred vision. Respiratory: Positive for cough. Negative for shortness of breath. Cardiovascular: Negative for chest pain. Objective:     Visit Vitals  /75 (BP 1 Location: Right arm, BP Patient Position: Sitting, BP Cuff Size: Large adult)   Pulse 95   Temp 97.8 °F (36.6 °C) (Temporal)   Resp 16   Ht 5' 6\" (1.676 m)   Wt 237 lb 3.2 oz (107.6 kg)   LMP 09/10/2014 (Exact Date)   SpO2 96%   BMI 38.29 kg/m²       Vitals and Nurse Documentation reviewed.      Physical Exam  Constitutional:       General: She is not in acute distress. Cardiovascular:      Heart sounds: S1 normal and S2 normal. No murmur heard. No friction rub. No gallop. Pulmonary:      Effort: No respiratory distress. Breath sounds: Normal breath sounds. Feet:      Right foot:      Protective Sensation: 4 sites tested. 4 sites sensed. Left foot:      Protective Sensation: 4 sites tested. 4 sites sensed. Skin:     General: Skin is warm and dry.    Psychiatric:         Mood and Affect: Mood and affect normal.

## 2021-11-22 ENCOUNTER — DOCUMENTATION ONLY (OUTPATIENT)
Dept: FAMILY MEDICINE CLINIC | Age: 46
End: 2021-11-22

## 2021-11-22 ENCOUNTER — TELEPHONE (OUTPATIENT)
Dept: FAMILY MEDICINE CLINIC | Age: 46
End: 2021-11-22

## 2021-11-22 NOTE — TELEPHONE ENCOUNTER
Chief Complaint   Patient presents with    Prior Auth     Jardiance 10MG tablets:  [place] Case: [id number], Status: Approved, Coverage Starts on: 11/22/2021 12:00:00 AM, Coverage Ends on: 11/22/2022 12:00:00 AM.   Adiel Wu Prior Auth     JARDIANCE 25 MG TABLETS:  Available without authorization. [facility] pharmacy was faxed approval notification at [phone] with confirmation received.  Avery Urrutia LPN

## 2021-11-22 NOTE — PROGRESS NOTES
Chief Complaint   Patient presents with    Other     PRESCRIPTION CARD INFORMATION :  MOST UPDATED VERSION 11/22/2021. HESHAM     BIN:  [id number], DEMIAN:  Michelle Lew:  Antonia Nieto ID:  [id number]. HESHAM.   Anika Omalley LPN

## 2021-11-22 NOTE — TELEPHONE ENCOUNTER
Marge Perla (Self) [phone] (H)     Pt stated that her Jardiance medication requires a prior auth. Pharmacist cancelled the 10 mg tablet. Needs a Prior auth for both the 10mg and 25mg Jardiance. Please call back if there are any issues.

## 2021-11-23 LAB — HBA1C MFR BLD HPLC: 9.7 %

## 2021-12-07 DIAGNOSIS — E11.65 UNCONTROLLED TYPE 2 DIABETES MELLITUS WITH HYPERGLYCEMIA (HCC): ICD-10-CM

## 2021-12-09 DIAGNOSIS — E11.65 UNCONTROLLED TYPE 2 DIABETES MELLITUS WITH HYPERGLYCEMIA (HCC): ICD-10-CM

## 2021-12-09 RX ORDER — FLASH GLUCOSE SENSOR
KIT MISCELLANEOUS
Qty: 6 KIT | Refills: 3 | Status: SHIPPED | OUTPATIENT
Start: 2021-12-09 | End: 2021-12-15 | Stop reason: SDUPTHER

## 2021-12-10 RX ORDER — FLUCONAZOLE 150 MG/1
TABLET ORAL
Qty: 3 TABLET | Refills: 0 | Status: SHIPPED | OUTPATIENT
Start: 2021-12-10 | End: 2022-02-16 | Stop reason: ALTCHOICE

## 2021-12-10 NOTE — TELEPHONE ENCOUNTER
Last visit 11/19/2021 RUDY Corley   Next appointment 3 months (02/2022) - Nothing scheduled   Previous refill encounter(s)   11/19/2021 Diflucan #3     Requested Prescriptions     Pending Prescriptions Disp Refills    fluconazole (Diflucan) 150 mg tablet 3 Tablet 0     Sig: Take 150 mg by mouth once for yeast infection as needed. FDA advises cautious prescribing of oral fluconazole in pregnancy.

## 2021-12-10 NOTE — TELEPHONE ENCOUNTER
Patient called checking the status of her refill on diflucan 150 mg tablet wants to know if she can get more than 3 tablets.     Requesting a call back    Best call back #[phone]

## 2021-12-14 ENCOUNTER — OFFICE VISIT (OUTPATIENT)
Dept: ORTHOPEDIC SURGERY | Age: 46
End: 2021-12-14
Payer: COMMERCIAL

## 2021-12-14 VITALS — WEIGHT: 230 LBS | HEIGHT: 67 IN | BODY MASS INDEX: 36.1 KG/M2

## 2021-12-14 DIAGNOSIS — M25.522 LEFT ELBOW PAIN: Primary | ICD-10-CM

## 2021-12-14 DIAGNOSIS — M77.02 MEDIAL EPICONDYLITIS, LEFT: ICD-10-CM

## 2021-12-14 PROCEDURE — 20551 NJX 1 TENDON ORIGIN/INSJ: CPT | Performed by: ORTHOPAEDIC SURGERY

## 2021-12-14 PROCEDURE — 99214 OFFICE O/P EST MOD 30 MIN: CPT | Performed by: ORTHOPAEDIC SURGERY

## 2021-12-14 RX ORDER — METHYLPREDNISOLONE ACETATE 40 MG/ML
40 INJECTION, SUSPENSION INTRA-ARTICULAR; INTRALESIONAL; INTRAMUSCULAR; SOFT TISSUE ONCE
Status: COMPLETED | OUTPATIENT
Start: 2021-12-14 | End: 2021-12-14

## 2021-12-14 RX ORDER — BUPIVACAINE HYDROCHLORIDE 2.5 MG/ML
1 INJECTION, SOLUTION INFILTRATION; PERINEURAL ONCE
Status: COMPLETED | OUTPATIENT
Start: 2021-12-14 | End: 2021-12-14

## 2021-12-14 RX ADMIN — METHYLPREDNISOLONE ACETATE 40 MG: 40 INJECTION, SUSPENSION INTRA-ARTICULAR; INTRALESIONAL; INTRAMUSCULAR; SOFT TISSUE at 13:27

## 2021-12-14 RX ADMIN — BUPIVACAINE HYDROCHLORIDE 2.5 MG: 2.5 INJECTION, SOLUTION INFILTRATION; PERINEURAL at 13:27

## 2021-12-14 NOTE — PROGRESS NOTES
Dc Johnson (: 1975) is a 55 y.o. female, patient, here for evaluation of the following chief complaint(s):  Elbow Pain (Left)       HPI:    She began having increased left elbow pain several months ago. The patient states that her pain levels the same as it was at her last visit. She describes her pain as constant. She reports taking no medication for discomfort. The patient does report a previous cortisone injection in her left elbow. Allergies   Allergen Reactions    Ciprofloxacin Hives    Meperidine Other (comments)     Goofy feeling, hallucinates  Other reaction(s): Other (See Comments)  Off balance       Current Outpatient Medications   Medication Sig    fluconazole (Diflucan) 150 mg tablet Take 150 mg by mouth once for yeast infection as needed. FDA advises cautious prescribing of oral fluconazole in pregnancy.  flash glucose sensor (FreeStyle Jessee 2 Sensor) kit Scan sensor 4x per day for blood sugar readings. Dx E11.65    Bydureon BCise 2 mg/0.85 mL atIn INJECT 2MG SUBCUTANEOUSLY ONCE A WEEK    empagliflozin (Jardiance) 10 mg tablet Take 1 Tablet by mouth daily.  [START ON 2021] empagliflozin (JARDIANCE) 25 mg tablet Take 1 Tablet by mouth daily.  glimepiride (AMARYL) 4 mg tablet Take 1 Tablet by mouth daily. For diabetes    flash glucose scanning reader (FreeStyle Jessee 2 [place]) misc Scan sensor 4x per day for blood sugar readings. Dx E11.65    fenofibrate micronized (LOFIBRA) 134 mg capsule Take 1 Capsule by mouth daily.  pantoprazole (PROTONIX) 40 mg tablet Take 1 Tablet by mouth daily. For acid reflux    pravastatin (PRAVACHOL) 40 mg tablet Take 1 Tablet by mouth nightly. For cholesterol    QUEtiapine (SEROquel) 100 mg tablet Take 1 Tablet by mouth daily.  pioglitazone (ACTOS) 15 mg tablet Take 1 Tablet by mouth daily.  zolpidem (AMBIEN) 5 mg tablet Take 1 Tablet by mouth nightly as needed for Sleep. Max Daily Amount: 5 mg.     oxybutynin chloride XL (DITROPAN XL) 10 mg CR tablet Take 1 Tab by mouth daily.  busPIRone (BUSPAR) 10 mg tablet Take 1 Tab by mouth two (2) times a day. For anxiety    ergocalciferol (ERGOCALCIFEROL) 1,250 mcg (50,000 unit) capsule Take 1 capsule by mouth once a week    lancets (OneTouch UltraSoft Lancets) misc Use to check glucose twice daily.  glucose blood VI test strips (OneTouch Verio test strips) strip Use 1 strip to check glucose twice daily. (DX: E11.9)    Blood-Glucose Meter (OneTouch Ultra2 Meter) monitoring kit Use as directed to test blood sugar two times a day. Use meter covered by insurance.  ibuprofen (MOTRIN) 800 mg tablet Take 1 Tab by mouth every eight (8) hours as needed for Pain. No current facility-administered medications for this visit.        Past Medical History:   Diagnosis Date    Abnormal Pap smear of cervix     Acid indigestion     Cancer ([facility] [address] 01/2015    choriocarcinoma  - UTERNE - surgery/chemo    Depression     Diabetes (HCC)     Dysthymic disorder     GERD (gastroesophageal reflux disease)     Hypertriglyceridemia     Ill-defined condition 02/27/2020    DENIES HISTORY OF MOTION SICKNESS OR VERTIGO    Mixed hyperlipidemia 3/31/2010    MRSA (methicillin resistant Staphylococcus aureus)     PRIOR TO 2017, NASAL SWAB NEG MRSA 1/19/2017    Ovarian cyst     left     Psychiatric disorder     depression; ANXIETY    Urinary incontinence     URGENCY AND INCONTINENECE        Past Surgical History:   Procedure Laterality Date    HX APPENDECTOMY      HX GYN  12/08    D & C     HX GYN      Ovary 8/2/2019 [place]    HX ORTHOPAEDIC      R ACL knee    HX ORTHOPAEDIC      BILATERAL A/S KNEES    HX ORTHOPAEDIC  4/2016    REMOVAL OF HARDWARE IN KNEE    HX PARTIAL HYSTERECTOMY Bilateral May 4 2015    Dr. Fermín Monroy       Family History   Problem Relation Age of Onset    Hypertension Mother     Elevated Lipids Mother     Cancer Mother         bladder cancer    Cancer Father pancreatic    Thyroid Disease Paternal Aunt     Heart Disease Maternal Grandmother     Heart Disease Maternal Grandfather     Heart Disease Paternal Grandfather     Diabetes Paternal Aunt     Alcohol abuse Neg Hx     Arthritis-rheumatoid Neg Hx     Asthma Neg Hx     Bleeding Prob Neg Hx     Headache Neg Hx     Lung Disease Neg Hx     Migraines Neg Hx     Psychiatric Disorder Neg Hx     Stroke Neg Hx     Mental Retardation Neg Hx     Anesth Problems Neg Hx         Social History     Socioeconomic History    Marital status: SINGLE     Spouse name: Not on file    Number of children: Not on file    Years of education: Not on file    Highest education level: Not on file   Occupational History    Not on file   Tobacco Use    Smoking status: Current Every Day Smoker     Packs/day: 1.00     Years: 25.00     Pack years: 25.00    Smokeless tobacco: Never Used   Vaping Use    Vaping Use: Never used   Substance and Sexual Activity    Alcohol use: No     Alcohol/week: 0.0 standard drinks    Drug use: No    Sexual activity: Yes     Partners: Male     Birth control/protection: None   Other Topics Concern    Not on file   Social History Narrative    Not on file     Social Determinants of Health     Financial Resource Strain:     Difficulty of Paying Living Expenses: Not on file   Food Insecurity:     Worried About Running Out of Food in the Last Year: Not on file    Montez of Food in the Last Year: Not on file   Transportation Needs:     Lack of Transportation (Medical): Not on file    Lack of Transportation (Non-Medical):  Not on file   Physical Activity:     Days of Exercise per Week: Not on file    Minutes of Exercise per Session: Not on file   Stress:     Feeling of Stress : Not on file   Social Connections:     Frequency of Communication with Friends and Family: Not on file    Frequency of Social Gatherings with Friends and Family: Not on file    Attends [employer] Services: Not on file   [address] [facility] [employer] or Organizations: Not on file    Attends Club or Organization Meetings: Not on file    Marital Status: Not on file   Intimate Partner Violence:     Fear of Current or Ex-Partner: Not on file    Emotionally Abused: Not on file    Physically Abused: Not on file    Sexually Abused: Not on file   Housing Stability:     Unable to Pay for Housing in the Last Year: Not on file    Number of [place] in the Last Year: Not on file    Unstable Housing in the Last Year: Not on file       Review of Systems   All other systems reviewed and are negative. Vitals:  Ht 5' 7\" (1.702 m)   Wt 230 lb (104.3 kg)   LMP 09/10/2014 (Exact Date)   BMI 36.02 kg/m²    Body mass index is 36.02 kg/m². Ortho Exam     The patient is well-developed and well-nourished. The patient presents today in alert and oriented x3 with a normal mood and affect. The patient stands with a normal weightbearing line and walks with a normal gait. Left elbow, the patient sits with normal posture. They are tender to palpation over the medial epicondyle and flexor origin. The patient has full range of motion, but discomfort with resisted wrist flexion. They have 5/5 strength, and are neurovascularly intact distally. There is no erythema, warmth or skin lesions present. ASSESSMENT/PLAN:      1. Left elbow pain  2. Medial epicondylitis, left  -     bupivacaine HCl (MARCAINE) 0.25 % (2.5 mg/mL) injection 2.5 mg; 2.5 mg (1 mL), Other, ONCE, 1 dose, On Tue 12/14/21 at 1400  -     methylPREDNISolone acetate (DEPO-MEDROL) 40 mg/mL injection 40 mg; 40 mg, Intra artICUlar, ONCE, 1 dose, On Tue 12/14/21 at 1400       Below is the assessment and plan developed based on review of pertinent history, physical exam, labs, studies, and medications. We discussed the patient's left elbow pain and her signs, symptoms, physical exam, description of her pain, and x-rays are consistent with medial epicondylitis.   The possible treatment options were discussed with the patient and we elected to inject her left elbow with cortisone today to try and alleviate some of her discomfort. The risks and benefits of the injection were discussed in detail with the patient and under sterile prep the patient's left elbow was injected with 1 cc of 40 mg/cc of Depo-Medrol and 1 cc of Marcaine. She tolerated the injection well. I did encourage her to continue to ice when possible, modify her activity level based on her left elbow pain, and use anti-inflammatory medication when necessary. The patient will also continue to work on range of motion, strengthening, and stretching exercises with an at-home exercise program as pain tolerates. She will obtain a wrist brace to limit her wrist range of motion and hopefully alleviate her elbow pain. She was given documentation for handicap parking sticker. I will see her back in 4 weeks for reevaluation if she continues to have persistence of her pain however, if her pain is improved and is well-maintained I will see her back on an as-needed basis. Return in about 4 weeks (around 1/11/2022), or if symptoms worsen or fail to improve. An electronic signature was used to authenticate this note.   -- Dionne Hacnock MD

## 2021-12-15 DIAGNOSIS — E11.65 UNCONTROLLED TYPE 2 DIABETES MELLITUS WITH HYPERGLYCEMIA (HCC): ICD-10-CM

## 2021-12-15 NOTE — TELEPHONE ENCOUNTER
RUDY Corley    [facility] is stating they did not receive the [facility] sent on 12/9/21. Please resend. Leonor Heard    Previous Refill Encounter(s): 12/9/21 6 + 3    Requested Prescriptions     Pending Prescriptions Disp Refills    flash glucose sensor (FreeStyle Jessee 2 Sensor) kit 6 Kit 3     Sig: Scan sensor 4x per day for blood sugar readings.  Dx E11.65

## 2021-12-16 ENCOUNTER — OFFICE VISIT (OUTPATIENT)
Dept: ORTHOPEDIC SURGERY | Age: 46
End: 2021-12-16
Payer: COMMERCIAL

## 2021-12-16 VITALS — BODY MASS INDEX: 36.1 KG/M2 | HEIGHT: 67 IN | WEIGHT: 230 LBS

## 2021-12-16 DIAGNOSIS — M77.02 MEDIAL EPICONDYLITIS, LEFT: ICD-10-CM

## 2021-12-16 DIAGNOSIS — M25.522 LEFT ELBOW PAIN: Primary | ICD-10-CM

## 2021-12-16 PROCEDURE — 99213 OFFICE O/P EST LOW 20 MIN: CPT | Performed by: ORTHOPAEDIC SURGERY

## 2021-12-16 RX ORDER — METHYLPREDNISOLONE 4 MG/1
4 TABLET ORAL
Qty: 1 DOSE PACK | Refills: 0 | Status: SHIPPED | OUTPATIENT
Start: 2021-12-16 | End: 2022-08-01 | Stop reason: ALTCHOICE

## 2021-12-16 RX ORDER — METHYLPREDNISOLONE 4 MG/1
4 TABLET ORAL
Qty: 1 DOSE PACK | Refills: 0 | Status: SHIPPED | OUTPATIENT
Start: 2021-12-16 | End: 2021-12-16 | Stop reason: SDUPTHER

## 2021-12-16 RX ORDER — FLASH GLUCOSE SENSOR
KIT MISCELLANEOUS
Qty: 6 KIT | Refills: 3 | Status: SHIPPED | OUTPATIENT
Start: 2021-12-16 | End: 2022-08-09

## 2021-12-16 NOTE — PROGRESS NOTES
Raissa Fleming (: 1975) is a 55 y.o. female, patient, here for evaluation of the following chief complaint(s):  Elbow Pain (left)       HPI:    She was last seen for left elbow pain on Tuesday, 2021. The patient states that she has had increased discomfort since then. She did receive a cortisone injection in her left elbow on Tuesday in the medial common flexor tendon. The patient states that she has had tenderness in her left elbow since her injection. She has trouble with full extension at this time. She denies any fevers or chills. She states that the injection she has had in the past have not been nearly as painful at this point. She does have some pain that radiates down in the forearm. She has pain with any type of flexion of the fingers. Allergies   Allergen Reactions    Ciprofloxacin Hives    Meperidine Other (comments)     Goofy feeling, hallucinates  Other reaction(s): Other (See Comments)  Off balance       Current Outpatient Medications   Medication Sig    methylPREDNISolone (MEDROL DOSEPACK) 4 mg tablet Take 1 Tablet by mouth Specific Days and Specific Times. Used as directed    flash glucose sensor (FreeStyle Jessee 2 Sensor) kit Scan sensor 4x per day for blood sugar readings. Dx E11.65    fluconazole (Diflucan) 150 mg tablet Take 150 mg by mouth once for yeast infection as needed. FDA advises cautious prescribing of oral fluconazole in pregnancy.  Bydureon BCise 2 mg/0.85 mL atIn INJECT 2MG SUBCUTANEOUSLY ONCE A WEEK    empagliflozin (Jardiance) 10 mg tablet Take 1 Tablet by mouth daily.  [START ON 2021] empagliflozin (JARDIANCE) 25 mg tablet Take 1 Tablet by mouth daily.  glimepiride (AMARYL) 4 mg tablet Take 1 Tablet by mouth daily. For diabetes    flash glucose scanning reader (FreeStyle Jessee 2 [place]) misc Scan sensor 4x per day for blood sugar readings. Dx E11.65    fenofibrate micronized (LOFIBRA) 134 mg capsule Take 1 Capsule by mouth daily.     pantoprazole (PROTONIX) 40 mg tablet Take 1 Tablet by mouth daily. For acid reflux    pravastatin (PRAVACHOL) 40 mg tablet Take 1 Tablet by mouth nightly. For cholesterol    QUEtiapine (SEROquel) 100 mg tablet Take 1 Tablet by mouth daily.  pioglitazone (ACTOS) 15 mg tablet Take 1 Tablet by mouth daily.  zolpidem (AMBIEN) 5 mg tablet Take 1 Tablet by mouth nightly as needed for Sleep. Max Daily Amount: 5 mg.  oxybutynin chloride XL (DITROPAN XL) 10 mg CR tablet Take 1 Tab by mouth daily.  busPIRone (BUSPAR) 10 mg tablet Take 1 Tab by mouth two (2) times a day. For anxiety    ergocalciferol (ERGOCALCIFEROL) 1,250 mcg (50,000 unit) capsule Take 1 capsule by mouth once a week    lancets (OneTouch UltraSoft Lancets) misc Use to check glucose twice daily.  glucose blood VI test strips (OneTouch Verio test strips) strip Use 1 strip to check glucose twice daily. (DX: E11.9)    Blood-Glucose Meter (OneTouch Ultra2 Meter) monitoring kit Use as directed to test blood sugar two times a day. Use meter covered by insurance.  ibuprofen (MOTRIN) 800 mg tablet Take 1 Tab by mouth every eight (8) hours as needed for Pain. No current facility-administered medications for this visit.        Past Medical History:   Diagnosis Date    Abnormal Pap smear of cervix     Acid indigestion     Cancer ([facility] [address] 01/2015    choriocarcinoma  - UTERNE - surgery/chemo    Depression     Diabetes (HCC)     Dysthymic disorder     GERD (gastroesophageal reflux disease)     Hypertriglyceridemia     Ill-defined condition 02/27/2020    DENIES HISTORY OF MOTION SICKNESS OR VERTIGO    Mixed hyperlipidemia 3/31/2010    MRSA (methicillin resistant Staphylococcus aureus)     PRIOR TO 2017, NASAL SWAB NEG MRSA 1/19/2017    Ovarian cyst     left     Psychiatric disorder     depression; ANXIETY    Urinary incontinence     URGENCY AND INCONTINENECE        Past Surgical History:   Procedure Laterality Date    HX APPENDECTOMY  HX GYN  12/08    D & C     HX GYN      Ovary 8/2/2019 [place]    HX ORTHOPAEDIC      R ACL knee    HX ORTHOPAEDIC      BILATERAL A/S KNEES    HX ORTHOPAEDIC  4/2016    REMOVAL OF HARDWARE IN KNEE    HX PARTIAL HYSTERECTOMY Bilateral May 4 2015    Dr. Darrel Velasco       Family History   Problem Relation Age of Onset    Hypertension Mother     Elevated Lipids Mother     Cancer Mother         bladder cancer    Cancer Father         pancreatic    Thyroid Disease Paternal Aunt     Heart Disease Maternal Grandmother     Heart Disease Maternal Grandfather     Heart Disease Paternal Grandfather     Diabetes Paternal Aunt     Alcohol abuse Neg Hx     Arthritis-rheumatoid Neg Hx     Asthma Neg Hx     Bleeding Prob Neg Hx     Headache Neg Hx     Lung Disease Neg Hx     Migraines Neg Hx     Psychiatric Disorder Neg Hx     Stroke Neg Hx     Mental Retardation Neg Hx     Anesth Problems Neg Hx         Social History     Socioeconomic History    Marital status: SINGLE     Spouse name: Not on file    Number of children: Not on file    Years of education: Not on file    Highest education level: Not on file   Occupational History    Not on file   Tobacco Use    Smoking status: Current Every Day Smoker     Packs/day: 1.00     Years: 25.00     Pack years: 25.00    Smokeless tobacco: Never Used   Vaping Use    Vaping Use: Never used   Substance and Sexual Activity    Alcohol use: No     Alcohol/week: 0.0 standard drinks    Drug use: No    Sexual activity: Yes     Partners: Male     Birth control/protection: None   Other Topics Concern    Not on file   Social History Narrative    Not on file     Social Determinants of Health     Financial Resource Strain:     Difficulty of Paying Living Expenses: Not on file   Food Insecurity:     Worried About Running Out of Food in the Last Year: Not on file    Montez of Food in the Last Year: Not on file   Transportation Needs:     Lack of Transportation (Medical): Not on file    Lack of Transportation (Non-Medical): Not on file   Physical Activity:     Days of Exercise per Week: Not on file    Minutes of Exercise per Session: Not on file   Stress:     Feeling of Stress : Not on file   Social Connections:     Frequency of Communication with Friends and Family: Not on file    Frequency of Social Gatherings with Friends and Family: Not on file    Attends [employer] Services: Not on file    Active Member of [address] [place] [address] or Organizations: Not on file    Attends Club or Organization Meetings: Not on file    Marital Status: Not on file   Intimate Partner Violence:     Fear of Current or Ex-Partner: Not on file    Emotionally Abused: Not on file    Physically Abused: Not on file    Sexually Abused: Not on file   Housing Stability:     Unable to Pay for Housing in the Last Year: Not on file    Number of [place] in the Last Year: Not on file    Unstable Housing in the Last Year: Not on file       Review of Systems   All other systems reviewed and are negative. Vitals:  Ht 5' 7\" (1.702 m)   Wt 230 lb (104.3 kg)   LMP 09/10/2014 (Exact Date)   BMI 36.02 kg/m²    Body mass index is 36.02 kg/m². Ortho Exam     The patient is well-developed and well-nourished. The patient presents today in alert and oriented x3 with a normal mood and affect. The patient stands with a normal weightbearing line and walks with a normal gait. Left elbow:  The patient sits with normal posture. They are exquisitely tender to palpation over the medial epicondyle and flexor origin. No tenderness to palpation over the ulnar nerve as it runs through the cubital tunnel. Negative Tinel's over the cubital tunnel. She does have pain with any type of  or flexion of the fingers. They have 5/5 strength, and are neurovascularly intact distally. There is no erythema, warmth or skin lesions present. ASSESSMENT/PLAN:      1. Left elbow pain  2.  Medial epicondylitis, left  -     methylPREDNISolone (MEDROL DOSEPACK) 4 mg tablet; Take 1 Tablet by mouth Specific Days and Specific Times. Used as directed, Normal, Disp-1 Dose Pack, R-0       Below is the assessment and plan developed based on review of pertinent history, physical exam, labs, studies, and medications. We discussed the patient's left elbow pain and medial epicondylitis. She did receive a cortisone injection on Tuesday, 12/14/2021. She states that her elbow pain is worse since the injection. Her signs, symptoms, physical exam, and description of her pain are consistent with inflammation from the recent cortisone injection. The possible treatment options were discussed with the patient and have agreed to give her a Medrol Dosepak. We asked that she discuss with her family care physician to discuss this in light of her diabetes. We encouraged her to possibly do a half dose of the Medrol Dosepak and lengthening out and she could come off of it if she really starts to feel better prior to finishing the Medrol Dosepak. We will get her a sling today. We also encouraged her to work on gentle extension of the elbows we do not want her to get stiff. If she continues to have significant symptoms we will see her back in 3 to 4 weeks time for evaluation. Return in about 3 weeks (around 1/6/2022). An electronic signature was used to authenticate this note.   -- Daniel Brownlee MD

## 2021-12-27 NOTE — TELEPHONE ENCOUNTER
Last Visit: 11/19/21 NP Jory  Next Appointment: Not scheduled  Previous Refill Encounter(s): 1/22/21 90 + 1    Requested Prescriptions     Pending Prescriptions Disp Refills    oxybutynin chloride XL (DITROPAN XL) 10 mg CR tablet 90 Tablet 1     Sig: Take 1 Tablet by mouth daily.

## 2021-12-28 RX ORDER — OXYBUTYNIN CHLORIDE 10 MG/1
10 TABLET, EXTENDED RELEASE ORAL DAILY
Qty: 90 TABLET | Refills: 1 | Status: SHIPPED | OUTPATIENT
Start: 2021-12-28 | End: 2022-06-23

## 2022-02-02 ENCOUNTER — NURSE TRIAGE (OUTPATIENT)
Dept: OTHER | Facility: CLINIC | Age: 47
End: 2022-02-02

## 2022-02-02 ENCOUNTER — OFFICE VISIT (OUTPATIENT)
Dept: FAMILY MEDICINE CLINIC | Age: 47
End: 2022-02-02
Payer: COMMERCIAL

## 2022-02-02 VITALS
WEIGHT: 228.2 LBS | SYSTOLIC BLOOD PRESSURE: 127 MMHG | OXYGEN SATURATION: 97 % | HEART RATE: 102 BPM | BODY MASS INDEX: 35.82 KG/M2 | HEIGHT: 67 IN | DIASTOLIC BLOOD PRESSURE: 71 MMHG | TEMPERATURE: 98 F | RESPIRATION RATE: 18 BRPM

## 2022-02-02 DIAGNOSIS — N76.0 ACUTE VAGINITIS: Primary | ICD-10-CM

## 2022-02-02 DIAGNOSIS — K21.00 GASTROESOPHAGEAL REFLUX DISEASE WITH ESOPHAGITIS: ICD-10-CM

## 2022-02-02 DIAGNOSIS — E11.9 DIABETES MELLITUS WITHOUT COMPLICATION (HCC): ICD-10-CM

## 2022-02-02 DIAGNOSIS — E78.5 HYPERLIPIDEMIA, UNSPECIFIED HYPERLIPIDEMIA TYPE: ICD-10-CM

## 2022-02-02 DIAGNOSIS — F51.04 CHRONIC INSOMNIA: ICD-10-CM

## 2022-02-02 DIAGNOSIS — F31.9 BIPOLAR 1 DISORDER, DEPRESSED (HCC): ICD-10-CM

## 2022-02-02 LAB — HBA1C MFR BLD HPLC: 7.6 %

## 2022-02-02 PROCEDURE — 99215 OFFICE O/P EST HI 40 MIN: CPT | Performed by: NURSE PRACTITIONER

## 2022-02-02 PROCEDURE — 83036 HEMOGLOBIN GLYCOSYLATED A1C: CPT | Performed by: NURSE PRACTITIONER

## 2022-02-02 PROCEDURE — 3051F HG A1C>EQUAL 7.0%<8.0%: CPT | Performed by: NURSE PRACTITIONER

## 2022-02-02 RX ORDER — FENOFIBRATE 134 MG/1
134 CAPSULE ORAL DAILY
Qty: 90 CAPSULE | Refills: 1 | Status: SHIPPED | OUTPATIENT
Start: 2022-02-02 | End: 2022-07-25

## 2022-02-02 RX ORDER — PRAVASTATIN SODIUM 40 MG/1
40 TABLET ORAL
Qty: 90 TABLET | Refills: 1 | Status: SHIPPED | OUTPATIENT
Start: 2022-02-02 | End: 2022-07-25

## 2022-02-02 RX ORDER — QUETIAPINE FUMARATE 100 MG/1
100 TABLET, FILM COATED ORAL DAILY
Qty: 90 TABLET | Refills: 1 | Status: SHIPPED | OUTPATIENT
Start: 2022-02-02 | End: 2022-06-24

## 2022-02-02 RX ORDER — ZOLPIDEM TARTRATE 5 MG/1
5 TABLET ORAL
Qty: 90 TABLET | Refills: 1 | Status: SHIPPED | OUTPATIENT
Start: 2022-02-02 | End: 2022-07-25

## 2022-02-02 RX ORDER — PANTOPRAZOLE SODIUM 40 MG/1
40 TABLET, DELAYED RELEASE ORAL DAILY
Qty: 90 TABLET | Refills: 1 | Status: SHIPPED | OUTPATIENT
Start: 2022-02-02 | End: 2022-07-25

## 2022-02-02 NOTE — TELEPHONE ENCOUNTER
Received call from Lori Pollack  at [facility] with Red Flag Complaint. Subjective: Caller states \"It been going on for quite some time, Im swollen from the top of my agnieszka ha to my rectum, there are little tears, red,  it hurts to wipe. I thought I was allergic to my toilet paper but I switched and it is still going on. \" I thought it was a yeast infection, it cleared up and it wasn't that bad, but then all the sudden it came back. Denies discharge but does see blood on toilet paper that is very minimal and thinks it is from the tears. Current Symptoms: vaginal swelling, redness, tears, painful to wipe, it hurts to go to the restroom but does not hurt just sitting there. Onset: 1 month ago; worsening    Associated Symptoms: NA    Pain Severity: 5/10; stinging sensation when urinating or having a BM and also when wiping it is painful ; intermittent    Temperature: denies     What has been tried: switched toilet paper     LMP: NA Pregnant: NA    Recommended disposition: to see today in office. Pt advised that if no appts she can go to [facility] [address] to be seen. Care advice provided, patient verbalizes understanding; denies any other questions or concerns; instructed to call back for any new or worsening symptoms. Writer provided warm transfer to [facility] YADIEL MCKEON  at [facility] for appointment scheduling    Attention Provider: Thank you for allowing me to participate in the care of your patient. The patient was connected to triage in response to information provided to the ECC. Please do not respond through this encounter as the response is not directed to a shared pool.       Reason for Disposition   MILD-MODERATE pain and present > 24 hours (Exception: chronic pain)    Protocols used: VAGINAL SYMPTOMS-ADULT-[place]

## 2022-02-02 NOTE — PROGRESS NOTES
Chief Complaint   Patient presents with    Vaginal Swelling       1. \"Have you been to the ER, urgent care clinic since your last visit? Hospitalized since your last visit? \" No    2. \"Have you seen or consulted any other health care providers outside of the [address] [place] [address] since your last visit? \" No     3. For patients over 45: Has the patient had a colonoscopy? Yes - Care Gap present. OVERDUE! If the patient is female:    4. For patients over 40: Has the patient had a mammogram? Yes - no Care Gap present    5. For patients over 21: Has the patient had a pap smear?  Yes - no Care Gap present    3 most recent PHQ Screens 2/2/2022   Little interest or pleasure in doing things Not at all   Feeling down, depressed, irritable, or hopeless Not at all   Total Score PHQ 2 0     Health Maintenance Due   Topic Date Due    COVID-19 Vaccine (1) Never done    Eye Exam Retinal or Dilated  02/12/2015    Colorectal Cancer Screening Combo  Never done    MICROALBUMIN Q1  01/22/2022

## 2022-02-05 LAB
A VAGINAE DNA VAG QL NAA+PROBE: ABNORMAL SCORE
BVAB2 DNA VAG QL NAA+PROBE: ABNORMAL SCORE
C ALBICANS DNA VAG QL NAA+PROBE: POSITIVE
C GLABRATA DNA VAG QL NAA+PROBE: POSITIVE
C TRACH RRNA SPEC QL NAA+PROBE: NEGATIVE
MEGA1 DNA VAG QL NAA+PROBE: ABNORMAL SCORE
N GONORRHOEA RRNA SPEC QL NAA+PROBE: NEGATIVE
SPECIMEN SOURCE: ABNORMAL
T VAGINALIS RRNA SPEC QL NAA+PROBE: NEGATIVE

## 2022-02-07 NOTE — PROGRESS NOTES
Assessment/Plan:     Diagnoses and all orders for this visit:    1. Acute vaginitis  -     NUSWAB VAGINITIS PLUS; Future  Failed several rounds of diflucan. Nuswab pending. 2. Diabetes mellitus without complication (HCC)  -     AMB POC HEMOGLOBIN A1C is improved. She will work towards reducing soda. Temporarily discontinue Jardiance until perineum improves. 3. Bipolar 1 disorder, depressed (HCC)  -     QUEtiapine (SEROquel) 100 mg tablet; Take 1 Tablet by mouth daily. Stable. Refilled today. Continue current therapy. 4. Chronic insomnia  -     zolpidem (AMBIEN) 5 mg tablet; Take 1 Tablet by mouth nightly as needed for Sleep. Max Daily Amount: 5 mg. Stable. Refilled today. Continue current therapy.  reviewed and appropriate. 5. Gastroesophageal reflux disease with esophagitis  -     pantoprazole (PROTONIX) 40 mg tablet; Take 1 Tablet by mouth daily. For acid reflux  Stable. Refilled today. Continue current therapy. 6. Hyperlipidemia, unspecified hyperlipidemia type  -     pravastatin (PRAVACHOL) 40 mg tablet; Take 1 Tablet by mouth nightly. For cholesterol  -     fenofibrate micronized (LOFIBRA) 134 mg capsule; Take 1 Capsule by mouth daily. Stable. Refilled today. Continue current therapy. Follow-up and Dispositions    · Return in about 3 months (around 5/2/2022) for Follow Up. Discussed expected course/resolution/complications of diagnosis in detail with patient. Medication risks/benefits/costs/interactions/alternatives discussed with patient. Pt was given after visit summary which includes diagnoses, current medications & vitals. Pt expressed understanding with the diagnosis and plan          Subjective:      Abdulkadir Reilly is a 55 y.o. female who presents for had concerns including Vaginal Swelling. Cardiovascular Review:  The patient has diabetes, hypertension, hyperlipidemia and obesity.   Diet and Lifestyle: not attempting to follow a low fat, low cholesterol diet, sedentary, smoker current  Home BP Monitoring: is not measured at home. Pertinent ROS: taking medications as instructed, no medication side effects noted, no TIA's, no chest pain on exertion, no dyspnea on exertion, no swelling of ankles. Reports a several week history of vaginal and perineal irritation. Has not attempted otc treatment. Symptoms are worsening over time. This is her first evaluation. She would like an early A1C to compare to her home A1C machine. Patient Active Problem List   Diagnosis Code    Anxiety F41.9    Unspecified vitamin D deficiency E55.9    Esophageal reflux K21.9    Chronic insomnia F51.04    Diabetes mellitus without complication ([facility] [address] Z67.6    Patellar malalignment syndrome M23.90    Dysthymic disorder F34.1    Microalbuminuria R80.9    Hyperlipemia E78.5    Choriocarcinoma of female ([facility] [address] C58    Severe obesity ([facility] [address] E66.01    Cyst of left ovary N83.202    Tobacco abuse Z72.0    Uncontrolled type 2 diabetes mellitus with hyperglycemia (HCC) E11.65       Current Outpatient Medications   Medication Sig Dispense Refill    QUEtiapine (SEROquel) 100 mg tablet Take 1 Tablet by mouth daily. 90 Tablet 1    zolpidem (AMBIEN) 5 mg tablet Take 1 Tablet by mouth nightly as needed for Sleep. Max Daily Amount: 5 mg. 90 Tablet 1    pantoprazole (PROTONIX) 40 mg tablet Take 1 Tablet by mouth daily. For acid reflux 90 Tablet 1    pravastatin (PRAVACHOL) 40 mg tablet Take 1 Tablet by mouth nightly. For cholesterol 90 Tablet 1    fenofibrate micronized (LOFIBRA) 134 mg capsule Take 1 Capsule by mouth daily. 90 Capsule 1    oxybutynin chloride XL (DITROPAN XL) 10 mg CR tablet Take 1 Tablet by mouth daily. 90 Tablet 1    pioglitazone (ACTOS) 15 mg tablet Take 1 tablet by mouth once daily 90 Tablet 1    flash glucose sensor (FreeStyle [place] 2 Sensor) kit Scan sensor 4x per day for blood sugar readings.  Dx E11.65 6 Kit 3    methylPREDNISolone (MEDROL DOSEPACK) 4 mg tablet Take 1 Tablet by mouth Specific Days and Specific Times. Used as directed 1 Dose Pack 0    fluconazole (Diflucan) 150 mg tablet Take 150 mg by mouth once for yeast infection as needed. FDA advises cautious prescribing of oral fluconazole in pregnancy. 3 Tablet 0    Bydureon BCise 2 mg/0.85 mL atIn INJECT 2MG SUBCUTANEOUSLY ONCE A WEEK 4 mL 4    empagliflozin (JARDIANCE) 25 mg tablet Take 1 Tablet by mouth daily. 30 Tablet 3    glimepiride (AMARYL) 4 mg tablet Take 1 Tablet by mouth daily. For diabetes 90 Tablet 1    flash glucose scanning reader (FreeStyle [place] 2 [place]) misc Scan sensor 4x per day for blood sugar readings. Dx E11.65 1 Each 0    busPIRone (BUSPAR) 10 mg tablet Take 1 Tab by mouth two (2) times a day. For anxiety 180 Tab 1    ergocalciferol (ERGOCALCIFEROL) 1,250 mcg (50,000 unit) capsule Take 1 capsule by mouth once a week 90 Cap 3    lancets (OneTouch UltraSoft Lancets) misc Use to check glucose twice daily. 200 Each 11    Blood-Glucose Meter (OneTouch Ultra2 Meter) monitoring kit Use as directed to test blood sugar two times a day. Use meter covered by insurance. 1 Kit 0    ibuprofen (MOTRIN) 800 mg tablet Take 1 Tab by mouth every eight (8) hours as needed for Pain. 30 Tab 0    empagliflozin (Jardiance) 10 mg tablet Take 1 Tablet by mouth daily. (Patient not taking: Reported on 2/2/2022) 30 Tablet 0    glucose blood VI test strips (OneTouch Verio test strips) strip Use 1 strip to check glucose twice daily. (DX: E11.9) (Patient not taking: Reported on 2/2/2022) 200 Strip 5       Allergies   Allergen Reactions    Ciprofloxacin Hives    Meperidine Other (comments)     Goofy feeling, hallucinates  Other reaction(s): Other (See Comments)  Off balance       ROS:   Review of Systems   Constitutional: Negative for malaise/fatigue. Eyes: Negative for blurred vision. Respiratory: Negative for shortness of breath. Cardiovascular: Negative for chest pain. Objective:     Visit Vitals  /71 (BP 1 Location: Left upper arm, BP Patient Position: Sitting, BP Cuff Size: Large adult long)   Pulse (!) 102   Temp 98 °F (36.7 °C) (Temporal)   Resp 18   Ht 5' 7\" (1.702 m)   Wt 228 lb 3.2 oz (103.5 kg)   LMP 09/10/2014 (Exact Date)   SpO2 97%   BMI 35.74 kg/m²       Vitals and Nurse Documentation reviewed. Physical Exam  Exam conducted with a chaperone present. Constitutional:       General: She is not in acute distress. Appearance: She is obese. Cardiovascular:      Heart sounds: S1 normal and S2 normal. No murmur heard. No friction rub. No gallop. Pulmonary:      Effort: No respiratory distress. Breath sounds: Normal breath sounds. Genitourinary:     Labia:         Right: Tenderness present. Left: Tenderness present. Vagina: Vaginal discharge (white, thicky) present. Comments: Global erythema at the perineum  Skin:     General: Skin is warm and dry.    Psychiatric:         Mood and Affect: Mood and affect normal.

## 2022-02-08 ENCOUNTER — TELEPHONE (OUTPATIENT)
Dept: FAMILY MEDICINE CLINIC | Age: 47
End: 2022-02-08

## 2022-02-08 NOTE — TELEPHONE ENCOUNTER
Spoke to patient about her matter. I apologized to her about the mix up on messages being sent to myself and Jory. I notified  Jory of patients discomfort and expressed to her that we would send over a script for pain if needed. I also consulted with Lady Cruz about where to receive a more affordable script.

## 2022-02-08 NOTE — TELEPHONE ENCOUNTER
[address] ([facility]) [phone] (H)     Pt would like a call back from the  in regards to a message that was sent to RUDY Corley and her not getting a response. Please call back and advise.

## 2022-02-16 ENCOUNTER — OFFICE VISIT (OUTPATIENT)
Dept: FAMILY MEDICINE CLINIC | Age: 47
End: 2022-02-16
Payer: COMMERCIAL

## 2022-02-16 VITALS
BODY MASS INDEX: 35.79 KG/M2 | HEART RATE: 88 BPM | WEIGHT: 228 LBS | HEIGHT: 67 IN | RESPIRATION RATE: 16 BRPM | TEMPERATURE: 99.2 F | OXYGEN SATURATION: 98 % | DIASTOLIC BLOOD PRESSURE: 77 MMHG | SYSTOLIC BLOOD PRESSURE: 110 MMHG

## 2022-02-16 DIAGNOSIS — Z12.11 ENCOUNTER FOR SCREENING COLONOSCOPY: ICD-10-CM

## 2022-02-16 DIAGNOSIS — E11.65 UNCONTROLLED TYPE 2 DIABETES MELLITUS WITH HYPERGLYCEMIA (HCC): ICD-10-CM

## 2022-02-16 DIAGNOSIS — N76.0 ACUTE VAGINITIS: Primary | ICD-10-CM

## 2022-02-16 PROCEDURE — 3051F HG A1C>EQUAL 7.0%<8.0%: CPT | Performed by: NURSE PRACTITIONER

## 2022-02-16 PROCEDURE — 99213 OFFICE O/P EST LOW 20 MIN: CPT | Performed by: NURSE PRACTITIONER

## 2022-02-16 PROCEDURE — 82044 UR ALBUMIN SEMIQUANTITATIVE: CPT | Performed by: NURSE PRACTITIONER

## 2022-02-16 NOTE — PROGRESS NOTES
Chief Complaint   Patient presents with    Follow-up        1. \"Have you been to the ER, urgent care clinic since your last visit? Hospitalized since your last visit? \" No    2. \"Have you seen or consulted any other health care providers outside of the [address] [place] [address] since your last visit? \" No     3. For patients aged 39-70: Has the patient had a colonoscopy? No     If the patient is female:    4. For patients aged 41-77: Has the patient had a mammogram within the past 2 years? Yes - no Care Gap present    5. For patients aged 21-30: Has the patient had a pap smear?  NA - based on age    1 most recent PHQ Screens 2/16/2022   Little interest or pleasure in doing things Not at all   Feeling down, depressed, irritable, or hopeless Not at all   Total Score PHQ 2 0

## 2022-02-16 NOTE — TELEPHONE ENCOUNTER
Spoke with patient she adv that things have been taken care of and that she was seen in the office. Would just like better communication within the office.

## 2022-02-16 NOTE — PROGRESS NOTES
Assessment/Plan:     Diagnoses and all orders for this visit:    1. Acute vaginitis  Resolved. Return if symptoms worsen. 2. Encounter for screening colonoscopy  -     REFERRAL TO GASTROENTEROLOGY    3. Uncontrolled type 2 diabetes mellitus with hyperglycemia (HCC)  -     AMB POC URINE, MICROALBUMIN, SEMIQUANT (3 RESULTS); Future  Back on Jardiance and tolerating without difficulty. Follow-up and Dispositions    · Return in about 3 months (around 5/16/2022) for Follow Up. Discussed expected course/resolution/complications of diagnosis in detail with patient. Medication risks/benefits/costs/interactions/alternatives discussed with patient. Pt was given after visit summary which includes diagnoses, current medications & vitals. Pt expressed understanding with the diagnosis and plan          Subjective:      Earnestine Dotson is a 55 y.o. female who presents for had concerns including Follow-up. She was previously evaluated for acute vaginitis. New swab did reveal a Candida glabrata. She has completed 4 days of boric acid suppositories and has had significant improvement in her symptoms. She has restarted the Jardiance without any significant deterioration of her symptoms. 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 [address] C58    Severe obesity ([address] E66.01    Cyst of left ovary N83.202    Tobacco abuse Z72.0    Uncontrolled type 2 diabetes mellitus with hyperglycemia (HCC) E11.65       Current Outpatient Medications   Medication Sig Dispense Refill    QUEtiapine (SEROquel) 100 mg tablet Take 1 Tablet by mouth daily.  90 Tablet 1    zolpidem (AMBIEN) 5 mg tablet Take 1 Tablet by mouth nightly as needed for Sleep. Max Daily Amount: 5 mg. 90 Tablet 1    pantoprazole (PROTONIX) 40 mg tablet Take 1 Tablet by mouth daily. For acid reflux 90 Tablet 1    pravastatin (PRAVACHOL) 40 mg tablet Take 1 Tablet by mouth nightly. For cholesterol 90 Tablet 1    fenofibrate micronized (LOFIBRA) 134 mg capsule Take 1 Capsule by mouth daily. 90 Capsule 1    oxybutynin chloride XL (DITROPAN XL) 10 mg CR tablet Take 1 Tablet by mouth daily. 90 Tablet 1    pioglitazone (ACTOS) 15 mg tablet Take 1 tablet by mouth once daily 90 Tablet 1    flash glucose sensor (FreeStyle [place] 2 Sensor) kit Scan sensor 4x per day for blood sugar readings. Dx E11.65 6 Kit 3    methylPREDNISolone (MEDROL DOSEPACK) 4 mg tablet Take 1 Tablet by mouth Specific Days and Specific Times. Used as directed 1 Dose Pack 0    Bydureon BCise 2 mg/0.85 mL atIn INJECT 2MG SUBCUTANEOUSLY ONCE A WEEK 4 mL 4    empagliflozin (JARDIANCE) 25 mg tablet Take 1 Tablet by mouth daily. 30 Tablet 3    glimepiride (AMARYL) 4 mg tablet Take 1 Tablet by mouth daily. For diabetes 90 Tablet 1    flash glucose scanning reader (FreeStyle [place] 2 [place]) misc Scan sensor 4x per day for blood sugar readings. Dx E11.65 1 Each 0    busPIRone (BUSPAR) 10 mg tablet Take 1 Tab by mouth two (2) times a day. For anxiety 180 Tab 1    ergocalciferol (ERGOCALCIFEROL) 1,250 mcg (50,000 unit) capsule Take 1 capsule by mouth once a week 90 Cap 3    lancets (OneTouch UltraSoft Lancets) misc Use to check glucose twice daily. 200 Each 11    Blood-Glucose Meter (OneTouch Ultra2 Meter) monitoring kit Use as directed to test blood sugar two times a day. Use meter covered by insurance. 1 Kit 0    ibuprofen (MOTRIN) 800 mg tablet Take 1 Tab by mouth every eight (8) hours as needed for Pain. 30 Tab 0    glucose blood VI test strips (OneTouch Verio test strips) strip Use 1 strip to check glucose twice daily.   (DX: E11.9) (Patient not taking: Reported on 2/2/2022) 200 Strip 5       Allergies   Allergen Reactions    Ciprofloxacin Hives    Meperidine Other (comments)     Goofy feeling, hallucinates  Other reaction(s): Other (See Comments)  Off balance       ROS:   Review of Systems   Constitutional: Negative for malaise/fatigue. Eyes: Negative for blurred vision. Respiratory: Negative for shortness of breath. Cardiovascular: Negative for chest pain. Objective:     Visit Vitals  /77   Pulse 88   Temp 99.2 °F (37.3 °C)   Resp 16   Ht 5' 7\" (1.702 m)   Wt 228 lb (103.4 kg)   LMP 09/10/2014 (Exact Date)   SpO2 98%   BMI 35.71 kg/m²       Vitals and Nurse Documentation reviewed. Physical Exam  Constitutional:       General: She is not in acute distress. Appearance: She is obese. Cardiovascular:      Heart sounds: S1 normal and S2 normal. No murmur heard. No friction rub. No gallop. Pulmonary:      Effort: No respiratory distress. Breath sounds: Normal breath sounds. Skin:     General: Skin is warm and dry.    Psychiatric:         Mood and Affect: Mood and affect normal.

## 2022-03-08 LAB
ALBUMIN UR QL STRIP: 10 MG/L
CREATININE, URINE POC: 300 MG/DL
MICROALBUMIN/CREAT RATIO POC: <30 MG/G

## 2022-03-16 ENCOUNTER — TELEPHONE (OUTPATIENT)
Dept: FAMILY MEDICINE CLINIC | Age: 47
End: 2022-03-16

## 2022-03-16 NOTE — TELEPHONE ENCOUNTER
Chief Complaint   Patient presents with    Prior Auth     Pioglitazone HCl 15MG tablets:  AVAILABLE WITHOUT AUTHORIZATION

## 2022-03-18 PROBLEM — N83.202 CYST OF LEFT OVARY: Status: ACTIVE | Noted: 2020-02-23

## 2022-03-18 PROBLEM — E11.65 UNCONTROLLED TYPE 2 DIABETES MELLITUS WITH HYPERGLYCEMIA (HCC): Status: ACTIVE | Noted: 2021-07-14

## 2022-03-18 PROBLEM — E66.01 SEVERE OBESITY (HCC): Status: ACTIVE | Noted: 2019-05-14

## 2022-03-19 PROBLEM — Z72.0 TOBACCO ABUSE: Status: ACTIVE | Noted: 2020-02-23

## 2022-03-21 ENCOUNTER — OFFICE VISIT (OUTPATIENT)
Dept: ORTHOPEDIC SURGERY | Age: 47
End: 2022-03-21

## 2022-03-21 VITALS — BODY MASS INDEX: 34.53 KG/M2 | HEIGHT: 67 IN | WEIGHT: 220 LBS

## 2022-03-21 DIAGNOSIS — M25.562 LEFT MEDIAL KNEE PAIN: ICD-10-CM

## 2022-03-21 DIAGNOSIS — S83.242A TEAR OF MEDIAL MENISCUS OF LEFT KNEE, INITIAL ENCOUNTER: ICD-10-CM

## 2022-03-21 DIAGNOSIS — M25.562 CHRONIC PAIN OF LEFT KNEE: Primary | ICD-10-CM

## 2022-03-21 DIAGNOSIS — M25.562 KNEE MENISCUS PAIN, LEFT: ICD-10-CM

## 2022-03-21 DIAGNOSIS — S83.412A SPRAIN OF MEDIAL COLLATERAL LIGAMENT OF LEFT KNEE, INITIAL ENCOUNTER: ICD-10-CM

## 2022-03-21 DIAGNOSIS — G89.29 CHRONIC PAIN OF LEFT KNEE: Primary | ICD-10-CM

## 2022-03-21 PROCEDURE — 99213 OFFICE O/P EST LOW 20 MIN: CPT | Performed by: ORTHOPAEDIC SURGERY

## 2022-03-21 NOTE — PROGRESS NOTES
Carlos Alberto Caldwell (: 1975) is a 55 y.o. female, patient, here for evaluation of the following chief complaint(s):  Knee Pain (left)       HPI:    She began having increased left knee pain several weeks ago. The patient reports no specific injury. She gives no detail or description of her discomfort. She has been experiencing some swelling and tingling in her left knee. The patient has been taking anti-inflammatory medication for discomfort as needed. Allergies   Allergen Reactions    Ciprofloxacin Hives    Meperidine Other (comments)     Goofy feeling, hallucinates  Other reaction(s): Other (See Comments)  Off balance       Current Outpatient Medications   Medication Sig    QUEtiapine (SEROquel) 100 mg tablet Take 1 Tablet by mouth daily.  zolpidem (AMBIEN) 5 mg tablet Take 1 Tablet by mouth nightly as needed for Sleep. Max Daily Amount: 5 mg.  pantoprazole (PROTONIX) 40 mg tablet Take 1 Tablet by mouth daily. For acid reflux    pravastatin (PRAVACHOL) 40 mg tablet Take 1 Tablet by mouth nightly. For cholesterol    fenofibrate micronized (LOFIBRA) 134 mg capsule Take 1 Capsule by mouth daily.  oxybutynin chloride XL (DITROPAN XL) 10 mg CR tablet Take 1 Tablet by mouth daily.  pioglitazone (ACTOS) 15 mg tablet Take 1 tablet by mouth once daily    flash glucose sensor (FreeStyle [place] 2 Sensor) kit Scan sensor 4x per day for blood sugar readings. Dx E11.65    methylPREDNISolone (MEDROL DOSEPACK) 4 mg tablet Take 1 Tablet by mouth Specific Days and Specific Times. Used as directed    Bydureon BCise 2 mg/0.85 mL atIn INJECT 2MG SUBCUTANEOUSLY ONCE A WEEK    empagliflozin (JARDIANCE) 25 mg tablet Take 1 Tablet by mouth daily.  glimepiride (AMARYL) 4 mg tablet Take 1 Tablet by mouth daily. For diabetes    flash glucose scanning reader (FreeStyle Jessee 2 [place]) misc Scan sensor 4x per day for blood sugar readings.  Dx E11.65    busPIRone (BUSPAR) 10 mg tablet Take 1 Tab by mouth two (2) times a day. For anxiety    ergocalciferol (ERGOCALCIFEROL) 1,250 mcg (50,000 unit) capsule Take 1 capsule by mouth once a week    lancets (OneTouch UltraSoft Lancets) misc Use to check glucose twice daily.  glucose blood VI test strips (OneTouch Verio test strips) strip Use 1 strip to check glucose twice daily. (DX: E11.9) (Patient not taking: Reported on 2/2/2022)    Blood-Glucose Meter (OneTouch Ultra2 Meter) monitoring kit Use as directed to test blood sugar two times a day. Use meter covered by insurance.  ibuprofen (MOTRIN) 800 mg tablet Take 1 Tab by mouth every eight (8) hours as needed for Pain. No current facility-administered medications for this visit.        Past Medical History:   Diagnosis Date    Abnormal Pap smear of cervix     Acid indigestion     Cancer ([facility] [address] 01/2015    choriocarcinoma  - UTERNE - surgery/chemo    Depression     Diabetes (HCC)     Dysthymic disorder     GERD (gastroesophageal reflux disease)     Hypertriglyceridemia     Ill-defined condition 02/27/2020    DENIES HISTORY OF MOTION SICKNESS OR VERTIGO    Mixed hyperlipidemia 3/31/2010    MRSA (methicillin resistant Staphylococcus aureus)     PRIOR TO 2017, NASAL SWAB NEG MRSA 1/19/2017    Ovarian cyst     left     Psychiatric disorder     depression; ANXIETY    Urinary incontinence     URGENCY AND INCONTINENECE        Past Surgical History:   Procedure Laterality Date    HX APPENDECTOMY      HX GYN  12/08    D & C     HX GYN      Ovary 8/2/2019 [place]    HX ORTHOPAEDIC      R ACL knee    HX ORTHOPAEDIC      BILATERAL A/S KNEES    HX ORTHOPAEDIC  4/2016    REMOVAL OF HARDWARE IN KNEE    HX PARTIAL HYSTERECTOMY Bilateral May 4 2015    Dr. Krissy Cordero       Family History   Problem Relation Age of Onset    Hypertension Mother     Elevated Lipids Mother     Cancer Mother         bladder cancer    Cancer Father         pancreatic    Thyroid Disease Paternal Aunt     Heart Disease Maternal Grandmother     Heart Disease Maternal Grandfather     Heart Disease Paternal Grandfather     Diabetes Paternal Aunt     Alcohol abuse Neg Hx     Arthritis-rheumatoid Neg Hx     Asthma Neg Hx     Bleeding Prob Neg Hx     Headache Neg Hx     Lung Disease Neg Hx     Migraines Neg Hx     Psychiatric Disorder Neg Hx     Stroke Neg Hx     Mental Retardation Neg Hx     Anesth Problems Neg Hx         Social History     Socioeconomic History    Marital status: SINGLE     Spouse name: Not on file    Number of children: Not on file    Years of education: Not on file    Highest education level: Not on file   Occupational History    Not on file   Tobacco Use    Smoking status: Current Every Day Smoker     Packs/day: 1.00     Years: 25.00     Pack years: 25.00    Smokeless tobacco: Never Used   Vaping Use    Vaping Use: Never used   Substance and Sexual Activity    Alcohol use: No     Alcohol/week: 0.0 standard drinks    Drug use: No    Sexual activity: Yes     Partners: Male     Birth control/protection: None   Other Topics Concern    Not on file   Social History Narrative    Not on file     Social Determinants of Health     Financial Resource Strain:     Difficulty of Paying Living Expenses: Not on file   Food Insecurity:     Worried About Running Out of Food in the Last Year: Not on file    Montez of Food in the Last Year: Not on file   Transportation Needs:     Lack of Transportation (Medical): Not on file    Lack of Transportation (Non-Medical):  Not on file   Physical Activity:     Days of Exercise per Week: Not on file    Minutes of Exercise per Session: Not on file   Stress:     Feeling of Stress : Not on file   Social Connections:     Frequency of Communication with Friends and Family: Not on file    Frequency of Social Gatherings with Friends and Family: Not on file    Attends Temple Services: Not on file    Active Member of Clubs or Organizations: Not on file   Aleah Mauro Attends Club or Organization Meetings: Not on file    Marital Status: Not on file   Intimate Partner Violence:     Fear of Current or Ex-Partner: Not on file    Emotionally Abused: Not on file    Physically Abused: Not on file    Sexually Abused: Not on file   Housing Stability:     Unable to Pay for Housing in the Last Year: Not on file    Number of [place] in the Last Year: Not on file    Unstable Housing in the Last Year: Not on file       Review of Systems   All other systems reviewed and are negative. Vitals:  Ht 5' 7\" (1.702 m)   Wt 220 lb (99.8 kg)   LMP 09/10/2014 (Exact Date)   BMI 34.46 kg/m²    Body mass index is 34.46 kg/m². Ortho Exam     The patient is well-developed and well-nourished. The patient presents today in alert and oriented x3 with a normal mood and affect. The patient stands with a normal weightbearing line but walks with a slightly antalgic gait because of her left knee pain. Left knee has tenderness over her MCL. Her knee is tender to palpation along the medial joint line and has a small effusion. The patient has positive Rasheed´s test and the knee is stable. There is a lack full flexion secondary to the effusion but does have full extension. There is 5/5. The knee and lower extremity is neurovascularly intact. There is no erythema or warmth the skin is normal.    ASSESSMENT/PLAN:      1. Chronic pain of left knee  -     XR KNEE LT MIN 4 V; Future  2. Sprain of medial collateral ligament of left knee, initial encounter  3. Left medial knee pain  4. Knee meniscus pain, left  5. Tear of medial meniscus of left knee, initial encounter     XR Results (most recent):  Results from Appointment encounter on 03/21/22    XR KNEE LT MIN 4 V    Narrative  Left knee 4 view x-rays show no evidence of a fracture or dislocation. Evidence of the previous patellofemoral joint arthroplasty.   Her patellofemoral joint arthroplasty is in good alignment with no evidence of loosening. Medial and lateral joint spaces are well-maintained. Below is the assessment and plan developed based on review of pertinent history, physical exam, labs, studies, and medications. We discussed the patient's ongoing left knee pain and her signs, symptoms, physical exam, description of her pain, and x-rays are consistent with an MCL sprain and medial meniscus tear. The possible treatment options were discussed with the patient and we elected to treat her pain conservatively at this point with rest, ice, elevation, activity modification, and anti-inflammatory medication. The patient will work on range of motion, strengthening, and stretching exercises with an at-home exercise program as pain tolerates. She is to avoid any deep knee bend activities against resistance, squatting, kneeling, stairs, cutting, twisting, change direction, and high impact loading activities. I will see her back in 4 weeks for reevaluation if she continues to have persistence of her pain however, if her pain has improved and is well-maintained I will see her back on an as-needed basis at which point we would discuss alternative treatment options and almost certainly obtain an MRI of her left knee. Return in about 4 weeks (around 4/18/2022), or if symptoms worsen or fail to improve. An electronic signature was used to authenticate this note.   -- Deyanira Diaz MD

## 2022-04-08 DIAGNOSIS — E55.9 VITAMIN D DEFICIENCY: ICD-10-CM

## 2022-04-08 DIAGNOSIS — E11.9 DIABETES MELLITUS WITHOUT COMPLICATION (HCC): ICD-10-CM

## 2022-04-08 DIAGNOSIS — E11.65 UNCONTROLLED TYPE 2 DIABETES MELLITUS WITH HYPERGLYCEMIA (HCC): ICD-10-CM

## 2022-04-08 RX ORDER — ERGOCALCIFEROL 1.25 MG/1
CAPSULE ORAL
Qty: 12 CAPSULE | Refills: 0 | Status: SHIPPED | OUTPATIENT
Start: 2022-04-08 | End: 2022-07-25

## 2022-04-08 RX ORDER — EMPAGLIFLOZIN 25 MG/1
TABLET, FILM COATED ORAL
Qty: 30 TABLET | Refills: 0 | Status: SHIPPED | OUTPATIENT
Start: 2022-04-08 | End: 2022-05-16

## 2022-04-08 RX ORDER — GLIMEPIRIDE 4 MG/1
TABLET ORAL
Qty: 90 TABLET | Refills: 0 | Status: SHIPPED | OUTPATIENT
Start: 2022-04-08 | End: 2022-07-25

## 2022-04-08 NOTE — TELEPHONE ENCOUNTER
NP Jory,    Requesting refill for Vit D2 50,000 caps. Noted last rx dated 1/22/21 was for 90 + 3 refills, but Sig is once a week. Changed to Qty 12. Pharmacy states dispensed 12.   Faisal Heard    Last Visit: 2/16/22 RUDY Corley  Next Appointment: None  Previous Refill Encounter(s): 1/22/21 90 + 3    Requested Prescriptions     Pending Prescriptions Disp Refills    ergocalciferol (ERGOCALCIFEROL) 1,250 mcg (50,000 unit) capsule 12 Capsule 0     Sig: Take 1 capsule by mouth once a week

## 2022-05-13 ENCOUNTER — OFFICE VISIT (OUTPATIENT)
Dept: ORTHOPEDIC SURGERY | Age: 47
End: 2022-05-13
Payer: COMMERCIAL

## 2022-05-13 VITALS — HEIGHT: 67 IN | BODY MASS INDEX: 34.46 KG/M2

## 2022-05-13 DIAGNOSIS — M25.522 LEFT ELBOW PAIN: ICD-10-CM

## 2022-05-13 DIAGNOSIS — M77.02 MEDIAL EPICONDYLITIS, LEFT: Primary | ICD-10-CM

## 2022-05-13 PROCEDURE — 99214 OFFICE O/P EST MOD 30 MIN: CPT | Performed by: ORTHOPAEDIC SURGERY

## 2022-05-13 RX ORDER — DICLOFENAC SODIUM 20 MG/G
2 SOLUTION TOPICAL 2 TIMES DAILY
Qty: 112 G | Refills: 3 | Status: SHIPPED | OUTPATIENT
Start: 2022-05-13 | End: 2022-08-25 | Stop reason: ALTCHOICE

## 2022-05-13 RX ORDER — TRAMADOL HYDROCHLORIDE 50 MG/1
50 TABLET ORAL
Qty: 20 TABLET | Refills: 0 | Status: SHIPPED | OUTPATIENT
Start: 2022-05-13 | End: 2022-05-18

## 2022-05-13 NOTE — PROGRESS NOTES
Lauren Beasley (: 1975) is a 55 y.o. female, patient, here for evaluation of the following chief complaint(s):  Elbow Pain (left)       HPI:    She was last seen for left elbow pain on 2021. The patient states that her pain is gotten worse since her last visit. She rates the severity of her left elbow pain as a 5 out of 10. The patient describes her left elbow pain as throbbing and aching. Her left elbow pain does make it difficult for her to go to sleep and does wake her up from sleep. The patient has been experiencing some swelling, numbness, and tingling in her left elbow. The patient has had 2 previous cortisone injections in her left elbow and unfortunately the injections have not helped reduce a significant amount of her discomfort. Allergies   Allergen Reactions    Ciprofloxacin Hives    Meperidine Other (comments)     Goofy feeling, hallucinates  Other reaction(s): Other (See Comments)  Off balance       Current Outpatient Medications   Medication Sig    Pennsaid 2 % sopk 2 Pump by Apply Externally route two (2) times a day.  traMADoL (ULTRAM) 50 mg tablet Take 1 Tablet by mouth every six (6) hours as needed for Pain for up to 5 days. Max Daily Amount: 200 mg.  Jardiance 25 mg tablet Take 1 tablet by mouth once daily    glimepiride (AMARYL) 4 mg tablet TAKE 1 TABLET BY MOUTH ONCE DAILY FOR DIABETES    ergocalciferol (ERGOCALCIFEROL) 1,250 mcg (50,000 unit) capsule Take 1 capsule by mouth once a week    QUEtiapine (SEROquel) 100 mg tablet Take 1 Tablet by mouth daily.  zolpidem (AMBIEN) 5 mg tablet Take 1 Tablet by mouth nightly as needed for Sleep. Max Daily Amount: 5 mg.  pantoprazole (PROTONIX) 40 mg tablet Take 1 Tablet by mouth daily. For acid reflux    pravastatin (PRAVACHOL) 40 mg tablet Take 1 Tablet by mouth nightly. For cholesterol    fenofibrate micronized (LOFIBRA) 134 mg capsule Take 1 Capsule by mouth daily.     oxybutynin chloride XL (DITROPAN XL) 10 mg CR tablet Take 1 Tablet by mouth daily.  pioglitazone (ACTOS) 15 mg tablet Take 1 tablet by mouth once daily    flash glucose sensor (FreeStyle [place] 2 Sensor) kit Scan sensor 4x per day for blood sugar readings. Dx E11.65    methylPREDNISolone (MEDROL DOSEPACK) 4 mg tablet Take 1 Tablet by mouth Specific Days and Specific Times. Used as directed    Bydureon BCise 2 mg/0.85 mL atIn INJECT 2MG SUBCUTANEOUSLY ONCE A WEEK    flash glucose scanning reader (FreeStyle [place] 2 [place]) misc Scan sensor 4x per day for blood sugar readings. Dx E11.65    busPIRone (BUSPAR) 10 mg tablet Take 1 Tab by mouth two (2) times a day. For anxiety    lancets (OneTouch UltraSoft Lancets) misc Use to check glucose twice daily.  glucose blood VI test strips (OneTouch Verio test strips) strip Use 1 strip to check glucose twice daily. (DX: E11.9) (Patient not taking: Reported on 2/2/2022)    Blood-Glucose Meter (OneTouch Ultra2 Meter) monitoring kit Use as directed to test blood sugar two times a day. Use meter covered by insurance.  ibuprofen (MOTRIN) 800 mg tablet Take 1 Tab by mouth every eight (8) hours as needed for Pain. No current facility-administered medications for this visit.        Past Medical History:   Diagnosis Date    Abnormal Pap smear of cervix     Acid indigestion     Cancer ([facility] [address] 01/2015    choriocarcinoma  - UTERNE - surgery/chemo    Depression     Diabetes (HCC)     Dysthymic disorder     GERD (gastroesophageal reflux disease)     Hypertriglyceridemia     Ill-defined condition 02/27/2020    DENIES HISTORY OF MOTION SICKNESS OR VERTIGO    Mixed hyperlipidemia 3/31/2010    MRSA (methicillin resistant Staphylococcus aureus)     PRIOR TO 2017, NASAL SWAB NEG MRSA 1/19/2017    Ovarian cyst     left     Psychiatric disorder     depression; ANXIETY    Urinary incontinence     URGENCY AND INCONTINENECE        Past Surgical History:   Procedure Laterality Date    HX APPENDECTOMY      HX GYN  12/08    D & C     HX GYN      Ovary 8/2/2019 [place]    HX ORTHOPAEDIC      R ACL knee    HX ORTHOPAEDIC      BILATERAL A/S KNEES    HX ORTHOPAEDIC  4/2016    REMOVAL OF HARDWARE IN KNEE    HX PARTIAL HYSTERECTOMY Bilateral May 4 2015    Dr. Rosanna Morgan       Family History   Problem Relation Age of Onset    Hypertension Mother     Elevated Lipids Mother     Cancer Mother         bladder cancer    Cancer Father         pancreatic    Thyroid Disease Paternal Aunt     Heart Disease Maternal Grandmother     Heart Disease Maternal Grandfather     Heart Disease Paternal Grandfather     Diabetes Paternal Aunt     Alcohol abuse Neg Hx     Arthritis-rheumatoid Neg Hx     Asthma Neg Hx     Bleeding Prob Neg Hx     Headache Neg Hx     Lung Disease Neg Hx     Migraines Neg Hx     Psychiatric Disorder Neg Hx     Stroke Neg Hx     Mental Retardation Neg Hx     Anesth Problems Neg Hx         Social History     Socioeconomic History    Marital status: SINGLE     Spouse name: Not on file    Number of children: Not on file    Years of education: Not on file    Highest education level: Not on file   Occupational History    Not on file   Tobacco Use    Smoking status: Current Every Day Smoker     Packs/day: 1.00     Years: 25.00     Pack years: 25.00    Smokeless tobacco: Never Used   Vaping Use    Vaping Use: Never used   Substance and Sexual Activity    Alcohol use: No     Alcohol/week: 0.0 standard drinks    Drug use: No    Sexual activity: Yes     Partners: Male     Birth control/protection: None   Other Topics Concern    Not on file   Social History Narrative    Not on file     Social Determinants of Health     Financial Resource Strain:     Difficulty of Paying Living Expenses: Not on file   Food Insecurity:     Worried About Running Out of Food in the Last Year: Not on file    Montez of Food in the Last Year: Not on file   Transportation Needs:     Lack of Transportation (Medical): Not on file    Lack of Transportation (Non-Medical): Not on file   Physical Activity:     Days of Exercise per Week: Not on file    Minutes of Exercise per Session: Not on file   Stress:     Feeling of Stress : Not on file   Social Connections:     Frequency of Communication with Friends and Family: Not on file    Frequency of Social Gatherings with Friends and Family: Not on file    Attends Denominational Services: Not on file    Active Member of [address] [place] [address] [employer] or Organizations: Not on file    Attends Club or Organization Meetings: Not on file    Marital Status: Not on file   Intimate Partner Violence:     Fear of Current or Ex-Partner: Not on file    Emotionally Abused: Not on file    Physically Abused: Not on file    Sexually Abused: Not on file   Housing Stability:     Unable to Pay for Housing in the Last Year: Not on file    Number of [place] in the Last Year: Not on file    Unstable Housing in the Last Year: Not on file       Review of Systems   All other systems reviewed and are negative. Vitals:  Ht 5' 7\" (1.702 m)   LMP 09/10/2014 (Exact Date)   BMI 34.46 kg/m²    Body mass index is 34.46 kg/m². Ortho Exam     The patient is well-developed and well-nourished. The patient presents today in alert and oriented x3 with a normal mood and affect. The patient stands with a normal weightbearing line and walks with a normal gait.     Left elbow:  The patient sits with normal posture. They are exquisitely tender to palpation over the medial epicondyle and flexor origin. She does have tenderness to palpation over the ulnar nerve as it runs through the cubital tunnel. Negative Tinel's over the cubital tunnel. She does have pain with any type of  or flexion of the fingers. They have 5/5 strength, and are neurovascularly intact distally. There is no erythema, warmth or skin lesions present. ASSESSMENT/PLAN:      1.  Medial epicondylitis, left  -     Pennsaid 2 % sopk; 2 Pump by Apply Externally route two (2) times a day., Normal, Disp-112 g, R-3, ANGEL LUIS  -     REFERRAL TO ORTHOPEDIC SURGERY  2. Left elbow pain  -     Pennsaid 2 % sopk; 2 Pump by Apply Externally route two (2) times a day., Normal, Disp-112 g, R-3, ANGEL LUIS       Below is the assessment and plan developed based on review of pertinent history, physical exam, labs, studies, and medications. We discussed the patient's ongoing left elbow pain and medial epicondylitis. The possible treatment options were discussed with the patient and because of the over 1 year long duration of her increased pain, no improvement with multiple modalities of conservative management including an at-home exercise program and 2 previous cortisone injections, her physical exam, description of her pain, past x-rays, and her inability to complete daily living activities without significant discomfort we both decided that surgical intervention was the best treatment plan. The risks and benefits of a left elbow medial epicondylar release and repair and ulnar nerve transposition were discussed in detail with the patient and she would like to proceed. We will schedule this at her convenience. In the interim, I did encourage her to ice when possible, modify her activity level based on her left elbow pain, and use anti-inflammatory medication when necessary. The patient will also work on range of motion, strengthening, and stretching exercises with an at-home exercise program as pain tolerates. The patient will continue to wear wrist brace to limit her wrist range of motion and hopefully alleviate some of her left elbow pain. I will see her back in the office on an as-needed basis or on the day of her left elbow surgery. Return for In the office as needed or on the day of her left elbow surgery. An electronic signature was used to authenticate this note.   -- Mary Pratt MD

## 2022-05-16 DIAGNOSIS — E11.65 UNCONTROLLED TYPE 2 DIABETES MELLITUS WITH HYPERGLYCEMIA (HCC): ICD-10-CM

## 2022-05-16 RX ORDER — EMPAGLIFLOZIN 25 MG/1
TABLET, FILM COATED ORAL
Qty: 30 TABLET | Refills: 0 | Status: SHIPPED | OUTPATIENT
Start: 2022-05-16 | End: 2022-06-23

## 2022-05-17 DIAGNOSIS — M77.02 MEDIAL EPICONDYLITIS, LEFT: Primary | ICD-10-CM

## 2022-05-17 DIAGNOSIS — M25.522 LEFT ELBOW PAIN: ICD-10-CM

## 2022-05-17 RX ORDER — GABAPENTIN 300 MG/1
300 CAPSULE ORAL 2 TIMES DAILY
Qty: 60 CAPSULE | Refills: 0 | Status: SHIPPED | OUTPATIENT
Start: 2022-05-17 | End: 2022-08-01 | Stop reason: ALTCHOICE

## 2022-05-24 NOTE — PROGRESS NOTES
Spoke with Dr. Dexter Gamboa about patient's BG of [phone]. Per his order, telephone with readback, gave 9 units Humalog. Dutasteride Pregnancy And Lactation Text: This medication is absolutely contraindicated in women, especially during pregnancy and breast feeding. Feminization of male fetuses is possible if taking while pregnant.

## 2022-06-01 NOTE — PROGRESS NOTES
Davida Robin (: 1975) is a 55 y.o. female, patient, here for evaluation of the following chief complaint(s):  Arm Pain (left)       HPI:    She was last seen for left elbow and arm pain on 2022. The patient states that her pain levels gotten worse since her last visit. She rates the severity of her left elbow and arm pain as a 6 out of 10. She describes her pain as throbbing, aching, burning, and constant. She does report increased discomfort into her wrist and hand. She states that she is painful to the touch. Her discomfort does make it difficult for her to go to sleep and does wake her up from sleep. She has been experiencing some bruising and tingling. Allergies   Allergen Reactions    Ciprofloxacin Hives    Meperidine Other (comments)     Goofy feeling, hallucinates  Other reaction(s): Other (See Comments)  Off balance       Current Outpatient Medications   Medication Sig    gabapentin (Neurontin) 300 mg capsule Take 1 Capsule by mouth two (2) times a day. Max Daily Amount: 600 mg.  Jardiance 25 mg tablet Take 1 tablet by mouth once daily    Pennsaid 2 % sopk 2 Pump by Apply Externally route two (2) times a day.  glimepiride (AMARYL) 4 mg tablet TAKE 1 TABLET BY MOUTH ONCE DAILY FOR DIABETES    ergocalciferol (ERGOCALCIFEROL) 1,250 mcg (50,000 unit) capsule Take 1 capsule by mouth once a week    QUEtiapine (SEROquel) 100 mg tablet Take 1 Tablet by mouth daily.  zolpidem (AMBIEN) 5 mg tablet Take 1 Tablet by mouth nightly as needed for Sleep. Max Daily Amount: 5 mg.  pantoprazole (PROTONIX) 40 mg tablet Take 1 Tablet by mouth daily. For acid reflux    pravastatin (PRAVACHOL) 40 mg tablet Take 1 Tablet by mouth nightly. For cholesterol    fenofibrate micronized (LOFIBRA) 134 mg capsule Take 1 Capsule by mouth daily.  oxybutynin chloride XL (DITROPAN XL) 10 mg CR tablet Take 1 Tablet by mouth daily.     pioglitazone (ACTOS) 15 mg tablet Take 1 tablet by mouth once daily    flash glucose sensor (FreeStyle Jessee 2 Sensor) kit Scan sensor 4x per day for blood sugar readings. Dx E11.65    methylPREDNISolone (MEDROL DOSEPACK) 4 mg tablet Take 1 Tablet by mouth Specific Days and Specific Times. Used as directed    Bydureon BCise 2 mg/0.85 mL atIn INJECT 2MG SUBCUTANEOUSLY ONCE A WEEK    flash glucose scanning reader (FreeStyle Jessee 2 [place]) misc Scan sensor 4x per day for blood sugar readings. Dx E11.65    busPIRone (BUSPAR) 10 mg tablet Take 1 Tab by mouth two (2) times a day. For anxiety    lancets (OneTouch UltraSoft Lancets) misc Use to check glucose twice daily.  glucose blood VI test strips (OneTouch Verio test strips) strip Use 1 strip to check glucose twice daily. (DX: E11.9) (Patient not taking: Reported on 2/2/2022)    Blood-Glucose Meter (OneTouch Ultra2 Meter) monitoring kit Use as directed to test blood sugar two times a day. Use meter covered by insurance.  ibuprofen (MOTRIN) 800 mg tablet Take 1 Tab by mouth every eight (8) hours as needed for Pain. No current facility-administered medications for this visit.        Past Medical History:   Diagnosis Date    Abnormal Pap smear of cervix     Acid indigestion     Cancer ([facility] [address] 01/2015    choriocarcinoma  - UTERNE - surgery/chemo    Depression     Diabetes (HCC)     Dysthymic disorder     GERD (gastroesophageal reflux disease)     Hypertriglyceridemia     Ill-defined condition 02/27/2020    DENIES HISTORY OF MOTION SICKNESS OR VERTIGO    Mixed hyperlipidemia 3/31/2010    MRSA (methicillin resistant Staphylococcus aureus)     PRIOR TO 2017, NASAL SWAB NEG MRSA 1/19/2017    Ovarian cyst     left     Psychiatric disorder     depression; ANXIETY    Urinary incontinence     URGENCY AND INCONTINENECE        Past Surgical History:   Procedure Laterality Date    HX APPENDECTOMY      HX GYN  12/08    D & C     HX GYN      Ovary 8/2/2019 [place]    HX ORTHOPAEDIC      R ACL knee    HX ORTHOPAEDIC      BILATERAL A/S KNEES    HX ORTHOPAEDIC  4/2016    REMOVAL OF HARDWARE IN KNEE    HX PARTIAL HYSTERECTOMY Bilateral May 4 2015    Dr. Анна Gómez       Family History   Problem Relation Age of Onset    Hypertension Mother     Elevated Lipids Mother     Cancer Mother         bladder cancer    Cancer Father         pancreatic    Thyroid Disease Paternal Aunt     Heart Disease Maternal Grandmother     Heart Disease Maternal Grandfather     Heart Disease Paternal Grandfather     Diabetes Paternal Aunt     Alcohol abuse Neg Hx     Arthritis-rheumatoid Neg Hx     Asthma Neg Hx     Bleeding Prob Neg Hx     Headache Neg Hx     Lung Disease Neg Hx     Migraines Neg Hx     Psychiatric Disorder Neg Hx     Stroke Neg Hx     Mental Retardation Neg Hx     Anesth Problems Neg Hx         Social History     Socioeconomic History    Marital status: SINGLE     Spouse name: Not on file    Number of children: Not on file    Years of education: Not on file    Highest education level: Not on file   Occupational History    Not on file   Tobacco Use    Smoking status: Current Every Day Smoker     Packs/day: 1.00     Years: 25.00     Pack years: 25.00    Smokeless tobacco: Never Used   Vaping Use    Vaping Use: Never used   Substance and Sexual Activity    Alcohol use: No     Alcohol/week: 0.0 standard drinks    Drug use: No    Sexual activity: Yes     Partners: Male     Birth control/protection: None   Other Topics Concern    Not on file   Social History Narrative    Not on file     Social Determinants of Health     Financial Resource Strain:     Difficulty of Paying Living Expenses: Not on file   Food Insecurity:     Worried About Running Out of Food in the Last Year: Not on file    Montez of Food in the Last Year: Not on file   Transportation Needs:     Lack of Transportation (Medical): Not on file    Lack of Transportation (Non-Medical):  Not on file   Physical Activity:     Days of Exercise per Week: Not on file    Minutes of Exercise per Session: Not on file   Stress:     Feeling of Stress : Not on file   Social Connections:     Frequency of Communication with Friends and Family: Not on file    Frequency of Social Gatherings with Friends and Family: Not on file    Attends [employer] Services: Not on file    Active Member of [address] [place] [address] or Organizations: Not on file    Attends Club or Organization Meetings: Not on file    Marital Status: Not on file   Intimate Partner Violence:     Fear of Current or Ex-Partner: Not on file    Emotionally Abused: Not on file    Physically Abused: Not on file    Sexually Abused: Not on file   Housing Stability:     Unable to Pay for Housing in the Last Year: Not on file    Number of [place] in the Last Year: Not on file    Unstable Housing in the Last Year: Not on file       Review of Systems   All other systems reviewed and are negative. Vitals:  Ht 5' 7\" (1.702 m)   LMP 09/10/2014 (Exact Date)   BMI 34.46 kg/m²    Body mass index is 34.46 kg/m². Ortho Exam     The patient is well-developed and well-nourished.  The patient presents today in alert and oriented x3 with a normal mood and affect.  The patient stands with a normal weightbearing line and walks with a normal gait.     Left elbow:  The patient sits with normal posture. They are exquisitely tender to palpation over the medial epicondyle and flexor origin. She does have tenderness to palpation over the ulnar nerve as it runs through the cubital tunnel.  She also has significant tenderness with deep palpation over the biceps tendon and she has pain with resisted supination over the same area and significant almost disabling discomfort with these maneuvers. .  Negative Tinel's over the cubital tunnel.  She does have pain with any type of  or flexion of the fingers.  They have 5/5 strength, and are neurovascularly intact distally.  There is no erythema, warmth or skin lesions present. ASSESSMENT/PLAN:      1. Left elbow pain  -     MRI ELBOW LT WO CONT; Future  2. Medial epicondylitis, left  -     MRI ELBOW LT WO CONT; Future  3. Left arm pain  -     MRI ELBOW LT WO CONT; Future  4. Biceps tendinitis, left  -     MRI ELBOW LT WO CONT; Future       Below is the assessment and plan developed based on review of pertinent history, physical exam, labs, studies, and medications. We discussed the patient's ongoing left elbow pain and medial epicondylitis. She has increased discomfort since her last visit secondary to her biceps tendinitis. The possible treatment options were discussed with the patient and because of her significant discomfort we elected to obtain an MRI of her left elbow to further evaluate the severity of her biceps tendinitis. The risks and benefits of the MRI were discussed in detail with the patient and she would like to proceed. We will schedule this at her earliest convenience. I will see her back after her MRI is complete to discuss the images, results, and further treatment options. Also because of the over 1 year long duration of her increased pain, no improvement with multiple modalities of conservative management including an at-home exercise program and 2 previous cortisone injections, her physical exam, description of her pain, past x-rays, and her inability to complete daily living activities without significant discomfort we both decided that surgical intervention was the best treatment plan. The risks and benefits of a left elbow medial epicondylar release and repair and ulnar nerve transposition were discussed in detail with the patient and she would like to proceed. We scheduled this surgery at her last visit at her convenience. In the interim, I did encourage her to ice when possible, modify her activity level based on her left elbow pain, and use anti-inflammatory medication when necessary.   The patient will also work on range of motion, strengthening, and stretching exercises with an at-home exercise program as pain tolerates. The patient will continue to wear wrist brace to limit her wrist range of motion and hopefully alleviate some of her left elbow pain. I will see her back in the office as described above after her left elbow MRI is complete or on the day of her left elbow surgery. Return for After her left elbow MRI is complete or on the day of her left elbow surgery. An electronic signature was used to authenticate this note.   -- Spencer North MD

## 2022-06-02 ENCOUNTER — OFFICE VISIT (OUTPATIENT)
Dept: ORTHOPEDIC SURGERY | Age: 47
End: 2022-06-02
Payer: COMMERCIAL

## 2022-06-02 VITALS — HEIGHT: 67 IN | BODY MASS INDEX: 34.46 KG/M2

## 2022-06-02 DIAGNOSIS — M25.522 LEFT ELBOW PAIN: Primary | ICD-10-CM

## 2022-06-02 DIAGNOSIS — M79.602 LEFT ARM PAIN: ICD-10-CM

## 2022-06-02 DIAGNOSIS — M77.02 MEDIAL EPICONDYLITIS, LEFT: ICD-10-CM

## 2022-06-02 DIAGNOSIS — M75.22 BICEPS TENDINITIS, LEFT: ICD-10-CM

## 2022-06-02 PROCEDURE — 99213 OFFICE O/P EST LOW 20 MIN: CPT | Performed by: ORTHOPAEDIC SURGERY

## 2022-06-07 DIAGNOSIS — M77.02 MEDIAL EPICONDYLITIS, LEFT: Primary | ICD-10-CM

## 2022-06-08 RX ORDER — HYDROCODONE BITARTRATE AND ACETAMINOPHEN 10; 325 MG/1; MG/1
1 TABLET ORAL
Qty: 30 TABLET | Refills: 0 | Status: SHIPPED | OUTPATIENT
Start: 2022-06-08 | End: 2022-06-15

## 2022-06-16 ENCOUNTER — OFFICE VISIT (OUTPATIENT)
Dept: ORTHOPEDIC SURGERY | Age: 47
End: 2022-06-16
Payer: COMMERCIAL

## 2022-06-16 DIAGNOSIS — Z98.890 HISTORY OF ELBOW SURGERY: Primary | ICD-10-CM

## 2022-06-16 DIAGNOSIS — M25.522 LEFT ELBOW PAIN: ICD-10-CM

## 2022-06-16 PROCEDURE — 99024 POSTOP FOLLOW-UP VISIT: CPT | Performed by: ORTHOPAEDIC SURGERY

## 2022-06-16 NOTE — PROGRESS NOTES
Danica Baeza (: 1975) is a 55 y.o. female, patient, here for evaluation of the following chief complaint(s):  Elbow Pain (left) and Surgical Follow-up (sx on 22)       HPI:    She is now 7 days status post left elbow epicondylar release and repair and ulnar nerve transposition. Her surgery was performed on 2022. The patient rates the severity of her postoperative left elbow pain is a 2 out of 10. She reports postoperative swelling, bruising, and a tingling sensation which is normal to be expected. The patient gives no detail or description of her postoperative discomfort. She has been taking medication for pain as needed. Allergies   Allergen Reactions    Ciprofloxacin Hives    Meperidine Other (comments)     Goofy feeling, hallucinates  Other reaction(s): Other (See Comments)  Off balance       Current Outpatient Medications   Medication Sig    gabapentin (Neurontin) 300 mg capsule Take 1 Capsule by mouth two (2) times a day. Max Daily Amount: 600 mg.  Jardiance 25 mg tablet Take 1 tablet by mouth once daily    Pennsaid 2 % sopk 2 Pump by Apply Externally route two (2) times a day.  glimepiride (AMARYL) 4 mg tablet TAKE 1 TABLET BY MOUTH ONCE DAILY FOR DIABETES    ergocalciferol (ERGOCALCIFEROL) 1,250 mcg (50,000 unit) capsule Take 1 capsule by mouth once a week    QUEtiapine (SEROquel) 100 mg tablet Take 1 Tablet by mouth daily.  zolpidem (AMBIEN) 5 mg tablet Take 1 Tablet by mouth nightly as needed for Sleep. Max Daily Amount: 5 mg.  pantoprazole (PROTONIX) 40 mg tablet Take 1 Tablet by mouth daily. For acid reflux    pravastatin (PRAVACHOL) 40 mg tablet Take 1 Tablet by mouth nightly. For cholesterol    fenofibrate micronized (LOFIBRA) 134 mg capsule Take 1 Capsule by mouth daily.  oxybutynin chloride XL (DITROPAN XL) 10 mg CR tablet Take 1 Tablet by mouth daily.     pioglitazone (ACTOS) 15 mg tablet Take 1 tablet by mouth once daily    flash glucose sensor (FreeStyle [place] 2 Sensor) kit Scan sensor 4x per day for blood sugar readings. Dx E11.65    methylPREDNISolone (MEDROL DOSEPACK) 4 mg tablet Take 1 Tablet by mouth Specific Days and Specific Times. Used as directed    Bydureon BCise 2 mg/0.85 mL atIn INJECT 2MG SUBCUTANEOUSLY ONCE A WEEK    flash glucose scanning reader (FreeStyle [place] 2 [place]) misc Scan sensor 4x per day for blood sugar readings. Dx E11.65    busPIRone (BUSPAR) 10 mg tablet Take 1 Tab by mouth two (2) times a day. For anxiety    lancets (OneTouch UltraSoft Lancets) misc Use to check glucose twice daily.  glucose blood VI test strips (OneTouch Verio test strips) strip Use 1 strip to check glucose twice daily. (DX: E11.9) (Patient not taking: Reported on 2/2/2022)    Blood-Glucose Meter (OneTouch Ultra2 Meter) monitoring kit Use as directed to test blood sugar two times a day. Use meter covered by insurance.  ibuprofen (MOTRIN) 800 mg tablet Take 1 Tab by mouth every eight (8) hours as needed for Pain. No current facility-administered medications for this visit.        Past Medical History:   Diagnosis Date    Abnormal Pap smear of cervix     Acid indigestion     Cancer ([facility] [address] 01/2015    choriocarcinoma  - UTERNE - surgery/chemo    Depression     Diabetes (HCC)     Dysthymic disorder     GERD (gastroesophageal reflux disease)     Hypertriglyceridemia     Ill-defined condition 02/27/2020    DENIES HISTORY OF MOTION SICKNESS OR VERTIGO    Mixed hyperlipidemia 3/31/2010    MRSA (methicillin resistant Staphylococcus aureus)     PRIOR TO 2017, NASAL SWAB NEG MRSA 1/19/2017    Ovarian cyst     left     Psychiatric disorder     depression; ANXIETY    Urinary incontinence     URGENCY AND INCONTINENECE        Past Surgical History:   Procedure Laterality Date    HX APPENDECTOMY      HX GYN  12/08    D & C     HX GYN      Ovary 8/2/2019 [place]    HX ORTHOPAEDIC      R ACL knee    HX ORTHOPAEDIC      BILATERAL A/S KNEES  HX ORTHOPAEDIC  4/2016    REMOVAL OF HARDWARE IN KNEE    HX PARTIAL HYSTERECTOMY Bilateral May 4 2015    Dr. Jing Lucero       Family History   Problem Relation Age of Onset    Hypertension Mother     Elevated Lipids Mother     Cancer Mother         bladder cancer    Cancer Father         pancreatic    Thyroid Disease Paternal Aunt     Heart Disease Maternal Grandmother     Heart Disease Maternal Grandfather     Heart Disease Paternal Grandfather     Diabetes Paternal Aunt     Alcohol abuse Neg Hx     Arthritis-rheumatoid Neg Hx     Asthma Neg Hx     Bleeding Prob Neg Hx     Headache Neg Hx     Lung Disease Neg Hx     Migraines Neg Hx     Psychiatric Disorder Neg Hx     Stroke Neg Hx     Mental Retardation Neg Hx     Anesth Problems Neg Hx         Social History     Socioeconomic History    Marital status: SINGLE     Spouse name: Not on file    Number of children: Not on file    Years of education: Not on file    Highest education level: Not on file   Occupational History    Not on file   Tobacco Use    Smoking status: Current Every Day Smoker     Packs/day: 1.00     Years: 25.00     Pack years: 25.00    Smokeless tobacco: Never Used   Vaping Use    Vaping Use: Never used   Substance and Sexual Activity    Alcohol use: No     Alcohol/week: 0.0 standard drinks    Drug use: No    Sexual activity: Yes     Partners: Male     Birth control/protection: None   Other Topics Concern    Not on file   Social History Narrative    Not on file     Social Determinants of Health     Financial Resource Strain:     Difficulty of Paying Living Expenses: Not on file   Food Insecurity:     Worried About Running Out of Food in the Last Year: Not on file    [place] of Food in the Last Year: Not on file   Transportation Needs:     Lack of Transportation (Medical): Not on file    Lack of Transportation (Non-Medical):  Not on file   Physical Activity:     Days of Exercise per Week: Not on file  Minutes of Exercise per Session: Not on file   Stress:     Feeling of Stress : Not on file   Social Connections:     Frequency of Communication with Friends and Family: Not on file    Frequency of Social Gatherings with Friends and Family: Not on file    Attends Rastafarian Services: Not on file    Active Member of Clubs or Organizations: Not on file    Attends Club or Organization Meetings: Not on file    Marital Status: Not on file   Intimate Partner Violence:     Fear of Current or Ex-Partner: Not on file    Emotionally Abused: Not on file    Physically Abused: Not on file    Sexually Abused: Not on file   Housing Stability:     Unable to Pay for Housing in the Last Year: Not on file    Number of [place] in the Last Year: Not on file    Unstable Housing in the Last Year: Not on file       Review of Systems   All other systems reviewed and are negative. Vitals:  LMP 09/10/2014 (Exact Date)    There is no height or weight on file to calculate BMI. Ortho Exam     The patient is well-developed and well-nourished. The patient presents today in alert and oriented x3 with a normal mood and affect. The patient stands with a normal weightbearing line and walks with a normal gait. Left elbow wounds are clean and dry neurovascular intact. There is some ecchymosis but no erythema. Her incisions are healing nicely with no sign of irritation or infection and look normal.  She does have essentially full range of motion. She does have some ulnar nerve tingling which is to be expected. Her strength is well-maintained. Her sensations are intact and her pulses are 2+. The skin is healing nicely. ASSESSMENT/PLAN:      1. History of elbow surgery  2. Left elbow pain       Below is the assessment and plan developed based on review of pertinent history, physical exam, labs, studies, and medications.     We discussed the patient's recent left elbow epicondylar release and repair and ulnar nerve transposition. Her surgery was performed on 6/8/2022. She is now 7 days status postsurgical intervention. The patient is progressing nicely in the early stages of her recovery and having the appropriate amount of expected postoperative discomfort at this time. Her incisions are healing nicely with no sign of irritation or infection and look normal.  Her range of motion and strength are both well-maintained. She does report some ulnar nerve tingling which is normal to be expected. The patient will continue the postoperative protocol by working on range of motion, strengthening, and stretching exercises with an at-home exercise program as discussed today in the office. She may increase activities as tolerated and as discussed today in the office. I did encourage her to continue to ice when possible, modify her activity level based on her postoperative left elbow pain, and use anti-inflammatory medication when necessary. I will see her back in 3 weeks for reevaluation and further discussion of the postoperative protocol. Return in about 3 weeks (around 7/7/2022) for Reevaluation and further discussion of the postop protocol. An electronic signature was used to authenticate this note.   -- Gregor Ramírez MD

## 2022-06-30 ENCOUNTER — OFFICE VISIT (OUTPATIENT)
Dept: ORTHOPEDIC SURGERY | Age: 47
End: 2022-06-30
Payer: COMMERCIAL

## 2022-06-30 VITALS — BODY MASS INDEX: 34.46 KG/M2 | HEIGHT: 67 IN

## 2022-06-30 DIAGNOSIS — M77.02 MEDIAL EPICONDYLITIS, LEFT: Primary | ICD-10-CM

## 2022-06-30 DIAGNOSIS — Z98.890 HISTORY OF ELBOW SURGERY: ICD-10-CM

## 2022-06-30 DIAGNOSIS — M25.522 LEFT ELBOW PAIN: Primary | ICD-10-CM

## 2022-06-30 DIAGNOSIS — M25.522 LEFT ELBOW PAIN: ICD-10-CM

## 2022-06-30 PROCEDURE — 99024 POSTOP FOLLOW-UP VISIT: CPT | Performed by: ORTHOPAEDIC SURGERY

## 2022-06-30 RX ORDER — GABAPENTIN 300 MG/1
300 CAPSULE ORAL
Qty: 30 CAPSULE | Refills: 0 | Status: CANCELLED | OUTPATIENT
Start: 2022-06-30

## 2022-06-30 RX ORDER — GABAPENTIN 300 MG/1
300 CAPSULE ORAL
Qty: 30 CAPSULE | Refills: 0 | Status: SHIPPED | OUTPATIENT
Start: 2022-06-30

## 2022-06-30 NOTE — PROGRESS NOTES
Sahil Felipe (: 1975) is a 55 y.o. female, patient, here for evaluation of the following chief complaint(s):  Elbow Pain (left) and Surgical Follow-up (sx 22)       HPI:    She is now approximately 3 weeks status post left elbow epicondylar release and repair and ulnar nerve transposition. Her surgery was performed on 2022. She was last seen on 2022. The patient states that her pain levels gotten worse since her last visit. She rates the severity of her postoperative left elbow pain is a 5 or 6 out of 10. She describes her pain as sharp, burning, and constant. Her discomfort does make it difficult for her to go to sleep and does wake her up from sleep. She has been experiencing some tingling and weakness in her left elbow. The patient reports taking no medication for discomfort. Allergies   Allergen Reactions    Ciprofloxacin Hives    Meperidine Other (comments)     Goofy feeling, hallucinates  Other reaction(s): Other (See Comments)  Off balance       Current Outpatient Medications   Medication Sig    gabapentin (Neurontin) 300 mg capsule Take 1 Capsule by mouth nightly. Max Daily Amount: 300 mg. Daily at Bedtime    QUEtiapine (SEROquel) 100 mg tablet Take 1 tablet by mouth once daily    Jardiance 25 mg tablet Take 1 tablet by mouth once daily    oxybutynin chloride XL (DITROPAN XL) 10 mg CR tablet Take 1 tablet by mouth once daily    gabapentin (Neurontin) 300 mg capsule Take 1 Capsule by mouth two (2) times a day. Max Daily Amount: 600 mg.    Pennsaid 2 % sopk 2 Pump by Apply Externally route two (2) times a day.  glimepiride (AMARYL) 4 mg tablet TAKE 1 TABLET BY MOUTH ONCE DAILY FOR DIABETES    ergocalciferol (ERGOCALCIFEROL) 1,250 mcg (50,000 unit) capsule Take 1 capsule by mouth once a week    zolpidem (AMBIEN) 5 mg tablet Take 1 Tablet by mouth nightly as needed for Sleep. Max Daily Amount: 5 mg.     pantoprazole (PROTONIX) 40 mg tablet Take 1 Tablet by mouth daily. For acid reflux    pravastatin (PRAVACHOL) 40 mg tablet Take 1 Tablet by mouth nightly. For cholesterol    fenofibrate micronized (LOFIBRA) 134 mg capsule Take 1 Capsule by mouth daily.  pioglitazone (ACTOS) 15 mg tablet Take 1 tablet by mouth once daily    flash glucose sensor (FreeStyle [place]see 2 Sensor) kit Scan sensor 4x per day for blood sugar readings. Dx E11.65    methylPREDNISolone (MEDROL DOSEPACK) 4 mg tablet Take 1 Tablet by mouth Specific Days and Specific Times. Used as directed    Bydureon BCise 2 mg/0.85 mL atIn INJECT 2MG SUBCUTANEOUSLY ONCE A WEEK    flash glucose scanning reader (FreeStyle Jessee 2 [place]) misc Scan sensor 4x per day for blood sugar readings. Dx E11.65    busPIRone (BUSPAR) 10 mg tablet Take 1 Tab by mouth two (2) times a day. For anxiety    lancets (OneTouch UltraSoft Lancets) misc Use to check glucose twice daily.  glucose blood VI test strips (OneTouch Verio test strips) strip Use 1 strip to check glucose twice daily. (DX: E11.9) (Patient not taking: Reported on 2/2/2022)    Blood-Glucose Meter (OneTouch Ultra2 Meter) monitoring kit Use as directed to test blood sugar two times a day. Use meter covered by insurance.  ibuprofen (MOTRIN) 800 mg tablet Take 1 Tab by mouth every eight (8) hours as needed for Pain. No current facility-administered medications for this visit.        Past Medical History:   Diagnosis Date    Abnormal Pap smear of cervix     Acid indigestion     Cancer ([facility] 01/2015    choriocarcinoma  - UTERNE - surgery/chemo    Depression     Diabetes (HCC)     Dysthymic disorder     GERD (gastroesophageal reflux disease)     Hypertriglyceridemia     Ill-defined condition 02/27/2020    DENIES HISTORY OF MOTION SICKNESS OR VERTIGO    Mixed hyperlipidemia 3/31/2010    MRSA (methicillin resistant Staphylococcus aureus)     PRIOR TO 2017, NASAL SWAB NEG MRSA 1/19/2017    Ovarian cyst     left     Psychiatric disorder     depression; ANXIETY    Urinary incontinence     URGENCY AND INCONTINENECE        Past Surgical History:   Procedure Laterality Date    HX APPENDECTOMY      HX GYN  12/08    D & C     HX GYN      Ovary 8/2/2019 [place]    HX ORTHOPAEDIC      R ACL knee    HX ORTHOPAEDIC      BILATERAL A/S KNEES    HX ORTHOPAEDIC  4/2016    REMOVAL OF HARDWARE IN KNEE    HX PARTIAL HYSTERECTOMY Bilateral May 4 2015    Dr. Randall Chopra       Family History   Problem Relation Age of Onset    Hypertension Mother    Shobha Pruett Elevated Lipids Mother     Cancer Mother         bladder cancer    Cancer Father         pancreatic    Thyroid Disease Paternal Aunt     Heart Disease Maternal Grandmother     Heart Disease Maternal Grandfather     Heart Disease Paternal Grandfather     Diabetes Paternal Aunt     Alcohol abuse Neg Hx     Arthritis-rheumatoid Neg Hx     Asthma Neg Hx     Bleeding Prob Neg Hx     Headache Neg Hx     Lung Disease Neg Hx     Migraines Neg Hx     Psychiatric Disorder Neg Hx     Stroke Neg Hx     Mental Retardation Neg Hx     Anesth Problems Neg Hx         Social History     Socioeconomic History    Marital status: SINGLE     Spouse name: Not on file    Number of children: Not on file    Years of education: Not on file    Highest education level: Not on file   Occupational History    Not on file   Tobacco Use    Smoking status: Current Every Day Smoker     Packs/day: 1.00     Years: 25.00     Pack years: 25.00    Smokeless tobacco: Never Used   Vaping Use    Vaping Use: Never used   Substance and Sexual Activity    Alcohol use: No     Alcohol/week: 0.0 standard drinks    Drug use: No    Sexual activity: Yes     Partners: Male     Birth control/protection: None   Other Topics Concern    Not on file   Social History Narrative    Not on file     Social Determinants of Health     Financial Resource Strain:     Difficulty of Paying Living Expenses: Not on file   Food Insecurity:     Worried About Running Out of Food in the Last Year: Not on file    Ran Out of Food in the Last Year: Not on file   Transportation Needs:     Lack of Transportation (Medical): Not on file    Lack of Transportation (Non-Medical): Not on file   Physical Activity:     Days of Exercise per Week: Not on file    Minutes of Exercise per Session: Not on file   Stress:     Feeling of Stress : Not on file   Social Connections:     Frequency of Communication with Friends and Family: Not on file    Frequency of Social Gatherings with Friends and Family: Not on file    Attends Scientology Services: Not on file    Active Member of [address] [place] [address] [employer] or Organizations: Not on file    Attends Club or Organization Meetings: Not on file    Marital Status: Not on file   Intimate Partner Violence:     Fear of Current or Ex-Partner: Not on file    Emotionally Abused: Not on file    Physically Abused: Not on file    Sexually Abused: Not on file   Housing Stability:     Unable to Pay for Housing in the Last Year: Not on file    Number of [place] in the Last Year: Not on file    Unstable Housing in the Last Year: Not on file       Review of Systems   All other systems reviewed and are negative. Vitals:  Ht 5' 7\" (1.702 m)   LMP 09/10/2014 (Exact Date)   BMI 34.46 kg/m²    Body mass index is 34.46 kg/m². Ortho Exam     The patient is well-developed and well-nourished. The patient presents today in alert and oriented x3 with a normal mood and affect. The patient stands with a normal weightbearing line and walks with a normal gait. The patient is well-developed and well-nourished. The patient presents today in alert and oriented x3 with a normal mood and affect. The patient stands with a normal weightbearing line and walks with a normal gait.     Left elbow wounds are clean and dry neurovascular intact. There is some ecchymosis but no erythema.   Her incisions are healing nicely with no sign of irritation or infection and look normal.  She does have essentially full range of motion. She does have some ulnar nerve tingling which is to be expected. Her strength is well-maintained. Her sensations are intact and her pulses are 2+. The skin is healing nicely. Left elbow wound is clean and dry neurovascular intact. There is no ecchymosis or erythema. Her incisions are well-healed with no sign of irritation or infection and look normal.  She does have full extension. She has range of motion from 0-110 degrees which is decreased from her last visit and she does have discomfort with motion. She does have some residual ulnar nerve tingling. Her strength is well-maintained but there is slight weakness noted compared to normal.  Her sensations are intact and pulses are 2+. Her skin is well-healed. ASSESSMENT/PLAN:      1. Left elbow pain  2. History of elbow surgery       Below is the assessment and plan developed based on review of pertinent history, physical exam, labs, studies, and medications. We discussed the patient's left elbow epicondylar release and repair and ulnar nerve transposition. Her surgery was performed on 6/8/2022. She is now approximately 3 weeks status postsurgical intervention. The patient continues to progress in her recovery. She is having slightly more postoperative discomfort than she was at her last visit. Her incisions are well-healed with no sign of irritation or infection. Her range of motion has improved but she does have discomfort with range of motion. Her strength was again not tested today. The patient will continue the postoperative protocol by working on range of motion, strengthening, and stretching exercises with an at-home exercise program as pain tolerates. She may continue to increase activities as tolerated and as discussed today in the office. The patient was given a prescription for gabapentin which she will use as needed and as directed.   I did encourage her to ice when possible and modify her activity level based on her postoperative left elbow pain. I will see her back in 3 weeks for reevaluation and further discussion of the postoperative protocol. Return in about 3 weeks (around 7/21/2022) for Re-evaluation and further discussion of the postop protocol. An electronic signature was used to authenticate this note.   -- Fuentes Rodriguez MD

## 2022-07-18 ENCOUNTER — OFFICE VISIT (OUTPATIENT)
Dept: ORTHOPEDIC SURGERY | Age: 47
End: 2022-07-18
Payer: COMMERCIAL

## 2022-07-18 DIAGNOSIS — M25.522 LEFT ELBOW PAIN: ICD-10-CM

## 2022-07-18 DIAGNOSIS — Z98.890 HISTORY OF ELBOW SURGERY: Primary | ICD-10-CM

## 2022-07-18 PROCEDURE — 99024 POSTOP FOLLOW-UP VISIT: CPT | Performed by: ORTHOPAEDIC SURGERY

## 2022-07-18 NOTE — PROGRESS NOTES
Nancy Vázquez (: 1975) is a 55 y.o. female, patient, here for evaluation of the following chief complaint(s):  Elbow Pain (left)       HPI:    She is now approaching 6 weeks status post left elbow epicondylar release and repair and ulnar nerve transposition. Izabella Machucair surgery was performed on 2022. She was last seen on 2022. The patient states that her pain level has improved since her last visit and surgical procedure. She rates the severity of her postoperative left elbow pain is a 2 out of 10. She describes her pain as burning. Her postoperative left elbow pain does not wake her up from sleep at night. She has been experiencing some tingling in her left elbow. She has been working with formal physical therapy postoperatively. She reports that the formal PT has helped reduce her postoperative discomfort. She does present today with some slight discomfort from a Monocryl stitch. Allergies   Allergen Reactions    Ciprofloxacin Hives    Meperidine Other (comments)     Goofy feeling, hallucinates  Other reaction(s): Other (See Comments)  Off balance       Current Outpatient Medications   Medication Sig    gabapentin (Neurontin) 300 mg capsule Take 1 Capsule by mouth nightly. Max Daily Amount: 300 mg. Daily at Bedtime    QUEtiapine (SEROquel) 100 mg tablet Take 1 tablet by mouth once daily    Jardiance 25 mg tablet Take 1 tablet by mouth once daily    oxybutynin chloride XL (DITROPAN XL) 10 mg CR tablet Take 1 tablet by mouth once daily    gabapentin (Neurontin) 300 mg capsule Take 1 Capsule by mouth two (2) times a day. Max Daily Amount: 600 mg.    Pennsaid 2 % sopk 2 Pump by Apply Externally route two (2) times a day.     glimepiride (AMARYL) 4 mg tablet TAKE 1 TABLET BY MOUTH ONCE DAILY FOR DIABETES    ergocalciferol (ERGOCALCIFEROL) 1,250 mcg (50,000 unit) capsule Take 1 capsule by mouth once a week    zolpidem (AMBIEN) 5 mg tablet Take 1 Tablet by mouth nightly as needed for Sleep. Max Daily Amount: 5 mg.  pantoprazole (PROTONIX) 40 mg tablet Take 1 Tablet by mouth daily. For acid reflux    pravastatin (PRAVACHOL) 40 mg tablet Take 1 Tablet by mouth nightly. For cholesterol    fenofibrate micronized (LOFIBRA) 134 mg capsule Take 1 Capsule by mouth daily.  pioglitazone (ACTOS) 15 mg tablet Take 1 tablet by mouth once daily    flash glucose sensor (FreeStyle Jessee 2 Sensor) kit Scan sensor 4x per day for blood sugar readings. Dx E11.65    methylPREDNISolone (MEDROL DOSEPACK) 4 mg tablet Take 1 Tablet by mouth Specific Days and Specific Times. Used as directed    Bydureon BCise 2 mg/0.85 mL atIn INJECT 2MG SUBCUTANEOUSLY ONCE A WEEK    flash glucose scanning reader (FreeStyle Jessee 2 [place]) misc Scan sensor 4x per day for blood sugar readings. Dx E11.65    busPIRone (BUSPAR) 10 mg tablet Take 1 Tab by mouth two (2) times a day. For anxiety    lancets (OneTouch UltraSoft Lancets) misc Use to check glucose twice daily.  glucose blood VI test strips (OneTouch Verio test strips) strip Use 1 strip to check glucose twice daily. (DX: E11.9) (Patient not taking: Reported on 2/2/2022)    Blood-Glucose Meter (OneTouch Ultra2 Meter) monitoring kit Use as directed to test blood sugar two times a day. Use meter covered by insurance.  ibuprofen (MOTRIN) 800 mg tablet Take 1 Tab by mouth every eight (8) hours as needed for Pain. No current facility-administered medications for this visit.        Past Medical History:   Diagnosis Date    Abnormal Pap smear of cervix     Acid indigestion     Cancer ([address] 01/2015    choriocarcinoma  - UTERNE - surgery/chemo    Depression     Diabetes (HCC)     Dysthymic disorder     GERD (gastroesophageal reflux disease)     Hypertriglyceridemia     Ill-defined condition 02/27/2020    DENIES HISTORY OF MOTION SICKNESS OR VERTIGO    Mixed hyperlipidemia 3/31/2010    MRSA (methicillin resistant Staphylococcus aureus)     PRIOR TO 2017, NASAL SWAB NEG MRSA 1/19/2017    Ovarian cyst     left     Psychiatric disorder     depression; ANXIETY    Urinary incontinence     URGENCY AND INCONTINENECE        Past Surgical History:   Procedure Laterality Date    HX APPENDECTOMY      HX GYN  12/08    D & C     HX GYN      Ovary 8/2/2019 [place]    HX ORTHOPAEDIC      R ACL knee    HX ORTHOPAEDIC      BILATERAL A/S KNEES    HX ORTHOPAEDIC  4/2016    REMOVAL OF HARDWARE IN KNEE    HX PARTIAL HYSTERECTOMY Bilateral May 4 2015    Dr. Daniella Coreas       Family History   Problem Relation Age of Onset    Hypertension Mother    [place] Elevated Lipids Mother     Cancer Mother         bladder cancer    Cancer Father         pancreatic    Thyroid Disease Paternal Aunt     Heart Disease Maternal Grandmother     Heart Disease Maternal Grandfather     Heart Disease Paternal Grandfather     Diabetes Paternal Aunt     Alcohol abuse Neg Hx     Arthritis-rheumatoid Neg Hx     Asthma Neg Hx     Bleeding Prob Neg Hx     Headache Neg Hx     Lung Disease Neg Hx     Migraines Neg Hx     Psychiatric Disorder Neg Hx     Stroke Neg Hx     Mental Retardation Neg Hx     Anesth Problems Neg Hx         Social History     Socioeconomic History    Marital status: SINGLE     Spouse name: Not on file    Number of children: Not on file    Years of education: Not on file    Highest education level: Not on file   Occupational History    Not on file   Tobacco Use    Smoking status: Current Every Day Smoker     Packs/day: 1.00     Years: 25.00     Pack years: 25.00    Smokeless tobacco: Never Used   Vaping Use    Vaping Use: Never used   Substance and Sexual Activity    Alcohol use: No     Alcohol/week: 0.0 standard drinks    Drug use: No    Sexual activity: Yes     Partners: Male     Birth control/protection: None   Other Topics Concern    Not on file   Social History Narrative    Not on file     Social Determinants of Health     Financial Resource Strain:  Difficulty of Paying Living Expenses: Not on file   Food Insecurity:     Worried About Running Out of Food in the Last Year: Not on file    Ran Out of Food in the Last Year: Not on file   Transportation Needs:     Lack of Transportation (Medical): Not on file    Lack of Transportation (Non-Medical): Not on file   Physical Activity:     Days of Exercise per Week: Not on file    Minutes of Exercise per Session: Not on file   Stress:     Feeling of Stress : Not on file   Social Connections:     Frequency of Communication with Friends and Family: Not on file    Frequency of Social Gatherings with Friends and Family: Not on file    Attends Gnosticism Services: Not on file    Active Member of [address] [place] [address] [place] [employer] or Organizations: Not on file    Attends Club or Organization Meetings: Not on file    Marital Status: Not on file   Intimate Partner Violence:     Fear of Current or Ex-Partner: Not on file    Emotionally Abused: Not on file    Physically Abused: Not on file    Sexually Abused: Not on file   Housing Stability:     Unable to Pay for Housing in the Last Year: Not on file    Number of [place] in the Last Year: Not on file    Unstable Housing in the Last Year: Not on file       Review of Systems   All other systems reviewed and are negative. Vitals:  LMP 09/10/2014 (Exact Date)    There is no height or weight on file to calculate BMI.     Ortho Exam     The patient is well-developed and well-nourished.  The patient presents today in alert and oriented x3 with a normal mood and affect.  The patient stands with a normal weightbearing line and walks with a normal gait.     Left elbow wounds are clean and dry neurovascular intact.  There is some ecchymosis but no erythema.  Her incisions are healing nicely with no sign of irritation or infection and look normal.  She does have full range of motion.  There is still some residual ulnar nerve tingling which is to be expected.  Her strength is well-maintained.  Her sensations are intact and her pulses are 2+.  She did have evidence of a Monocryl stitch that was popping out and the stitch was removed today without complication. Her skin is well-healed. ASSESSMENT/PLAN:      1. History of elbow surgery  -     REFERRAL TO PHYSICAL THERAPY  2. Left elbow pain       Below is the assessment and plan developed based on review of pertinent history, physical exam, labs, studies, and medications. **The patient will continue attending formal physical therapy as needed. **    We discussed the patient's left elbow epicondylar release and repair and ulnar nerve transposition. Verna Cheung surgery was performed on 6/8/2022. She is now approaching 6 weeks status postsurgical intervention. The patient continues to progress nicely in her recovery. Her pain is intermittent and well controlled. She has regained full range of motion. Her strength is well-maintained. She does still have some slight ulnar nerve tingling which is to be expected. She did present today with a Monocryl stitch popping out and we did remove the stitch without complication. Her incision is well-healed with no sign of irritation or infection and looks normal.  The patient will continue the postoperative protocol by working on range of motion, strengthening, and stretching exercises at both formal physical therapy as needed and with an at-home exercise program as pain tolerates. She may continue to increase activities as tolerated and as discussed today in the office. I did encourage her to ice when possible, modify her activity level if she experiences any postoperative left elbow pain, and use anti-inflammatory medication when necessary. I will see her back in 4 weeks for reevaluation if she has any continued persistence of her pain however, if her pain continues to improve and is well-maintained I will see her back on an as-needed basis.     Return in about 4 weeks (around 8/15/2022), or if symptoms worsen or fail to improve. An electronic signature was used to authenticate this note.   -- Pritesh Paul MD

## 2022-07-23 DIAGNOSIS — E78.5 HYPERLIPIDEMIA, UNSPECIFIED HYPERLIPIDEMIA TYPE: ICD-10-CM

## 2022-07-23 DIAGNOSIS — K21.00 GASTROESOPHAGEAL REFLUX DISEASE WITH ESOPHAGITIS: ICD-10-CM

## 2022-07-23 DIAGNOSIS — F51.04 CHRONIC INSOMNIA: ICD-10-CM

## 2022-07-25 RX ORDER — ZOLPIDEM TARTRATE 5 MG/1
TABLET ORAL
Qty: 90 TABLET | Refills: 0 | Status: SHIPPED | OUTPATIENT
Start: 2022-07-25 | End: 2022-11-03

## 2022-07-25 RX ORDER — PANTOPRAZOLE SODIUM 40 MG/1
TABLET, DELAYED RELEASE ORAL
Qty: 90 TABLET | Refills: 0 | Status: SHIPPED | OUTPATIENT
Start: 2022-07-25 | End: 2022-09-12 | Stop reason: ALTCHOICE

## 2022-07-25 RX ORDER — PRAVASTATIN SODIUM 40 MG/1
40 TABLET ORAL
Qty: 90 TABLET | Refills: 0 | Status: SHIPPED | OUTPATIENT
Start: 2022-07-25

## 2022-07-25 RX ORDER — FENOFIBRATE 134 MG/1
CAPSULE ORAL
Qty: 90 CAPSULE | Refills: 0 | Status: SHIPPED | OUTPATIENT
Start: 2022-07-25

## 2022-07-25 RX ORDER — OXYBUTYNIN CHLORIDE 10 MG/1
TABLET, EXTENDED RELEASE ORAL
Qty: 90 TABLET | Refills: 0 | Status: SHIPPED | OUTPATIENT
Start: 2022-07-25

## 2022-08-01 ENCOUNTER — TELEPHONE (OUTPATIENT)
Dept: FAMILY MEDICINE CLINIC | Age: 47
End: 2022-08-01

## 2022-08-01 ENCOUNTER — OFFICE VISIT (OUTPATIENT)
Dept: FAMILY MEDICINE CLINIC | Age: 47
End: 2022-08-01
Payer: COMMERCIAL

## 2022-08-01 VITALS
RESPIRATION RATE: 16 BRPM | DIASTOLIC BLOOD PRESSURE: 76 MMHG | HEART RATE: 96 BPM | SYSTOLIC BLOOD PRESSURE: 122 MMHG | TEMPERATURE: 97.7 F | BODY MASS INDEX: 38.45 KG/M2 | HEIGHT: 67 IN | WEIGHT: 245 LBS | OXYGEN SATURATION: 97 %

## 2022-08-01 DIAGNOSIS — E11.65 UNCONTROLLED TYPE 2 DIABETES MELLITUS WITH HYPERGLYCEMIA (HCC): Primary | ICD-10-CM

## 2022-08-01 DIAGNOSIS — K62.89 RECTAL PAIN: ICD-10-CM

## 2022-08-01 LAB
ALBUMIN SERPL-MCNC: 4.2 G/DL (ref 3.5–5)
ALBUMIN/GLOB SERPL: 1.3 {RATIO} (ref 1.1–2.2)
ALP SERPL-CCNC: 89 U/L (ref 45–117)
ALT SERPL-CCNC: 36 U/L (ref 12–78)
ANION GAP SERPL CALC-SCNC: 7 MMOL/L (ref 5–15)
AST SERPL-CCNC: 23 U/L (ref 15–37)
BILIRUB SERPL-MCNC: 0.4 MG/DL (ref 0.2–1)
BUN SERPL-MCNC: 10 MG/DL (ref 6–20)
BUN/CREAT SERPL: 10 (ref 12–20)
CALCIUM SERPL-MCNC: 9.5 MG/DL (ref 8.5–10.1)
CHLORIDE SERPL-SCNC: 105 MMOL/L (ref 97–108)
CHOLEST SERPL-MCNC: 189 MG/DL
CO2 SERPL-SCNC: 26 MMOL/L (ref 21–32)
CREAT SERPL-MCNC: 0.99 MG/DL (ref 0.55–1.02)
EST. AVERAGE GLUCOSE BLD GHB EST-MCNC: 186 MG/DL
GLOBULIN SER CALC-MCNC: 3.2 G/DL (ref 2–4)
GLUCOSE SERPL-MCNC: 258 MG/DL (ref 65–100)
HBA1C MFR BLD: 8.1 % (ref 4–5.6)
HDLC SERPL-MCNC: 44 MG/DL
HDLC SERPL: 4.3 {RATIO} (ref 0–5)
LDLC SERPL CALC-MCNC: ABNORMAL MG/DL (ref 0–100)
LDLC SERPL DIRECT ASSAY-MCNC: 96 MG/DL (ref 0–100)
POTASSIUM SERPL-SCNC: 3.9 MMOL/L (ref 3.5–5.1)
PROT SERPL-MCNC: 7.4 G/DL (ref 6.4–8.2)
SODIUM SERPL-SCNC: 138 MMOL/L (ref 136–145)
TRIGL SERPL-MCNC: 436 MG/DL (ref ?–150)
VLDLC SERPL CALC-MCNC: ABNORMAL MG/DL

## 2022-08-01 PROCEDURE — 99214 OFFICE O/P EST MOD 30 MIN: CPT | Performed by: NURSE PRACTITIONER

## 2022-08-01 PROCEDURE — 3052F HG A1C>EQUAL 8.0%<EQUAL 9.0%: CPT | Performed by: NURSE PRACTITIONER

## 2022-08-01 RX ORDER — PEN NEEDLE, DIABETIC 30 GX3/16"
NEEDLE, DISPOSABLE MISCELLANEOUS
Qty: 1 EACH | Refills: 11 | Status: SHIPPED | OUTPATIENT
Start: 2022-08-01

## 2022-08-01 RX ORDER — VENLAFAXINE HYDROCHLORIDE 75 MG/1
CAPSULE, EXTENDED RELEASE ORAL
COMMUNITY
End: 2022-08-25

## 2022-08-01 RX ORDER — BLOOD-GLUCOSE METER
EACH MISCELLANEOUS
COMMUNITY
End: 2022-08-01 | Stop reason: ALTCHOICE

## 2022-08-01 RX ORDER — NYSTATIN AND TRIAMCINOLONE ACETONIDE 100000; 1 [USP'U]/G; MG/G
OINTMENT TOPICAL
COMMUNITY

## 2022-08-01 RX ORDER — INSULIN GLARGINE 300 U/ML
20 INJECTION, SOLUTION SUBCUTANEOUS
Qty: 3 ML | Refills: 1 | Status: SHIPPED | OUTPATIENT
Start: 2022-08-01 | End: 2022-08-25 | Stop reason: SDUPTHER

## 2022-08-01 RX ORDER — IBUPROFEN 200 MG
CAPSULE ORAL
COMMUNITY
End: 2022-08-01 | Stop reason: ALTCHOICE

## 2022-08-01 RX ORDER — FAMOTIDINE 40 MG/1
TABLET, FILM COATED ORAL
COMMUNITY
End: 2022-08-01 | Stop reason: ALTCHOICE

## 2022-08-01 RX ORDER — LANCETS
EACH MISCELLANEOUS
COMMUNITY
End: 2022-08-25

## 2022-08-01 RX ORDER — FLUCONAZOLE 150 MG/1
TABLET ORAL
COMMUNITY
End: 2022-08-04 | Stop reason: SDUPTHER

## 2022-08-01 RX ORDER — HYDROCORTISONE ACETATE 25 MG/1
25 SUPPOSITORY RECTAL EVERY 12 HOURS
Qty: 10 SUPPOSITORY | Refills: 0 | Status: SHIPPED | OUTPATIENT
Start: 2022-08-01 | End: 2022-08-25 | Stop reason: ALTCHOICE

## 2022-08-01 NOTE — PROGRESS NOTES
Assessment/Plan:     Diagnoses and all orders for this visit:    1. Uncontrolled type 2 diabetes mellitus with hyperglycemia (HCC)  -     insulin glargine U-300 conc (Toujeo Max U-300 SoloStar) 300 unit/mL (3 mL) inpn; 20 Units by SubCUTAneous route nightly. Indications: type 2 diabetes mellitus  -     Insulin Needles, Disposable, 31 gauge x 5/16\" ndle; Use as directed once daily with insulin  -     METABOLIC PANEL, COMPREHENSIVE; Future  -     LIPID PANEL; Future  -     HEMOGLOBIN A1C WITH EAG; Future  Uncontrolled. Initiate Toujeo as above. She is using Severo Chyle monitoring. She will discontinue Actos and Amaryl. Consider discontinuation of Bcise in favor of Mounjaro. 2. Rectal pain  -     hydrocortisone (Anusol-HC) 25 mg supp; Insert 1 Suppository into rectum every twelve (12) hours. Treatment as above. Follow up with gastroenterology. Other orders  -     LDL, DIRECT       Follow-up and Dispositions    Return in about 4 weeks (around 8/29/2022) for Follow Up. Discussed expected course/resolution/complications of diagnosis in detail with patient. Medication risks/benefits/costs/interactions/alternatives discussed with patient. Pt was given after visit summary which includes diagnoses, current medications & vitals. Pt expressed understanding with the diagnosis and plan          Subjective:      Roque Flannery is a 55 y.o. female who presents for had concerns including Diabetes, Anal Pain, and Other (Has scheduled colonoscopy ). She is scheduled for evaluation with gastroenterology regarding routine colonoscopy and rectal pain/hemorrhoids. Would like to initiate treatment ahead of her evaluation. Patient is a 55 y.o. female that comes today for follow up of Diabetes Mellitus Type 2. Patient does regularly monitor  their FSBG at home.  The fasting plasma glucose (fpg) usually runs around 200 mg/L. not attempting to follow a low fat, low cholesterol diet  Patients activity level: sedentery  gained, 25 lbs. .  The patient has not experienced recent hypoglycemia. reports that she has been smoking cigarettes. She has a 25.00 pack-year smoking history. She has never used smokeless tobacco.    Lab Results   Component Value Date/Time    Hemoglobin A1c 8.1 (H) 08/01/2022 02:58 PM    Hemoglobin A1c 9.2 (H) 06/22/2021 11:42 AM    Hemoglobin A1c 7.4 (H) 01/22/2021 11:53 AM         Patient Active Problem List   Diagnosis Code    Anxiety F41.9    Unspecified vitamin D deficiency E55.9    Esophageal reflux K21.9    Chronic insomnia F51.04    Diabetes mellitus without complication (HCC) B57.7    Patellar malalignment syndrome M23.90    Dysthymic disorder F34.1    Microalbuminuria R80.9    Hyperlipemia E78.5    Choriocarcinoma of female ([facility] [address] C58    Severe obesity (HCC) E66.01    Cyst of left ovary N83.202    Tobacco abuse Z72.0    Uncontrolled type 2 diabetes mellitus with hyperglycemia (HCC) E11.65       Current Outpatient Medications   Medication Sig Dispense Refill    nystatin-triamcinolone (MYCOLOG) 100,000-0.1 unit/gram-% ointment nystatin-triamcinolone 100,000 unit/gram-0.1 % topical ointment   APPLY TO THE AFFECTED AREA(S) BY TOPICAL ROUTE 3 TIMES PER DAY as needed      fluconazole (DIFLUCAN) 150 mg tablet Diflucan 150 mg tablet   1 po today daily for 7 days      insulin glargine U-300 conc (Toujeo Max U-300 SoloStar) 300 unit/mL (3 mL) inpn 20 Units by SubCUTAneous route nightly. Indications: type 2 diabetes mellitus 3 mL 1    Insulin Needles, Disposable, 31 gauge x 5/16\" ndle Use as directed once daily with insulin 1 Each 11    hydrocortisone (Anusol-HC) 25 mg supp Insert 1 Suppository into rectum every twelve (12) hours. 10 Suppository 0    zolpidem (AMBIEN) 5 mg tablet TAKE 1 TABLET BY MOUTH AT BEDTIME AS NEEDED FOR SLEEP 90 Tablet 0    pantoprazole (PROTONIX) 40 mg tablet TAKE 1 TABLET BY MOUTH ONCE DAILY FOR ACID REFLUX.   90 Tablet 0    Jardiance 25 mg tablet Take 1 tablet by mouth once daily 90 Tablet 0    oxybutynin chloride XL (DITROPAN XL) 10 mg CR tablet Take 1 tablet by mouth once daily 90 Tablet 0    ergocalciferol (ERGOCALCIFEROL) 1,250 mcg (50,000 unit) capsule Take 1 capsule by mouth once a week 12 Capsule 0    pravastatin (PRAVACHOL) 40 mg tablet TAKE 1 TABLET BY MOUTH NIGHTLY FOR CHOLESTEROL 90 Tablet 0    fenofibrate micronized (LOFIBRA) 134 mg capsule Take 1 capsule by mouth once daily 90 Capsule 0    gabapentin (Neurontin) 300 mg capsule Take 1 Capsule by mouth nightly. Max Daily Amount: 300 mg. Daily at Bedtime 30 Capsule 0    QUEtiapine (SEROquel) 100 mg tablet Take 1 tablet by mouth once daily 90 Tablet 0    Pennsaid 2 % sopk 2 Pump by Apply Externally route two (2) times a day. 112 g 3    flash glucose sensor (FreeStyle Jessee 2 Sensor) kit Scan sensor 4x per day for blood sugar readings. Dx E11.65 6 Kit 3    Bydureon BCise 2 mg/0.85 mL atIn INJECT 2MG SUBCUTANEOUSLY ONCE A WEEK 4 mL 4    flash glucose scanning reader (FreeStyle Jessee 2 [place]) misc Scan sensor 4x per day for blood sugar readings. Dx E11.65 1 Each 0    busPIRone (BUSPAR) 10 mg tablet Take 1 Tab by mouth two (2) times a day. For anxiety 180 Tab 1    venlafaxine-SR (EFFEXOR-XR) 75 mg capsule Effexor XR 75 mg capsule,extended release   Take 1 capsule every day by oral route. (Patient not taking: Reported on 8/1/2022)      lancets misc lancets (Patient not taking: Reported on 8/1/2022)      lancets (OneTouch UltraSoft Lancets) misc Use to check glucose twice daily. (Patient not taking: Reported on 8/1/2022) 200 Each 11    ibuprofen (MOTRIN) 800 mg tablet Take 1 Tab by mouth every eight (8) hours as needed for Pain. (Patient not taking: Reported on 8/1/2022) 30 Tab 0       Allergies   Allergen Reactions    Ciprofloxacin Hives    Meperidine Other (comments)     Goofy feeling, hallucinates  Other reaction(s):  Other (See Comments)  Off balance  Other reaction(s): Hallucinations       ROS:   Review of Systems Constitutional:  Negative for malaise/fatigue. Eyes:  Negative for blurred vision. Respiratory:  Negative for shortness of breath. Cardiovascular:  Negative for chest pain. Gastrointestinal:  Negative for blood in stool and melena. Objective:   Visit Vitals  /76 (BP 1 Location: Right arm, BP Patient Position: Sitting, BP Cuff Size: Large adult)   Pulse 96   Temp 97.7 °F (36.5 °C) (Temporal)   Resp 16   Ht 5' 7\" (1.702 m)   Wt 245 lb (111.1 kg)   LMP 09/10/2014 (Exact Date)   SpO2 97%   BMI 38.37 kg/m²       Vitals and Nurse Documentation reviewed. Physical Exam  Constitutional:       General: She is not in acute distress. Appearance: She is obese. Cardiovascular:      Heart sounds: S1 normal and S2 normal. No murmur heard. No friction rub. No gallop. Pulmonary:      Effort: No respiratory distress. Breath sounds: Normal breath sounds. Skin:     General: Skin is warm and dry.    Psychiatric:         Mood and Affect: Mood and affect normal.

## 2022-08-01 NOTE — PROGRESS NOTES
Chief Complaint   Patient presents with    Diabetes    Anal Pain    Other     Has scheduled colonoscopy      1. Have you been to the ER, urgent care clinic since your last visit? Hospitalized since your last visit? No    2. Have you seen or consulted any other health care providers outside of the [address] [place] [address] since your last visit? Include any pap smears or colon screening.  No

## 2022-08-03 ENCOUNTER — PATIENT MESSAGE (OUTPATIENT)
Dept: FAMILY MEDICINE CLINIC | Age: 47
End: 2022-08-03

## 2022-08-04 RX ORDER — FLUCONAZOLE 150 MG/1
150 TABLET ORAL ONCE
Qty: 1 TABLET | Refills: 1 | Status: SHIPPED | OUTPATIENT
Start: 2022-08-04 | End: 2022-08-04

## 2022-08-04 NOTE — TELEPHONE ENCOUNTER
From: Santy Gaviria  To: Shailesh Law NP  Sent: 8/3/2022 4:17 PM EDT  Subject: Triglycerides/Insulin pen    Yes maam I have been taking my meds! ! I also started my Insulin pen on Tuesday night and last night my sugar when above 400 and it couldnt read it because it was so high!!! Should I bump my dose to 30? This scares me! ! And with that has come a yeast infection from [place]!! Can you PLEASE send in a RX to [facility]?

## 2022-08-25 ENCOUNTER — OFFICE VISIT (OUTPATIENT)
Dept: FAMILY MEDICINE CLINIC | Age: 47
End: 2022-08-25
Payer: COMMERCIAL

## 2022-08-25 VITALS
SYSTOLIC BLOOD PRESSURE: 100 MMHG | HEIGHT: 67 IN | RESPIRATION RATE: 16 BRPM | OXYGEN SATURATION: 96 % | BODY MASS INDEX: 37.2 KG/M2 | TEMPERATURE: 97.7 F | WEIGHT: 237 LBS | HEART RATE: 102 BPM | DIASTOLIC BLOOD PRESSURE: 68 MMHG

## 2022-08-25 DIAGNOSIS — E11.65 UNCONTROLLED TYPE 2 DIABETES MELLITUS WITH HYPERGLYCEMIA (HCC): Primary | ICD-10-CM

## 2022-08-25 PROCEDURE — 3052F HG A1C>EQUAL 8.0%<EQUAL 9.0%: CPT | Performed by: NURSE PRACTITIONER

## 2022-08-25 PROCEDURE — 99214 OFFICE O/P EST MOD 30 MIN: CPT | Performed by: NURSE PRACTITIONER

## 2022-08-25 RX ORDER — INSULIN GLARGINE 300 U/ML
40 INJECTION, SOLUTION SUBCUTANEOUS
Qty: 6 ML | Refills: 1 | Status: SHIPPED | OUTPATIENT
Start: 2022-08-25 | End: 2022-10-03

## 2022-08-25 RX ORDER — INSULIN PUMP SYRINGE, 3 ML
EACH MISCELLANEOUS
Qty: 1 KIT | Refills: 0 | Status: SHIPPED | OUTPATIENT
Start: 2022-08-25

## 2022-08-25 RX ORDER — LANCETS
EACH MISCELLANEOUS
Qty: 200 EACH | Refills: 11 | Status: SHIPPED | OUTPATIENT
Start: 2022-08-25

## 2022-08-25 RX ORDER — TIRZEPATIDE 2.5 MG/.5ML
2.5 INJECTION, SOLUTION SUBCUTANEOUS
Qty: 4 PEN | Refills: 0 | Status: SHIPPED | OUTPATIENT
Start: 2022-08-25 | End: 2022-09-12 | Stop reason: ALTCHOICE

## 2022-08-25 RX ORDER — IBUPROFEN 200 MG
CAPSULE ORAL
Qty: 100 STRIP | Refills: 5 | Status: SHIPPED | OUTPATIENT
Start: 2022-08-25

## 2022-08-25 NOTE — PROGRESS NOTES
Chief Complaint   Patient presents with    Diabetes     1. \"Have you been to the ER, urgent care clinic since your last visit? Hospitalized since your last visit? \" Urgent Care Yeast Infection    2. \"Have you seen or consulted any other health care providers outside of the [address] [place] [address] since your last visit? \"  no    3. For patients aged 39-70: Has the patient had a colonoscopy / FIT/ Cologuard? Has gap       If the patient is female:    4. For patients aged 41-77: Has the patient had a mammogram within the past 2 years? No gap       5. For patients aged 21-65: Has the patient had a pap smear? No gap     Eye Exam ?? Not yet

## 2022-08-26 ENCOUNTER — TELEPHONE (OUTPATIENT)
Dept: FAMILY MEDICINE CLINIC | Age: 47
End: 2022-08-26

## 2022-08-26 NOTE — TELEPHONE ENCOUNTER
Pharmacy Progress Note - Telephone Call    Ms. Ed Hawthorne 55 y.o. was contacted via an outbound telephone call regarding recent DM med changes today. A voicemail was left for patient to return my call. This writer's work mobile number was provided as a callback contact - [phone].       Geremias Petersen, PharmD, BCGP, BCACP  Clinical Pharmacist Specialist        For Pharmacy Admin Tracking Only    CPA in place: Yes  Recommendation Provided To: Patient/Caregiver: 0 via Telephone  Intervention Accepted By: Patient/Caregiver: 0  Time Spent (min): 5

## 2022-08-31 ENCOUNTER — TELEPHONE (OUTPATIENT)
Dept: FAMILY MEDICINE CLINIC | Age: 47
End: 2022-08-31

## 2022-08-31 NOTE — TELEPHONE ENCOUNTER
Pharmacy Progress Note - Telephone Encounter    S/O: Ms. Finnegan Covert 55 y.o. female, referred by Dr. Alysia Noel NP, was contacted via an outbound telephone call to discuss [facility] today. Verified patients identifiers (name & ) per HIPAA policy. - Pt was able to get Mounjaro from the pharmacy - had to get it from other pharmacy d/t issue with supply. She notes significant decrease in appetite. Denies side effects besides increased fullness/bloating. Completed 1 dose. Pt was concerned that she was not eating enough with start of Mounjaro that decreased appetite significantly. She is worried that the med is decreasing her appetite too much. Example of Meals  Breakfast: Protein shake  Lunch: 3 Pigs in a blanket, 5 Strawberries  Dinner: [place] sandwich  No snacks    Pt has decreased her insulin per PCP instructions to 40 units daily. She is concerned that she received a few FSL2 CGM sensors that were  and they fell off early. A/P:  - BG rdgs are improving with start of Mounjaro but still elevated. Pt tolerated first dose relatively well. She is eating enough in the day. Discussed using SkinTac to help stopping FSL2 CGM falling off. Will provide new sample sensors to help. - Scheduled visit for 22  - Patient endorses understanding to the provided information. All questions answered at this time. There are no discontinued medications. No orders of the defined types were placed in this encounter.       Andrew Melendez, PharmD, BCGP, BCACP  Clinical Pharmacist Specialist      For Pharmacy Admin Tracking Only    CPA in place: Yes  Recommendation Provided To: Patient/Caregiver: 2 via Telephone  Intervention Detail: Device Training and Scheduled Appointment  Intervention Accepted By: Patient/Caregiver: 2  Time Spent (min): 20

## 2022-09-12 ENCOUNTER — OFFICE VISIT (OUTPATIENT)
Dept: FAMILY MEDICINE CLINIC | Age: 47
End: 2022-09-12

## 2022-09-12 ENCOUNTER — OFFICE VISIT (OUTPATIENT)
Dept: FAMILY MEDICINE CLINIC | Age: 47
End: 2022-09-12
Payer: COMMERCIAL

## 2022-09-12 VITALS
OXYGEN SATURATION: 98 % | WEIGHT: 232.2 LBS | BODY MASS INDEX: 36.44 KG/M2 | DIASTOLIC BLOOD PRESSURE: 71 MMHG | SYSTOLIC BLOOD PRESSURE: 103 MMHG | HEIGHT: 67 IN | HEART RATE: 100 BPM | RESPIRATION RATE: 16 BRPM | TEMPERATURE: 98 F

## 2022-09-12 DIAGNOSIS — E11.65 UNCONTROLLED TYPE 2 DIABETES MELLITUS WITH HYPERGLYCEMIA (HCC): Primary | ICD-10-CM

## 2022-09-12 DIAGNOSIS — C58 CHORIOCARCINOMA OF FEMALE (HCC): ICD-10-CM

## 2022-09-12 DIAGNOSIS — E66.01 SEVERE OBESITY (BMI 35.0-39.9) WITH COMORBIDITY (HCC): ICD-10-CM

## 2022-09-12 DIAGNOSIS — E11.65 UNCONTROLLED TYPE 2 DIABETES MELLITUS WITH HYPERGLYCEMIA (HCC): ICD-10-CM

## 2022-09-12 DIAGNOSIS — Z23 NEEDS FLU SHOT: ICD-10-CM

## 2022-09-12 PROBLEM — J42 CHRONIC BRONCHITIS, UNSPECIFIED CHRONIC BRONCHITIS TYPE (HCC): Status: ACTIVE | Noted: 2022-09-12

## 2022-09-12 PROCEDURE — 90686 IIV4 VACC NO PRSV 0.5 ML IM: CPT | Performed by: NURSE PRACTITIONER

## 2022-09-12 PROCEDURE — 99214 OFFICE O/P EST MOD 30 MIN: CPT | Performed by: NURSE PRACTITIONER

## 2022-09-12 PROCEDURE — 90471 IMMUNIZATION ADMIN: CPT | Performed by: NURSE PRACTITIONER

## 2022-09-12 PROCEDURE — 3052F HG A1C>EQUAL 8.0%<EQUAL 9.0%: CPT | Performed by: NURSE PRACTITIONER

## 2022-09-12 RX ORDER — FLUCONAZOLE 150 MG/1
TABLET ORAL
COMMUNITY
Start: 2022-08-09 | End: 2022-10-20 | Stop reason: ALTCHOICE

## 2022-09-12 RX ORDER — TIRZEPATIDE 5 MG/.5ML
5 INJECTION, SOLUTION SUBCUTANEOUS
Qty: 4 PEN | Refills: 0 | Status: SHIPPED | OUTPATIENT
Start: 2022-09-12 | End: 2022-10-20 | Stop reason: DRUGHIGH

## 2022-09-12 RX ORDER — FLUCONAZOLE 200 MG/1
TABLET ORAL
COMMUNITY
Start: 2022-08-20 | End: 2022-10-20 | Stop reason: ALTCHOICE

## 2022-09-12 RX ORDER — PANTOPRAZOLE SODIUM 40 MG/1
40 TABLET, DELAYED RELEASE ORAL DAILY
COMMUNITY

## 2022-09-12 RX ORDER — FLASH GLUCOSE SENSOR
KIT MISCELLANEOUS
Qty: 6 KIT | Refills: 3 | Status: SHIPPED | OUTPATIENT
Start: 2022-09-12

## 2022-09-12 NOTE — PROGRESS NOTES
No chief complaint on file. 1. \"Have you been to the ER, urgent care clinic since your last visit? Hospitalized since your last visit? \" No    2. \"Have you seen or consulted any other health care providers outside of the [address] [place] [address] since your last visit? \" No     3. For patients over 45: Has the patient had a colonoscopy? No     If the patient is female:    4. For patients over 40: Has the patient had a mammogram? Yes - no Care Gap present    5. For patients over 21: Has the patient had a pap smear?  Yes - no Care Gap present    3 most recent PHQ Screens 8/25/2022   Little interest or pleasure in doing things Not at all   Feeling down, depressed, irritable, or hopeless Nearly every day   Total Score PHQ 2 3   Trouble falling or staying asleep, or sleeping too much Not at all   Feeling tired or having little energy Not at all   Poor appetite, weight loss, or overeating Several days   Feeling bad about yourself - or that you are a failure or have let yourself or your family down Several days   Trouble concentrating on things such as school, work, reading, or watching TV Not at all   Moving or speaking so slowly that other people could have noticed; or the opposite being so fidgety that others notice Not at all   Thoughts of being better off dead, or hurting yourself in some way Not at all   PHQ 9 Score 5   How difficult have these problems made it for you to do your work, take care of your home and get along with others Somewhat difficult     Health Maintenance Due   Topic Date Due    COVID-19 Vaccine (1) Never done    Eye Exam Retinal or Dilated  02/12/2015    Colorectal Cancer Screening Combo  Never done    Pneumococcal 0-64 years (2 - PCV) 06/30/2021    Flu Vaccine (1) 09/01/2022

## 2022-09-12 NOTE — PROGRESS NOTES
Patient was seen with Colt LillyD, PGY1 resident. I was present for the visit and agree with the assessment and plan. Please see their note for more details of the visit.     Lior Swan, [place], [facility], BCACP  Clinical Pharmacist Specialist      For Pharmacy Admin Tracking Only    CPA in place: Yes  Recommendation Provided To: Patient/Caregiver: 6 via In person  Intervention Detail: Dose Adjustment: 2, reason: Therapy Optimization, Patient Access Assistance/Sample Provided, Refill(s) Provided, and Scheduled Appointment  Intervention Accepted By: Patient/Caregiver: 6  Time Spent (min): 30

## 2022-09-12 NOTE — PROGRESS NOTES
Assessment/Plan:     Diagnoses and all orders for this visit:    1. Uncontrolled type 2 diabetes mellitus with hyperglycemia (HCC)  -     empagliflozin (Jardiance) 10 mg tablet; Take 1 Tablet by mouth daily. -     tirzepatide (Mounjaro) 5 mg/0.5 mL pnij; 5 mg by SubCUTAneous route every seven (7) days. Indications: type 2 diabetes mellitus  Improving. She is still not tolerating Jardiance. Decrease as above. Increase Mounjaro to 5mg for 4 weeks. If sugars are consistently under 130, decrease Toujeo to 30 units. 2. Needs flu shot  -     INFLUENZA, FLUARIX, FLULAVAL, FLUZONE (AGE 6 MO+), AFLURIA(AGE 3Y+) IM, PF, 0.5 ML    3. Severe obesity (BMI 35.0-39. 9) with comorbidity ([facility] [address]    4. Choriocarcinoma of female ([facility] [address]   Stable. Followed by Dr. Greg Tony. May be overdue for repeat ultrasound. Follow-up and Dispositions    Return in about 3 months (around 12/12/2022) for Follow Up. Time: 30-39 minutes was spent with this patient face to face discussing  diagnoses, risk factors and treatment with greater than 50% of this time was spent in counseling and coordination of care. Discussed expected course/resolution/complications of diagnosis in detail with patient. Medication risks/benefits/costs/interactions/alternatives discussed with patient. Pt was given after visit summary which includes diagnoses, current medications & vitals. Pt expressed understanding with the diagnosis and plan          Subjective:      Robinson Odonnell is a 55 y.o. female who presents for had concerns including Follow-up. Cardiovascular Review:  The patient has diabetes, hypertension, hyperlipidemia, obesity, and tobacco abuse. Diet and Lifestyle: not attempting to follow a low fat, low cholesterol diet, sedentary  Home BP Monitoring: is not measured at home.   Pertinent ROS: taking medications as instructed, no medication side effects noted, no TIA's, no chest pain on exertion, no dyspnea on exertion, no swelling of ankles. She is tolerating the addition of Moujaro well and has noticed a significant decrease in her appetite. This has led towards a rapid reduction in blood sugars down to 140s. She is scheduled for upcoming colonoscopy next month. Patient Active Problem List   Diagnosis Code    Anxiety F41.9    Unspecified vitamin D deficiency E55.9    Esophageal reflux K21.9    Chronic insomnia F51.04    Diabetes mellitus without complication (HCC) C20.6    Patellar malalignment syndrome M23.90    Dysthymic disorder F34.1    Microalbuminuria R80.9    Hyperlipemia E78.5    Choriocarcinoma of female ([facility] [address] C58    Severe obesity (HCC) E66.01    Cyst of left ovary N83.202    Tobacco abuse Z72.0    Uncontrolled type 2 diabetes mellitus with hyperglycemia ([facility]) E11.65    Chronic bronchitis, unspecified chronic bronchitis type ([facility]) J42       Current Outpatient Medications   Medication Sig Dispense Refill    fluconazole (DIFLUCAN) 150 mg tablet       fluconazole (DIFLUCAN) 200 mg tablet TAKE 2 TABLETS BY MOUTH FOR DAY 1, TAKE 1 TAB 2 DAYS LATER FOR CONT. SYMPTOMS AND MAY REPEAT X 2      empagliflozin (Jardiance) 10 mg tablet Take 1 Tablet by mouth daily. 30 Tablet 3    tirzepatide (Mounjaro) 5 mg/0.5 mL pnij 5 mg by SubCUTAneous route every seven (7) days. Indications: type 2 diabetes mellitus 4 Pen 0    pantoprazole (Protonix) 40 mg tablet Take 40 mg by mouth daily. lancets (OneTouch UltraSoft Lancets) misc Use to check glucose twice daily. 200 Each 11    glucose blood VI test strips (blood glucose test) strip One touch ultra test blood sugar twice daily or as directed by provider 100 Strip 5    Blood-Glucose Meter monitoring kit One touch ultra test blood sugar twice daily or as directed by provider    E11.9 1 Kit 0    insulin glargine U-300 conc (Toujeo Max U-300 SoloStar) 300 unit/mL (3 mL) inpn 40 Units by SubCUTAneous route nightly.  Indications: type 2 diabetes mellitus 6 mL 1    nystatin-triamcinolone (MYCOLOG) 100,000-0.1 unit/gram-% ointment nystatin-triamcinolone 100,000 unit/gram-0.1 % topical ointment   APPLY TO THE AFFECTED AREA(S) BY TOPICAL ROUTE 3 TIMES PER DAY as needed      Insulin Needles, Disposable, 31 gauge x 5/16\" ndle Use as directed once daily with insulin 1 Each 11    zolpidem (AMBIEN) 5 mg tablet TAKE 1 TABLET BY MOUTH AT BEDTIME AS NEEDED FOR SLEEP 90 Tablet 0    oxybutynin chloride XL (DITROPAN XL) 10 mg CR tablet Take 1 tablet by mouth once daily 90 Tablet 0    ergocalciferol (ERGOCALCIFEROL) 1,250 mcg (50,000 unit) capsule Take 1 capsule by mouth once a week 12 Capsule 0    pravastatin (PRAVACHOL) 40 mg tablet TAKE 1 TABLET BY MOUTH NIGHTLY FOR CHOLESTEROL 90 Tablet 0    fenofibrate micronized (LOFIBRA) 134 mg capsule Take 1 capsule by mouth once daily 90 Capsule 0    gabapentin (Neurontin) 300 mg capsule Take 1 Capsule by mouth nightly. Max Daily Amount: 300 mg. Daily at Bedtime 30 Capsule 0    QUEtiapine (SEROquel) 100 mg tablet Take 1 tablet by mouth once daily 90 Tablet 0    flash glucose scanning reader (FreeStyle [place] 2 [place]) misc Scan sensor 4x per day for blood sugar readings. Dx E11.65 1 Each 0    busPIRone (BUSPAR) 10 mg tablet Take 1 Tab by mouth two (2) times a day. For anxiety 180 Tab 1    ibuprofen (MOTRIN) 800 mg tablet Take 1 Tab by mouth every eight (8) hours as needed for Pain. 30 Tab 0    flash glucose sensor (FreeStyle [place] 2 Sensor) kit SCAN SENSOR TO CHECK BLOOD SUGAR 4 TIMES DAILY 6 Kit 3       Allergies   Allergen Reactions    Ciprofloxacin Hives    Meperidine Other (comments)     Goofy feeling, hallucinates  Other reaction(s): Other (See Comments)  Off balance  Other reaction(s): Hallucinations    Metformin Diarrhea       ROS:   Review of Systems   Constitutional:  Negative for malaise/fatigue. HENT:  Positive for congestion. Eyes:  Negative for blurred vision. Respiratory:  Negative for shortness of breath. Cardiovascular:  Negative for chest pain. Objective:   Visit Vitals  /71 (BP 1 Location: Left upper arm, BP Patient Position: Sitting, BP Cuff Size: Large adult long)   Pulse 100   Temp 98 °F (36.7 °C)   Resp 16   Ht 5' 7\" (1.702 m)   Wt 232 lb 3.2 oz (105.3 kg)   LMP 09/10/2014 (Exact Date)   SpO2 98%   BMI 36.37 kg/m²       Vitals and Nurse Documentation reviewed. Physical Exam  Constitutional:       General: She is not in acute distress. Appearance: She is obese. Cardiovascular:      Heart sounds: S1 normal and S2 normal. No murmur heard. No friction rub. No gallop. Pulmonary:      Effort: No respiratory distress. Breath sounds: Normal breath sounds. Skin:     General: Skin is warm and dry.    Psychiatric:         Mood and Affect: Mood and affect normal.

## 2022-09-12 NOTE — PROGRESS NOTES
Pharmacy Progress Note - Diabetes Management    S/O: Ms. Sanjeev Killian is a 55 y.o. female, referred by Dr. Genie Roberts NP, with a PMH of T2DM, HLD, vit D def, GERD, anxiety, insomnia, obesity, was seen today for diabetes management. Patient's last A1c was 8.1 (Aug 2022) which was down from 9.2 (June 2022). Interim update: Pt was recently seen by pharmacy after switching from [facility] to [facility]. Pt had initial concerns that her appetite had decreased too much with Mounjaro , but she was counseled on the expected reduction in appetite and that what she was experiencing was normal. Pt is also currently on Toujeo 40 units nightly and Jardiance 25mg daily. MD concerned for recurrent mycotic infection, so may consider stopping Jardiance. Pt has been having an issue with their CGM sensors falling off. Recommended using some SkinTac at last visit. Regarding the issues with the CGM sensor, the pt switched arms that she keeps the sensor on, so her purse doesn't hit it and she will not be getting in the pool anymore as pool season is over. She hasn't had any issues since switching arms. The pts CGM rdgs have improved, which the pt believes is due to not having much of an appetite. She did have a reading in the 120s one evening for which she skipped her Toujeo. She attributes the decreased appetite to the [facility], which she is tolerating well, and felling sick with some nasal congestion. The pt did notice that she was feeling hungry more toward the end of the week last week, and she was wondering if the [facility] was wearing off earlier.  She was also concerned with cost of the [facility] and how long the coupon would last.    Diabetes Management:  Diabetes History:  - Previous anti-hyperglycemic agents includes:   - Did not tolerate metformin   - Actos and glipizide were d/c'd when insulin was started    Current anti-hyperglycemic regimen includes:    - Mounjaro 2.5mg weekly  - Toujeo 40 units at night  - Jardiance 25mg daily    ROS:  Today, Pt endorses:  - Symptoms of Hyperglycemia: none  - Symptoms of Hypoglycemia: none    Self Monitoring Blood Glucose (SMBG) or CGM:  - Brought in home glucometer/blood glucose log/CGM reader today:  yes        Nutrition:  - Breakfast: protein shake (will last till mid afternoon)  - has to force herself to eat to keep energy up   - had some veggie soup yesterday    Vitals: Wt Readings from Last 3 Encounters:   09/12/22 232 lb 3.2 oz (105.3 kg)   08/25/22 237 lb (107.5 kg)   08/01/22 245 lb (111.1 kg)     BP Readings from Last 3 Encounters:   09/12/22 103/71   08/25/22 100/68   08/01/22 122/76     Pulse Readings from Last 3 Encounters:   09/12/22 100   08/25/22 (!) 102   08/01/22 96       Past Medical History:   Diagnosis Date    Abnormal Pap smear of cervix     Acid indigestion     Cancer ([facility] [address] 01/2015    choriocarcinoma  - [facility]TER[facility] - surgery/chemo    Depression     Diabetes ([facility] [address]     Dysthymic disorder     GERD (gastroesophageal reflux disease)     Hypertriglyceridemia     Ill-defined condition 02/27/2020    DENIES HISTORY OF MOTION SICKNESS OR VERTIGO    Mixed hyperlipidemia 3/31/2010    MRSA (methicillin resistant Staphylococcus aureus)     PRIOR TO 2017, NASAL SWAB NEG MRSA 1/19/2017    Ovarian cyst     left     Psychiatric disorder     depression; ANXIETY    Urinary incontinence     URGENCY AND INCONTINENECE     Allergies   Allergen Reactions    Ciprofloxacin Hives    Meperidine Other (comments)     Goofy feeling, hallucinates  Other reaction(s):  Other (See Comments)  Off balance  Other reaction(s): Hallucinations    Metformin Diarrhea       Current Outpatient Medications   Medication Sig    flash glucose sensor (FreeStyle [place] 2 Sensor) kit SCAN SENSOR TO CHECK BLOOD SUGAR 4 TIMES DAILY    fluconazole (DIFLUCAN) 150 mg tablet     fluconazole (DIFLUCAN) 200 mg tablet TAKE 2 TABLETS BY MOUTH FOR DAY 1, TAKE 1 TAB 2 DAYS LATER FOR CONT. SYMPTOMS AND MAY REPEAT X 2 empagliflozin (Jardiance) 10 mg tablet Take 1 Tablet by mouth daily. tirzepatide (Mounjaro) 5 mg/0.5 mL pnij 5 mg by SubCUTAneous route every seven (7) days. Indications: type 2 diabetes mellitus    pantoprazole (Protonix) 40 mg tablet Take 40 mg by mouth daily. lancets (OneTouch UltraSoft Lancets) misc Use to check glucose twice daily. glucose blood VI test strips (blood glucose test) strip One touch ultra test blood sugar twice daily or as directed by provider    Blood-Glucose Meter monitoring kit One touch ultra test blood sugar twice daily or as directed by provider    E11.9    insulin glargine U-300 conc (Toujeo Max U-300 SoloStar) 300 unit/mL (3 mL) inpn 40 Units by SubCUTAneous route nightly. Indications: type 2 diabetes mellitus    nystatin-triamcinolone (MYCOLOG) 100,000-0.1 unit/gram-% ointment nystatin-triamcinolone 100,000 unit/gram-0.1 % topical ointment   APPLY TO THE AFFECTED AREA(S) BY TOPICAL ROUTE 3 TIMES PER DAY as needed    Insulin Needles, Disposable, 31 gauge x 5/16\" ndle Use as directed once daily with insulin    zolpidem (AMBIEN) 5 mg tablet TAKE 1 TABLET BY MOUTH AT BEDTIME AS NEEDED FOR SLEEP    oxybutynin chloride XL (DITROPAN XL) 10 mg CR tablet Take 1 tablet by mouth once daily    ergocalciferol (ERGOCALCIFEROL) 1,250 mcg (50,000 unit) capsule Take 1 capsule by mouth once a week    pravastatin (PRAVACHOL) 40 mg tablet TAKE 1 TABLET BY MOUTH NIGHTLY FOR CHOLESTEROL    fenofibrate micronized (LOFIBRA) 134 mg capsule Take 1 capsule by mouth once daily    gabapentin (Neurontin) 300 mg capsule Take 1 Capsule by mouth nightly. Max Daily Amount: 300 mg. Daily at Bedtime    QUEtiapine (SEROquel) 100 mg tablet Take 1 tablet by mouth once daily    flash glucose scanning reader (FreeStyle [place] 2 [place]) misc Scan sensor 4x per day for blood sugar readings. Dx E11.65    busPIRone (BUSPAR) 10 mg tablet Take 1 Tab by mouth two (2) times a day.  For anxiety    ibuprofen (MOTRIN) 800 mg tablet Take 1 Tab by mouth every eight (8) hours as needed for Pain. No current facility-administered medications for this visit. Lab Results   Component Value Date/Time    Sodium 138 08/01/2022 02:58 PM    Potassium 3.9 08/01/2022 02:58 PM    Chloride 105 08/01/2022 02:58 PM    CO2 26 08/01/2022 02:58 PM    Anion gap 7 08/01/2022 02:58 PM    Glucose 258 (H) 08/01/2022 02:58 PM    BUN 10 08/01/2022 02:58 PM    Creatinine 0.99 08/01/2022 02:58 PM    BUN/Creatinine ratio 10 (L) 08/01/2022 02:58 PM    GFR est AA >60 08/01/2022 02:58 PM    GFR est non-AA >60 08/01/2022 02:58 PM    Calcium 9.5 08/01/2022 02:58 PM    Bilirubin, total 0.4 08/01/2022 02:58 PM    Alk. phosphatase 89 08/01/2022 02:58 PM    Protein, total 7.4 08/01/2022 02:58 PM    Albumin 4.2 08/01/2022 02:58 PM    Globulin 3.2 08/01/2022 02:58 PM    A-G Ratio 1.3 08/01/2022 02:58 PM    ALT (SGPT) 36 08/01/2022 02:58 PM     Lab Results   Component Value Date/Time    Cholesterol, total 189 08/01/2022 02:58 PM    HDL Cholesterol 44 08/01/2022 02:58 PM    LDL,Direct 96 08/01/2022 02:58 PM    LDL, calculated Not calculated due to elevated triglyceride level 08/01/2022 02:58 PM    VLDL, calculated  08/01/2022 02:58 PM     Calculation not valid with this patient's other Lipid values.     Triglyceride 436 (H) 08/01/2022 02:58 PM    CHOL/HDL Ratio 4.3 08/01/2022 02:58 PM     Lab Results   Component Value Date/Time    WBC 11.3 (H) 06/22/2021 11:42 AM    WBC 8.8 05/18/2012 09:00 AM    Hemoglobin (POC) 12.1 03/01/2012 11:26 AM    HGB 13.8 06/22/2021 11:42 AM    HCT 43.4 06/22/2021 11:42 AM    PLATELET 096 48/66/3280 11:42 AM    MCV 93.5 06/22/2021 11:42 AM       Lab Results   Component Value Date/Time    Microalbumin/Creat ratio (mg/g creat) 13 01/22/2021 11:53 AM    MICROALBUMIN,MG/DAY CANCELED 10/12/2016 09:18 AM    Microalbumin,urine random 2.36 01/22/2021 11:53 AM       Lab Results   Component Value Date/Time    Hemoglobin A1c 8.1 (H) 08/01/2022 02:58 PM Hemoglobin A1c 9.2 (H) 06/22/2021 11:42 AM    Hemoglobin A1c 7.4 (H) 01/22/2021 11:53 AM     Hemoglobin A1c (POC)   Date Value Ref Range Status   02/02/2022 7.6 % Final        Estimated Creatinine Clearance: 88.7 mL/min (by C-G formula based on SCr of 0.99 mg/dL). A/P:    1) T2DM: Per ADA guidelines, Pt's A1c is not at goal of < 7%. Current SMBG(s)/CGM trend indicates an improvement from 1% to 23% of the time in target range. They are still uncontrolled overall and would benefit from an increase in their Mounjaro to 5mg weekly. This should also help maintian the pts decrease in appetite. If the BG rdgs are consistently below 100 mg/dL over the next couple of weeks, would recommend a decrease in in the toujeo dose, but emphasized to the pt that they must consistently take this medication. Provided a couple sample CGM sensors to replace the ones that fell off of the pt and sent in a new script for a 90 day supply of sensors to their pharmacy. - Recommend increasing Mounjaro to 5mg weekly after finishing the 4th dose of the 2.5mg this week  - Continue Jardiance 25mg daily  - Continue Toujeo 40units nightly  - Gave 2 replacement CGM sensors and sent in a new 90 day supply    Medication reconciliation was completed during the visit. Medications Discontinued During This Encounter   Medication Reason    flash glucose sensor (FreeStyle [place] 2 Sensor) kit REORDER     Orders Placed This Encounter    flash glucose sensor (FreeStyle [place] 2 Sensor) kit     Sig: SCAN SENSOR TO CHECK BLOOD SUGAR 4 TIMES DAILY     Dispense:  6 Kit     Refill:  3       Patient verbalized understanding of the information presented and all of the patients questions were answered. AVS was handed to the patient. Patient advised to call the office with any additional questions or concerns. Notifications of recommendations will be sent to Dr. Lenora Boone NP for review. Patient will return to clinic in 3 week(s) for follow up. Madelyn Dyer PharmD  [facility] PGY1 Resident

## 2022-10-06 ENCOUNTER — TELEPHONE (OUTPATIENT)
Dept: FAMILY MEDICINE CLINIC | Age: 47
End: 2022-10-06

## 2022-10-06 NOTE — TELEPHONE ENCOUNTER
Chief Complaint   Patient presents with    Prior Ellene Bowling 5MG/0.5ML pen-injectors:  APPROVED:  PA Case: [id number], Status: Approved, Coverage Starts on: 10/5/2022 12:00:00 AM, Coverage Ends on: 10/5/2023 12:00:00 AM.
No Vaccines Administered.

## 2022-10-17 DIAGNOSIS — E55.9 VITAMIN D DEFICIENCY: ICD-10-CM

## 2022-10-17 RX ORDER — ERGOCALCIFEROL 1.25 MG/1
CAPSULE ORAL
Qty: 12 CAPSULE | Refills: 0 | Status: SHIPPED | OUTPATIENT
Start: 2022-10-17

## 2022-10-17 NOTE — TELEPHONE ENCOUNTER
----- Message from Stephanie Bingham sent at 2/7/2017  5:19 PM EST -----  Regarding: Fercho Rivera/Telephone   [phone] [address] [facility] Pharmacist would like a call regarding her recent labs and diabetes. DISCHARGE

## 2022-10-19 ENCOUNTER — TELEPHONE (OUTPATIENT)
Dept: INTERNAL MEDICINE CLINIC | Age: 47
End: 2022-10-19

## 2022-10-19 NOTE — TELEPHONE ENCOUNTER
Pharmacy Progress Note - Telephone Encounter    S/O: Ms. Raquel Cardona 52 y.o. female contacted office/me via an inbound telephone call to discuss doing phone visit now today. Verified patients identifiers (name & ) per HIPAA policy. - Pt asked this writer if she could do phone visit now instead of at 1:30PM today. A/P:  - Pt's visit is scheduled for tomorrow. Pt was ok with completing visit during scheduled time. - Patient endorses understanding to the provided information. All questions answered at this time. There are no discontinued medications. No orders of the defined types were placed in this encounter.         Luis Nieves, PharmD, BCGP, BCACP  Clinical Pharmacist Specialist        For Pharmacy Admin Tracking Only    CPA in place: Yes  Recommendation Provided To: Patient/Caregiver: 0 via Telephone  Intervention Accepted By: Patient/Caregiver: 0  Time Spent (min): 5

## 2022-10-20 ENCOUNTER — VIRTUAL VISIT (OUTPATIENT)
Dept: INTERNAL MEDICINE CLINIC | Age: 47
End: 2022-10-20

## 2022-10-20 DIAGNOSIS — E11.65 UNCONTROLLED TYPE 2 DIABETES MELLITUS WITH HYPERGLYCEMIA (HCC): Primary | ICD-10-CM

## 2022-10-20 RX ORDER — TIRZEPATIDE 7.5 MG/.5ML
7.5 INJECTION, SOLUTION SUBCUTANEOUS
Qty: 2 ML | Refills: 1 | Status: SHIPPED | OUTPATIENT
Start: 2022-10-20

## 2022-10-20 NOTE — PROGRESS NOTES
Pharmacy Progress Note - Diabetes Management    S/O: Ms. Olivia Graves is a 52 y.o. female, referred by Dr. Ferdie Heimlich, NP, with a PMH of T2DM, HLD, vit D def, GERD, anxiety, insomnia, obesity, was seen today for diabetes management. Patient's last A1c was 8.1 (Aug 2022) which was down from 9.2 (June 2022). Visit was completed using telephone only. Interim update: Pt was last contacted by this writer on 10/3/22 for f/up on BG rdgs. Rdgs were elevated and insulin dose was increased by approx 10%. Pt was contacted today to discuss BG rdgs. CGM average BG rdg decreased from 273 to 198 mg/dL with increase of insulin. Time in target went from 2% to 41%. Pt states she saw OBGYN last Monday and they stopped her Jardiance d/t fungal infxn. She was treated with an oral med pt could not recall name of and a cream.  States she feels so much better. Diabetes Management:  Diabetes History:  - Previous anti-hyperglycemic agents includes: Metformin (D/c'd d/t GI side effects), Pioglitazone and Glipizide (stopped when insulin started), Jardiance (d/c'd d/t yeast infxn)    Current anti-hyperglycemic regimen includes:    - Toujeo U-300 46 units  - Mounjaro 5 mg weekly  - Jardiance 10 mg daily    ROS:  Today, Pt endorses:  - Symptoms of Hyperglycemia: none  - Symptoms of Hypoglycemia: none    Self Monitoring Blood Glucose (SMBG) or CGM:  - Brought in home glucometer/blood glucose log/CGM reader today:  yes        Nutrition:  States she continues to be a big bedtime snacker. 8pm highs - bedtime snacker  High carb snacks - m&m, peanutbutter pretzels, half a bag of snickers bars   Avoiding pepsi - no longer likes taste  Drinking tea - mylos tea? Which doesn't raise bg as much as mcdonalds' tea  Drinks 2 bain a day with crystal lite      Vitals:   Wt Readings from Last 3 Encounters:   09/12/22 232 lb 3.2 oz (105.3 kg)   08/25/22 237 lb (107.5 kg)   08/01/22 245 lb (111.1 kg)     BP Readings from Last 3 Encounters:   09/12/22 103/71   08/25/22 100/68   08/01/22 122/76     Pulse Readings from Last 3 Encounters:   09/12/22 100   08/25/22 (!) 102   08/01/22 96       Past Medical History:   Diagnosis Date    Abnormal Pap smear of cervix     Acid indigestion     Cancer ([facility] [address] 01/2015    choriocarcinoma  - UTERNE - surgery/chemo    Depression     [facility]     Dysthymic disorder     GERD (gastroesophageal reflux disease)     Hypertriglyceridemia     Ill-defined condition 02/27/2020    DENIES HISTORY OF MOTION SICKNESS OR VERTIGO    Mixed hyperlipidemia 3/31/2010    MRSA (methicillin resistant Staphylococcus aureus)     PRIOR TO 2017, NASAL SWAB NEG MRSA 1/19/2017    Ovarian cyst     left     Psychiatric disorder     depression; ANXIETY    Urinary incontinence     URGENCY AND INCONTINENECE     Allergies   Allergen Reactions    Ciprofloxacin Hives    Meperidine Other (comments)     Goofy feeling, hallucinates  Other reaction(s): Other (See Comments)  Off balance  Other reaction(s): Hallucinations    Metformin Diarrhea       Current Outpatient Medications   Medication Sig    ergocalciferol (ERGOCALCIFEROL) 1,250 mcg (50,000 unit) capsule Take 1 capsule by mouth once a week    insulin glargine U-300 conc (Toujeo Max U-300 SoloStar) 300 unit/mL (3 mL) inpn 46 Units by SubCUTAneous route nightly. Indications: type 2 diabetes mellitus    fluconazole (DIFLUCAN) 150 mg tablet     fluconazole (DIFLUCAN) 200 mg tablet TAKE 2 TABLETS BY MOUTH FOR DAY 1, TAKE 1 TAB 2 DAYS LATER FOR CONT. SYMPTOMS AND MAY REPEAT X 2    empagliflozin (Jardiance) 10 mg tablet Take 1 Tablet by mouth daily. tirzepatide (Mounjaro) 5 mg/0.5 mL pnij 5 mg by SubCUTAneous route every seven (7) days. Indications: type 2 diabetes mellitus    flash glucose sensor (FreeStyle Jessee 2 Sensor) kit SCAN SENSOR TO CHECK BLOOD SUGAR 4 TIMES DAILY    pantoprazole (Protonix) 40 mg tablet Take 40 mg by mouth daily.     lancets (OneTouch UltraSoft Lancets) misc Use to check glucose twice daily. glucose blood VI test strips (blood glucose test) strip One touch ultra test blood sugar twice daily or as directed by provider    Blood-Glucose Meter monitoring kit One touch ultra test blood sugar twice daily or as directed by provider    E11.9    nystatin-triamcinolone (MYCOLOG) 100,000-0.1 unit/gram-% ointment nystatin-triamcinolone 100,000 unit/gram-0.1 % topical ointment   APPLY TO THE AFFECTED AREA(S) BY TOPICAL ROUTE 3 TIMES PER DAY as needed    Insulin Needles, Disposable, 31 gauge x 5/16\" ndle Use as directed once daily with insulin    zolpidem (AMBIEN) 5 mg tablet TAKE 1 TABLET BY MOUTH AT BEDTIME AS NEEDED FOR SLEEP    oxybutynin chloride XL (DITROPAN XL) 10 mg CR tablet Take 1 tablet by mouth once daily    pravastatin (PRAVACHOL) 40 mg tablet TAKE 1 TABLET BY MOUTH NIGHTLY FOR CHOLESTEROL    fenofibrate micronized (LOFIBRA) 134 mg capsule Take 1 capsule by mouth once daily    gabapentin (Neurontin) 300 mg capsule Take 1 Capsule by mouth nightly. Max Daily Amount: 300 mg. Daily at Bedtime    QUEtiapine (SEROquel) 100 mg tablet Take 1 tablet by mouth once daily    flash glucose scanning reader (FreeStyle Jessee 2 [place]) misc Scan sensor 4x per day for blood sugar readings. Dx E11.65    busPIRone (BUSPAR) 10 mg tablet Take 1 Tab by mouth two (2) times a day. For anxiety    ibuprofen (MOTRIN) 800 mg tablet Take 1 Tab by mouth every eight (8) hours as needed for Pain. No current facility-administered medications for this visit.      Lab Results   Component Value Date/Time    Sodium 138 08/01/2022 02:58 PM    Potassium 3.9 08/01/2022 02:58 PM    Chloride 105 08/01/2022 02:58 PM    CO2 26 08/01/2022 02:58 PM    Anion gap 7 08/01/2022 02:58 PM    Glucose 258 (H) 08/01/2022 02:58 PM    BUN 10 08/01/2022 02:58 PM    Creatinine 0.99 08/01/2022 02:58 PM    BUN/Creatinine ratio 10 (L) 08/01/2022 02:58 PM    GFR est AA >60 08/01/2022 02:58 PM    GFR est non-AA >60 08/01/2022 02:58 PM    Calcium 9.5 08/01/2022 02:58 PM    Bilirubin, total 0.4 08/01/2022 02:58 PM    Alk. phosphatase 89 08/01/2022 02:58 PM    Protein, total 7.4 08/01/2022 02:58 PM    Albumin 4.2 08/01/2022 02:58 PM    Globulin 3.2 08/01/2022 02:58 PM    A-G Ratio 1.3 08/01/2022 02:58 PM    ALT (SGPT) 36 08/01/2022 02:58 PM     Lab Results   Component Value Date/Time    Cholesterol, total 189 08/01/2022 02:58 PM    HDL Cholesterol 44 08/01/2022 02:58 PM    LDL,Direct 96 08/01/2022 02:58 PM    LDL, calculated Not calculated due to elevated triglyceride level 08/01/2022 02:58 PM    VLDL, calculated  08/01/2022 02:58 PM     Calculation not valid with this patient's other Lipid values. Triglyceride 436 (H) 08/01/2022 02:58 PM    CHOL/HDL Ratio 4.3 08/01/2022 02:58 PM     Lab Results   Component Value Date/Time    WBC 11.3 (H) 06/22/2021 11:42 AM    WBC 8.8 05/18/2012 09:00 AM    Hemoglobin (POC) 12.1 03/01/2012 11:26 AM    HGB 13.8 06/22/2021 11:42 AM    HCT 43.4 06/22/2021 11:42 AM    PLATELET 398 33/11/2934 11:42 AM    MCV 93.5 06/22/2021 11:42 AM       Lab Results   Component Value Date/Time    Microalbumin/Creat ratio (mg/g creat) 13 01/22/2021 11:53 AM    MICROALBUMIN,MG/DAY CANCELED 10/12/2016 09:18 AM    Microalbumin,urine random 2.36 01/22/2021 11:53 AM       Lab Results   Component Value Date/Time    Hemoglobin A1c 8.1 (H) 08/01/2022 02:58 PM    Hemoglobin A1c 9.2 (H) 06/22/2021 11:42 AM    Hemoglobin A1c 7.4 (H) 01/22/2021 11:53 AM     Hemoglobin A1c (POC)   Date Value Ref Range Status   02/02/2022 7.6 % Final        CrCl cannot be calculated (Unknown ideal weight. ). A/P:    1) T2DM: Per ADA guidelines, Pt's A1c is not at goal of < 7%. Current SMBG(s)/CGM trend indicates improvement in BG rdgs after increase in insulin dose. She has been working on food choices but still has elevated ppBG rdgs. Fasting BG rdgs have decreased to close to controlled levels.   Will increase GIP/GLP-1 agonist therapy in order to target ppBG rdgs. - INCREASE Mounjaro to 7.5 mg weekly  - STOP Jardiance d/t side effects  - Continue Toujeo U-300 46 units      Medication reconciliation was completed during the visit. Medications Discontinued During This Encounter   Medication Reason    empagliflozin (Jardiance) 10 mg tablet Therapy Completed    fluconazole (DIFLUCAN) 150 mg tablet Therapy Completed    fluconazole (DIFLUCAN) 200 mg tablet Therapy Completed    tirzepatide (Mounjaro) 5 mg/0.5 mL pnij DOSE ADJUSTMENT     Orders Placed This Encounter    tirzepatide (Mounjaro) 7.5 mg/0.5 mL pnij     Si.5 mg by SubCUTAneous route every seven (7) days. Dispense:  2 mL     Refill:  1       Patient verbalized understanding of the information presented and all of the patients questions were answered. AVS was handed to the patient. Patient advised to call the office with any additional questions or concerns. Notifications of recommendations will be sent to Dr. Conrado Francis NP for review. Patient will return to clinic in 4 week(s) for follow up.      Penny Shepard, PharmD, BCGP, BCACP  Clinical Pharmacist Specialist          For Pharmacy Admin Tracking Only    CPA in place: Yes  Recommendation Provided To: Patient/Caregiver: 7 via Virtual Visit  Intervention Detail: Discontinued Rx: 4, reason: JOSE LUIS and Therapy Complete, Dose Adjustment: 1, reason: Therapy Optimization, New Rx: 1, reason: Needs Additional Therapy, and Scheduled Appointment  Intervention Accepted By: Patient/Caregiver: 7  Time Spent (min): 30

## 2022-10-29 DIAGNOSIS — F31.9 BIPOLAR 1 DISORDER, DEPRESSED (HCC): ICD-10-CM

## 2022-10-29 DIAGNOSIS — F51.04 CHRONIC INSOMNIA: ICD-10-CM

## 2022-10-31 DIAGNOSIS — E11.65 UNCONTROLLED TYPE 2 DIABETES MELLITUS WITH HYPERGLYCEMIA (HCC): ICD-10-CM

## 2022-10-31 RX ORDER — QUETIAPINE FUMARATE 100 MG/1
TABLET, FILM COATED ORAL
Qty: 90 TABLET | Refills: 0 | Status: SHIPPED | OUTPATIENT
Start: 2022-10-31

## 2022-11-01 RX ORDER — INSULIN GLARGINE 300 U/ML
INJECTION, SOLUTION SUBCUTANEOUS
Qty: 6 ML | Refills: 0 | Status: SHIPPED | OUTPATIENT
Start: 2022-11-01 | End: 2022-11-28

## 2022-11-02 DIAGNOSIS — F51.04 CHRONIC INSOMNIA: ICD-10-CM

## 2022-11-02 NOTE — TELEPHONE ENCOUNTER
Request for refill zolpidem. Check . Félix, Baby August    Last Visit: 9/12/22 NP Jory  Next Appointment: None  Previous Refill Encounter(s): 7/25/22 90    Requested Prescriptions     Pending Prescriptions Disp Refills    zolpidem (AMBIEN) 5 mg tablet 90 Tablet 0     Sig: Take 1 Tablet by mouth nightly as needed for Sleep. Max Daily Amount: 5 mg. For [facility] in place:   Recommendation Provided To:    Intervention Detail: New Rx: 1, reason: Patient Preference  Gap Closed?:   Intervention Accepted By:   Time Spent (min): 5

## 2022-11-03 RX ORDER — ZOLPIDEM TARTRATE 5 MG/1
5 TABLET ORAL
Qty: 90 TABLET | Refills: 0 | OUTPATIENT
Start: 2022-11-03

## 2022-11-03 RX ORDER — ZOLPIDEM TARTRATE 5 MG/1
TABLET ORAL
Qty: 90 TABLET | Refills: 0 | Status: SHIPPED | OUTPATIENT
Start: 2022-11-03

## 2022-11-09 ENCOUNTER — NURSE TRIAGE (OUTPATIENT)
Dept: OTHER | Facility: CLINIC | Age: 47
End: 2022-11-09

## 2022-11-09 NOTE — TELEPHONE ENCOUNTER
Location of patient: [address] [facility] [address] yun from [facility] at [facility] with [employer]. Subjective: Caller states \"I woke up this morning with a \"hard rock\", quarter sized under my left nipple. \"     Current Symptoms: lump left nipple area, tender to touch, noted red streak; noted nipple inversion when she goes to squeeze left nipple    Onset: 8 hours ago; sudden    Associated Symptoms: NA    Pain Severity: 3/10; tender; intermittent to touch    Temperature: na denies fever    What has been tried: removed nipple piercing, hot wash compress; is on amoxicillin and pain meds for recent root canal    LMP: NA Pregnant:  hysterectomy    Recommended disposition: See in Office Today; patient unable to come in today, wants to be seen on Friday    Care advice provided, patient verbalizes understanding; denies any other questions or concerns; instructed to call back for any new or worsening symptoms. Patient/caller does not agree with recommend disposition, states she can't come in until Friday; writer provided warm transfer to Mack Hinson at [facility] for appointment scheduling    Attention Provider: Thank you for allowing me to participate in the care of your patient. The patient was connected to triage in response to information provided to the [facility]. Please do not respond through this encounter as the response is not directed to a shared pool.     Reason for Disposition   Swelling is painful to touch and no fever    Protocols used: Skin Lump or Localized Swelling-ADULT-[place]

## 2022-11-26 DIAGNOSIS — E11.65 UNCONTROLLED TYPE 2 DIABETES MELLITUS WITH HYPERGLYCEMIA (HCC): ICD-10-CM

## 2022-11-26 DIAGNOSIS — E78.5 HYPERLIPIDEMIA, UNSPECIFIED HYPERLIPIDEMIA TYPE: ICD-10-CM

## 2022-11-28 RX ORDER — INSULIN GLARGINE 300 U/ML
INJECTION, SOLUTION SUBCUTANEOUS
Qty: 6 ML | Refills: 0 | Status: SHIPPED | OUTPATIENT
Start: 2022-11-28

## 2022-11-28 RX ORDER — PRAVASTATIN SODIUM 40 MG/1
40 TABLET ORAL
Qty: 90 TABLET | Refills: 0 | Status: SHIPPED | OUTPATIENT
Start: 2022-11-28

## 2022-11-28 RX ORDER — FENOFIBRATE 134 MG/1
CAPSULE ORAL
Qty: 90 CAPSULE | Refills: 0 | Status: SHIPPED | OUTPATIENT
Start: 2022-11-28

## 2022-12-07 DIAGNOSIS — E11.65 UNCONTROLLED TYPE 2 DIABETES MELLITUS WITH HYPERGLYCEMIA (HCC): ICD-10-CM

## 2022-12-08 ENCOUNTER — TELEPHONE (OUTPATIENT)
Dept: FAMILY MEDICINE CLINIC | Age: 47
End: 2022-12-08

## 2022-12-08 RX ORDER — TIRZEPATIDE 7.5 MG/.5ML
7.5 INJECTION, SOLUTION SUBCUTANEOUS
Qty: 2 ML | Refills: 1 | OUTPATIENT
Start: 2022-12-08

## 2022-12-08 RX ORDER — TIRZEPATIDE 10 MG/.5ML
10 INJECTION, SOLUTION SUBCUTANEOUS
Qty: 4 PEN | Refills: 0 | Status: SHIPPED | OUTPATIENT
Start: 2022-12-08

## 2022-12-08 NOTE — TELEPHONE ENCOUNTER
Pharmacy Progress Note - Telephone Encounter    S/O: Ms. Keely Jerez 52 y.o. female contacted office/me via an inbound telephone call to discuss Mounjaro dose refill today. Verified patients identifiers (name & ) per HIPAA policy. - Been on 7.5 mg weekly x2 months. Requests refill.    - Pt was informed, PCP sent in new Rx for 10 mg.    - States she has not been eating like she should. Snacking on peanut butter filled pretzels. Feels like she hasn't lost as much weight as she'd like. A/P:  - Pt plans to schedule f/up with PharmD virtually in January  - Patient endorses understanding to the provided information. All questions answered at this time. There are no discontinued medications. No orders of the defined types were placed in this encounter.         Sona Cooper, PharmD, BCGP, BCACP  Clinical Pharmacist Specialist        For Pharmacy Admin Tracking Only    CPA in place: Yes  Recommendation Provided To: Patient/Caregiver: 0 via Telephone  Intervention Accepted By: Patient/Caregiver: 90  Time Spent (min): 10

## 2022-12-22 NOTE — PROGRESS NOTES
Ryan Denton (: 1975) is a 52 y.o. female, patient, here for evaluation of the following chief complaint(s):  Hand Pain (Right hand pain x 1 month)       HPI:    She began having increased right hand pain weeks ago and now states that her pain is moderate, sharp, stabbing, throbbing and intermittent but does not always keep her awake. She does note some weakness which is getting worse. She has had ulnar neuritis and ulnar nerve compression at the cubital tunnel on the opposite side states that there is some component of that but is also having pain in her palm. Allergies   Allergen Reactions    Ciprofloxacin Hives    Jardiance [Empagliflozin] Other (comments)     Fungal infection    Meperidine Other (comments)     Goofy feeling, hallucinates  Other reaction(s): Other (See Comments)  Off balance  Other reaction(s): Hallucinations    Metformin Diarrhea       Current Outpatient Medications   Medication Sig    tirzepatide (Mounjaro) 10 mg/0.5 mL pnij 10 mg by SubCUTAneous route every seven (7) days. oxybutynin chloride XL (DITROPAN XL) 10 mg CR tablet Take 1 tablet by mouth once daily    pravastatin (PRAVACHOL) 40 mg tablet TAKE 1 TABLET BY MOUTH NIGHTLY FOR CHOLESTEROL    fenofibrate micronized (LOFIBRA) 134 mg capsule Take 1 capsule by mouth once daily    Toujeo Max U-300 SoloStar 300 unit/mL (3 mL) inpn INJECT 20 UNITS SUBCUTANEOUSLY NIGHTLY FOR DIABETES    zolpidem (AMBIEN) 5 mg tablet TAKE 1 TABLET BY MOUTH AT BEDTIME AS NEEDED FOR SLEEP    QUEtiapine (SEROquel) 100 mg tablet Take 1 tablet by mouth once daily    ergocalciferol (ERGOCALCIFEROL) 1,250 mcg (50,000 unit) capsule Take 1 capsule by mouth once a week    flash glucose sensor (FreeStyle Jessee 2 Sensor) kit SCAN SENSOR TO CHECK BLOOD SUGAR 4 TIMES DAILY    pantoprazole (Protonix) 40 mg tablet Take 40 mg by mouth daily. lancets (OneTouch UltraSoft Lancets) misc Use to check glucose twice daily.     glucose blood VI test strips (blood glucose test) strip One touch ultra test blood sugar twice daily or as directed by provider    Blood-Glucose Meter monitoring kit One touch ultra test blood sugar twice daily or as directed by provider    E11.9    nystatin-triamcinolone (MYCOLOG) 100,000-0.1 unit/gram-% ointment nystatin-triamcinolone 100,000 unit/gram-0.1 % topical ointment   APPLY TO THE AFFECTED AREA(S) BY TOPICAL ROUTE 3 TIMES PER DAY as needed    Insulin Needles, Disposable, 31 gauge x 5/16\" ndle Use as directed once daily with insulin    gabapentin (Neurontin) 300 mg capsule Take 1 Capsule by mouth nightly. Max Daily Amount: 300 mg. Daily at Bedtime    flash glucose scanning reader (FreeStyle Jessee 2 [place]) misc Scan sensor 4x per day for blood sugar readings. Dx E11.65    busPIRone (BUSPAR) 10 mg tablet Take 1 Tab by mouth two (2) times a day. For anxiety    ibuprofen (MOTRIN) 800 mg tablet Take 1 Tab by mouth every eight (8) hours as needed for Pain. No current facility-administered medications for this visit.        Past Medical History:   Diagnosis Date    Abnormal Pap smear of cervix     Acid indigestion     Cancer ([facility] [address] 01/2015    choriocarcinoma  - UTERNE - surgery/chemo    Depression     Diabetes ([facility] [address]     Dysthymic disorder     GERD (gastroesophageal reflux disease)     Hypertriglyceridemia     Ill-defined condition 02/27/2020    DENIES HISTORY OF MOTION SICKNESS OR VERTIGO    Mixed hyperlipidemia 3/31/2010    MRSA (methicillin resistant Staphylococcus aureus)     PRIOR TO 2017, NASAL SWAB NEG MRSA 1/19/2017    Ovarian cyst     left     Psychiatric disorder     depression; ANXIETY    Urinary incontinence     URGENCY AND INCONTINENECE        Past Surgical History:   Procedure Laterality Date    HX APPENDECTOMY      HX GYN  12/08    D & C     HX GYN      Ovary 8/2/2019 [facility] [place]    HX ORTHOPAEDIC      R ACL knee    HX ORTHOPAEDIC      BILATERAL A/S KNEES    HX ORTHOPAEDIC  4/2016    REMOVAL OF HARDWARE IN KNEE    HX PARTIAL HYSTERECTOMY Bilateral May 4 2015    Dr. Manzo Huge       Family History   Problem Relation Age of Onset    Hypertension Mother     Elevated Lipids Mother     Cancer Mother         bladder cancer    Cancer Father         pancreatic    Thyroid Disease Paternal Aunt     Heart Disease Maternal Grandmother     Heart Disease Maternal Grandfather     Heart Disease Paternal Grandfather     Diabetes Paternal Aunt     Alcohol abuse Neg Hx     Arthritis-rheumatoid Neg Hx     Asthma Neg Hx     Bleeding Prob Neg Hx     Headache Neg Hx     Lung Disease Neg Hx     Migraines Neg Hx     Psychiatric Disorder Neg Hx     Stroke Neg Hx     Mental Retardation Neg Hx     Anesth Problems Neg Hx         Social History     Socioeconomic History    Marital status:      Spouse name: Not on file    Number of children: Not on file    Years of education: Not on file    Highest education level: Not on file   Occupational History    Not on file   Tobacco Use    Smoking status: Every Day     Packs/day: 1.00     Years: 25.00     Pack years: 25.00     Types: Cigarettes    Smokeless tobacco: Never   Vaping Use    Vaping Use: Never used   Substance and Sexual Activity    Alcohol use: No     Alcohol/week: 0.0 standard drinks    Drug use: No    Sexual activity: Yes     Partners: Male     Birth control/protection: None   Other Topics Concern    Not on file   Social History Narrative    Not on file     Social Determinants of Health     Financial Resource Strain: Not on file   Food Insecurity: Not on file   Transportation Needs: Not on file   Physical Activity: Not on file   Stress: Not on file   Social Connections: Not on file   Intimate Partner Violence: Not on file   Housing Stability: Not on file       Review of Systems   All other systems reviewed and are negative. Vitals:  Ht 5' 7\" (1.702 m)   Wt 225 lb (102.1 kg)   LMP 09/10/2014 (Exact Date)   BMI 35.24 kg/m²    Body mass index is 35.24 kg/m².     Ortho Exam     The patient is well-developed and well-nourished. The patient presents today in alert and oriented x3 with a normal mood and affect. The patient stands with a normal weightbearing line and walks with a normal gait. Right hand has tenderness over the palmar aspect of her palm and there is a nodule in the palm at approximately the distal hand crease over the third and fourth digits which is tender to palpation but there is no evidence of any infection or inflammation. Her range of motion is well-maintained. There is no instability. Her sensations intact her pulses are 2+. Skin is normal.  Right elbow has no point tenderness and full range of motion but she does have a slightly positive Tinel's sign over ulnar nerve. ASSESSMENT/PLAN:  XR Results (most recent):  Results from Appointment encounter on 12/23/22    XR HAND RT MIN 3 V    Narrative  Three-view x-rays of the right hand show no evidence of a fracture or dislocation. There is no evidence of degenerative disease. 1. Right hand pain  -     XR HAND RT MIN 3 V; Future  2. Neuritis of right ulnar nerve     Below is the assessment and plan developed based on review of pertinent history, physical exam, labs, studies, and medications. We discussed the patient's right hand pain and her signs, symptoms, physical exam, description of her pain, and x-rays are consistent with a superficial nodule in her palm I do not think that this is a Dupuytren's contracture but could be an early Dupuytren's. We also talked about her mild ulnar neuritis and started her on anti-inflammatory medication. We also talked about a wrist splint to avoid impacting her hand and limiting her wrist range of motion. We talked about using a elbow wrap at night to avoid the ulnar neuritis. We talked about vitamin B6 as an alternative as well.   We will have her work on a gentle range of motion and strengthening program on her own I will see her back in 1 month if she has any persistence of her pain.      No follow-ups on file. An electronic signature was used to authenticate this note.   -- Malaika Peter MD

## 2022-12-23 ENCOUNTER — OFFICE VISIT (OUTPATIENT)
Dept: ORTHOPEDIC SURGERY | Age: 47
End: 2022-12-23
Payer: COMMERCIAL

## 2022-12-23 VITALS — HEIGHT: 67 IN | WEIGHT: 225 LBS | BODY MASS INDEX: 35.31 KG/M2

## 2022-12-23 DIAGNOSIS — G56.21 NEURITIS OF RIGHT ULNAR NERVE: ICD-10-CM

## 2022-12-23 DIAGNOSIS — M79.641 RIGHT HAND PAIN: Primary | ICD-10-CM

## 2023-01-06 ENCOUNTER — TELEPHONE (OUTPATIENT)
Dept: FAMILY MEDICINE CLINIC | Age: 48
End: 2023-01-06

## 2023-01-06 RX ORDER — TIRZEPATIDE 12.5 MG/.5ML
12.5 INJECTION, SOLUTION SUBCUTANEOUS
Qty: 4 PEN | Refills: 0 | Status: SHIPPED | OUTPATIENT
Start: 2023-01-06 | End: 2023-01-08 | Stop reason: ALTCHOICE

## 2023-01-06 RX ORDER — TIRZEPATIDE 10 MG/.5ML
10 INJECTION, SOLUTION SUBCUTANEOUS
Qty: 4 PEN | Refills: 0 | OUTPATIENT
Start: 2023-01-06

## 2023-01-06 NOTE — TELEPHONE ENCOUNTER
Patient mychart request for refill Mounjaro. Patient last rx Mounjaro 10mg. Continue same dose? ThanksSalbador    Last Visit: 9/12/22 Jory  Next Appointment: None  Previous Refill Encounter(s): 12/8/22 4    Requested Prescriptions     Pending Prescriptions Disp Refills    tirzepatide (Mounjaro) 10 mg/0.5 mL pnij 4 Pen 0     Sig: 10 mg by SubCUTAneous route every seven (7) days. For Pharmacy Admin Tracking Only    Program: Medication Refill  CPA in place:   Recommendation Provided To:    Intervention Detail: New Rx: 1, reason: Patient Preference  Intervention Accepted By:   Antione Paci Closed?:   Time Spent (min): 5

## 2023-01-06 NOTE — TELEPHONE ENCOUNTER
NP Jory,    Patient stating via [employer] message reply that she will call to schedule Appt. but wanted the same dose of Mounjaro (10mg- Not to increase dose this time). Noted just sent the 12.5mg today.     Thanks, Peyton Saha

## 2023-01-08 RX ORDER — TIRZEPATIDE 10 MG/.5ML
10 INJECTION, SOLUTION SUBCUTANEOUS
Qty: 4 PEN | Refills: 2 | Status: SHIPPED | OUTPATIENT
Start: 2023-01-08

## 2023-01-10 ENCOUNTER — OFFICE VISIT (OUTPATIENT)
Dept: FAMILY MEDICINE CLINIC | Age: 48
End: 2023-01-10
Payer: COMMERCIAL

## 2023-01-10 VITALS
WEIGHT: 222 LBS | HEIGHT: 67 IN | RESPIRATION RATE: 16 BRPM | TEMPERATURE: 98 F | DIASTOLIC BLOOD PRESSURE: 83 MMHG | SYSTOLIC BLOOD PRESSURE: 116 MMHG | OXYGEN SATURATION: 97 % | HEART RATE: 90 BPM | BODY MASS INDEX: 34.84 KG/M2

## 2023-01-10 DIAGNOSIS — C58 CHORIOCARCINOMA OF FEMALE (HCC): ICD-10-CM

## 2023-01-10 DIAGNOSIS — F31.9 BIPOLAR 1 DISORDER, DEPRESSED (HCC): ICD-10-CM

## 2023-01-10 DIAGNOSIS — E11.65 UNCONTROLLED TYPE 2 DIABETES MELLITUS WITH HYPERGLYCEMIA (HCC): Primary | ICD-10-CM

## 2023-01-10 DIAGNOSIS — J42 CHRONIC BRONCHITIS, UNSPECIFIED CHRONIC BRONCHITIS TYPE (HCC): ICD-10-CM

## 2023-01-10 PROCEDURE — 99214 OFFICE O/P EST MOD 30 MIN: CPT | Performed by: NURSE PRACTITIONER

## 2023-01-10 RX ORDER — INSULIN GLARGINE 300 U/ML
46 INJECTION, SOLUTION SUBCUTANEOUS DAILY
Qty: 5 ML | Refills: 3 | Status: SHIPPED | OUTPATIENT
Start: 2023-01-10

## 2023-01-10 NOTE — PROGRESS NOTES
Assessment/Plan:     Diagnoses and all orders for this visit:    1. Uncontrolled type 2 diabetes mellitus with hyperglycemia (HCC)  -     REFERRAL TO DIETITIAN  -     insulin glargine U-300 conc (Toujeo Max U-300 SoloStar) 300 unit/mL (3 mL) inpn; 46 Units by SubCUTAneous route daily. Continue current therapy. She is having some access issues with Mounjaro. Will continue at 10mg at this time. Refer to dietitian to work on decreasing processed foods. 2. Bipolar 1 disorder, depressed ([address]  Assessment & Plan:   monitored by specialist. No acute findings meriting change in the plan      3. Choriocarcinoma of female [facility])  Assessment & Plan:   monitored by specialist. No acute findings meriting change in the plan      4. Chronic bronchitis, unspecified chronic bronchitis type [facility])  Assessment & Plan:   monitored by specialist. No acute findings meriting change in the plan         Follow-up and Dispositions    Return in about 3 months (around 4/10/2023) for Follow Up. Discussed expected course/resolution/complications of diagnosis in detail with patient. Medication risks/benefits/costs/interactions/alternatives discussed with patient. Pt was given after visit summary which includes diagnoses, current medications & vitals. Pt expressed understanding with the diagnosis and plan          Subjective:      Miguel Ángel Larson is a 52 y.o. female who presents for had concerns including Follow-up and Labs. Cardiovascular Review:  The patient has diabetes, hypertension, hyperlipidemia, obesity, and tobacco abuse. Diet and Lifestyle: not attempting to follow a low fat, low cholesterol diet, sedentary  Home BP Monitoring: is not measured at home. Pertinent ROS: taking medications as instructed, no medication side effects noted, no TIA's, no chest pain on exertion, no dyspnea on exertion, no swelling of ankles.    She has lost 20 pounds to the initiation of my Mounjaro however she has felt particularly ill with gastrointestinal symptoms that are improving over time with her elevation of 10 mg. She has a jessee but she is not routinely monitoring her sugars. She continues to spike after meals. 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    Dysthymic disorder F34.1    Microalbuminuria R80.9    Hyperlipemia E78.5    Choriocarcinoma of female ([facility] C58    Severe obesity ([facility] E66.01    Cyst of left ovary N83.202    Tobacco abuse Z72.0    Uncontrolled type 2 diabetes mellitus with hyperglycemia ([facility]) E11.65    Chronic bronchitis, unspecified chronic bronchitis type ([facility]) J42    Bipolar 1 disorder, depressed ([facility]) F31.9       Current Outpatient Medications   Medication Sig Dispense Refill    insulin glargine U-300 conc (Toujeo Max U-300 SoloStar) 300 unit/mL (3 mL) inpn 46 Units by SubCUTAneous route daily. 5 mL 3    tirzepatide (Mounjaro) 10 mg/0.5 mL pnij 10 mg by SubCUTAneous route every seven (7) days. 4 Pen 2    oxybutynin chloride XL (DITROPAN XL) 10 mg CR tablet Take 1 tablet by mouth once daily 90 Tablet 1    pravastatin (PRAVACHOL) 40 mg tablet TAKE 1 TABLET BY MOUTH NIGHTLY FOR CHOLESTEROL 90 Tablet 0    fenofibrate micronized (LOFIBRA) 134 mg capsule Take 1 capsule by mouth once daily 90 Capsule 0    zolpidem (AMBIEN) 5 mg tablet TAKE 1 TABLET BY MOUTH AT BEDTIME AS NEEDED FOR SLEEP 90 Tablet 0    QUEtiapine (SEROquel) 100 mg tablet Take 1 tablet by mouth once daily 90 Tablet 0    ergocalciferol (ERGOCALCIFEROL) 1,250 mcg (50,000 unit) capsule Take 1 capsule by mouth once a week 12 Capsule 0    flash glucose sensor (FreeStyle Jessee 2 Sensor) kit SCAN SENSOR TO CHECK BLOOD SUGAR 4 TIMES DAILY 6 Kit 3    lancets (OneTouch UltraSoft Lancets) misc Use to check glucose twice daily.  200 Each 11    glucose blood VI test strips (blood glucose test) strip One touch ultra test blood sugar twice daily or as directed by provider 100 Strip 5    Blood-Glucose Meter monitoring kit One touch ultra test blood sugar twice daily or as directed by provider    E11.9 1 Kit 0    nystatin-triamcinolone (MYCOLOG) 100,000-0.1 unit/gram-% ointment nystatin-triamcinolone 100,000 unit/gram-0.1 % topical ointment   APPLY TO THE AFFECTED AREA(S) BY TOPICAL ROUTE 3 TIMES PER DAY as needed      Insulin Needles, Disposable, 31 gauge x 5/16\" ndle Use as directed once daily with insulin 1 Each 11    flash glucose scanning reader (FreeStyle Jessee 2 [place]) misc Scan sensor 4x per day for blood sugar readings. Dx E11.65 1 Each 0    busPIRone (BUSPAR) 10 mg tablet Take 1 Tab by mouth two (2) times a day. For anxiety 180 Tab 1    ibuprofen (MOTRIN) 800 mg tablet Take 1 Tab by mouth every eight (8) hours as needed for Pain. 30 Tab 0    gabapentin (Neurontin) 300 mg capsule Take 1 Capsule by mouth nightly. Max Daily Amount: 300 mg. Daily at Bedtime (Patient not taking: Reported on 1/10/2023) 30 Capsule 0       Allergies   Allergen Reactions    Ciprofloxacin Hives    Jardiance [Empagliflozin] Other (comments)     Fungal infection    Meperidine Other (comments)     Goofy feeling, hallucinates  Other reaction(s): Other (See Comments)  Off balance  Other reaction(s): Hallucinations    Metformin Diarrhea       ROS:   Review of Systems   Constitutional:  Negative for malaise/fatigue. Eyes:  Negative for blurred vision. Respiratory:  Negative for shortness of breath. Cardiovascular:  Negative for chest pain. Objective:   Visit Vitals  /83 (BP 1 Location: Left upper arm, BP Patient Position: Sitting, BP Cuff Size: Large adult long)   Pulse 90   Temp 98 °F (36.7 °C)   Resp 16   Ht 5' 7\" (1.702 m)   Wt 222 lb (100.7 kg)   LMP 09/10/2014 (Exact Date)   SpO2 97%   BMI 34.77 kg/m²       Vitals and Nurse Documentation reviewed.      Physical Exam  Constitutional:       General: She is not in acute distress. Appearance: She is obese. Cardiovascular:      Heart sounds: S1 normal and S2 normal. No murmur heard. No friction rub. No gallop. Pulmonary:      Effort: No respiratory distress. Breath sounds: Normal breath sounds. Skin:     General: Skin is warm and dry.    Psychiatric:         Mood and Affect: Mood and affect normal.

## 2023-01-10 NOTE — PROGRESS NOTES
Chief Complaint   Patient presents with    Follow-up    Labs       1. \"Have you been to the ER, urgent care clinic since your last visit? Hospitalized since your last visit? \" No    2. \"Have you seen or consulted any other health care providers outside of the [address] [place] [address] since your last visit? \" No     3. For patients over 45: Has the patient had a colonoscopy? Yes - no Care Gap present     If the patient is female:    4. For patients over 40: Has the patient had a mammogram? Yes - no Care Gap present    5. For patients over 21: Has the patient had a pap smear?  No    3 most recent PHQ Screens 1/10/2023   Little interest or pleasure in doing things Not at all   Feeling down, depressed, irritable, or hopeless Not at all   Total Score PHQ 2 0   Trouble falling or staying asleep, or sleeping too much -   Feeling tired or having little energy -   Poor appetite, weight loss, or overeating -   Feeling bad about yourself - or that you are a failure or have let yourself or your family down -   Trouble concentrating on things such as school, work, reading, or watching TV -   Moving or speaking so slowly that other people could have noticed; or the opposite being so fidgety that others notice -   Thoughts of being better off dead, or hurting yourself in some way -   PHQ 9 Score -   How difficult have these problems made it for you to do your work, take care of your home and get along with others -     Health Maintenance Due   Topic Date Due    COVID-19 Vaccine (1) Never done    Hepatitis B Vaccine (1 of 3 - Risk 3-dose series) Never done    Cervical cancer screen  Never done    Eye Exam Retinal or Dilated  02/12/2015    Diabetic Alb to Cr ratio (uACR) test  01/22/2022    Foot Exam Q1  11/23/2022

## 2023-01-28 DIAGNOSIS — F51.04 CHRONIC INSOMNIA: ICD-10-CM

## 2023-01-28 DIAGNOSIS — F31.9 BIPOLAR 1 DISORDER, DEPRESSED (HCC): ICD-10-CM

## 2023-01-30 DIAGNOSIS — F51.04 CHRONIC INSOMNIA: ICD-10-CM

## 2023-01-30 DIAGNOSIS — F31.9 BIPOLAR 1 DISORDER, DEPRESSED (HCC): ICD-10-CM

## 2023-01-30 RX ORDER — ZOLPIDEM TARTRATE 5 MG/1
TABLET ORAL
Qty: 90 TABLET | Refills: 0 | OUTPATIENT
Start: 2023-01-30

## 2023-01-30 RX ORDER — QUETIAPINE FUMARATE 100 MG/1
100 TABLET, FILM COATED ORAL DAILY
Qty: 90 TABLET | Refills: 0 | Status: SHIPPED | OUTPATIENT
Start: 2023-01-30

## 2023-01-30 RX ORDER — ZOLPIDEM TARTRATE 5 MG/1
5 TABLET ORAL
Qty: 90 TABLET | Refills: 0 | Status: SHIPPED | OUTPATIENT
Start: 2023-01-30

## 2023-01-30 RX ORDER — QUETIAPINE FUMARATE 100 MG/1
TABLET, FILM COATED ORAL
Qty: 90 TABLET | Refills: 0 | OUTPATIENT
Start: 2023-01-30

## 2023-01-30 NOTE — TELEPHONE ENCOUNTER
Requests for refills via [employer] request.    RUDY Corley stating the Quetiapine and zolpidem are to be prescribed/managed by psychiatrist.      Gerlean Six patient via [employer] message to contact them for these prescriptions. ([employer] sent requests to RUDY Corley). *Unable to delete these requests from the pharmacy. [employer] message sent to patient to advise pharmacy to contact psychiatrist.  Thanks, [address] Only    Program: Medication Refill  CPA in place:   Recommendation Provided To:    Intervention Detail: Discontinued Rx: 2, reason: Duplicate Therapy  Intervention Accepted By:   Mono Buckner Closed?:   Time Spent (min): 10

## 2023-01-30 NOTE — TELEPHONE ENCOUNTER
NP Jory,    Patient is requesting refill quetiapine and zolpidem. I sent [employer] message to patient advising supposed to receive from psychiatrist and patient stated she does not have psychiatrist and you are prescriber of these meds. Check . Thanks, Sotero Reynoso    Last Visit: 1/10/23 Jory  Next Appointment: None  Previous Refill Encounter(s):   Zolpidem 11/3/22 90  Quetiapine 10/31/22 90    Requested Prescriptions     Pending Prescriptions Disp Refills    zolpidem (AMBIEN) 5 mg tablet 90 Tablet 0     Sig: Take 1 Tablet by mouth nightly as needed for Sleep. Max Daily Amount: 5 mg. QUEtiapine (SEROquel) 100 mg tablet 90 Tablet 0     Sig: Take 1 Tablet by mouth daily. For Pharmacy Admin Tracking Only    Program: Medication Refill  CPA in place:   Recommendation Provided To:    Intervention Detail: New Rx: 2, reason: Patient Preference  Intervention Accepted By:   John Else Closed?:   Time Spent (min): 10

## 2023-02-03 ENCOUNTER — TELEPHONE (OUTPATIENT)
Dept: FAMILY MEDICINE CLINIC | Age: 48
End: 2023-02-03

## 2023-02-03 NOTE — TELEPHONE ENCOUNTER
Pharmacy Progress Note - Telephone Encounter    S/O: Ms. Judie Luo 52 y.o. female, referred by Dr. Jeff Owens, RUDY, was contacted via an outbound telephone call to discuss scheduling DM visit with PharmD today. Verified patients identifiers (name & ) per HIPAA policy. - Pt amenable to scheduling visit with PharmD    A/P:  - Scheduled on 23 at 1:30P  - Patient endorses understanding to the provided information. All questions answered at this time. There are no discontinued medications. No orders of the defined types were placed in this encounter.       Lulu Herrera, PharmD, BCGP, BCACP  Clinical Pharmacist Specialist      For Pharmacy Admin Tracking Only    Program: Medical Group  CPA in place: Yes  Recommendation Provided To: Patient/Caregiver: 1 via Telephone  Intervention Detail: Scheduled Appointment  Intervention Accepted By: Patient/Caregiver: 1  Time Spent (min): 5

## 2023-02-06 NOTE — TELEPHONE ENCOUNTER
[facility] is out of [employer]. Patient is asking to send Mounjaro 10mg to [facility]. If they do not have it, she will try to change to Ozempic but having good results with the [employer] and would rather not change. Leonor Heard      Previous Refill Encounter(s): 1/8/23 4 + 2    Requested Prescriptions     Pending Prescriptions Disp Refills    tirzepatide (Mounjaro) 10 mg/0.5 mL pnij 12 Pen 0     Sig: 10 mg by SubCUTAneous route every seven (7) days. For Pharmacy Admin Tracking Only    Program: Medication Refill  CPA in place:   Recommendation Provided To:    Intervention Detail: New Rx: 1, reason: Patient Preference  Intervention Accepted By:   Susy Holts Closed?:   Time Spent (min): 10

## 2023-02-06 NOTE — TELEPHONE ENCOUNTER
Patient stating Toni Bolden is out of stock at [facility]. Asking to send to [facility]. Not sure which mailorder? [employer] message sent to patient to verify which one.   Thanks, Nena Case

## 2023-02-07 RX ORDER — TIRZEPATIDE 10 MG/.5ML
10 INJECTION, SOLUTION SUBCUTANEOUS
Qty: 12 PEN | Refills: 0 | Status: SHIPPED | OUTPATIENT
Start: 2023-02-07

## 2023-02-08 RX ORDER — TIRZEPATIDE 5 MG/.5ML
10 INJECTION, SOLUTION SUBCUTANEOUS
Qty: 1 BOX | Refills: 0 | Status: SHIPPED | OUTPATIENT
Start: 2023-02-08 | End: 2023-02-10 | Stop reason: SDUPTHER

## 2023-02-08 NOTE — TELEPHONE ENCOUNTER
NP Jory,    Patient called and states that [employer] ran a test refill with the 5mg Mounjaro and it ran as a 14 day supply (10mg dose). This would help patient buy time as supposedly the mounjaro 10mg is coming in on 16th. Entered below for 14 day supply if appropriate. Patient is aware that she would use 2 pens for the 10mg dose. Please call her and advise if unable to do this. Thanks, Frances Naga    Only wants 14 day supply for now. Requested Prescriptions     Pending Prescriptions Disp Refills    tirzepatide (Mounjaro) 5 mg/0.5 mL pnij 1 Box 0     Sig: 10 mg by SubCUTAneous route every seven (7) days for 14 days. For Pharmacy Admin Tracking Only    Program: Medication Refill  CPA in place:   Recommendation Provided To:    Intervention Detail: New Rx: 1, reason: Patient Preference  Intervention Accepted By:   Corona Jenkins Closed?:   Time Spent (min): 15

## 2023-02-09 ENCOUNTER — PATIENT MESSAGE (OUTPATIENT)
Dept: FAMILY MEDICINE CLINIC | Age: 48
End: 2023-02-09

## 2023-02-10 RX ORDER — TIRZEPATIDE 5 MG/.5ML
10 INJECTION, SOLUTION SUBCUTANEOUS
Qty: 1 BOX | Refills: 0 | Status: SHIPPED | OUTPATIENT
Start: 2023-02-10 | End: 2023-02-24

## 2023-02-10 NOTE — TELEPHONE ENCOUNTER
From: Williams Upton  To: Lucille Perez NP  Sent: 2/9/2023 1:21 PM EST  Subject: Jordon Dunaway    I spoke with the insurance company 3 times, my RX for 5-.50/14 days isnt there! Can you send it to [facility] for me please? Thank you!

## 2023-02-14 ENCOUNTER — VIRTUAL VISIT (OUTPATIENT)
Dept: FAMILY MEDICINE CLINIC | Age: 48
End: 2023-02-14

## 2023-02-14 DIAGNOSIS — E11.65 UNCONTROLLED TYPE 2 DIABETES MELLITUS WITH HYPERGLYCEMIA (HCC): Primary | ICD-10-CM

## 2023-02-14 NOTE — PROGRESS NOTES
Pt did not log in to virtual visit. Link sent twice.     Xena Alanis, PharmD, BCGP, [address] [facility]  Clinical Pharmacist Specialist

## 2023-02-26 DIAGNOSIS — E78.5 HYPERLIPIDEMIA, UNSPECIFIED HYPERLIPIDEMIA TYPE: ICD-10-CM

## 2023-02-26 DIAGNOSIS — E55.9 VITAMIN D DEFICIENCY: ICD-10-CM

## 2023-02-26 DIAGNOSIS — K21.00 GASTROESOPHAGEAL REFLUX DISEASE WITH ESOPHAGITIS: ICD-10-CM

## 2023-02-26 RX ORDER — PANTOPRAZOLE SODIUM 40 MG/1
TABLET, DELAYED RELEASE ORAL
Qty: 90 TABLET | Refills: 0 | Status: SHIPPED | OUTPATIENT
Start: 2023-02-26

## 2023-02-26 RX ORDER — ERGOCALCIFEROL 1.25 MG/1
CAPSULE ORAL
Qty: 12 CAPSULE | Refills: 0 | Status: SHIPPED | OUTPATIENT
Start: 2023-02-26

## 2023-02-26 RX ORDER — FENOFIBRATE 134 MG/1
CAPSULE ORAL
Qty: 90 CAPSULE | Refills: 0 | Status: SHIPPED | OUTPATIENT
Start: 2023-02-26

## 2023-02-26 RX ORDER — PRAVASTATIN SODIUM 40 MG/1
40 TABLET ORAL
Qty: 90 TABLET | Refills: 0 | Status: SHIPPED | OUTPATIENT
Start: 2023-02-26

## 2023-03-14 RX ORDER — TIRZEPATIDE 10 MG/.5ML
INJECTION, SOLUTION SUBCUTANEOUS
Qty: 4 PEN | Refills: 3 | Status: CANCELLED | OUTPATIENT
Start: 2023-03-14

## 2023-03-15 RX ORDER — TIRZEPATIDE 12.5 MG/.5ML
12.5 INJECTION, SOLUTION SUBCUTANEOUS
Qty: 4 PEN | Refills: 3 | Status: SHIPPED | OUTPATIENT
Start: 2023-03-15

## 2023-03-15 NOTE — TELEPHONE ENCOUNTER
New Rx pended for the 12.5mg. Please sign if appropriate. Last Visit: 1/10/23 with RUDY Corley  Next Appointment: Lamont Weiss to follow-up in 3 months (4/10/23)    Requested Prescriptions     Pending Prescriptions Disp Refills    tirzepatide (Mounjaro) 12.5 mg/0.5 mL pnij 4 Pen 3     Si.5 mg by SubCUTAneous route every seven (7) days. For Pharmacy Admin Tracking Only    Program: Medication Refill  CPA in place:   Recommendation Provided To:    Intervention Detail: New Rx: 1, reason: Patient Preference  Intervention Accepted By:   Van Veliz Closed?:   Time Spent (min): 5

## 2023-03-21 NOTE — TELEPHONE ENCOUNTER
NP Jory,    Patient stating that [facility] does not have the Mounjaro. Patient spoke with [facility] yesterday and they have it in stock and requesting the 90 d/s. (Received the fax as well for request for patient). Thanks, Shruthi Delcid    Last Visit: 1/10/23 Jory  Next Appointment: None  Previous Refill Encounter(s): 3/15/23 4 + 3 ([facility] does not have in stock)    Requested Prescriptions     Pending Prescriptions Disp Refills    tirzepatide (Mounjaro) 12.5 mg/0.5 mL pnij 12 Pen 1     Si.5 mg by SubCUTAneous route every seven (7) days. For Pharmacy Admin Tracking Only    Program: Medication Refill  CPA in place:   Recommendation Provided To:    Intervention Detail: New Rx: 1, reason: Patient Preference  Intervention Accepted By:   Saritha Meadows Closed?:   Time Spent (min): 5

## 2023-03-22 RX ORDER — TIRZEPATIDE 12.5 MG/.5ML
12.5 INJECTION, SOLUTION SUBCUTANEOUS
Qty: 12 PEN | Refills: 1 | Status: SHIPPED | OUTPATIENT
Start: 2023-03-22

## 2023-04-03 NOTE — MR AVS SNAPSHOT
[address] [place] [address] 
[phone] Patient: Meli Dickson MRN: [id number] CNX:[id number] Visit Information Date & Time Provider Department Dept. Phone Encounter #  
 10/2/2018 10:15 AM Ashley Smith, [facility] [phone] [id number] Upcoming Health Maintenance Date Due  
 EYE EXAM RETINAL OR DILATED Q1 2/12/2014 Pneumococcal 19-64 Highest Risk (2 of 3 - PCV13) 5/22/2018 FOOT EXAM Q1 5/22/2018 Influenza Age 5 to Adult 1/22/2019* HEMOGLOBIN A1C Q6M 1/16/2019 MICROALBUMIN Q1 2/7/2019 LIPID PANEL Q1 7/16/2019 PAP AKA CERVICAL CYTOLOGY 5/22/2020 DTaP/Tdap/Td series (2 - Td) 5/22/2027 *Topic was postponed. The date shown is not the original due date. Allergies as of 10/2/2018  Review Complete On: 10/2/2018 By: Ashley Smith NP Severity Noted Reaction Type Reactions Ciprocinonide  06/03/2009    Hives  
 cipro Demerol [Meperidine]  06/03/2009    Other (comments) Goofy feeling, hallucinates Current Immunizations  Never Reviewed Name Date Influenza Vaccine (Quad) PF 10/12/2016 Influenza Vaccine Split 11/19/2010, 10/28/2009 Not reviewed this visit You Were Diagnosed With   
  
 Codes Comments Bipolar 1 disorder, depressed ([facility]    -  Primary ICD-10-CM: F31.9 ICD-9-CM: 296.50 Insomnia, unspecified type     ICD-10-CM: G47.00 ICD-9-CM: 780.52 Severe single current episode of major depressive disorder, without psychotic features ([facility]     ICD-10-CM: F32.2 ICD-9-CM: 296.23 Agoraphobia with panic disorder     ICD-10-CM: F40.01 
ICD-9-CM: 300.21 BMI 34.0-34.9,adult     ICD-10-CM: M88.12 
ICD-9-CM: V85.34 Vitals BP Pulse Temp Resp Height(growth percentile) Weight(growth percentile) 130/82 84 98.8 °F (37.1 °C) (Oral) 16 5' 7\" (1.702 m) 220 lb 9.6 oz (100.1 kg) LMP SpO2 BMI OB Status Smoking Status 09/10/2014 (Exact Date) 97% 34.55 kg/m2 Hysterectomy Current Every Day Smoker BMI and BSA Data Body Mass Index Body Surface Area 34.55 kg/m 2 2.18 m 2 Preferred Pharmacy Pharmacy Name Phone [facility], [address] [place] [address] [phone] Your Updated Medication List  
  
   
This list is accurate as of 10/2/18 11:00 AM.  Always use your most recent med list.  
  
  
  
  
 Blood-Glucose Meter monitoring kit Commonly known as:  [address]   [address] KIT Use as directed  
  
 busPIRone 10 mg tablet Commonly known as:  BUSPAR Take 1 Tab by mouth two (2) times a day. estradiol 0.5 mg tablet Commonly known as:  ESTRACE Take 0.5 mg by mouth daily. fenofibrate micronized 134 mg capsule Commonly known as:  LOFIBRA TAKE ONE CAPSULE BY MOUTH EVERY DAY  
  
 fluconazole 150 mg tablet Commonly known as:  DIFLUCAN Take 150 mg by mouth daily. FDA advises cautious prescribing of oral fluconazole in pregnancy. glucose blood VI test strips strip Commonly known as:  ONETOUCH ULTRA TEST Monitor BS BID Dx: 250.02 Lancets Misc Commonly known as:  ONETOUCH ULTRASOFT LANCETS Use as directed Omeprazole delayed release 20 mg tablet Commonly known as:  PRILOSEC D/R Take 1 Tab by mouth daily. oxybutynin chloride XL 10 mg CR tablet Commonly known as:  DITROPAN XL  
TAKE ONE TABLET BY MOUTH AT NIGHT  
  
 pantoprazole 40 mg tablet Commonly known as:  PROTONIX  
TAKE ONE TABLET BY MOUTH EVERY DAY pioglitazone 15 mg tablet Commonly known as:  ACTOS Take 1 Tab by mouth daily. pravastatin 40 mg tablet Commonly known as:  PRAVACHOL  
TAKE ONE TABLET BY MOUTH AT NIGHT QUEtiapine 100 mg tablet Commonly known as:  SEROquel Take 1 Tab by mouth daily. raNITIdine 150 mg tablet Commonly known as:  ZANTAC TAKE ONE TABLET BY MOUTH AT NIGHT SITagliptin 100 mg tablet Commonly known as:  Kulwinder Jorge Take 1 Tab by mouth daily. terconazole 0.4 % vaginal cream  
Commonly known as:  TERAZOL 7 Insert 1 Applicator into vagina nightly. zolpidem 5 mg tablet Commonly known as:  AMBIEN Take 1 Tab by mouth nightly as needed for Sleep. Max Daily Amount: 5 mg. Prescriptions Printed Refills QUEtiapine (SEROQUEL) 100 mg tablet 3 Sig: Take 1 Tab by mouth daily. Class: Print Route: Oral  
 busPIRone (BUSPAR) 10 mg tablet 3 Sig: Take 1 Tab by mouth two (2) times a day. Class: Print Route: Oral  
  
Introducing [facility]! Dear [address]: Thank you for requesting a [employer] account. Our records indicate that you already have an active Launch[employer] account. You can access your account anytime at [email or web address] Did you know that you can access your hospital and ER discharge instructions at any time in LaunchSide? You can also review all of your test results from your hospital stay or ER visit. Additional Information If you have questions, please visit the Frequently Asked Questions section of the LaunchSide website at [email or web address] Remember, LaunchSide is NOT to be used for urgent needs. For medical emergencies, dial 911. Now available from your iPhone and Android! Please provide this summary of care documentation to your next provider. Your primary care clinician is listed as Elroy ROBLERO. If you have any questions after today's visit, please call [phone]. right shoulder injury/pain

## 2023-05-15 DIAGNOSIS — F51.04 CHRONIC INSOMNIA: Primary | ICD-10-CM

## 2023-05-15 RX ORDER — QUETIAPINE FUMARATE 100 MG/1
100 TABLET, FILM COATED ORAL DAILY
Qty: 90 TABLET | Refills: 1 | Status: SHIPPED | OUTPATIENT
Start: 2023-05-15

## 2023-05-15 RX ORDER — PANTOPRAZOLE SODIUM 40 MG/1
TABLET, DELAYED RELEASE ORAL
Qty: 90 TABLET | Refills: 1 | Status: SHIPPED | OUTPATIENT
Start: 2023-05-15

## 2023-05-15 RX ORDER — FENOFIBRATE 134 MG/1
134 CAPSULE ORAL DAILY
Qty: 90 CAPSULE | Refills: 1 | Status: SHIPPED | OUTPATIENT
Start: 2023-05-15

## 2023-05-15 RX ORDER — ZOLPIDEM TARTRATE 5 MG/1
5 TABLET ORAL NIGHTLY PRN
Qty: 90 TABLET | Refills: 0 | Status: SHIPPED | OUTPATIENT
Start: 2023-05-15 | End: 2023-08-13

## 2023-05-15 RX ORDER — PRAVASTATIN SODIUM 40 MG
40 TABLET ORAL DAILY
Qty: 90 TABLET | Refills: 1 | Status: SHIPPED | OUTPATIENT
Start: 2023-05-15

## 2023-05-15 RX ORDER — ERGOCALCIFEROL 1.25 MG/1
1 CAPSULE ORAL WEEKLY
Qty: 12 CAPSULE | Refills: 0 | Status: SHIPPED | OUTPATIENT
Start: 2023-05-15

## 2023-05-15 NOTE — TELEPHONE ENCOUNTER
NP Araceli,    Patient call and is out of meds. Feeling back without them. Needs refills to [facility] asap. Check  for zolpidem. (Did contact [facility] and filled 1/30/23 for 90). Thanks, Toño Bhakta    Last appointment: 1/10/23 Araceli  Next appointment: None  Previous refill encounter(s):   Ergocalciferiol 2/26/23 90  Fenofibrate 2/26/23 90  Pantoprazole 2/26/23 90  Pravastatin 2/26/23 90   Quetiapine 1/30/23 90  Zolpidem 1/30/23 90    Requested Prescriptions     Pending Prescriptions Disp Refills    ergocalciferol (ERGOCALCIFEROL) 1.25 MG (63217 UT) capsule 12 capsule 0     Sig: Take 1 capsule by mouth once a week    fenofibrate micronized (LOFIBRA) 134 MG capsule 90 capsule 1     Sig: Take 1 capsule by mouth daily    pantoprazole (PROTONIX) 40 MG tablet 90 tablet 1     Sig: TAKE 1 TABLET BY MOUTH ONCE DAILY FOR ACID REFLUX. *DISCONTINUE OMEPRAZOLE*    pravastatin (PRAVACHOL) 40 MG tablet 90 tablet 1     Sig: Take 1 tablet by mouth daily    QUEtiapine (SEROQUEL) 100 MG tablet 90 tablet 1     Sig: Take 1 tablet by mouth daily    zolpidem (AMBIEN) 5 MG tablet 90 tablet 0     Sig: Take 1 tablet by mouth nightly as needed for Sleep for up to 90 days.  Max Daily Amount: 5 mg     For Pharmacy Admin Tracking Only    Program: Medication Refill  Intervention Detail: New Rx: 6, reason: Patient Preference  Time Spent (min): 10

## 2023-05-18 RX ORDER — ZOLPIDEM TARTRATE 5 MG/1
TABLET ORAL
Qty: 90 TABLET | Refills: 0 | OUTPATIENT
Start: 2023-05-18

## 2023-05-18 RX ORDER — OXYBUTYNIN CHLORIDE 10 MG/1
TABLET, EXTENDED RELEASE ORAL
Qty: 90 TABLET | Refills: 0 | OUTPATIENT
Start: 2023-05-18

## 2023-05-18 RX ORDER — PANTOPRAZOLE SODIUM 40 MG/1
TABLET, DELAYED RELEASE ORAL
Qty: 90 TABLET | Refills: 0 | OUTPATIENT
Start: 2023-05-18

## 2023-05-18 RX ORDER — FENOFIBRATE 134 MG/1
CAPSULE ORAL
Qty: 90 CAPSULE | Refills: 0 | OUTPATIENT
Start: 2023-05-18

## 2023-05-18 RX ORDER — QUETIAPINE FUMARATE 100 MG/1
TABLET, FILM COATED ORAL
Qty: 90 TABLET | Refills: 0 | OUTPATIENT
Start: 2023-05-18

## 2023-05-18 RX ORDER — PRAVASTATIN SODIUM 40 MG
TABLET ORAL
Qty: 90 TABLET | Refills: 0 | OUTPATIENT
Start: 2023-05-18

## 2023-05-18 RX ORDER — ERGOCALCIFEROL 1.25 MG/1
CAPSULE ORAL
Qty: 12 CAPSULE | Refills: 0 | OUTPATIENT
Start: 2023-05-18

## 2023-05-22 RX ORDER — OXYBUTYNIN CHLORIDE 10 MG/1
10 TABLET, EXTENDED RELEASE ORAL DAILY
Qty: 90 TABLET | Refills: 1 | Status: SHIPPED | OUTPATIENT
Start: 2023-05-22

## 2023-06-01 ENCOUNTER — TELEPHONE (OUTPATIENT)
Age: 48
End: 2023-06-01

## 2023-06-01 NOTE — TELEPHONE ENCOUNTER
Pharmacy Progress Note - Telephone Call    Ms. Jose Cruz Llanos 52 y.o. was contacted via an outbound telephone call regarding scheduling f/up and [address] today. A voicemail was left for patient to return my call. This writer's work mobile number was provided as a callback contact - [phone].       Salima Post, PharmD, [facility] [place] [place]  Clinical Pharmacist Specialist        For Pharmacy Admin Tracking Only    Program: Medical Group  CPA in place:  Yes  Recommendation Provided To: Patient/Caregiver: 0 via Telephone  Intervention Accepted By: Patient/Caregiver: 0  Time Spent (min): 5

## 2023-06-02 ENCOUNTER — PHARMACY VISIT (OUTPATIENT)
Age: 48
End: 2023-06-02

## 2023-06-02 VITALS — BODY MASS INDEX: 34.21 KG/M2 | WEIGHT: 218.4 LBS

## 2023-06-02 DIAGNOSIS — Z79.4 TYPE 2 DIABETES MELLITUS WITH HYPERGLYCEMIA, WITH LONG-TERM CURRENT USE OF INSULIN (HCC): Primary | ICD-10-CM

## 2023-06-02 DIAGNOSIS — E11.65 TYPE 2 DIABETES MELLITUS WITH HYPERGLYCEMIA, WITH LONG-TERM CURRENT USE OF INSULIN (HCC): Primary | ICD-10-CM

## 2023-06-02 RX ORDER — MELATONIN 5 MG
5 TABLET,CHEWABLE ORAL
COMMUNITY

## 2023-06-02 RX ORDER — NICOTINE POLACRILEX 2 MG
1 GUM BUCCAL DAILY
COMMUNITY

## 2023-06-02 RX ORDER — BLOOD-GLUCOSE SENSOR
EACH MISCELLANEOUS
Qty: 2 EACH | Refills: 11 | Status: SHIPPED | OUTPATIENT
Start: 2023-06-02

## 2023-06-08 ENCOUNTER — PATIENT MESSAGE (OUTPATIENT)
Age: 48
End: 2023-06-08

## 2023-06-08 NOTE — TELEPHONE ENCOUNTER
Pharmacy Progress Note     Pt requested info on Vitamin K and diabetes and lipo-C/PATTI injections for weight loss. This writer provided drug information via [employer].         Sherly Barney, PharmD, BCGP  Clinical Pharmacist Specialist      For Pharmacy Admin Tracking Only    Program: Medical Group  CPA in place:  Yes  Recommendation Provided To: Patient/Caregiver: 0 via [address] By: Patient/Caregiver: 0  Time Spent (min): 60

## 2023-07-10 ENCOUNTER — PHARMACY VISIT (OUTPATIENT)
Age: 48
End: 2023-07-10

## 2023-07-10 DIAGNOSIS — E11.65 TYPE 2 DIABETES MELLITUS WITH HYPERGLYCEMIA, WITH LONG-TERM CURRENT USE OF INSULIN (HCC): Primary | ICD-10-CM

## 2023-07-10 DIAGNOSIS — Z79.4 TYPE 2 DIABETES MELLITUS WITH HYPERGLYCEMIA, WITH LONG-TERM CURRENT USE OF INSULIN (HCC): Primary | ICD-10-CM

## 2023-07-10 NOTE — PROGRESS NOTES
follow up.      León Amaro, PharmD, BCGP  Clinical Pharmacist Specialist          For Pharmacy Admin Tracking Only    Program: Medical Group  CPA in place:  Yes  Recommendation Provided To: Patient/Caregiver: 0 via Telephone  Intervention Accepted By: Patient/Caregiver: 0  Time Spent (min): 30

## 2023-07-14 NOTE — TELEPHONE ENCOUNTER
[facility] [employer] is requesting a new prescription for the [facility] 15 mg/0.5ml on behalf of the pt via fax. Please review and prescribe if appropriate. Thank you.      Last appointment: 05/31/2023 MD Darell Hannah  Next appointment: Nothing scheduled     For Pharmacy Admin Tracking Only    Program: Medication Refill  Intervention Detail: New Rx: 1, reason: Patient Preference  Time Spent (min): 5      Requested Prescriptions     Pending Prescriptions Disp Refills    Tirzepatide (MOUNJARO) 15 MG/0.5ML SOPN SC injection

## 2023-08-01 ENCOUNTER — TELEPHONE (OUTPATIENT)
Age: 48
End: 2023-08-01

## 2023-08-01 DIAGNOSIS — E11.65 TYPE 2 DIABETES MELLITUS WITH HYPERGLYCEMIA, WITH LONG-TERM CURRENT USE OF INSULIN (HCC): Primary | ICD-10-CM

## 2023-08-01 DIAGNOSIS — Z79.4 TYPE 2 DIABETES MELLITUS WITH HYPERGLYCEMIA, WITH LONG-TERM CURRENT USE OF INSULIN (HCC): Primary | ICD-10-CM

## 2023-08-01 NOTE — TELEPHONE ENCOUNTER
Pharmacy Progress Note - Diabetes Management    S/O: Ms. Darrin Kelly is a 52 y.o. female, referred by ROSA Garnett - NP, with a PMH of GERD, T2DM, obesity, HLD, tobacco abuse, anxiety, Bipolar 1 disorder, was seen today for diabetes management. Patient's last A1c was 8.1% (Aug 2022) which is decreased from 9.2% (June 2021). Visit completed via phone. Interim update: Pt was contacted via phone due to missing her f/up for DM management. She states that she has been tolerating the Mounjaro 12.5 mg dose, but she has not noticed any more weight loss. She has been increasing her physical activity and has ordered resistance bands to increase exercise. She also notes that while she's not eating candy in bed anymore, she is eating dinner late - between 7-9PM and is having meals higher in carbs. She notes that corn increases her BG rdgs. Weight when starting GLP-1 agonist: approx 245 lbs (Aug 2022)  Current Reported Wt: 214 lbs  Total Weight Loss: 31 lbs (12.6%)      Diabetes Management:  - Previous anti-hyperglycemic agents includes: Jardiance (fungal infxn), Metformin (diarrhea)    Current anti-hyperglycemic regimen includes:    - Toujeo U-300 46 units daily  - Mounjaro 12.5 mg weekly    ROS:  Today, Pt endorses:  - Symptoms of Hyperglycemia: none  - Symptoms of Hypoglycemia: none    Self Monitoring Blood Glucose (SMBG) or CGM:  - Brought in home glucometer/blood glucose log/CGM reader today:  yes          Vitals:   Wt Readings from Last 3 Encounters:   06/02/23 218 lb 6.4 oz (99.1 kg)   01/10/23 222 lb (100.7 kg)   12/23/22 225 lb (102.1 kg)     BP Readings from Last 3 Encounters:   01/10/23 116/83   09/12/22 103/71   08/25/22 100/68     Pulse Readings from Last 3 Encounters:   01/10/23 90   09/12/22 100   08/25/22 (!) 102       Past Medical History:   Diagnosis Date    Abnormal Pap smear of cervix     Acid indigestion     Cancer ([address]) 01/2015    choriocarcinoma  - UTERNE -

## 2023-08-05 ENCOUNTER — PATIENT MESSAGE (OUTPATIENT)
Age: 48
End: 2023-08-05

## 2023-08-05 DIAGNOSIS — F51.04 CHRONIC INSOMNIA: ICD-10-CM

## 2023-08-07 RX ORDER — ZOLPIDEM TARTRATE 5 MG/1
5 TABLET ORAL NIGHTLY PRN
Qty: 90 TABLET | Refills: 0 | Status: SHIPPED | OUTPATIENT
Start: 2023-08-07 | End: 2023-11-05

## 2023-08-07 NOTE — TELEPHONE ENCOUNTER
PCP: Rosalie Kimble, APRN - NP    Last appt: [unfilled]  Future Appointments   Date Time Provider [address] [place] [address]   10/2/2023  9:30 AM Anum Grove RPH PA[facility]IGUEL BS AMB       Requested Prescriptions     Pending Prescriptions Disp Refills    zolpidem (AMBIEN) 5 MG tablet 90 tablet 0     Sig: Take 1 tablet by mouth nightly as needed for Sleep for up to 90 days.  Max Daily Amount: 5 mg

## 2023-08-08 RX ORDER — BUSPIRONE HYDROCHLORIDE 10 MG/1
10 TABLET ORAL 2 TIMES DAILY PRN
Qty: 60 TABLET | Refills: 0 | Status: SHIPPED | OUTPATIENT
Start: 2023-08-08 | End: 2023-09-07

## 2023-08-09 RX ORDER — INSULIN GLARGINE 300 U/ML
INJECTION, SOLUTION SUBCUTANEOUS
Qty: 6 ML | Refills: 0 | Status: SHIPPED | OUTPATIENT
Start: 2023-08-09 | End: 2023-08-11

## 2023-08-09 NOTE — TELEPHONE ENCOUNTER
Spoke to pt informed her that  approved her Ambien. And that she needed to make a Diabetes' Follow-up with KOKO Charles,pt's scheduled to see KOKO Charles on 9.19.23 @ 11:45 AM.

## 2023-08-09 NOTE — TELEPHONE ENCOUNTER
PCP: Francisco Wallace APRN - NP    Last appt: 7/10/2023       Future Appointments   Date Time Provider [address]   10/2/2023  9:30 AM Anum Grove RPH PAFP BS AMB       Requested Prescriptions     Pending Prescriptions Disp Refills    YASMIN AMES SOLOSTAR 300 UNIT/ML SOPN [Pharmacy Med Name: Donte Schroeder SoloStar 300 UNIT/ML Subcutaneous Solution Pen-injector] 6 mL 0     Sig: INJECT 46 UNITS UNDER THE SKIN ONCE DAILY       Prior labs and Blood pressures:  BP Readings from Last 3 Encounters:   01/10/23 116/83   09/12/22 103/71   08/25/22 100/68     Lab Results   Component Value Date/Time     08/01/2022 02:58 PM    K 3.9 08/01/2022 02:58 PM     08/01/2022 02:58 PM    CO2 26 08/01/2022 02:58 PM    BUN 10 08/01/2022 02:58 PM    GFRAA >60 08/01/2022 02:58 PM     Lab Results   Component Value Date/Time    XQA6RZAB 7.6 02/02/2022 11:54 AM     Lab Results   Component Value Date/Time    CHOL 189 08/01/2022 02:58 PM    HDL 44 08/01/2022 02:58 PM     No results found for: VITD3, VD3RIA    Lab Results   Component Value Date/Time    TSH 1.03 06/22/2021 11:42 AM

## 2023-08-11 ENCOUNTER — TELEPHONE (OUTPATIENT)
Age: 48
End: 2023-08-11

## 2023-08-11 DIAGNOSIS — Z79.4 TYPE 2 DIABETES MELLITUS WITH HYPERGLYCEMIA, WITH LONG-TERM CURRENT USE OF INSULIN (HCC): Primary | ICD-10-CM

## 2023-08-11 DIAGNOSIS — E11.65 TYPE 2 DIABETES MELLITUS WITH HYPERGLYCEMIA, WITH LONG-TERM CURRENT USE OF INSULIN (HCC): Primary | ICD-10-CM

## 2023-08-11 RX ORDER — INSULIN GLARGINE 300 U/ML
20 INJECTION, SOLUTION SUBCUTANEOUS DAILY
Qty: 6 ML | Refills: 0
Start: 2023-08-11

## 2023-08-11 NOTE — TELEPHONE ENCOUNTER
Pharmacy Progress Note     Pt contacted the office stating that she had had a problem getting refills for Toujeo U-300 insulin. She had been without insulin x1 week. She states that her BG rdgs have been improved off insulin. She also notes her appetite has been reduced. Reviewed CGM data. Compared 1 week without insulin vs 1 week with all meds. Pt's CGM data were very similar. Data below is from being off Toujeo U-300 46 units dialy      Will have pt restart Toujeo U-300 back at 20 units instead of 46 units previously prescribed.     F/up in 1 week    Medications Discontinued During This Encounter   Medication Reason    TOUJEO MAX SOLOSTAR 300 UNIT/ML SOPN      Orders Placed This Encounter    Insulin Glargine, 2 Unit Dial, (TOUJEO MAX SOLOSTAR) 300 UNIT/ML SOPN     Sig: Inject 20 Units into the skin daily     Dispense:  6 mL     Refill:  0         Dulce Maria Bernardo, PharmD, BCGP  Clinical Pharmacist Specialist      For Pharmacy Admin Tracking Only    Program: Medical Group  CPA in place:  Yes  Recommendation Provided To: Patient/Caregiver: 1 via Telephone  Intervention Detail: Dose Adjustment: 1, reason: Therapy De-escalation  Intervention Accepted By: Patient/Caregiver: 1  Time Spent (min): 10

## 2023-08-11 NOTE — TELEPHONE ENCOUNTER
Pt called and requested callback  regarding her insulin.      [facility]# [phone]

## 2023-08-22 ENCOUNTER — SCHEDULED TELEPHONE ENCOUNTER (OUTPATIENT)
Age: 48
End: 2023-08-22

## 2023-08-22 DIAGNOSIS — Z79.4 TYPE 2 DIABETES MELLITUS WITH HYPERGLYCEMIA, WITH LONG-TERM CURRENT USE OF INSULIN (HCC): Primary | ICD-10-CM

## 2023-08-22 DIAGNOSIS — E11.65 TYPE 2 DIABETES MELLITUS WITH HYPERGLYCEMIA, WITH LONG-TERM CURRENT USE OF INSULIN (HCC): Primary | ICD-10-CM

## 2023-08-22 NOTE — PROGRESS NOTES
(Patient taking differently: Take 1 tablet by mouth daily FOR ACID REFLUX.)    pravastatin (PRAVACHOL) 40 MG tablet Take 1 tablet by mouth daily    QUEtiapine (SEROQUEL) 100 MG tablet Take 1 tablet by mouth daily    busPIRone (BUSPAR) 10 MG tablet Take 1 tablet by mouth daily    ibuprofen (ADVIL;MOTRIN) 800 MG tablet Take by mouth every 8 hours as needed     No current facility-administered medications for this visit. Lab Results   Component Value Date/Time     08/01/2022 02:58 PM    K 3.9 08/01/2022 02:58 PM     08/01/2022 02:58 PM    CO2 26 08/01/2022 02:58 PM    BUN 10 08/01/2022 02:58 PM    GFRAA >60 08/01/2022 02:58 PM    GLOB 3.2 08/01/2022 02:58 PM    ALT 36 08/01/2022 02:58 PM     Lab Results   Component Value Date/Time    CHOL 189 08/01/2022 02:58 PM    HDL 44 08/01/2022 02:58 PM     Lab Results   Component Value Date/Time    WBC 11.3 06/22/2021 11:42 AM    HGB 13.8 06/22/2021 11:42 AM    HCT 43.4 06/22/2021 11:42 AM     06/22/2021 11:42 AM    MCV 93.5 06/22/2021 11:42 AM       No results found for: MCA2, MCAU, MCAU2    Lab Results   Component Value Date    LABA1C 8.1 (H) 08/01/2022    LABA1C 9.2 (H) 06/22/2021    LABA1C 7.4 (H) 01/22/2021     Hemoglobin A1C, POC   Date Value Ref Range Status   02/02/2022 7.6 % Final           CrCl cannot be calculated (Patient's most recent lab result is older than the maximum 180 days allowed. ). A/P:    1) T2DM: Per ADA guidelines, Pt's A1c is not at goal of < 7%. Current SMBG(s)/CGM trend indicates significant improvement since changing food choices. She has eliminated bedtime snacking. She has maintained good control even with decreased insulin dose. She plans to increase Mounjaro to 15 mg when her current supply of 12.5 mg is completed. She asked about what else is on the market for further weight loss if she doesn't respond well with max dose of Mounjaro.   Emphasized her major improvement in BG rdgs, and discussed no other weight loss

## 2023-09-15 ENCOUNTER — OFFICE VISIT (OUTPATIENT)
Age: 48
End: 2023-09-15
Payer: COMMERCIAL

## 2023-09-15 VITALS
SYSTOLIC BLOOD PRESSURE: 111 MMHG | HEART RATE: 76 BPM | HEIGHT: 67 IN | WEIGHT: 214 LBS | BODY MASS INDEX: 33.59 KG/M2 | OXYGEN SATURATION: 98 % | RESPIRATION RATE: 16 BRPM | TEMPERATURE: 97.9 F | DIASTOLIC BLOOD PRESSURE: 75 MMHG

## 2023-09-15 DIAGNOSIS — Z23 ENCOUNTER FOR IMMUNIZATION: ICD-10-CM

## 2023-09-15 DIAGNOSIS — E11.65 UNCONTROLLED TYPE 2 DIABETES MELLITUS WITH HYPERGLYCEMIA (HCC): Primary | ICD-10-CM

## 2023-09-15 DIAGNOSIS — Z90.710 STATUS POST HYSTERECTOMY: ICD-10-CM

## 2023-09-15 DIAGNOSIS — N83.202 LEFT OVARIAN CYST: ICD-10-CM

## 2023-09-15 LAB
ALBUMIN SERPL-MCNC: 4.4 G/DL (ref 3.5–5)
ALBUMIN/GLOB SERPL: 1.4 (ref 1.1–2.2)
ALP SERPL-CCNC: 71 U/L (ref 45–117)
ALT SERPL-CCNC: 35 U/L (ref 12–78)
ANION GAP SERPL CALC-SCNC: 3 MMOL/L (ref 5–15)
AST SERPL-CCNC: 20 U/L (ref 15–37)
BILIRUB SERPL-MCNC: 0.4 MG/DL (ref 0.2–1)
BUN SERPL-MCNC: 14 MG/DL (ref 6–20)
BUN/CREAT SERPL: 16 (ref 12–20)
CALCIUM SERPL-MCNC: 10.4 MG/DL (ref 8.5–10.1)
CANCER AG125 SERPL-ACNC: 5 U/ML (ref 1.5–35)
CHLORIDE SERPL-SCNC: 106 MMOL/L (ref 97–108)
CHOLEST SERPL-MCNC: 152 MG/DL
CO2 SERPL-SCNC: 30 MMOL/L (ref 21–32)
CREAT SERPL-MCNC: 0.87 MG/DL (ref 0.55–1.02)
CREAT UR-MCNC: 35.8 MG/DL
EST. AVERAGE GLUCOSE BLD GHB EST-MCNC: 148 MG/DL
GLOBULIN SER CALC-MCNC: 3.2 G/DL (ref 2–4)
GLUCOSE SERPL-MCNC: 119 MG/DL (ref 65–100)
HBA1C MFR BLD: 6.8 % (ref 4–5.6)
HDLC SERPL-MCNC: 42 MG/DL
HDLC SERPL: 3.6 (ref 0–5)
LDLC SERPL CALC-MCNC: 52.4 MG/DL (ref 0–100)
MICROALBUMIN UR-MCNC: 0.52 MG/DL
MICROALBUMIN/CREAT UR-RTO: 15 MG/G (ref 0–30)
POTASSIUM SERPL-SCNC: 3.9 MMOL/L (ref 3.5–5.1)
PROT SERPL-MCNC: 7.6 G/DL (ref 6.4–8.2)
SODIUM SERPL-SCNC: 139 MMOL/L (ref 136–145)
TRIGL SERPL-MCNC: 288 MG/DL
VLDLC SERPL CALC-MCNC: 57.6 MG/DL

## 2023-09-15 PROCEDURE — 90471 IMMUNIZATION ADMIN: CPT | Performed by: NURSE PRACTITIONER

## 2023-09-15 PROCEDURE — 90674 CCIIV4 VAC NO PRSV 0.5 ML IM: CPT | Performed by: NURSE PRACTITIONER

## 2023-09-15 PROCEDURE — 99214 OFFICE O/P EST MOD 30 MIN: CPT | Performed by: NURSE PRACTITIONER

## 2023-09-15 ASSESSMENT — ENCOUNTER SYMPTOMS: SHORTNESS OF BREATH: 0

## 2023-09-15 NOTE — PROGRESS NOTES
Assessment/Plan:     1. Uncontrolled type 2 diabetes mellitus with hyperglycemia (HCC)  -     Hemoglobin A1C; Future  -      DIABETES FOOT EXAM  -     Microalbumin / Creatinine Urine Ratio; Future  -     Comprehensive Metabolic Panel; Future  -     Lipid Panel; Future  -     Hepatitis C Ab, Rflx to Qt by PCR; Future  2. Left ovarian cyst  -     US PELVIS COMPLETE; Future  -     ; Future  3. Status post hysterectomy  4. Encounter for immunization  -     VA IM ADM PRQ ID SUBQ/IM NJXS 1 VACCINE  -     Influenza, FLUCELVAX, (age 10 mo+), IM, PF, 0.5 mL       Discussed the addition of low glycemic carbohydrates and nonstarchy vegetables to her diet. Discussed a reduction in breads. Eye exam requested from [facility] today. No follow-ups on file. Discussed expected course/resolution/complications of diagnosis in detail with patient. Medication risks/benefits/costs/interactions/alternatives discussed with patient. Pt was given after visit summary which includes diagnoses, current medications & vitals. Pt expressed understanding with the diagnosis and plan          Subjective:      Keeley Gonsales is a 52 y.o. female who presents for had concerns including Follow-up and Blood Work. Cardiovascular Review:  The patient has diabetes, hypertension, hyperlipidemia, obesity, and tobacco abuse. Diet and Lifestyle: not attempting to follow a low fat, low cholesterol diet, walking regularly  Home BP Monitoring: is not measured at home. Pertinent ROS: taking medications as instructed, no medication side effects noted, no TIA's, no chest pain on exertion, no dyspnea on exertion, no swelling of ankles. Her sugars are 73% at goal.  Just initiated Mounjaro 15 mg last night. Remote history of choriocarcinoma. Previously saw Dr. Franck Evans, gynecology oncology, however she prefers not to return.     Patient Active Problem List   Diagnosis    Severe obesity ([address])    Chronic insomnia

## 2023-09-16 LAB
HCV AB SERPL QL IA: NORMAL
HCV IGG SERPL QL IA: NON REACTIVE S/CO RATIO

## 2023-10-10 ENCOUNTER — TELEPHONE (OUTPATIENT)
Age: 48
End: 2023-10-10

## 2023-10-10 NOTE — TELEPHONE ENCOUNTER
Chief Complaint   Patient presents with    Prior Authorization     Mounjaro 5MG/0.5ML pen-injectors: DENIED

## 2023-10-23 ENCOUNTER — SCHEDULED TELEPHONE ENCOUNTER (OUTPATIENT)
Age: 48
End: 2023-10-23

## 2023-10-23 DIAGNOSIS — E11.65 UNCONTROLLED TYPE 2 DIABETES MELLITUS WITH HYPERGLYCEMIA (HCC): Primary | ICD-10-CM

## 2023-10-23 NOTE — PROGRESS NOTES
Pharmacy Progress Note - Diabetes Management    S/O: Ms. Dale Norman is a 50 y.o. female, referred by Dr. Ryne Vazquez - KOKO, with a PMH of GERD, T2DM, obesity, HLD, tobacco abuse, anxiety, Bipolar 1 disorder, was seen today for diabetes management. Patient's last A1c was 6.8% (Sept 2023) which is decreased from 8.1% (Aug 2022). Visit completed via phone. Interim update: Pt was contacted via phone for f/up on Dm management. She shates she has completed 2 doses of Mounjaro 15 mg. Denies adverse effects. Endorses appetite suppression is lasting longer. She is feeling well overall and is able to eat smaller portions and choose to eat more vegetables in meals more easily. Reports her current weight as 205 lbs. She reports being off insulin. She will still take 10 units of Toujeo U300 if she has a BG >175 mg/dL. She might take long acting insulin 1x per week. Diabetes Management:  - Previous anti-hyperglycemic agents includes: Jardiance (fungal infxn), Metformin (diarrhea)    Current anti-hyperglycemic regimen includes:    - Mounjaro 15 mg weekly  - Toujeo U-300 20 units daily    ROS:  Today, Pt endorses:  - Symptoms of Hyperglycemia: none  - Symptoms of Hypoglycemia: none    Self Monitoring Blood Glucose (SMBG) or CGM:  - Brought in home glucometer/blood glucose log/CGM reader today:  yes            Vitals:   Wt Readings from Last 3 Encounters:   09/15/23 97.1 kg (214 lb)   09/07/23 98.9 kg (218 lb)   06/02/23 99.1 kg (218 lb 6.4 oz)     BP Readings from Last 3 Encounters:   09/15/23 111/75   01/10/23 116/83   09/12/22 103/71     Pulse Readings from Last 3 Encounters:   09/15/23 76   01/10/23 90   09/12/22 100       Past Medical History:   Diagnosis Date    Abnormal Pap smear of cervix     Acid indigestion     Cancer ([address]) 01/2015    choriocarcinoma  - UTERNE - surgery/chemo    Depression     Diabetes ([address])     Dysthymic disorder     GERD (gastroesophageal reflux disease)

## 2023-11-06 DIAGNOSIS — F51.04 CHRONIC INSOMNIA: ICD-10-CM

## 2023-11-06 NOTE — TELEPHONE ENCOUNTER
PCP: Socorro Charles, APRN - NP    Last appt: 10/23/2023       Future Appointments   Date Time Provider Department Center   11/7/2023  7:30 AM SPT US 1 [facility] [place] [facility]   1/16/2024  1:30 PM Anum Grove, RP PAFP BS AMB       Requested Prescriptions     Pending Prescriptions Disp Refills    busPIRone (BUSPAR) 10 MG tablet [Pharmacy Med Name: busPIRone HCl 10 MG Oral Tablet] 60 tablet 0     Sig: Take 1 tablet by mouth twice daily as needed       Prior labs and Blood pressures:  BP Readings from Last 3 Encounters:   09/15/23 111/75   01/10/23 116/83   09/12/22 103/71     Lab Results   Component Value Date/Time     09/15/2023 09:38 AM    K 3.9 09/15/2023 09:38 AM     09/15/2023 09:38 AM    CO2 30 09/15/2023 09:38 AM    BUN 14 09/15/2023 09:38 AM    GFRAA >60 08/01/2022 02:58 PM     Lab Results   Component Value Date/Time    [id number] 7.6 02/02/2022 11:54 AM     Lab Results   Component Value Date/Time    CHOL 152 09/15/2023 09:38 AM    HDL 42 09/15/2023 09:38 AM     No results found for: \"VITD3\", \"VD3RIA\"    Lab Results   Component Value Date/Time    TSH 1.03 06/22/2021 11:42 AM

## 2023-11-06 NOTE — TELEPHONE ENCOUNTER
PCP: Shiela Charles, APRN - NP    Last appt: 10/23/2023   Future Appointments   Date Time Provider [address]   11/7/2023  7:30 AM SPT US 1 [facility] [place] [facility]   1/16/2024  1:30 PM Anum Grove, JAIR PAFP BS AMB       Requested Prescriptions     Pending Prescriptions Disp Refills    QUEtiapine (SEROQUEL) 100 MG tablet [Pharmacy Med Name: QUEtiapine Fumarate 100 MG Oral Tablet] 90 tablet 0     Sig: Take 1 tablet by mouth once daily         Prior labs and Blood pressures:  BP Readings from Last 3 Encounters:   09/15/23 111/75   01/10/23 116/83   09/12/22 103/71     Lab Results   Component Value Date/Time     09/15/2023 09:38 AM    K 3.9 09/15/2023 09:38 AM     09/15/2023 09:38 AM    CO2 30 09/15/2023 09:38 AM    BUN 14 09/15/2023 09:38 AM    GFRAA >60 08/01/2022 02:58 PM     Lab Results   Component Value Date/Time    VEY6RHNG 7.6 02/02/2022 11:54 AM     Lab Results   Component Value Date/Time    CHOL 152 09/15/2023 09:38 AM    HDL 42 09/15/2023 09:38 AM     No results found for: \"VITD3\", \"VD3RIA\"    Lab Results   Component Value Date/Time    TSH 1.03 06/22/2021 11:42 AM

## 2023-11-06 NOTE — TELEPHONE ENCOUNTER
PCP: Lavanda Schaumann, APRN - NP    Last appt: 10/23/2023   Future Appointments   Date Time Provider [address]   11/7/2023  7:30 AM SPT US 1 [facility] [place] [facility]   1/16/2024  1:30 PM Anum Grove, JAIR PAFP BS AMB       Requested Prescriptions     Pending Prescriptions Disp Refills    zolpidem (AMBIEN) 5 MG tablet [Pharmacy Med Name: Zolpidem Tartrate 5 MG Oral Tablet] 90 tablet 0     Sig: TAKE 1 TABLET BY MOUTH NIGHTLY AS NEEDED FOR SLEEP FOR UP TO 90 DAYS.  MAX DAILY AMOUNT OF 5 MG         Prior labs and Blood pressures:  BP Readings from Last 3 Encounters:   09/15/23 111/75   01/10/23 116/83   09/12/22 103/71     Lab Results   Component Value Date/Time     09/15/2023 09:38 AM    K 3.9 09/15/2023 09:38 AM     09/15/2023 09:38 AM    CO2 30 09/15/2023 09:38 AM    BUN 14 09/15/2023 09:38 AM    GFRAA >60 08/01/2022 02:58 PM     Lab Results   Component Value Date/Time    PDY6KBAY 7.6 02/02/2022 11:54 AM     Lab Results   Component Value Date/Time    CHOL 152 09/15/2023 09:38 AM    HDL 42 09/15/2023 09:38 AM     No results found for: \"VITD3\", \"VD3RIA\"    Lab Results   Component Value Date/Time    TSH 1.03 06/22/2021 11:42 AM

## 2023-11-07 RX ORDER — BUSPIRONE HYDROCHLORIDE 10 MG/1
10 TABLET ORAL 2 TIMES DAILY PRN
Qty: 60 TABLET | Refills: 3 | Status: SHIPPED | OUTPATIENT
Start: 2023-11-07

## 2023-11-07 RX ORDER — ZOLPIDEM TARTRATE 5 MG/1
TABLET ORAL
Qty: 90 TABLET | Refills: 0 | Status: SHIPPED | OUTPATIENT
Start: 2023-11-07 | End: 2024-02-05

## 2023-11-07 RX ORDER — QUETIAPINE FUMARATE 100 MG/1
100 TABLET, FILM COATED ORAL DAILY
Qty: 90 TABLET | Refills: 0 | Status: SHIPPED | OUTPATIENT
Start: 2023-11-07

## 2023-11-07 RX ORDER — FENOFIBRATE 134 MG/1
134 CAPSULE ORAL DAILY
Qty: 90 CAPSULE | Refills: 1 | Status: SHIPPED | OUTPATIENT
Start: 2023-11-07

## 2023-11-07 RX ORDER — QUETIAPINE FUMARATE 100 MG/1
100 TABLET, FILM COATED ORAL DAILY
Qty: 90 TABLET | Refills: 1 | Status: SHIPPED | OUTPATIENT
Start: 2023-11-07

## 2023-11-07 NOTE — TELEPHONE ENCOUNTER
PCP: Maverick Charles, APRN - NP    Last appt: 10/23/2023   Future Appointments   Date Time Provider [address]   1/16/2024  1:30 PM Anum Grove RPH PAFP BS AMB       Requested Prescriptions     Pending Prescriptions Disp Refills    fenofibrate micronized (LOFIBRA) 134 MG capsule 90 capsule 1     Sig: Take 1 capsule by mouth daily    QUEtiapine (SEROQUEL) 100 MG tablet 90 tablet 1     Sig: Take 1 tablet by mouth daily         Prior labs and Blood pressures:  BP Readings from Last 3 Encounters:   09/15/23 111/75   01/10/23 116/83   09/12/22 103/71     Lab Results   Component Value Date/Time     09/15/2023 09:38 AM    K 3.9 09/15/2023 09:38 AM     09/15/2023 09:38 AM    CO2 30 09/15/2023 09:38 AM    BUN 14 09/15/2023 09:38 AM    GFRAA >60 08/01/2022 02:58 PM     Lab Results   Component Value Date/Time    [id number] 7.6 02/02/2022 11:54 AM     Lab Results   Component Value Date/Time    CHOL 152 09/15/2023 09:38 AM    HDL 42 09/15/2023 09:38 AM     No results found for: \"VITD3\", \"VD3RIA\"    Lab Results   Component Value Date/Time    TSH 1.03 06/22/2021 11:42 AM

## 2023-11-30 NOTE — TELEPHONE ENCOUNTER
----- Message from Satinder Manning sent at 11/30/2023  4:11 PM EST -----  Subject: Message to Provider    QUESTIONS  Information for Provider? Patient calling for Anum the Pharmacist and   would like her to call patient back asap  ---------------------------------------------------------------------------  --------------  [address]  [phone]; OK to leave message on voicemail  ---------------------------------------------------------------------------  --------------  SCRIPT ANSWERS  Relationship to Patient?  Self

## 2023-12-01 ENCOUNTER — TELEPHONE (OUTPATIENT)
Age: 48
End: 2023-12-01

## 2023-12-01 NOTE — TELEPHONE ENCOUNTER
Pharmacy Progress Note     This writer contacted pt's insurance to [place] [facility] Completed via phone. Clinical notes and labs sent via fax. Pt was updated via [employer]. There are no discontinued medications. No orders of the defined types were placed in this encounter.         Kole PerdueD, [facility]  Clinical Pharmacist Specialist      For Pharmacy Admin Tracking Only    Program: Medical Group  CPA in place:  Yes  Recommendation Provided To: Patient/Caregiver: 1 via Telephone and [employer] and Other: 1  Intervention Detail: Benefit Assistance  Intervention Accepted By: Patient/Caregiver: 1 and Other: 1  Time Spent (min): 20

## 2023-12-01 NOTE — TELEPHONE ENCOUNTER
NP Araceli,     Patient call stating [employer] has been sending request for PA for the Mounjaro 15mg.  (None seen via this end).    Contacted [facility] and rx rejecting as PA Required.  No other info provided.    Please call for updated PA.  (Patient has been on for while).      RX: 7/14/23 4 pens + 3 refills    ThanksKaela          For Pharmacy Admin Tracking Only    Program: Medication Refill  CPA in place:    Recommendation Provided To:   Intervention Detail: New Rx: 1, reason: Patient Preference  Intervention Accepted By:   Gap Closed?:    Time Spent (min): 5

## 2023-12-01 NOTE — TELEPHONE ENCOUNTER
Pharmacy Progress Note     Pt contacted this office asking for PA to be completed for Mounjaro.      This writer contacted insurance company via phone to provide clinical information.    Faxed clinical notes with reference # [id number].    Will have result within approx 72 hours.  Will be faxed notification of approval or denial.      There are no discontinued medications.  No orders of the defined types were placed in this encounter.        Kole YepezD, BCGP  Clinical Pharmacist Specialist      For Pharmacy Admin Tracking Only    Program: Medical Group  CPA in place:  Yes  Recommendation Provided To: Patient/Caregiver: 1 via Telephone and [employer] Message and Other: 1  Intervention Detail: Benefit Assistance  Intervention Accepted By: Patient/Caregiver: 1 and Other: 1  Time Spent (min): 20

## 2023-12-06 ENCOUNTER — TELEPHONE (OUTPATIENT)
Age: 48
End: 2023-12-06

## 2023-12-06 NOTE — TELEPHONE ENCOUNTER
PCP: Gali Charles, APRN - NP    Last appt: 10/23/2023   Future Appointments   Date Time Provider [address]   1/16/2024  1:30 PM Anum Grove RPH PAFP BS AMB       Requested Prescriptions     Pending Prescriptions Disp Refills    pravastatin (PRAVACHOL) 40 MG tablet [Pharmacy Med Name: Pravastatin Sodium 40 MG Oral Tablet] 90 tablet 0     Sig: Take 1 tablet by mouth once daily    pantoprazole (PROTONIX) 40 MG tablet [Pharmacy Med Name: Pantoprazole Sodium 40 MG Oral Tablet Delayed Release] 90 tablet 0     Sig: TAKE 1 TABLET BY MOUTH ONCE DAILY FOR ACID REFLUX. DISCONTINUE OMEPRAZOLE.     oxybutynin (DITROPAN-XL) 10 MG extended release tablet [Pharmacy Med Name: Oxybutynin Chloride ER 10 MG Oral Tablet Extended Release 24 Hour] 90 tablet 0     Sig: Take 1 tablet by mouth once daily         Prior labs and Blood pressures:  BP Readings from Last 3 Encounters:   09/15/23 111/75   01/10/23 116/83   09/12/22 103/71     Lab Results   Component Value Date/Time     09/15/2023 09:38 AM    K 3.9 09/15/2023 09:38 AM     09/15/2023 09:38 AM    CO2 30 09/15/2023 09:38 AM    BUN 14 09/15/2023 09:38 AM    GFRAA >60 08/01/2022 02:58 PM     Lab Results   Component Value Date/Time    GIF5YCDT 7.6 02/02/2022 11:54 AM     Lab Results   Component Value Date/Time    CHOL 152 09/15/2023 09:38 AM    HDL 42 09/15/2023 09:38 AM     No results found for: \"VITD3\", \"VD3RIA\"    Lab Results   Component Value Date/Time    TSH 1.03 06/22/2021 11:42 AM

## 2023-12-06 NOTE — TELEPHONE ENCOUNTER
Pt called requesting a call back from her pharmacist Anum. Pt says she checked with her pharmacy about medication refill for Mounjaro and pharmacy say there's no PA on file, and they are requesting more information about the pt such as recent labs and current medications she is taking. Best call back number [phone]

## 2023-12-07 RX ORDER — PANTOPRAZOLE SODIUM 40 MG/1
TABLET, DELAYED RELEASE ORAL
Qty: 90 TABLET | Refills: 0 | Status: SHIPPED | OUTPATIENT
Start: 2023-12-07

## 2023-12-07 RX ORDER — PRAVASTATIN SODIUM 40 MG
40 TABLET ORAL DAILY
Qty: 90 TABLET | Refills: 0 | Status: SHIPPED | OUTPATIENT
Start: 2023-12-07

## 2023-12-07 RX ORDER — OXYBUTYNIN CHLORIDE 10 MG/1
10 TABLET, EXTENDED RELEASE ORAL DAILY
Qty: 90 TABLET | Refills: 0 | Status: SHIPPED | OUTPATIENT
Start: 2023-12-07

## 2023-12-07 NOTE — TELEPHONE ENCOUNTER
Message from patient. Noted this has been ongoing since 11/30/23. Had requested PA and [place] had stated that:   Prior [place]  is being submitted. I am just waiting on Cover My[employer] to attach her notes and A1C. And noted [employer] has submitted info on 12/1/23. Not sure what happened after that? Still no PA on file per last message yesterday. Thanks, Kaela [facility]) 15 MG/0.5ML SOPN SC injection        Patient Comment: [employer] insurance rep, this needs to be expedited. ..    i need all of medical records since starting mounjaro to be sent to insurance, including AIC results for preauth. .it has been denying to to lack of information being sent to the insurance company     Preferred pharmacy: [address] [place] [address] [phone] - F [phone]

## 2023-12-08 ENCOUNTER — TELEPHONE (OUTPATIENT)
Age: 48
End: 2023-12-08

## 2023-12-08 NOTE — TELEPHONE ENCOUNTER
Patient call again today to see what is needed for her to obtain the Mounjaro.    Patient has 1 shot left- due tomorrow.      Asking to Please contact insurance to see what is needed for the PA.      Patient stated they are advising her to change to Ozempic-     Patient noted has had success with Mounjaro w/no side effects and does not want to change at this point!!  ( had terrible side effects w/Ozempic)    Noted the insurance stated they did not receive all of the paperwork/information.  Anum had faxed on 12/1/23    Patient asking for callback today.  [phone].  Can let me know and I will contact patient as well.    Thanks, Kaela

## 2023-12-11 ENCOUNTER — TELEPHONE (OUTPATIENT)
Age: 48
End: 2023-12-11

## 2023-12-11 NOTE — TELEPHONE ENCOUNTER
NOAH Cr    Contacted/Advised patient that she will need to fail two medications prior to renewal of mounjaro.  (Bydureon per hx)    Advised criteria may have changed since the approval w/last PA?    Patient is going to contact insurance again to see what she can do to appeal the denial.  Thanks, Kaela

## 2023-12-11 NOTE — TELEPHONE ENCOUNTER
Anum,    Patient has been speaking to her insurance this weekend and this PA was approved on 10/5/22 x 1 year.   on 10/5/23.    Patient has been told by insurance that this just needs updated and does not understand why this has not been approved?      Patient provided number to call:  Same info needs provided as previous PA.    [phone]    Please advise.  Patient has requested callback as soon as possible.  Thanks, Kaela      2022  System     S    22  6:12 AM  Note           Chief Complaint   Patient presents with    Prior Auth       Mounjaro 5MG/0.5ML pen-injectors:  APPROVED:  PA Case: [id number], Status: Approved, Coverage Starts on: 10/5/2022 12:00:00 AM, Coverage Ends on: 10/5/2023 12:00:00 AM.

## 2023-12-11 NOTE — TELEPHONE ENCOUNTER
NP Araceli,    Patient called back.      Stated she contacted insurance to appeal the denial and patient advised them that she has stomach issues - IBS - on and off and due to those issues, cannot try the other medications.      Insurance advised her to have NP Araceli or nurse to call this number to advise of this stomach issue within 24-72 hours:  [phone]    ThanksKaela      For Pharmacy Admin Tracking Only    Program: Medication Refill  CPA in place:    Recommendation Provided To:   Intervention Detail: New Rx: 1, reason: Patient Preference  Intervention Accepted By:   Gap Closed?:    Time Spent (min): 45

## 2023-12-12 NOTE — TELEPHONE ENCOUNTER
PCP: Teresa Charles, ROSA - NP      Future Appointments   Date Time Provider [facility] [address]   1/16/2024  1:30 PM Anum Grove RPH PAFP BS AMB       Requested Prescriptions     Pending Prescriptions Disp Refills    Tirzepatide (MOUNJARO) 15 MG/0.5ML SOPN SC injection 4 Adjustable Dose Pre-filled Pen Syringe 3     Sig: Inject 0.5 mLs into the skin once a week

## 2023-12-13 ENCOUNTER — TELEPHONE (OUTPATIENT)
Age: 48
End: 2023-12-13

## 2023-12-13 NOTE — TELEPHONE ENCOUNTER
KOKO Charles,    Patient call again and is asking if you can please call her. She has additional information about doing an appeal for the [employer]- and is asking to please call her.       [phone]    Thanks, Irma Collazo

## 2023-12-13 NOTE — TELEPHONE ENCOUNTER
Anum,    Patient is still trying to see if there is something that can be done for this Mounjaro PA.      She is requesting a callback from you today or she is calling the supervisor.  She has been waiting a callback for a week.    NP Araceli is stating that the diagnosis of IBS or stomach issues is not going to change anything and that she has to fail additional med:  Trulicity, Ozempic and Victoza has not been tried.      (Bydureon was first failed)    It is unfortunate that this was approved by PA a year ago but now cannot?      Please contact patient today to advise.    [phone]    Thanks, Kaela

## 2023-12-14 ENCOUNTER — TELEPHONE (OUTPATIENT)
Age: 48
End: 2023-12-14

## 2023-12-14 DIAGNOSIS — E11.65 UNCONTROLLED TYPE 2 DIABETES MELLITUS WITH HYPERGLYCEMIA (HCC): Primary | ICD-10-CM

## 2023-12-14 RX ORDER — DULAGLUTIDE 4.5 MG/.5ML
4.5 INJECTION, SOLUTION SUBCUTANEOUS WEEKLY
Qty: 2 ML | Refills: 1 | Status: SHIPPED | OUTPATIENT
Start: 2023-12-14

## 2023-12-14 NOTE — TELEPHONE ENCOUNTER
Pharmacy Progress Note     Pt contacted this writer concerning her Cappuccini PA. She would like an update. This writer contacted [employer] for update. PA was denied. She is required to try and fail 2 GLP-1 agonist therapies - Victoza, Ozempic, Trulicity - before Cappuccini can be approved. Pt was contacted and PA denial discussed. Sent in [employer] [facility]    F/up with PharmD in 1 month.     Medications Discontinued During This Encounter   Medication Reason    Tirzepatide (MOUNJARO) 15 MG/0.5ML SOPN SC injection Cost of medication     Orders Placed This Encounter    Dulaglutide (TRULICITY) 4.5 HY/5.0RR SOPN     Sig: Inject 4.5 mg into the skin once a week     Dispense:  2 mL     Refill:  1         Rocael Walden, [place], [place] [facility]  Clinical Pharmacist Specialist      For Pharmacy Admin Tracking Only    Program: Medical Group  CPA in place:  Yes  Recommendation Provided To: Patient/Caregiver: 3 via Telephone and Other: 3  Intervention Detail: Benefit Assistance, Discontinued Rx: 1, reason: Cost/Formulary Change, and New Rx: 1, reason: Cost/Formulary Change  Intervention Accepted By: Patient/Caregiver: 3 and Other: 3  Time Spent (min): 30

## 2023-12-14 NOTE — TELEPHONE ENCOUNTER
Already addressed with the pharmacist today. PA denied as she has not trialed two GLP therapies. She will complete a trial of Trulicity.

## 2024-01-12 ENCOUNTER — TELEPHONE (OUTPATIENT)
Age: 49
End: 2024-01-12

## 2024-01-12 DIAGNOSIS — E11.65 TYPE 2 DIABETES MELLITUS WITH HYPERGLYCEMIA, WITH LONG-TERM CURRENT USE OF INSULIN (HCC): ICD-10-CM

## 2024-01-12 DIAGNOSIS — Z79.4 TYPE 2 DIABETES MELLITUS WITH HYPERGLYCEMIA, WITH LONG-TERM CURRENT USE OF INSULIN (HCC): ICD-10-CM

## 2024-01-12 RX ORDER — INSULIN GLARGINE 300 U/ML
40 INJECTION, SOLUTION SUBCUTANEOUS DAILY
Qty: 6 ML | Refills: 1 | Status: SHIPPED | OUTPATIENT
Start: 2024-01-12

## 2024-01-12 NOTE — TELEPHONE ENCOUNTER
----- Message from Lucille Kimbrough sent at 1/12/2024 10:00 AM EST -----  Subject: Message to Provider    QUESTIONS  Information for Provider? Pt would like Anum, the pharmacist, to call   her back as soon as possible.  ---------------------------------------------------------------------------  --------------  CALL BACK INFO  [phone]; OK to leave message on voicemail  ---------------------------------------------------------------------------  --------------  SCRIPT ANSWERS  Relationship to Patient? Self

## 2024-01-12 NOTE — TELEPHONE ENCOUNTER
Pharmacy Progress Note     Pt calls and states she has not been taking her insulin for several weeks.  She states Trulicity is not holding her BG rdgs.  Rdgs are very high.    DM Regimen  - Trulicity 4.5 mg weekly  - Toujeo U-200 20-30 units daily - for the past few days        1) Uncontrolled BG: Pt's BG rdgs are uncontrolled .  Will increase insulin use and have pt inject same dose every day.  - INCREASE Toujeo U-200 to 40 units dialy  - Continue Trulicity 4.5 mg weekly    F/up with PharmD on 1/16/24    Medications Discontinued During This Encounter   Medication Reason    Insulin Glargine, 2 Unit Dial, (TOUJEO MAX SOLOSTAR) 300 UNIT/ML SOPN      Orders Placed This Encounter    Insulin Glargine, 2 Unit Dial, (TOUJEO MAX SOLOSTAR) 300 UNIT/ML SOPN     Sig: Inject 40 Units into the skin daily     Dispense:  6 mL     Refill:  1         Anum Grove PharmD, [facility]  Clinical Pharmacist Specialist      For Pharmacy Admin Tracking Only    Program: Medical Group  CPA in place:  Yes  Recommendation Provided To: Patient/Caregiver: 2 via Telephone  Intervention Detail: Dose Adjustment: 1, reason: Therapy Optimization and Refill(s) Provided  Intervention Accepted By: Patient/Caregiver: 2  Time Spent (min): 15

## 2024-01-12 NOTE — TELEPHONE ENCOUNTER
Lucille ([facility]) called states \" I sent a message, but pt wants message send ASAP. She wants to speak with Anum about her med but she wouldn't give details.\"    BCB# [phone]

## 2024-01-16 ENCOUNTER — TELEPHONE (OUTPATIENT)
Age: 49
End: 2024-01-16

## 2024-01-16 ENCOUNTER — SCHEDULED TELEPHONE ENCOUNTER (OUTPATIENT)
Age: 49
End: 2024-01-16

## 2024-01-16 DIAGNOSIS — Z79.4 TYPE 2 DIABETES MELLITUS WITH HYPERGLYCEMIA, WITH LONG-TERM CURRENT USE OF INSULIN (HCC): Primary | ICD-10-CM

## 2024-01-16 DIAGNOSIS — Z79.4 TYPE 2 DIABETES MELLITUS WITH HYPERGLYCEMIA, WITH LONG-TERM CURRENT USE OF INSULIN (HCC): ICD-10-CM

## 2024-01-16 DIAGNOSIS — E11.65 TYPE 2 DIABETES MELLITUS WITH HYPERGLYCEMIA, WITH LONG-TERM CURRENT USE OF INSULIN (HCC): ICD-10-CM

## 2024-01-16 DIAGNOSIS — E11.65 TYPE 2 DIABETES MELLITUS WITH HYPERGLYCEMIA, WITH LONG-TERM CURRENT USE OF INSULIN (HCC): Primary | ICD-10-CM

## 2024-01-16 NOTE — TELEPHONE ENCOUNTER
Pt called to speak with Anum. Pt hoping to receive a call back from Anum today.    Best call back number   [phone]

## 2024-01-16 NOTE — PROGRESS NOTES
days.    ergocalciferol (ERGOCALCIFEROL) 1.25 MG (31597 UT) capsule Take 1 capsule by mouth once a week    ibuprofen (ADVIL;MOTRIN) 800 MG tablet Take by mouth every 8 hours as needed     No current facility-administered medications for this visit.     Lab Results   Component Value Date/Time     09/15/2023 09:38 AM    K 3.9 09/15/2023 09:38 AM     09/15/2023 09:38 AM    CO2 30 09/15/2023 09:38 AM    BUN 14 09/15/2023 09:38 AM    GFRAA >60 08/01/2022 02:58 PM    GLOB 3.2 09/15/2023 09:38 AM    ALT 35 09/15/2023 09:38 AM     Lab Results   Component Value Date/Time    CHOL 152 09/15/2023 09:38 AM    HDL 42 09/15/2023 09:38 AM     Lab Results   Component Value Date/Time    WBC 11.3 06/22/2021 11:42 AM    HGB 13.8 06/22/2021 11:42 AM    HCT 43.4 06/22/2021 11:42 AM     06/22/2021 11:42 AM    MCV 93.5 06/22/2021 11:42 AM       No results found for: \"MCA2\", \"MCAU\", \"MCAU2\"  Lab Results   Component Value Date/Time    MICROALBUR 2.36 01/22/2021 11:53 AM    LABCREA 35.80 09/15/2023 09:38 AM       Lab Results   Component Value Date    LABA1C 6.8 (H) 09/15/2023    LABA1C 8.1 (H) 08/01/2022    LABA1C 9.2 (H) 06/22/2021     Hemoglobin A1C, POC   Date Value Ref Range Status   02/02/2022 7.6 % Final           CrCl cannot be calculated (Unknown ideal weight.).      A/P:    1) T2DM: Per ADA guidelines, Pt's A1c is at goal of < 7%.  Current SMBG(s)/CGM trend indicates significantly uncontrolled rdgs.  BG rdgs have not been controlled since switching to Trulicity.  She is on max dose.  Will consider new GLP-1 agonist now.  Pt wants to avoid Ozempic.  Will start Vicotza.  Will also increase long acting insulin dose as rdgs are severely uncontrolled.  - STOP Trulicity  - START Victoza 1.8 mg daily  - INCREASE Toujeo U-300 to 46 units daily      Medication reconciliation was completed during the visit.    There are no discontinued medications.  No orders of the defined types were placed in this encounter.      Patient

## 2024-01-17 RX ORDER — INSULIN GLARGINE 300 U/ML
46 INJECTION, SOLUTION SUBCUTANEOUS DAILY
Qty: 6 ML | Refills: 1
Start: 2024-01-17

## 2024-01-17 RX ORDER — TIRZEPATIDE 15 MG/.5ML
15 INJECTION, SOLUTION SUBCUTANEOUS WEEKLY
Qty: 2 ML | Refills: 3
Start: 2024-01-17

## 2024-01-17 NOTE — TELEPHONE ENCOUNTER
Pharmacy Progress Note     Pt contacted this writer via phone and states she spoke with her pharmacy and found that Victoza was on backorder and wouldn't be available for several months.    She contacted her insurance company to ask about alternatives.  Insurance rep states Mounjaro was approved with a $30 copay.  No PA required.    Pt contacted pharmacy and asked to have Mounjaro Rx run.  Med went thru without issue.  Pt is picking up today.    Pt restarting Mounjaro 15 mg weekly instead of Trulicity or Victoza alternatives.    Med list was updated to accurately reflect what she is taking.      Medications Discontinued During This Encounter   Medication Reason    Dulaglutide (TRULICITY) 4.5 MG/0.5ML SOPN Therapy completed     Orders Placed This Encounter    MOUNJARO 15 MG/0.5ML SOPN SC injection     Sig: Inject 0.5 mLs into the skin once a week     Dispense:  2 mL     Refill:  3         Anum Zentgraf, PharmD, BCGP  Clinical Pharmacist Specialist      For Pharmacy Admin Tracking Only    Program: Medical Group  CPA in place:  Yes  Recommendation Provided To: Patient/Caregiver: 3 via Telephone and Other: 3  Intervention Detail: Benefit Assistance, Discontinued Rx: 1, reason: Patient Preference, and New Rx: 1, reason: Needs Additional Therapy, Patient Preference  Intervention Accepted By: Patient/Caregiver: 3 and Other: 3  Time Spent (min): 10

## 2024-01-27 DIAGNOSIS — F51.04 CHRONIC INSOMNIA: ICD-10-CM

## 2024-01-29 DIAGNOSIS — F51.04 CHRONIC INSOMNIA: ICD-10-CM

## 2024-01-29 NOTE — TELEPHONE ENCOUNTER
PCP: Socorro Charles, APRN - NP    Last appt: 1/16/2024   Future Appointments   Date Time Provider Department Center   2/12/2024  1:30 PM Anum Grove RPH PAFP BS AMB       Requested Prescriptions     Pending Prescriptions Disp Refills    zolpidem (AMBIEN) 5 MG tablet [Pharmacy Med Name: Zolpidem Tartrate 5 MG Oral Tablet] 90 tablet 0     Sig: TAKE 1 TABLET BY MOUTH IN THE EVENING AS NEEDED FOR SLEEP FOR UP TO 90 DAYS.         Prior labs and Blood pressures:  BP Readings from Last 3 Encounters:   09/15/23 111/75   01/10/23 116/83   09/12/22 103/71     Lab Results   Component Value Date/Time     09/15/2023 09:38 AM    K 3.9 09/15/2023 09:38 AM     09/15/2023 09:38 AM    CO2 30 09/15/2023 09:38 AM    BUN 14 09/15/2023 09:38 AM    GFRAA >60 08/01/2022 02:58 PM     Lab Results   Component Value Date/Time    [id number] 7.6 02/02/2022 11:54 AM     Lab Results   Component Value Date/Time    CHOL 152 09/15/2023 09:38 AM    HDL 42 09/15/2023 09:38 AM     No results found for: \"VITD3\", \"VD3RIA\"    Lab Results   Component Value Date/Time    TSH 1.03 06/22/2021 11:42 AM

## 2024-01-30 RX ORDER — ZOLPIDEM TARTRATE 5 MG/1
TABLET ORAL
Qty: 90 TABLET | Refills: 0 | Status: SHIPPED | OUTPATIENT
Start: 2024-01-30 | End: 2024-04-29

## 2024-01-30 NOTE — TELEPHONE ENCOUNTER
PCP: Socorro Charles APRN - NP      Future Appointments   Date Time Provider Department Center   2/12/2024  1:30 PM Anum Grove RPH PAFP BS AMB       Requested Prescriptions     Pending Prescriptions Disp Refills    zolpidem (AMBIEN) 5 MG tablet 90 tablet 0

## 2024-01-31 NOTE — TELEPHONE ENCOUNTER
PCP: Socorro Charles, APRN - NP    Last appt: 1/16/2024       Future Appointments   Date Time Provider Department Center   2/12/2024  1:30 PM Anum Grove RPH PAFP BS AMB       Requested Prescriptions     Pending Prescriptions Disp Refills    QUEtiapine (SEROQUEL) 100 MG tablet 90 tablet 1     Sig: Take 1 tablet by mouth daily       Prior labs and Blood pressures:  BP Readings from Last 3 Encounters:   09/15/23 111/75   01/10/23 116/83   09/12/22 103/71     Lab Results   Component Value Date/Time     09/15/2023 09:38 AM    K 3.9 09/15/2023 09:38 AM     09/15/2023 09:38 AM    CO2 30 09/15/2023 09:38 AM    BUN 14 09/15/2023 09:38 AM    GFRAA >60 08/01/2022 02:58 PM     Lab Results   Component Value Date/Time    YYP1RCSP 7.6 02/02/2022 11:54 AM     Lab Results   Component Value Date/Time    CHOL 152 09/15/2023 09:38 AM    HDL 42 09/15/2023 09:38 AM     No results found for: \"VITD3\", \"VD3RIA\"    Lab Results   Component Value Date/Time    TSH 1.03 06/22/2021 11:42 AM

## 2024-02-01 RX ORDER — QUETIAPINE FUMARATE 100 MG/1
100 TABLET, FILM COATED ORAL DAILY
Qty: 90 TABLET | Refills: 1 | Status: SHIPPED | OUTPATIENT
Start: 2024-02-01

## 2024-02-01 RX ORDER — ZOLPIDEM TARTRATE 5 MG/1
TABLET ORAL
Qty: 90 TABLET | Refills: 0 | OUTPATIENT
Start: 2024-02-01 | End: 2024-05-01

## 2024-02-09 DIAGNOSIS — E11.65 TYPE 2 DIABETES MELLITUS WITH HYPERGLYCEMIA, WITH LONG-TERM CURRENT USE OF INSULIN (HCC): ICD-10-CM

## 2024-02-09 DIAGNOSIS — Z79.4 TYPE 2 DIABETES MELLITUS WITH HYPERGLYCEMIA, WITH LONG-TERM CURRENT USE OF INSULIN (HCC): ICD-10-CM

## 2024-02-09 NOTE — TELEPHONE ENCOUNTER
PCP: Socorro Charles, APRN - NP    Last appt: 1/16/2024   Future Appointments   Date Time Provider Department Center   2/12/2024  1:30 PM Anum Grove RPH PAFP BS AMB       Requested Prescriptions     Pending Prescriptions Disp Refills    MOUNJARO 15 MG/0.5ML SOPN SC injection [Pharmacy Med Name: Mounjaro 15 MG/0.5ML Subcutaneous Solution Pen-injector] 4 mL 0     Sig: INJECT 15 MG SUBCUTANEOUSLY  ONCE A WEEK         Prior labs and Blood pressures:  BP Readings from Last 3 Encounters:   09/15/23 111/75   01/10/23 116/83   09/12/22 103/71     Lab Results   Component Value Date/Time     09/15/2023 09:38 AM    K 3.9 09/15/2023 09:38 AM     09/15/2023 09:38 AM    CO2 30 09/15/2023 09:38 AM    BUN 14 09/15/2023 09:38 AM    GFRAA >60 08/01/2022 02:58 PM     Lab Results   Component Value Date/Time    [id number] 7.6 02/02/2022 11:54 AM     Lab Results   Component Value Date/Time    CHOL 152 09/15/2023 09:38 AM    HDL 42 09/15/2023 09:38 AM     No results found for: \"VITD3\", \"VD3RIA\"    Lab Results   Component Value Date/Time    TSH 1.03 06/22/2021 11:42 AM

## 2024-02-12 ENCOUNTER — SCHEDULED TELEPHONE ENCOUNTER (OUTPATIENT)
Age: 49
End: 2024-02-12

## 2024-02-12 DIAGNOSIS — Z79.4 TYPE 2 DIABETES MELLITUS WITH HYPERGLYCEMIA, WITH LONG-TERM CURRENT USE OF INSULIN (HCC): Primary | ICD-10-CM

## 2024-02-12 DIAGNOSIS — E11.65 TYPE 2 DIABETES MELLITUS WITH HYPERGLYCEMIA, WITH LONG-TERM CURRENT USE OF INSULIN (HCC): Primary | ICD-10-CM

## 2024-02-12 RX ORDER — TIRZEPATIDE 15 MG/.5ML
15 INJECTION, SOLUTION SUBCUTANEOUS WEEKLY
Qty: 12 ADJUSTABLE DOSE PRE-FILLED PEN SYRINGE | Refills: 1 | Status: SHIPPED | OUTPATIENT
Start: 2024-02-12

## 2024-02-12 RX ORDER — TIRZEPATIDE 15 MG/.5ML
INJECTION, SOLUTION SUBCUTANEOUS
Qty: 4 ML | Refills: 3 | Status: SHIPPED | OUTPATIENT
Start: 2024-02-12 | End: 2024-02-12 | Stop reason: SDUPTHER

## 2024-02-12 RX ORDER — PRAVASTATIN SODIUM 40 MG
40 TABLET ORAL DAILY
Qty: 90 TABLET | Refills: 0 | Status: SHIPPED | OUTPATIENT
Start: 2024-02-12

## 2024-02-12 NOTE — PROGRESS NOTES
09/15/2023 09:38 AM    ALT 35 09/15/2023 09:38 AM     Lab Results   Component Value Date/Time    CHOL 152 09/15/2023 09:38 AM    HDL 42 09/15/2023 09:38 AM     Lab Results   Component Value Date/Time    WBC 11.3 06/22/2021 11:42 AM    HGB 13.8 06/22/2021 11:42 AM    HCT 43.4 06/22/2021 11:42 AM     06/22/2021 11:42 AM    MCV 93.5 06/22/2021 11:42 AM       No results found for: \"MCA2\", \"MCAU\", \"MCAU2\"  Lab Results   Component Value Date/Time    MICROALBUR 2.36 01/22/2021 11:53 AM    LABCREA 35.80 09/15/2023 09:38 AM       Lab Results   Component Value Date    LABA1C 6.8 (H) 09/15/2023    LABA1C 8.1 (H) 08/01/2022    LABA1C 9.2 (H) 06/22/2021     Hemoglobin A1C, POC   Date Value Ref Range Status   02/02/2022 7.6 % Final           CrCl cannot be calculated (Unknown ideal weight.).      A/P:    1) T2DM: Per ADA guidelines, Pt's A1c is at goal of < 7%.  Current SMBG(s)/CGM trend indicates elevated rdgs; however, rdgs have improved since restarting Mounjaro.  Pt self stopped Toujeo insulin.  Med list updated.  Pt will continue with current med.  She asked about alternatives or higher doses.  Discussed that there were no higher doses and no other meds similar out on the market yet.  - STOP Toujeo  - Continue Mounjaro 15 mg weekly  - Refill provided for statin and Mounjaro - 90 ds      Medication reconciliation was completed during the visit.    There are no discontinued medications.  No orders of the defined types were placed in this encounter.      Patient verbalized understanding of the information presented and all of the patient’s questions were answered.  AVS was handed to the patient. Patient advised to call the office with any additional questions or concerns.    Notifications of recommendations will be sent to Socorro Lee, ROSA - NP for review.      Anum Grove, PharmD, BCGP  Clinical Pharmacist Specialist          For Pharmacy Admin Tracking Only    Program: Medical Group  CPA in place:

## 2024-02-17 DIAGNOSIS — F51.04 CHRONIC INSOMNIA: ICD-10-CM

## 2024-02-17 DIAGNOSIS — E11.65 TYPE 2 DIABETES MELLITUS WITH HYPERGLYCEMIA, WITH LONG-TERM CURRENT USE OF INSULIN (HCC): ICD-10-CM

## 2024-02-17 DIAGNOSIS — Z79.4 TYPE 2 DIABETES MELLITUS WITH HYPERGLYCEMIA, WITH LONG-TERM CURRENT USE OF INSULIN (HCC): ICD-10-CM

## 2024-02-21 RX ORDER — FENOFIBRATE 134 MG/1
134 CAPSULE ORAL DAILY
Qty: 90 CAPSULE | Refills: 1 | Status: SHIPPED | OUTPATIENT
Start: 2024-02-21

## 2024-02-21 RX ORDER — FENOFIBRATE 134 MG/1
134 CAPSULE ORAL DAILY
Qty: 90 CAPSULE | Refills: 0 | OUTPATIENT
Start: 2024-02-21

## 2024-02-21 RX ORDER — OXYBUTYNIN CHLORIDE 10 MG/1
10 TABLET, EXTENDED RELEASE ORAL DAILY
Qty: 90 TABLET | Refills: 0 | OUTPATIENT
Start: 2024-02-21

## 2024-02-21 RX ORDER — ZOLPIDEM TARTRATE 5 MG/1
TABLET ORAL
Qty: 90 TABLET | Refills: 0 | OUTPATIENT
Start: 2024-02-21 | End: 2024-05-21

## 2024-02-21 RX ORDER — BUSPIRONE HYDROCHLORIDE 10 MG/1
TABLET ORAL
Qty: 60 TABLET | Refills: 0 | OUTPATIENT
Start: 2024-02-21

## 2024-02-21 RX ORDER — OXYBUTYNIN CHLORIDE 10 MG/1
10 TABLET, EXTENDED RELEASE ORAL DAILY
Qty: 90 TABLET | Refills: 1 | Status: SHIPPED | OUTPATIENT
Start: 2024-02-21

## 2024-02-21 RX ORDER — PRAVASTATIN SODIUM 40 MG
40 TABLET ORAL DAILY
Qty: 90 TABLET | Refills: 0 | OUTPATIENT
Start: 2024-02-21

## 2024-02-21 RX ORDER — BUSPIRONE HYDROCHLORIDE 10 MG/1
10 TABLET ORAL 2 TIMES DAILY PRN
Qty: 60 TABLET | Refills: 3 | Status: SHIPPED | OUTPATIENT
Start: 2024-02-21

## 2024-02-21 RX ORDER — PANTOPRAZOLE SODIUM 40 MG/1
TABLET, DELAYED RELEASE ORAL
Qty: 90 TABLET | Refills: 0 | OUTPATIENT
Start: 2024-02-21

## 2024-02-21 NOTE — TELEPHONE ENCOUNTER
Subjective:      Patient ID: Talya Bates is a 60 y.o. female.    Chief Complaint: Side Pain    See MA note.  She needed to move a table in order to mop her floor. As she lifted and turned the table to her side, she felt pain to her right lower anterior ribcage that radiates around to her back and up to the right axilla area. Denies any direct trauma to the area.    Patient's place of employment - [employer]  Patient's job title -   Date of injury - 10/23/2023  Body part injured including left or right - Right side  Injury Mechanism - Pull/Strain  What they were doing when they got hurt - Moving a table   What they did immediately after - Continued to work. then the pain started and she was unable to continue to work.  Pain scale right now - 10/10      Constitution: Positive for activity change.   HENT: Negative.     Neck: neck negative.   Eyes: Negative.    Respiratory: Negative.     Gastrointestinal: Negative.    Musculoskeletal:  Positive for pain and muscle ache.   Skin: Negative.      Objective:     Physical Exam  Vitals and nursing note reviewed.   Constitutional:       General: She is not in acute distress.     Appearance: Normal appearance.   HENT:      Head: Normocephalic.   Eyes:      General: Lids are normal.      Conjunctiva/sclera: Conjunctivae normal.   Pulmonary:      Effort: Pulmonary effort is normal.      Breath sounds: Normal breath sounds and air entry.          Comments: No gross deformity noted to the thoracic spine.  Areas of mild tenderness on palpation to the right lower anterior ribcage and right axillary area.  Moderate tenderness to the right upper and lower thoracic spine areas to palpation.  Pain to lower right thoracic area with side bending/rotation greater than flexion.  Some difficulty standing up from a seated position secondary to pain to her right lower ribcage and right lower thoracic area.  Chest:       Musculoskeletal:      Right shoulder: Normal. No  REFLUX (DISCONTINUE OMEPRAZOLE)     Refused By: MINAL CRUZ     Reason for Refusal: Other          swelling or deformity. Normal range of motion.      Left shoulder: Normal. No swelling or deformity. Normal range of motion.      Cervical back: Full passive range of motion without pain and normal range of motion.      Thoracic back: Spasms and tenderness present. Decreased range of motion.   Skin:     General: Skin is warm.      Capillary Refill: Capillary refill takes less than 2 seconds.   Neurological:      General: No focal deficit present.      Mental Status: She is alert and oriented to person, place, and time.      Deep Tendon Reflexes: Reflexes are normal and symmetric.   Psychiatric:         Attention and Perception: Attention normal.         Mood and Affect: Mood and affect normal.         Speech: Speech normal.         Behavior: Behavior normal. Behavior is cooperative.        Assessment:      1. Muscle strain of chest wall, initial encounter    2. Thoracic myofascial strain, initial encounter      Plan:   She appears to be uncomfortable when transitioning positions and with upper body movement.  I have asked her not to return to work today and returned to our clinic tomorrow for re-evaluation.  She does have a history of chronic kidney disease that precludes use of anti-inflammatory.  Thus I will prescribe her with Tylenol and a muscle relaxer to see if this will help with her symptoms.  Again no work today and we will reassess her status tomorrow    I spent a total of 45 minutes on the day of the visit.This includes face to face time and non-face to face time preparing to see the patient (eg, review of tests), obtaining and/or reviewing separately obtained history, documenting clinical information in the electronic or other health record, independently interpreting results and communicating results to the patient/family/caregiver, or care coordinator.      Medications Ordered This Encounter   Medications    acetaminophen (TYLENOL) 500 MG tablet     Sig: Take 2 tablets (1,000 mg total) by mouth every 8  (eight) hours as needed for Pain.     Dispense:  30 tablet     Refill:  0    methocarbamoL (ROBAXIN) 500 MG Tab     Sig: Take one tablet every 8 hours PRN pain/spasm     Dispense:  40 tablet     Refill:  0         Restrictions: Disabled until next office visit, Home today  Follow up in about 1 day (around 10/24/2023).

## 2024-02-24 DIAGNOSIS — F51.04 CHRONIC INSOMNIA: ICD-10-CM

## 2024-02-26 NOTE — TELEPHONE ENCOUNTER
PCP: Socorro Charles APRN - NP    Last appt: 2/12/2024   Future Appointments   Date Time Provider Department Center   3/19/2024  1:00 PM Socorro Charles APRN - NP PAFP BS AMB       Requested Prescriptions     Pending Prescriptions Disp Refills    QUEtiapine (SEROQUEL) 100 MG tablet 90 tablet 0     Sig: Take 1 tablet by mouth daily    pantoprazole (PROTONIX) 40 MG tablet 90 tablet 0     Sig: TAKE 1 TABLET BY MOUTH ONCE DAILY FOR ACID REFLUX.  DISCONTINUE OMEPRAZOLE.    zolpidem (AMBIEN) 5 MG tablet 90 tablet 0    QUEtiapine (SEROQUEL) 100 MG tablet 90 tablet 1     Sig: Take 1 tablet by mouth daily    busPIRone (BUSPAR) 10 MG tablet 60 tablet 3     Sig: Take 1 tablet by mouth 2 times daily as needed (anxiety)    oxyBUTYnin (DITROPAN-XL) 10 MG extended release tablet 90 tablet 1     Sig: Take 1 tablet by mouth daily         Prior labs and Blood pressures:  BP Readings from Last 3 Encounters:   09/15/23 111/75   01/10/23 116/83   09/12/22 103/71     Lab Results   Component Value Date/Time     09/15/2023 09:38 AM    K 3.9 09/15/2023 09:38 AM     09/15/2023 09:38 AM    CO2 30 09/15/2023 09:38 AM    BUN 14 09/15/2023 09:38 AM    GFRAA >60 08/01/2022 02:58 PM     Lab Results   Component Value Date/Time    [id number] 7.6 02/02/2022 11:54 AM     Lab Results   Component Value Date/Time    CHOL 152 09/15/2023 09:38 AM    HDL 42 09/15/2023 09:38 AM     No results found for: \"VITD3\", \"VD3RIA\"    Lab Results   Component Value Date/Time    TSH 1.03 06/22/2021 11:42 AM

## 2024-02-27 RX ORDER — OXYBUTYNIN CHLORIDE 10 MG/1
10 TABLET, EXTENDED RELEASE ORAL DAILY
Qty: 90 TABLET | Refills: 1 | OUTPATIENT
Start: 2024-02-27

## 2024-02-27 RX ORDER — PANTOPRAZOLE SODIUM 40 MG/1
TABLET, DELAYED RELEASE ORAL
Qty: 90 TABLET | Refills: 0 | Status: SHIPPED | OUTPATIENT
Start: 2024-02-27

## 2024-02-27 RX ORDER — QUETIAPINE FUMARATE 100 MG/1
100 TABLET, FILM COATED ORAL DAILY
Qty: 90 TABLET | Refills: 0 | Status: SHIPPED | OUTPATIENT
Start: 2024-02-27

## 2024-02-27 RX ORDER — QUETIAPINE FUMARATE 100 MG/1
100 TABLET, FILM COATED ORAL DAILY
Qty: 90 TABLET | Refills: 1 | Status: SHIPPED | OUTPATIENT
Start: 2024-02-27

## 2024-02-27 RX ORDER — ZOLPIDEM TARTRATE 5 MG/1
TABLET ORAL
Qty: 90 TABLET | Refills: 0 | Status: SHIPPED | OUTPATIENT
Start: 2024-02-27 | End: 2024-05-10

## 2024-02-27 RX ORDER — BUSPIRONE HYDROCHLORIDE 10 MG/1
10 TABLET ORAL 2 TIMES DAILY PRN
Qty: 60 TABLET | Refills: 3 | Status: SHIPPED | OUTPATIENT
Start: 2024-02-27

## 2024-02-27 NOTE — TELEPHONE ENCOUNTER
She needs to establish with psychiatry for further management of these.  Recommend [facility] on [address].

## 2024-03-11 ENCOUNTER — APPOINTMENT (OUTPATIENT)
Facility: HOSPITAL | Age: 49
End: 2024-03-11
Payer: COMMERCIAL

## 2024-03-11 ENCOUNTER — HOSPITAL ENCOUNTER (EMERGENCY)
Facility: HOSPITAL | Age: 49
Discharge: HOME | End: 2024-03-11
Attending: EMERGENCY MEDICINE
Payer: COMMERCIAL

## 2024-03-11 VITALS
RESPIRATION RATE: 12 BRPM | OXYGEN SATURATION: 94 % | TEMPERATURE: 98.3 F | SYSTOLIC BLOOD PRESSURE: 130 MMHG | HEART RATE: 80 BPM | DIASTOLIC BLOOD PRESSURE: 79 MMHG

## 2024-03-11 DIAGNOSIS — J40 BRONCHITIS: ICD-10-CM

## 2024-03-11 DIAGNOSIS — R06.02 SHORTNESS OF BREATH: Primary | ICD-10-CM

## 2024-03-11 LAB
ALBUMIN SERPL-MCNC: 3.9 G/DL (ref 3.5–5)
ALBUMIN/GLOB SERPL: 1 (ref 1.1–2.2)
ALP SERPL-CCNC: 75 U/L (ref 45–117)
ALT SERPL-CCNC: 25 U/L (ref 12–78)
ANION GAP SERPL CALC-SCNC: 16 MMOL/L (ref 5–15)
AST SERPL-CCNC: 16 U/L (ref 15–37)
BASOPHILS # BLD: 0 K/UL (ref 0–0.1)
BASOPHILS NFR BLD: 0 % (ref 0–1)
BILIRUB SERPL-MCNC: 0.6 MG/DL (ref 0.2–1)
BUN SERPL-MCNC: 15 MG/DL (ref 6–20)
BUN/CREAT SERPL: 14 (ref 12–20)
CALCIUM SERPL-MCNC: 9.5 MG/DL (ref 8.5–10.1)
CHLORIDE SERPL-SCNC: 105 MMOL/L (ref 97–108)
CO2 SERPL-SCNC: 20 MMOL/L (ref 21–32)
CREAT SERPL-MCNC: 1.09 MG/DL (ref 0.55–1.02)
D DIMER PPP FEU-MCNC: <0.19 MG/L FEU (ref 0–0.65)
DIFFERENTIAL METHOD BLD: ABNORMAL
EOSINOPHIL # BLD: 0 K/UL (ref 0–0.4)
EOSINOPHIL NFR BLD: 0 % (ref 0–7)
ERYTHROCYTE [DISTWIDTH] IN BLOOD BY AUTOMATED COUNT: 12.9 % (ref 11.5–14.5)
GLOBULIN SER CALC-MCNC: 3.9 G/DL (ref 2–4)
GLUCOSE SERPL-MCNC: 237 MG/DL (ref 65–100)
HCT VFR BLD AUTO: 44.6 % (ref 35–47)
HGB BLD-MCNC: 15.8 G/DL (ref 11.5–16)
IMM GRANULOCYTES # BLD AUTO: 0.1 K/UL (ref 0–0.04)
IMM GRANULOCYTES NFR BLD AUTO: 1 % (ref 0–0.5)
LYMPHOCYTES # BLD: 4.7 K/UL (ref 0.8–3.5)
LYMPHOCYTES NFR BLD: 38 % (ref 12–49)
MCH RBC QN AUTO: 29.8 PG (ref 26–34)
MCHC RBC AUTO-ENTMCNC: 35.4 G/DL (ref 30–36.5)
MCV RBC AUTO: 84 FL (ref 80–99)
MONOCYTES # BLD: 0.5 K/UL (ref 0–1)
MONOCYTES NFR BLD: 4 % (ref 5–13)
NEUTS SEG # BLD: 7.1 K/UL (ref 1.8–8)
NEUTS SEG NFR BLD: 57 % (ref 32–75)
NRBC # BLD: 0 K/UL (ref 0–0.01)
NRBC BLD-RTO: 0 PER 100 WBC
PLATELET # BLD AUTO: 248 K/UL (ref 150–400)
PMV BLD AUTO: 11.8 FL (ref 8.9–12.9)
POTASSIUM SERPL-SCNC: 3.3 MMOL/L (ref 3.5–5.1)
PROT SERPL-MCNC: 7.8 G/DL (ref 6.4–8.2)
RBC # BLD AUTO: 5.31 M/UL (ref 3.8–5.2)
SODIUM SERPL-SCNC: 141 MMOL/L (ref 136–145)
TROPONIN I SERPL HS-MCNC: 5 NG/L (ref 0–51)
WBC # BLD AUTO: 12.5 K/UL (ref 3.6–11)

## 2024-03-11 PROCEDURE — 85379 FIBRIN DEGRADATION QUANT: CPT

## 2024-03-11 PROCEDURE — 84484 ASSAY OF TROPONIN QUANT: CPT

## 2024-03-11 PROCEDURE — 85025 COMPLETE CBC W/AUTO DIFF WBC: CPT

## 2024-03-11 PROCEDURE — 71046 X-RAY EXAM CHEST 2 VIEWS: CPT

## 2024-03-11 PROCEDURE — 93005 ELECTROCARDIOGRAM TRACING: CPT | Performed by: STUDENT IN AN ORGANIZED HEALTH CARE EDUCATION/TRAINING PROGRAM

## 2024-03-11 PROCEDURE — 2580000003 HC RX 258: Performed by: STUDENT IN AN ORGANIZED HEALTH CARE EDUCATION/TRAINING PROGRAM

## 2024-03-11 PROCEDURE — 6360000002 HC RX W HCPCS: Performed by: STUDENT IN AN ORGANIZED HEALTH CARE EDUCATION/TRAINING PROGRAM

## 2024-03-11 PROCEDURE — 80053 COMPREHEN METABOLIC PANEL: CPT

## 2024-03-11 PROCEDURE — 36415 COLL VENOUS BLD VENIPUNCTURE: CPT

## 2024-03-11 PROCEDURE — 99285 EMERGENCY DEPT VISIT HI MDM: CPT

## 2024-03-11 RX ORDER — 0.9 % SODIUM CHLORIDE 0.9 %
1000 INTRAVENOUS SOLUTION INTRAVENOUS ONCE
Status: COMPLETED | OUTPATIENT
Start: 2024-03-11 | End: 2024-03-11

## 2024-03-11 RX ORDER — MORPHINE SULFATE 4 MG/ML
4 INJECTION, SOLUTION INTRAMUSCULAR; INTRAVENOUS
Status: DISCONTINUED | OUTPATIENT
Start: 2024-03-11 | End: 2024-03-11 | Stop reason: HOSPADM

## 2024-03-11 RX ORDER — BENZONATATE 200 MG/1
200 CAPSULE ORAL 3 TIMES DAILY PRN
COMMUNITY
Start: 2023-12-17

## 2024-03-11 RX ORDER — AZITHROMYCIN 250 MG/1
500 TABLET, FILM COATED ORAL DAILY
COMMUNITY

## 2024-03-11 RX ORDER — AMOXICILLIN AND CLAVULANATE POTASSIUM 875; 125 MG/1; MG/1
1 TABLET, FILM COATED ORAL 2 TIMES DAILY
COMMUNITY

## 2024-03-11 RX ORDER — METHYLPREDNISOLONE 4 MG/1
2 TABLET ORAL DAILY
COMMUNITY

## 2024-03-11 RX ADMIN — SODIUM CHLORIDE 1000 ML: 9 INJECTION, SOLUTION INTRAVENOUS at 15:36

## 2024-03-11 ASSESSMENT — ENCOUNTER SYMPTOMS
NAUSEA: 0
EYE PAIN: 0
SHORTNESS OF BREATH: 1
SORE THROAT: 0
DIARRHEA: 0
COUGH: 1
VOMITING: 0
ABDOMINAL PAIN: 0

## 2024-03-11 ASSESSMENT — LIFESTYLE VARIABLES
HOW MANY STANDARD DRINKS CONTAINING ALCOHOL DO YOU HAVE ON A TYPICAL DAY: PATIENT DOES NOT DRINK
HOW OFTEN DO YOU HAVE A DRINK CONTAINING ALCOHOL: NEVER

## 2024-03-11 NOTE — ED NOTES
Patient stable at time of discharge. Reviewed discharge instructions, medications, and follow up with patient. Allowed time for questions. Patient verbalized understanding. Ambulatory out of department with steady gait unaccompanied.

## 2024-03-11 NOTE — ED NOTES
Patient started yelling \"help\" entered the room to find patient tearful and stating she had sudden onset of upper back pain. Pain located upper right side and sharp. PA made aware. Medicated as per MAR.

## 2024-03-11 NOTE — ED TRIAGE NOTES
Patient reports that she was diagnosed with community acquired pneumonia on Thursday without xray confirmation at [facility]. Flu, Strep and Covid tests were negative at that time. Patient taking two antibiotics, medrol dose pack, mucinex, albuterol and benzonatate without relief. Pt reports increased shortness of breath with productive cough this afternoon while shopping. Pt hyperventilating at at rate of 28 breaths per minute. SPO2 98% on room air. Pt reports chest pain only when coughing.

## 2024-03-11 NOTE — DISCHARGE INSTRUCTIONS
Continue to monitor symptoms at home.  Continue to take medications as prescribed.  Return with any changes or worsening.

## 2024-03-11 NOTE — ED PROVIDER NOTES
[facility] EMERGENCY CTR  EMERGENCY DEPARTMENT ENCOUNTER      Pt Name: Rose Marie Feng  MRN: [id number]  Birthdate 1975  Date of evaluation: 3/11/2024  Provider: ENMANUEL NICOLE    CHIEF COMPLAINT       Chief Complaint   Patient presents with    Shortness of Breath         HISTORY OF PRESENT ILLNESS   (Location/Symptom, Timing/Onset, Context/Setting, Quality, Duration, Modifying Factors, Severity)  Note limiting factors.   48-year-old female presents to ED with shortness of breath and cough.  Patient reports that for the past several days she has had increased coughing and feeling sick.  She went to [facility] urgent care on Thursday, 03/07 where she tested negative for flu, strep and COVID.  She was diagnosed with community-acquired pneumonia but did not get a chest x-ray.  She was discharged with Medrol Dosepak, albuterol, Tessalon Perles, Augmentin and Z-Armon.  She has been taking all of these since then with little relief.  She reports that this afternoon while shopping she started having a cough of being fit and got acutely short of breath and reports she has felt short of breath since.  She called EMS who brought her in.  She notes chest pain with coughing and low-grade fevers recently but otherwise denies any rash, chills, nausea, vomiting, diarrhea.  Denies any known cardiac issues.            Review of External Medical Records:     Nursing Notes were reviewed.    REVIEW OF SYSTEMS    (2-9 systems for level 4, 10 or more for level 5)     Review of Systems   Constitutional:  Negative for chills and fever.   HENT:  Negative for congestion, ear pain and sore throat.    Eyes:  Negative for pain.   Respiratory:  Positive for cough and shortness of breath.    Cardiovascular:  Negative for chest pain.   Gastrointestinal:  Negative for abdominal pain, diarrhea, nausea and vomiting.   Genitourinary:  Negative for dysuria and flank pain.   Musculoskeletal:  Negative for myalgias.   Skin:  Negative for rash.  Anesthesia Pre Eval Note    OB Evaluation    Anesthesia ROS/Med Hx        Anesthetic Complication History:  Patient does not have a history of anesthetic complications      Pulmonary Review:  Patient does not have a pulmonary history    Negative for sleep apnea     Neuro/Psych Review:    Negative for CVA  Positive for psychiatric history    Cardiovascular Review:  Exercise tolerance: good (>4 METS)  Negative for CAD    Negative for dysrhythmias  Negative for hypertension    GI/HEPATIC/RENAL Review:  Negative for liver disease  Negative for renal disease    End/Other Review:  Negative for diabetes  Negative for hypothyroidism      Relevant Problems   No relevant active problems       Physical Exam     Airway   Mallampati: II    Cardiovascular    Cardio Rhythm: Regular  Cardio Rate: Normal    Head Assessment  Head assessment: Normocephalic    General Assessment  General Assessment: Alert and oriented    Pulmonary Exam  Pulmonary exam normal  Breath sounds clear to auscultation:  Yes    Abdominal Exam  Abdominal exam normal      Anesthesia Plan    ASA Status: 2    Anesthesia Type: General    Induction: Intravenous  Preferred Airway Type: ETT  Maintenance: Inhalational      Risks Discussed: Nausea, Vomiting and Dental Injury    Post-op Pain Management: Per Surgeon      Checklist  Reviewed: NPO Status, Allergies, Medications, Problem list, Past Med History, Lab Results, EKG, Patient Summary, Beta Blocker Status, Outside Records, DNR Status, Consultations and Nursing Notes    Informed Consent  The proposed anesthetic plan, including its risks and benefits, have been discussed with the Patient  - along with the risks and benefits of alternatives.  Questions were encouraged and answered and the patient and/or representative understands and agrees to proceed.     Blood Products: Not Anticipated

## 2024-03-12 LAB
EKG ATRIAL RATE: 81 BPM
EKG DIAGNOSIS: NORMAL
EKG P AXIS: -10 DEGREES
EKG P-R INTERVAL: 148 MS
EKG Q-T INTERVAL: 386 MS
EKG QRS DURATION: 94 MS
EKG QTC CALCULATION (BAZETT): 450 MS
EKG R AXIS: 9 DEGREES
EKG T AXIS: 36 DEGREES
EKG VENTRICULAR RATE: 82 BPM

## 2024-03-12 PROCEDURE — 93010 ELECTROCARDIOGRAM REPORT: CPT | Performed by: INTERNAL MEDICINE

## 2024-03-19 ENCOUNTER — TELEPHONE (OUTPATIENT)
Age: 49
End: 2024-03-19

## 2024-03-19 ENCOUNTER — OFFICE VISIT (OUTPATIENT)
Age: 49
End: 2024-03-19
Payer: COMMERCIAL

## 2024-03-19 VITALS
DIASTOLIC BLOOD PRESSURE: 67 MMHG | HEIGHT: 67 IN | WEIGHT: 198.8 LBS | RESPIRATION RATE: 16 BRPM | HEART RATE: 94 BPM | SYSTOLIC BLOOD PRESSURE: 99 MMHG | TEMPERATURE: 98.5 F | BODY MASS INDEX: 31.2 KG/M2 | OXYGEN SATURATION: 98 %

## 2024-03-19 DIAGNOSIS — E11.65 UNCONTROLLED TYPE 2 DIABETES MELLITUS WITH HYPERGLYCEMIA (HCC): Primary | ICD-10-CM

## 2024-03-19 PROCEDURE — 99213 OFFICE O/P EST LOW 20 MIN: CPT | Performed by: NURSE PRACTITIONER

## 2024-03-19 RX ORDER — FLUCONAZOLE 150 MG/1
TABLET ORAL
COMMUNITY
Start: 2024-03-07

## 2024-03-19 RX ORDER — DAPAGLIFLOZIN 5 MG/1
5 TABLET, FILM COATED ORAL EVERY MORNING
Qty: 30 TABLET | Refills: 2 | Status: SHIPPED | OUTPATIENT
Start: 2024-03-19

## 2024-03-19 RX ORDER — FLUCONAZOLE 150 MG/1
300 TABLET ORAL ONCE
Qty: 2 TABLET | Refills: 3 | Status: SHIPPED | OUTPATIENT
Start: 2024-03-19 | End: 2024-03-19

## 2024-03-19 ASSESSMENT — PATIENT HEALTH QUESTIONNAIRE - PHQ9
SUM OF ALL RESPONSES TO PHQ QUESTIONS 1-9: 1
SUM OF ALL RESPONSES TO PHQ QUESTIONS 1-9: 1
1. LITTLE INTEREST OR PLEASURE IN DOING THINGS: NOT AT ALL
8. MOVING OR SPEAKING SO SLOWLY THAT OTHER PEOPLE COULD HAVE NOTICED. OR THE OPPOSITE, BEING SO FIGETY OR RESTLESS THAT YOU HAVE BEEN MOVING AROUND A LOT MORE THAN USUAL: NOT AT ALL
10. IF YOU CHECKED OFF ANY PROBLEMS, HOW DIFFICULT HAVE THESE PROBLEMS MADE IT FOR YOU TO DO YOUR WORK, TAKE CARE OF THINGS AT HOME, OR GET ALONG WITH OTHER PEOPLE: NOT DIFFICULT AT ALL
6. FEELING BAD ABOUT YOURSELF - OR THAT YOU ARE A FAILURE OR HAVE LET YOURSELF OR YOUR FAMILY DOWN: NOT AT ALL
5. POOR APPETITE OR OVEREATING: NOT AT ALL
4. FEELING TIRED OR HAVING LITTLE ENERGY: NOT AT ALL
3. TROUBLE FALLING OR STAYING ASLEEP: NOT AT ALL
2. FEELING DOWN, DEPRESSED OR HOPELESS: NOT AT ALL
9. THOUGHTS THAT YOU WOULD BE BETTER OFF DEAD, OR OF HURTING YOURSELF: NOT AT ALL
SUM OF ALL RESPONSES TO PHQ QUESTIONS 1-9: 1
SUM OF ALL RESPONSES TO PHQ QUESTIONS 1-9: 1
SUM OF ALL RESPONSES TO PHQ9 QUESTIONS 1 & 2: 0
7. TROUBLE CONCENTRATING ON THINGS, SUCH AS READING THE NEWSPAPER OR WATCHING TELEVISION: SEVERAL DAYS

## 2024-03-19 NOTE — TELEPHONE ENCOUNTER
Patients pharmacy called hoping to find out if the instructions were correct on the medication Diflucan. They just want to make sure we meant for the patient to take 2 150 mg tablets once daily.    Hung up before I could get a best call back number

## 2024-03-19 NOTE — PROGRESS NOTES
Chief Complaint   Patient presents with    Follow-up    Medication Refill    Blood Work     \"Have you been to the ER, urgent care clinic since your last visit?  Hospitalized since your last visit?\"    YES - When: approximately 1  weeks ago.  Where and Why: [facility].    “Have you seen or consulted any other health care providers outside of [facility] since your last visit?”    NO            
(90620 UT) capsule Take 1 capsule by mouth once a week 12 capsule 0    ibuprofen (ADVIL;MOTRIN) 800 MG tablet Take by mouth every 8 hours as needed      methylPREDNISolone (MEDROL) 4 MG tablet Take 0.5 tablets by mouth daily (Patient not taking: Reported on 3/19/2024)       No current facility-administered medications for this visit.       Allergies   Allergen Reactions    Ciprofibrate Hives    Ciprofloxacin Hives    Empagliflozin Other (See Comments)     Fungal infection    Meperidine Other (See Comments)     Goofy feeling, hallucinates    Other reaction(s): Other (See Comments)    Off balance    Other reaction(s): Hallucinations    Goofy feeling, hallucinates Other reaction(s): Other (See Comments) Off balance Other reaction(s): Hallucinations Off balance    Meperidine Hcl Hallucinations    Metformin Diarrhea       ROS:   Review of Systems   Constitutional:  Negative for fatigue.   Respiratory:  Negative for shortness of breath.    Cardiovascular:  Negative for chest pain and leg swelling.         Objective:   BP 99/67 (Site: Left Upper Arm, Position: Sitting, Cuff Size: Large Adult)   Pulse 94   Temp 98.5 °F (36.9 °C)   Resp 16   Ht 1.702 m (5' 7\")   Wt 90.2 kg (198 lb 12.8 oz)   SpO2 98%   BMI 31.14 kg/m²     Vitals and Nurse Documentation reviewed.     Physical Exam  Constitutional:       Appearance: Normal appearance. She is obese.   Cardiovascular:      Rate and Rhythm: Normal rate.      Heart sounds: No murmur heard.     No friction rub. No gallop.   Pulmonary:      Effort: Pulmonary effort is normal.      Breath sounds: Normal breath sounds.   Neurological:      Mental Status: She is alert.   Psychiatric:         Behavior: Behavior normal.         Thought Content: Thought content normal.

## 2024-03-26 ASSESSMENT — ENCOUNTER SYMPTOMS: SHORTNESS OF BREATH: 0

## 2024-03-29 ENCOUNTER — TELEPHONE (OUTPATIENT)
Age: 49
End: 2024-03-29

## 2024-03-29 NOTE — TELEPHONE ENCOUNTER
Chief Complaint   Patient presents with    Medication Refill    Prior Authorization     DAPAGLIFLOZIN 5 MG: Approved until 3/22/2025

## 2024-05-07 ENCOUNTER — TELEPHONE (OUTPATIENT)
Age: 49
End: 2024-05-07

## 2024-05-07 NOTE — TELEPHONE ENCOUNTER
[facility] called stating that the patient is hoping to have 3 boxes of mounjaro sent into the pharmacy, so insurance can cover it, and her co-pay can be lower.    Best call back number  [phone]

## 2024-05-08 NOTE — TELEPHONE ENCOUNTER
Spoke with [facility] pharmacy and informed them that the patient had a 90 day supply with one refill sent in February.

## 2024-06-24 DIAGNOSIS — E11.65 UNCONTROLLED TYPE 2 DIABETES MELLITUS WITH HYPERGLYCEMIA (HCC): ICD-10-CM

## 2024-06-25 RX ORDER — DAPAGLIFLOZIN 5 MG/1
5 TABLET, FILM COATED ORAL EVERY MORNING
Qty: 30 TABLET | Refills: 0 | Status: SHIPPED | OUTPATIENT
Start: 2024-06-25

## 2024-06-25 NOTE — TELEPHONE ENCOUNTER
Last appointment: 03/19/2024 KOKO Charles   Next appointment: Nothing scheduled  Previous refill encounter(s):   03/19/2024 Farxiga #30 with 2 refills.     For Pharmacy Admin Tracking Only    Program: Medication Refill  Intervention Detail: New Rx: 1, reason: Patient Preference  Time Spent (min): 5    Requested Prescriptions     Pending Prescriptions Disp Refills    dapagliflozin (FARXIGA) 5 MG tablet [Pharmacy Med Name: Dapagliflozin Propanediol 5 MG Oral Tablet] 30 tablet 0     Sig: TAKE 1 TABLET BY MOUTH ONCE DAILY IN THE MORNING

## 2024-07-23 DIAGNOSIS — E11.65 TYPE 2 DIABETES MELLITUS WITH HYPERGLYCEMIA, WITH LONG-TERM CURRENT USE OF INSULIN (HCC): ICD-10-CM

## 2024-07-23 DIAGNOSIS — E11.65 UNCONTROLLED TYPE 2 DIABETES MELLITUS WITH HYPERGLYCEMIA (HCC): ICD-10-CM

## 2024-07-23 DIAGNOSIS — Z79.4 TYPE 2 DIABETES MELLITUS WITH HYPERGLYCEMIA, WITH LONG-TERM CURRENT USE OF INSULIN (HCC): ICD-10-CM

## 2024-07-24 RX ORDER — PRAVASTATIN SODIUM 40 MG
40 TABLET ORAL DAILY
Qty: 90 TABLET | Refills: 0 | Status: SHIPPED | OUTPATIENT
Start: 2024-07-24

## 2024-07-24 RX ORDER — PANTOPRAZOLE SODIUM 40 MG/1
40 TABLET, DELAYED RELEASE ORAL DAILY
Qty: 90 TABLET | Refills: 1 | Status: SHIPPED | OUTPATIENT
Start: 2024-07-24

## 2024-07-24 RX ORDER — DAPAGLIFLOZIN 5 MG/1
5 TABLET, FILM COATED ORAL EVERY MORNING
Qty: 30 TABLET | Refills: 0 | Status: SHIPPED | OUTPATIENT
Start: 2024-07-24

## 2024-07-24 NOTE — TELEPHONE ENCOUNTER
Last appointment: 3/19/24 [place], lipid 9/2023  Next appointment: None  Previous refill encounter(s):   Pravastatin 2/12/24 90  Pantoprazole 2/27/24 90    Requested Prescriptions     Pending Prescriptions Disp Refills    pravastatin (PRAVACHOL) 40 MG tablet [Pharmacy Med Name: Pravastatin Sodium 40 MG Oral Tablet] 90 tablet 0     Sig: Take 1 tablet by mouth daily    pantoprazole (PROTONIX) 40 MG tablet [Pharmacy Med Name: Pantoprazole Sodium 40 MG Oral Tablet Delayed Release] 90 tablet 1     Sig: Take 1 tablet by mouth daily For acid reflux     For Pharmacy Admin Tracking Only    Program: Medication Refill  CPA in place:    Recommendation Provided To:   Intervention Detail: New Rx: 2, reason: Patient Preference  Intervention Accepted By:   Gap Closed?:    Time Spent (min): 5

## 2024-09-03 DIAGNOSIS — E11.65 UNCONTROLLED TYPE 2 DIABETES MELLITUS WITH HYPERGLYCEMIA (HCC): ICD-10-CM

## 2024-09-03 RX ORDER — DAPAGLIFLOZIN 5 MG/1
5 TABLET, FILM COATED ORAL EVERY MORNING
Qty: 90 TABLET | Refills: 0 | Status: SHIPPED | OUTPATIENT
Start: 2024-09-03

## 2024-09-03 RX ORDER — OXYBUTYNIN CHLORIDE 10 MG/1
10 TABLET, EXTENDED RELEASE ORAL DAILY
Qty: 90 TABLET | Refills: 0 | Status: SHIPPED | OUTPATIENT
Start: 2024-09-03

## 2024-09-03 NOTE — TELEPHONE ENCOUNTER
PCP: Socorro Charles APRN - NP    Last appt: 3/19/2024   Future Appointments   Date Time Provider Department Center   9/9/2024 10:30 AM Florentino Oneil MD [facility]P [facility]   9/18/2024  1:30 PM [facility]       Requested Prescriptions     Pending Prescriptions Disp Refills    oxyBUTYnin (DITROPAN-XL) 10 MG extended release tablet [Pharmacy Med Name: Oxybutynin Chloride ER 10 MG Oral Tablet Extended Release 24 Hour] 90 tablet 0     Sig: Take 1 tablet by mouth once daily    dapagliflozin (FARXIGA) 5 MG tablet [Pharmacy Med Name: Dapagliflozin Propanediol 5 MG Oral Tablet] 30 tablet 0     Sig: TAKE 1 TABLET BY MOUTH ONCE DAILY IN THE MORNING         Prior labs and Blood pressures:  BP Readings from Last 3 Encounters:   03/19/24 99/67   03/11/24 130/79   09/15/23 111/75     Lab Results   Component Value Date/Time     03/11/2024 02:45 PM    K 3.3 03/11/2024 02:45 PM     03/11/2024 02:45 PM    CO2 20 03/11/2024 02:45 PM    BUN 15 03/11/2024 02:45 PM    GFRAA >60 08/01/2022 02:58 PM     Lab Results   Component Value Date/Time    CXF0KRWZ 7.6 02/02/2022 11:54 AM     Lab Results   Component Value Date/Time    CHOL 152 09/15/2023 09:38 AM    HDL 42 09/15/2023 09:38 AM    LDL 52.4 09/15/2023 09:38 AM    VLDL 57.6 09/15/2023 09:38 AM     No results found for: \"VITD3\"    Lab Results   Component Value Date/Time    TSH 1.03 06/22/2021 11:42 AM

## 2024-09-18 ENCOUNTER — OFFICE VISIT (OUTPATIENT)
Age: 49
End: 2024-09-18

## 2024-09-18 VITALS
HEIGHT: 67 IN | WEIGHT: 191.6 LBS | RESPIRATION RATE: 14 BRPM | OXYGEN SATURATION: 98 % | DIASTOLIC BLOOD PRESSURE: 81 MMHG | TEMPERATURE: 97.4 F | HEART RATE: 74 BPM | BODY MASS INDEX: 30.07 KG/M2 | SYSTOLIC BLOOD PRESSURE: 126 MMHG

## 2024-09-18 DIAGNOSIS — Z79.4 TYPE 2 DIABETES MELLITUS WITH HYPERGLYCEMIA, WITH LONG-TERM CURRENT USE OF INSULIN (HCC): Primary | ICD-10-CM

## 2024-09-18 DIAGNOSIS — F31.31 BIPOLAR AFFECTIVE DISORDER, CURRENTLY DEPRESSED, MILD (HCC): ICD-10-CM

## 2024-09-18 DIAGNOSIS — Z12.31 SCREENING MAMMOGRAM, ENCOUNTER FOR: ICD-10-CM

## 2024-09-18 DIAGNOSIS — E11.65 TYPE 2 DIABETES MELLITUS WITH HYPERGLYCEMIA, WITH LONG-TERM CURRENT USE OF INSULIN (HCC): Primary | ICD-10-CM

## 2024-09-18 DIAGNOSIS — Z23 NEEDS FLU SHOT: ICD-10-CM

## 2024-09-18 SDOH — ECONOMIC STABILITY: FOOD INSECURITY: WITHIN THE PAST 12 MONTHS, THE FOOD YOU BOUGHT JUST DIDN'T LAST AND YOU DIDN'T HAVE MONEY TO GET MORE.: NEVER TRUE

## 2024-09-18 SDOH — ECONOMIC STABILITY: INCOME INSECURITY: HOW HARD IS IT FOR YOU TO PAY FOR THE VERY BASICS LIKE FOOD, HOUSING, MEDICAL CARE, AND HEATING?: NOT HARD AT ALL

## 2024-09-18 SDOH — ECONOMIC STABILITY: FOOD INSECURITY: WITHIN THE PAST 12 MONTHS, YOU WORRIED THAT YOUR FOOD WOULD RUN OUT BEFORE YOU GOT MONEY TO BUY MORE.: NEVER TRUE

## 2024-09-19 LAB
ALBUMIN SERPL-MCNC: 4.1 G/DL (ref 3.5–5)
ALBUMIN/GLOB SERPL: 1.4 (ref 1.1–2.2)
ALP SERPL-CCNC: 67 U/L (ref 45–117)
ALT SERPL-CCNC: 18 U/L (ref 12–78)
ANION GAP SERPL CALC-SCNC: 4 MMOL/L (ref 2–12)
AST SERPL-CCNC: 12 U/L (ref 15–37)
BASOPHILS # BLD: 0 K/UL (ref 0–0.1)
BASOPHILS NFR BLD: 0 % (ref 0–1)
BILIRUB SERPL-MCNC: 0.4 MG/DL (ref 0.2–1)
BUN SERPL-MCNC: 13 MG/DL (ref 6–20)
BUN/CREAT SERPL: 13 (ref 12–20)
CALCIUM SERPL-MCNC: 9.9 MG/DL (ref 8.5–10.1)
CHLORIDE SERPL-SCNC: 108 MMOL/L (ref 97–108)
CHOLEST SERPL-MCNC: 151 MG/DL
CO2 SERPL-SCNC: 28 MMOL/L (ref 21–32)
CREAT SERPL-MCNC: 0.99 MG/DL (ref 0.55–1.02)
CREAT UR-MCNC: 54.3 MG/DL
DIFFERENTIAL METHOD BLD: ABNORMAL
EOSINOPHIL # BLD: 0.2 K/UL (ref 0–0.4)
EOSINOPHIL NFR BLD: 1 % (ref 0–7)
ERYTHROCYTE [DISTWIDTH] IN BLOOD BY AUTOMATED COUNT: 13.3 % (ref 11.5–14.5)
EST. AVERAGE GLUCOSE BLD GHB EST-MCNC: 137 MG/DL
GLOBULIN SER CALC-MCNC: 3 G/DL (ref 2–4)
GLUCOSE SERPL-MCNC: 146 MG/DL (ref 65–100)
HBA1C MFR BLD: 6.4 % (ref 4–5.6)
HCT VFR BLD AUTO: 42.9 % (ref 35–47)
HDLC SERPL-MCNC: 52 MG/DL
HDLC SERPL: 2.9 (ref 0–5)
HGB BLD-MCNC: 14.3 G/DL (ref 11.5–16)
IMM GRANULOCYTES # BLD AUTO: 0 K/UL (ref 0–0.04)
IMM GRANULOCYTES NFR BLD AUTO: 0 % (ref 0–0.5)
LDLC SERPL CALC-MCNC: 56.6 MG/DL (ref 0–100)
LYMPHOCYTES # BLD: 4 K/UL (ref 0.8–3.5)
LYMPHOCYTES NFR BLD: 35 % (ref 12–49)
MCH RBC QN AUTO: 30.3 PG (ref 26–34)
MCHC RBC AUTO-ENTMCNC: 33.3 G/DL (ref 30–36.5)
MCV RBC AUTO: 90.9 FL (ref 80–99)
MICROALBUMIN UR-MCNC: 0.53 MG/DL
MICROALBUMIN/CREAT UR-RTO: 10 MG/G (ref 0–30)
MONOCYTES # BLD: 0.4 K/UL (ref 0–1)
MONOCYTES NFR BLD: 4 % (ref 5–13)
NEUTS SEG # BLD: 6.8 K/UL (ref 1.8–8)
NEUTS SEG NFR BLD: 60 % (ref 32–75)
NRBC # BLD: 0 K/UL (ref 0–0.01)
NRBC BLD-RTO: 0 PER 100 WBC
PLATELET # BLD AUTO: 214 K/UL (ref 150–400)
PMV BLD AUTO: 12.1 FL (ref 8.9–12.9)
POTASSIUM SERPL-SCNC: 4.1 MMOL/L (ref 3.5–5.1)
PROT SERPL-MCNC: 7.1 G/DL (ref 6.4–8.2)
RBC # BLD AUTO: 4.72 M/UL (ref 3.8–5.2)
SODIUM SERPL-SCNC: 140 MMOL/L (ref 136–145)
TRIGL SERPL-MCNC: 212 MG/DL
VLDLC SERPL CALC-MCNC: 42.4 MG/DL
WBC # BLD AUTO: 11.4 K/UL (ref 3.6–11)

## 2024-09-26 DIAGNOSIS — E11.65 TYPE 2 DIABETES MELLITUS WITH HYPERGLYCEMIA, WITH LONG-TERM CURRENT USE OF INSULIN (HCC): ICD-10-CM

## 2024-09-26 DIAGNOSIS — Z79.4 TYPE 2 DIABETES MELLITUS WITH HYPERGLYCEMIA, WITH LONG-TERM CURRENT USE OF INSULIN (HCC): ICD-10-CM

## 2024-09-29 RX ORDER — BLOOD-GLUCOSE SENSOR
EACH MISCELLANEOUS
Qty: 2 EACH | Refills: 5 | Status: SHIPPED | OUTPATIENT
Start: 2024-09-29

## 2024-11-05 DIAGNOSIS — Z79.4 TYPE 2 DIABETES MELLITUS WITH HYPERGLYCEMIA, WITH LONG-TERM CURRENT USE OF INSULIN (HCC): ICD-10-CM

## 2024-11-05 DIAGNOSIS — E11.65 TYPE 2 DIABETES MELLITUS WITH HYPERGLYCEMIA, WITH LONG-TERM CURRENT USE OF INSULIN (HCC): ICD-10-CM

## 2024-11-05 RX ORDER — PRAVASTATIN SODIUM 40 MG
40 TABLET ORAL DAILY
Qty: 30 TABLET | Refills: 0 | Status: SHIPPED | OUTPATIENT
Start: 2024-11-05

## 2024-11-05 NOTE — TELEPHONE ENCOUNTER
PCP: Socorro Charles, APRN - NP    Last appt: 9/18/2024   No future appointments.    Requested Prescriptions     Pending Prescriptions Disp Refills    pravastatin (PRAVACHOL) 40 MG tablet [Pharmacy Med Name: Pravastatin Sodium 40 MG Oral Tablet] 30 tablet 0     Sig: Take 1 tablet by mouth once daily         Prior labs and Blood pressures:  BP Readings from Last 3 Encounters:   09/18/24 126/81   03/19/24 99/67   03/11/24 130/79     Lab Results   Component Value Date/Time     09/18/2024 02:51 PM    K 4.1 09/18/2024 02:51 PM     09/18/2024 02:51 PM    CO2 28 09/18/2024 02:51 PM    BUN 13 09/18/2024 02:51 PM    GFRAA >60 08/01/2022 02:58 PM     Lab Results   Component Value Date/Time    [id number] 7.6 02/02/2022 11:54 AM     Lab Results   Component Value Date/Time    CHOL 151 09/18/2024 02:51 PM    HDL 52 09/18/2024 02:51 PM    LDL 56.6 09/18/2024 02:51 PM    LDL 52.4 09/15/2023 09:38 AM    VLDL 42.4 09/18/2024 02:51 PM     No results found for: \"VITD3\"    Lab Results   Component Value Date/Time    TSH 1.03 06/22/2021 11:42 AM       
Detail Level: Simple

## (undated) DEVICE — HANDPIECE SET WITH BONE CLEANING TIP AND SUCTION TUBE: Brand: INTERPULSE

## (undated) DEVICE — STERILE POLYISOPRENE POWDER-FREE SURGICAL GLOVES WITH EMOLLIENT COATING: Brand: PROTEXIS

## (undated) DEVICE — BLADE SURG SAW SAG S STL 85MM LEN 25.4MM W 1.19MM THCK

## (undated) DEVICE — SOLUTION IV 1000ML 0.9% SOD CHL

## (undated) DEVICE — HOOK LOCK LATEX FREE ELASTIC BANDAGE D/L 6INX10YD

## (undated) DEVICE — SPONGE GZ W4XL4IN COT RADPQ HIGHLY ABSRB

## (undated) DEVICE — TUBING, SUCTION, 1/4" X 10', STRAIGHT: Brand: MEDLINE

## (undated) DEVICE — 2108 SERIES SAGITTAL BLADE (18.6 X 0.8 X 73.8MM)

## (undated) DEVICE — TUBING HYDR IRR --

## (undated) DEVICE — INFECTION CONTROL KIT SYS

## (undated) DEVICE — Z DISCONTINUED GLOVE SURG SZ 7 L12IN FNGR THK13MIL WHT ISOLEX POLYISOPRENE

## (undated) DEVICE — DISPOSABLE TOURNIQUET CUFF SINGLE BLADDER, DUAL PORT AND QUICK CONNECT CONNECTOR: Brand: COLOR CUFF

## (undated) DEVICE — PREP SKN PREVAIL 40ML APPL --

## (undated) DEVICE — FORCEPS BX L240CM JAW DIA2.8MM L CAP W/ NDL MIC MESH TOOTH

## (undated) DEVICE — SCRUB DRY SURG EZ SCRUB BRUSH PREOPERATIVE GRN

## (undated) DEVICE — INTENDED USE FOR SURGICAL MARKING ON INTACT SKIN, ALSO PROVIDES A PERMANENT METHOD OF IDENTIFYING OBJECTS IN THE OPERATING ROOM: Brand: WRITESITE® REGULAR TIP SKIN MARKER

## (undated) DEVICE — REM POLYHESIVE ADULT PATIENT RETURN ELECTRODE: Brand: VALLEYLAB

## (undated) DEVICE — TRAY CATH 16F URIN MTR LTX -- CONVERT TO ITEM 363111

## (undated) DEVICE — SOLUTION IRRIG 3000ML 0.9% SOD CHL FLX CONT 0797208] ICU MEDICAL INC]

## (undated) DEVICE — SUTURE VCRL SZ 1 L27IN ABSRB UD L36MM CP-1 1/2 CIR REV CUT J268H

## (undated) DEVICE — 3000CC GUARDIAN II: Brand: GUARDIAN

## (undated) DEVICE — KIT,ANTI FOG,W/SPONGE & FLUID,SOFT PACK: Brand: MEDLINE

## (undated) DEVICE — DEVON™ KNEE AND BODY STRAP 60" X 3" (1.5 M X 7.6 CM): Brand: DEVON

## (undated) DEVICE — Z INACTIVE NO USAGE TURNOVER KIT RM CLEANOP

## (undated) DEVICE — SLIM BODY SKIN STAPLER: Brand: APPOSE ULC

## (undated) DEVICE — GARMENT,MEDLINE,DVT,INT,CALF,MED, GEN2: Brand: MEDLINE

## (undated) DEVICE — Device

## (undated) DEVICE — ARGYLE FRAZIER SURGICAL SUCTION INSTRUMENT 10 FR/CH (3.3 MM): Brand: ARGYLE

## (undated) DEVICE — 3M™ IOBAN™ 2 ANTIMICROBIAL INCISE DRAPE 6651EZ: Brand: IOBAN™ 2

## (undated) DEVICE — SUTURE VCRL SZ 2-0 L27IN ABSRB UD L36MM CP-1 1/2 CIR REV J266H

## (undated) DEVICE — SMALL RIORDAN RASP 32.0MM

## (undated) DEVICE — PADDING CST 4INX4YD --